# Patient Record
Sex: FEMALE | NOT HISPANIC OR LATINO | Employment: OTHER | URBAN - METROPOLITAN AREA
[De-identification: names, ages, dates, MRNs, and addresses within clinical notes are randomized per-mention and may not be internally consistent; named-entity substitution may affect disease eponyms.]

---

## 2021-03-09 ENCOUNTER — HOSPITAL ENCOUNTER (OUTPATIENT)
Dept: VACCINE CLINIC | Facility: HOSPITAL | Age: 71
Discharge: HOME | End: 2021-03-09
Attending: INTERNAL MEDICINE

## 2021-03-30 ENCOUNTER — HOSPITAL ENCOUNTER (OUTPATIENT)
Dept: VACCINE CLINIC | Facility: HOSPITAL | Age: 71
Discharge: HOME | End: 2021-03-30
Attending: INTERNAL MEDICINE

## 2023-06-14 PROBLEM — I48.91 A-FIB: Status: ACTIVE | Noted: 2023-06-14

## 2023-06-15 PROBLEM — I48.91 NEW ONSET A-FIB: Status: ACTIVE | Noted: 2023-06-15

## 2023-09-22 ENCOUNTER — TELEPHONE (OUTPATIENT)
Dept: CARDIOLOGY | Facility: CLINIC | Age: 73
End: 2023-09-22
Payer: MEDICARE

## 2023-09-22 ENCOUNTER — ANTICOAGULATION VISIT (OUTPATIENT)
Dept: CARDIOLOGY | Facility: CLINIC | Age: 73
End: 2023-09-22
Payer: MEDICARE

## 2023-09-22 LAB — INR PPP: 2.1

## 2023-09-22 NOTE — PROGRESS NOTES
PCP is wanting to transfer monitoring of INR to cardiology.       INR   1.54 (09/18/23)  2.10 (09/21/23)     Range 2.0-3.0  Dose: 3 mg Mon/ Wed/ Sat/ Sun    4 mg Tues/ Thurs/ Fri     Patient is currently trying to get [facility] out to draw inr, appointment set for next week. Patient said she is disabled and only able to get rides 2 times a month.     Please advise

## 2023-09-22 NOTE — TELEPHONE ENCOUNTER
Caller: TAYLOR TUCKER    Relationship: PCP    What was the call regarding: PATIENTS PCP CALLED IN ASKING IF DR. CRAIG CAN TAKE OVER THE PATIENT INR AND WARFARIN AS THEY CAN'T HANDLE THAT. THEY DID CHECK PATIENT'S INR YESTERDAY AND IT WAS 2.1.     Is it okay if the provider responds through MyChart: NO

## 2023-09-25 ENCOUNTER — TELEPHONE (OUTPATIENT)
Dept: CARDIOLOGY | Facility: CLINIC | Age: 73
End: 2023-09-25

## 2023-09-25 ENCOUNTER — ANTICOAGULATION VISIT (OUTPATIENT)
Dept: CARDIOLOGY | Facility: CLINIC | Age: 73
End: 2023-09-25

## 2023-09-25 DIAGNOSIS — I48.19 PERSISTENT ATRIAL FIBRILLATION: Primary | ICD-10-CM

## 2023-09-25 LAB — INR PPP: 2.3 (ref 2–3)

## 2023-09-25 RX ORDER — WARFARIN SODIUM 2 MG/1
4 TABLET ORAL DAILY
Qty: 90 TABLET | Refills: 1 | Status: SHIPPED | OUTPATIENT
Start: 2023-09-25

## 2023-09-25 RX ORDER — FUROSEMIDE 20 MG/1
40 TABLET ORAL 2 TIMES DAILY
Qty: 360 TABLET | Refills: 1 | Status: SHIPPED | OUTPATIENT
Start: 2023-09-25

## 2023-09-25 RX ORDER — WARFARIN SODIUM 2 MG/1
2 TABLET ORAL DAILY
Qty: 90 TABLET | Refills: 1 | Status: SHIPPED | OUTPATIENT
Start: 2023-09-25 | End: 2023-09-25 | Stop reason: SDUPTHER

## 2023-09-25 NOTE — TELEPHONE ENCOUNTER
Caller: Inez Doyle    Relationship: Self    Best call back number: [phone]     What medication are you requesting: WARFARIN 2 MG    What are your current symptoms: PATIENT DOES NOT KNOW WHY SHE IS TAKING THE MEDICATION OTHER THAN CONTROLLING HER INR    How long have you been experiencing symptoms: N/A    Have you had these symptoms before:    [] Yes  [] No    Have you been treated for these symptoms before:   [x] Yes  [] No    If a prescription is needed, what is your preferred pharmacy and phone number:  [facility] [address] [phone]    Additional notes: HER ORIGINAL PRESCRIPTION IS NO LONGER PRACTICING AND HER NEW PCP STATED THAT DR. CRAIG HAD TO TAKE OVER HER WARFARIN PRESCRIPTION. SHE IS COMPLETELY OUT

## 2023-09-25 NOTE — PROGRESS NOTES
Lab Results   Component Value Date    INR 2.30 09/25/2023    INR 2.10 09/21/2023    INR 1.20 (H) 06/14/2023    PROTIME 15.3 (H) 06/14/2023    Atrial fibrillation   Range 2.0-3.0  4 mg  [facility] nurse     Pt is aware of results and instructions.

## 2023-09-25 NOTE — TELEPHONE ENCOUNTER
Caller: Inez Doyle    Relationship: Self    Best call back number: [phone]     Requested Prescriptions:   Requested Prescriptions     Pending Prescriptions Disp Refills    furosemide (LASIX) 20 MG tablet 120 tablet 1     Sig: Take 2 tablets by mouth 2 (Two) Times a Day for 60 days.        [facility] [address] [phone] [phone] FX     Last office visit with prescribing clinician: 7/18/2023   Last telemedicine visit with prescribing clinician: Visit date not found   Next office visit with prescribing clinician: 2/13/2024     Additional details provided by patient: PATIENT ADDED THAT SHE IS NEEDING OTHER MEDICATION FILLED AS WELL BUT IS NOT SURE WHAT ALL OF IT IS AND THAT THE PHARMACY WOULD KNOW. SIMILAR TO PREVIOUS MESSAGE, PATIENT STATED THAT HER PCP WON'T FILL THEM AND IS WANTING DR. CRAIG TO TAKE OVER THE MEDICATION     Does the patient have less than a 3 day supply:  [x] Yes  [] No    Would you like a call back once the refill request has been completed: [x] Yes [] No    If the office needs to give you a call back, can they leave a voicemail: [] Yes [] No    Annabelle Toure Rep   09/25/23 10:23 EDT

## 2023-09-26 ENCOUNTER — TELEPHONE (OUTPATIENT)
Dept: CARDIOLOGY | Facility: CLINIC | Age: 73
End: 2023-09-26

## 2023-09-26 NOTE — TELEPHONE ENCOUNTER
The [facility] received a fax that requires your attention. The document has been indexed to the patient’s chart for your review.      Reason for sending: EXTERNAL MEDICAL RECORD NOTIFICATION     [facility]    Name of Sender: [facility] [place]     Date Indexed: 9.26.23

## 2023-10-19 ENCOUNTER — TELEPHONE (OUTPATIENT)
Dept: CARDIOLOGY | Facility: CLINIC | Age: 73
End: 2023-10-19
Payer: MEDICARE

## 2023-10-19 NOTE — TELEPHONE ENCOUNTER
Filled out forms for Remote INR and Mdinr for home monitoring. Forms given to Debra to be scanned in

## 2023-10-24 ENCOUNTER — TELEPHONE (OUTPATIENT)
Dept: CARDIOLOGY | Facility: CLINIC | Age: 73
End: 2023-10-24

## 2023-10-25 ENCOUNTER — TELEPHONE (OUTPATIENT)
Dept: CARDIOLOGY | Facility: CLINIC | Age: 73
End: 2023-10-25
Payer: MEDICARE

## 2023-10-25 NOTE — TELEPHONE ENCOUNTER
Pt called and was denied at home INR and Daisha has filled out and sent in an at home INR for the pt to do herself but has not been completed yet. She is do an INR and wants to talk to her provider to see what she can do because she is very concerned and scared. Please reach out.

## 2023-10-25 NOTE — TELEPHONE ENCOUNTER
Called pt and she said it cost her 70 dollars to go to the hospital and wants to wait until we hear from the at home INR machine that could take over a week or 2 to hear if she gets approved. I sent her to Valentina DO to advise.

## 2023-11-01 ENCOUNTER — TELEPHONE (OUTPATIENT)
Dept: CARDIOLOGY | Facility: CLINIC | Age: 73
End: 2023-11-01

## 2023-11-01 NOTE — TELEPHONE ENCOUNTER
The [facility] received a fax that requires your attention. The document has been indexed to the patient’s chart for your review.      Reason for sending: EXTERNAL MEDICAL RECORD NOTIFICATION     Documents Description: PHYS ORD-[facility] AT HOME SIGN/RETURN-11.1.23    Name of Sender: [facility] [place]     Date Indexed: 11.1.23

## 2023-11-07 ENCOUNTER — TELEPHONE (OUTPATIENT)
Dept: CARDIOLOGY | Facility: CLINIC | Age: 73
End: 2023-11-07
Payer: MEDICARE

## 2023-11-07 NOTE — TELEPHONE ENCOUNTER
The wrong form was sent to [employer] for remote inr. [employer] is the company for [employer]. Filled out [employer] form and faxed w/ins .

## 2023-11-17 ENCOUNTER — ANTICOAGULATION VISIT (OUTPATIENT)
Dept: CARDIOLOGY | Facility: CLINIC | Age: 73
End: 2023-11-17
Payer: MEDICARE

## 2023-11-17 DIAGNOSIS — I48.19 PERSISTENT ATRIAL FIBRILLATION: Primary | ICD-10-CM

## 2023-11-17 LAB — INR PPP: 1.8

## 2023-11-17 NOTE — PROGRESS NOTES
Lab Results   Component Value Date    INR 1.80 11/17/2023    INR 2.30 09/25/2023    INR 2.10 09/21/2023    PROTIME 15.3 (H) 06/14/2023     DX  Atrial fibrillation   Taking 4MG every day   Range 2.0-3.0  Home Remote

## 2023-11-21 ENCOUNTER — ANTICOAGULATION VISIT (OUTPATIENT)
Dept: CARDIOLOGY | Facility: CLINIC | Age: 73
End: 2023-11-21
Payer: MEDICARE

## 2023-11-21 DIAGNOSIS — I48.19 PERSISTENT ATRIAL FIBRILLATION: Primary | ICD-10-CM

## 2023-11-21 LAB — INR PPP: 2

## 2023-11-21 NOTE — PROGRESS NOTES
Lab Results   Component Value Date    INR 1.80 11/17/2023    INR 2.30 09/25/2023    INR 2.10 09/21/2023    PROTIME 15.3 (H) 06/14/2023     Dx:afib  Pt taking 6/4/4  Range: 2.0-3.0  remote

## 2023-11-21 NOTE — PROGRESS NOTES
Chart was incorrect it didn't save in the new one from the 20th please advise  Lab Results   Component Value Date    INR 2.00 11/20/2023    INR 1.80 11/17/2023    INR 2.30 09/25/2023    PROTIME 15.3 (H) 06/14/2023     Dx: afib  Pt taking 6/4/4  Range: 2.0-3.0  remote   81.6

## 2023-11-27 ENCOUNTER — ANTICOAGULATION VISIT (OUTPATIENT)
Dept: CARDIOLOGY | Facility: CLINIC | Age: 73
End: 2023-11-27
Payer: MEDICARE

## 2023-11-27 DIAGNOSIS — I48.19 PERSISTENT ATRIAL FIBRILLATION: ICD-10-CM

## 2023-11-27 DIAGNOSIS — I48.19 PERSISTENT ATRIAL FIBRILLATION: Primary | ICD-10-CM

## 2023-11-27 LAB — INR PPP: 2

## 2023-11-27 NOTE — PROGRESS NOTES
Lab Results   Component Value Date    INR 2.00 11/27/2023    INR 2.00 11/20/2023    INR 1.80 11/17/2023    PROTIME 15.3 (H) 06/14/2023     Dx: afib  Pt taking 6/4/4  Range: 2.0-3.0  remote

## 2023-11-28 DIAGNOSIS — I48.19 PERSISTENT ATRIAL FIBRILLATION: ICD-10-CM

## 2023-12-06 ENCOUNTER — ANTICOAGULATION VISIT (OUTPATIENT)
Dept: CARDIOLOGY | Facility: CLINIC | Age: 73
End: 2023-12-06
Payer: MEDICARE

## 2023-12-06 DIAGNOSIS — I48.19 PERSISTENT ATRIAL FIBRILLATION: Primary | ICD-10-CM

## 2023-12-06 DIAGNOSIS — I48.19 PERSISTENT ATRIAL FIBRILLATION: ICD-10-CM

## 2023-12-06 LAB — INR PPP: 3

## 2023-12-06 NOTE — PROGRESS NOTES
Lab Results   Component Value Date    INR 3.00 12/06/2023    INR 2.00 11/27/2023    INR 2.00 11/20/2023    PROTIME 15.3 (H) 06/14/2023     DX  Atrial fibrillation   Taking 6/4/4  Range 2.0-3.0  Remote

## 2023-12-06 NOTE — ADDENDUM NOTE
Addended by: RACHELE DAVIS on: 12/6/2023 01:03 PM     Modules accepted: Orders, Level of Service

## 2023-12-12 ENCOUNTER — ANTICOAGULATION VISIT (OUTPATIENT)
Dept: CARDIOLOGY | Facility: CLINIC | Age: 73
End: 2023-12-12
Payer: MEDICARE

## 2023-12-12 DIAGNOSIS — I48.19 PERSISTENT ATRIAL FIBRILLATION: Primary | ICD-10-CM

## 2023-12-12 LAB — INR PPP: 2.5

## 2023-12-12 NOTE — PROGRESS NOTES
Lab Results   Component Value Date    INR 2.50 12/12/2023    INR 3.00 12/06/2023    INR 2.00 11/27/2023    PROTIME 15.3 (H) 06/14/2023     Dx: afib  Pt taking 4/4/6  Range: 2.0-3.0  remote

## 2023-12-19 ENCOUNTER — ANTICOAGULATION VISIT (OUTPATIENT)
Dept: CARDIOLOGY | Facility: CLINIC | Age: 73
End: 2023-12-19
Payer: MEDICARE

## 2023-12-19 DIAGNOSIS — I48.19 PERSISTENT ATRIAL FIBRILLATION: Primary | ICD-10-CM

## 2023-12-19 DIAGNOSIS — I48.19 PERSISTENT ATRIAL FIBRILLATION: ICD-10-CM

## 2023-12-19 LAB — INR PPP: 1.8

## 2023-12-19 NOTE — PROGRESS NOTES
Patient states she only takes 6 mg on Fridays, 4 mg all other days. Directions still the same? Thank you.

## 2023-12-19 NOTE — PROGRESS NOTES
Lab Results   Component Value Date    INR 1.80 12/19/2023    INR 2.50 12/12/2023    INR 3.00 12/06/2023    PROTIME 15.3 (H) 06/14/2023     Dx: afib  Pt taking 4/4/6  Range: 2.0-3.0  remote

## 2023-12-21 ENCOUNTER — TELEPHONE (OUTPATIENT)
Dept: CARDIOLOGY | Facility: CLINIC | Age: 73
End: 2023-12-21

## 2023-12-21 ENCOUNTER — ANTICOAGULATION VISIT (OUTPATIENT)
Dept: CARDIOLOGY | Facility: CLINIC | Age: 73
End: 2023-12-21
Payer: MEDICARE

## 2023-12-21 DIAGNOSIS — I48.19 PERSISTENT ATRIAL FIBRILLATION: Primary | ICD-10-CM

## 2023-12-21 LAB — INR PPP: 1.8

## 2023-12-21 NOTE — TELEPHONE ENCOUNTER
Caller:LADI MCCRAY    Phone:[phone]    INR Number Being Reported:  1.8    Day of the Week  Monday Tuesday 12/19 Wednesday 12/20 Thursday Friday Saturday Sunday     Dose Taken  5 5

## 2023-12-21 NOTE — PROGRESS NOTES
INR - 1.8    Currently taking 5mg qd.  Last checked 12/19/2023 with a result of 1.8.  Range: 2.0-3.0  Testing done at home.

## 2023-12-26 ENCOUNTER — ANTICOAGULATION VISIT (OUTPATIENT)
Dept: CARDIOLOGY | Facility: CLINIC | Age: 73
End: 2023-12-26
Payer: MEDICARE

## 2023-12-26 DIAGNOSIS — I48.19 PERSISTENT ATRIAL FIBRILLATION: Primary | ICD-10-CM

## 2023-12-26 LAB — INR PPP: 1.7 (ref 2–3)

## 2023-12-26 NOTE — PROGRESS NOTES
Lab Results   Component Value Date    INR 1.70 (A) 12/26/2023    INR 1.80 12/21/2023    INR 1.80 12/19/2023    PROTIME 15.3 (H) 06/14/2023    Atrial fibrillation   Range 2.0-3.0  6/5/5  Self test    Attempted call, no answer , no vm x 2    Pt is aware of results and instructions.

## 2023-12-28 ENCOUNTER — ANTICOAGULATION VISIT (OUTPATIENT)
Dept: CARDIOLOGY | Facility: CLINIC | Age: 73
End: 2023-12-28
Payer: MEDICARE

## 2023-12-28 DIAGNOSIS — I48.19 PERSISTENT ATRIAL FIBRILLATION: ICD-10-CM

## 2023-12-28 DIAGNOSIS — I48.19 PERSISTENT ATRIAL FIBRILLATION: Primary | ICD-10-CM

## 2023-12-28 NOTE — PROGRESS NOTES
Lab Results   Component Value Date    INR 1.70 (A) 12/26/2023    INR 1.80 12/21/2023    INR 1.80 12/19/2023    PROTIME 15.3 (H) 06/14/2023     DX Atrial fibrillation   Taking 6mg daily  Range 2.0-3.0  [facility]

## 2024-01-03 ENCOUNTER — ANTICOAGULATION VISIT (OUTPATIENT)
Dept: CARDIOLOGY | Facility: CLINIC | Age: 74
End: 2024-01-03
Payer: MEDICARE

## 2024-01-03 ENCOUNTER — TELEPHONE (OUTPATIENT)
Dept: CARDIOLOGY | Facility: CLINIC | Age: 74
End: 2024-01-03
Payer: MEDICARE

## 2024-01-03 DIAGNOSIS — I48.19 PERSISTENT ATRIAL FIBRILLATION: Primary | ICD-10-CM

## 2024-01-03 LAB — INR PPP: 3

## 2024-01-03 NOTE — PROGRESS NOTES
Lab Results   Component Value Date    INR 3.00 01/03/2024    INR 1.70 (A) 12/26/2023    INR 1.80 12/21/2023    PROTIME 15.3 (H) 06/14/2023     Dx:afib  Pt taking 7/6/6  Range: 2.0-3.0  Remote    Call Justina WALKER back [phone]

## 2024-01-03 NOTE — TELEPHONE ENCOUNTER
"Alexa KING called and relayed to me that she spoke with MsYandy Yanira and \"is home bound and does not have any INR testing supplies until approximately the 8th.\" She wanted to know if it is ok to check her INR when her supplies arrive. (She was do to check yesterdat,Tuesday)  "

## 2024-01-09 ENCOUNTER — ANTICOAGULATION VISIT (OUTPATIENT)
Dept: CARDIOLOGY | Facility: CLINIC | Age: 74
End: 2024-01-09
Payer: MEDICARE

## 2024-01-09 DIAGNOSIS — I48.19 PERSISTENT ATRIAL FIBRILLATION: Primary | ICD-10-CM

## 2024-01-09 LAB — INR PPP: 3.5

## 2024-01-09 NOTE — PROGRESS NOTES
INR - 3.5    Currently taking 6/6/7mg rotation.  Last checked 1/3/2024 with a result of 3.0.  Range: 2.0-3.0  Testing done at home.

## 2024-01-12 ENCOUNTER — ANTICOAGULATION VISIT (OUTPATIENT)
Dept: CARDIOLOGY | Facility: CLINIC | Age: 74
End: 2024-01-12
Payer: MEDICARE

## 2024-01-12 DIAGNOSIS — I48.19 PERSISTENT ATRIAL FIBRILLATION: Primary | ICD-10-CM

## 2024-01-12 DIAGNOSIS — I48.19 PERSISTENT ATRIAL FIBRILLATION: ICD-10-CM

## 2024-01-12 LAB — INR PPP: 3.6

## 2024-01-12 NOTE — PROGRESS NOTES
INR - 3.6    Currently taking 6mg qd.  Last checked 1/9/2024 with a result of 3.5.  Range: 2.0-3.0  Testing done at home.    Per MIKE Mercedes take 3mg today then 5mg daily and recheck Monday.      Patient aware of instructions.

## 2024-01-16 ENCOUNTER — ANTICOAGULATION VISIT (OUTPATIENT)
Dept: CARDIOLOGY | Facility: CLINIC | Age: 74
End: 2024-01-16
Payer: MEDICARE

## 2024-01-16 DIAGNOSIS — I48.19 PERSISTENT ATRIAL FIBRILLATION: Primary | ICD-10-CM

## 2024-01-16 DIAGNOSIS — I48.19 PERSISTENT ATRIAL FIBRILLATION: ICD-10-CM

## 2024-01-16 LAB — INR PPP: 3.7

## 2024-01-16 NOTE — PROGRESS NOTES
Spoke to patient and patient acknowledge and aware of lab results  She states she is running out of supplies and can't recheck again until Tuesday 1/23/2024

## 2024-01-16 NOTE — PROGRESS NOTES
Lab Results   Component Value Date    INR 3.70 01/16/2024    INR 3.60 01/12/2024    INR 3.50 01/09/2024    PROTIME 15.3 (H) 06/14/2023     DXAtrial fibrillation   Taking 5mg daily  Range 2.0-3.0  Remote

## 2024-01-18 RX ORDER — WARFARIN SODIUM 2 MG/1
TABLET ORAL
Qty: 90 TABLET | Refills: 1 | Status: SHIPPED | OUTPATIENT
Start: 2024-01-18

## 2024-01-24 ENCOUNTER — ANTICOAGULATION VISIT (OUTPATIENT)
Dept: CARDIOLOGY | Facility: CLINIC | Age: 74
End: 2024-01-24
Payer: MEDICARE

## 2024-01-24 DIAGNOSIS — I48.19 PERSISTENT ATRIAL FIBRILLATION: Primary | ICD-10-CM

## 2024-01-24 LAB — INR PPP: 3.4

## 2024-01-24 NOTE — PROGRESS NOTES
Lab Results   Component Value Date    INR 3.40 01/24/2024    INR 3.70 01/16/2024    INR 3.60 01/12/2024    PROTIME 15.3 (H) 06/14/2023     Dx: afib  Pt taking 4  Range: 2.0-3.0  remote

## 2024-01-29 LAB — INR PPP: 2.3

## 2024-01-30 ENCOUNTER — ANTICOAGULATION VISIT (OUTPATIENT)
Dept: CARDIOLOGY | Facility: CLINIC | Age: 74
End: 2024-01-30
Payer: MEDICARE

## 2024-01-30 DIAGNOSIS — I48.19 PERSISTENT ATRIAL FIBRILLATION: Primary | ICD-10-CM

## 2024-01-30 DIAGNOSIS — I48.19 PERSISTENT ATRIAL FIBRILLATION: ICD-10-CM

## 2024-01-30 NOTE — PROGRESS NOTES
Lab Results   Component Value Date    INR 2.30 01/29/2024    INR 3.40 01/24/2024    INR 3.70 01/16/2024    PROTIME 15.3 (H) 06/14/2023     DX Atrial fibrillation   Taking 3/4/4mg every 3 days   Range 2.0-3.0  Remote

## 2024-02-05 ENCOUNTER — ANTICOAGULATION VISIT (OUTPATIENT)
Dept: CARDIOLOGY | Facility: CLINIC | Age: 74
End: 2024-02-05
Payer: MEDICARE

## 2024-02-05 DIAGNOSIS — I48.19 PERSISTENT ATRIAL FIBRILLATION: Primary | ICD-10-CM

## 2024-02-05 DIAGNOSIS — I48.19 PERSISTENT ATRIAL FIBRILLATION: ICD-10-CM

## 2024-02-05 LAB — INR PPP: 1.8

## 2024-02-05 NOTE — PROGRESS NOTES
Lab Results   Component Value Date    INR 1.80 02/05/2024    INR 2.30 01/29/2024    INR 3.40 01/24/2024    PROTIME 15.3 (H) 06/14/2023     DX Atrial fibrillation   Taking 3/4/4mg Every 3 days   Range 2.0-3.0  Remote

## 2024-02-12 ENCOUNTER — ANTICOAGULATION VISIT (OUTPATIENT)
Dept: CARDIOLOGY | Facility: CLINIC | Age: 74
End: 2024-02-12
Payer: MEDICARE

## 2024-02-12 DIAGNOSIS — I48.19 PERSISTENT ATRIAL FIBRILLATION: Primary | ICD-10-CM

## 2024-02-12 DIAGNOSIS — I48.19 PERSISTENT ATRIAL FIBRILLATION: ICD-10-CM

## 2024-02-12 LAB — INR PPP: 1.7 (ref 2–3)

## 2024-02-16 ENCOUNTER — ANTICOAGULATION VISIT (OUTPATIENT)
Dept: CARDIOLOGY | Facility: CLINIC | Age: 74
End: 2024-02-16
Payer: MEDICARE

## 2024-02-16 DIAGNOSIS — I48.19 PERSISTENT ATRIAL FIBRILLATION: Primary | ICD-10-CM

## 2024-02-16 LAB — INR PPP: 1.6

## 2024-02-22 ENCOUNTER — TELEPHONE (OUTPATIENT)
Dept: CARDIOLOGY | Facility: CLINIC | Age: 74
End: 2024-02-22
Payer: MEDICARE

## 2024-02-22 NOTE — TELEPHONE ENCOUNTER
Caller: Inez Doyle    Relationship to patient: Self    Best call back number:  [phone]    Patient is needing: PATIENT RAN OUT OF STRIPS TO TAKE INR. SHE CALLED THE COMPANY THEY TOLD HER THEY WOULD SEND MORE THIS WEEKEND AND NOT TO TAKE IT AGAIN UNTIL THEY WALK HER THROUGH IT. SHE IS CHECKING TO SEE IF SHE NEEDS TO STAY ON 5MG OF WARFRIN.

## 2024-02-26 ENCOUNTER — ANTICOAGULATION VISIT (OUTPATIENT)
Dept: CARDIOLOGY | Facility: CLINIC | Age: 74
End: 2024-02-26
Payer: MEDICARE

## 2024-02-26 DIAGNOSIS — I48.19 PERSISTENT ATRIAL FIBRILLATION: Primary | ICD-10-CM

## 2024-02-26 DIAGNOSIS — I48.19 PERSISTENT ATRIAL FIBRILLATION: ICD-10-CM

## 2024-02-26 LAB — INR PPP: 2.4

## 2024-02-26 NOTE — PROGRESS NOTES
Lab Results   Component Value Date    INR 2.40 02/26/2024    INR 1.60 02/16/2024    INR 1.70 (A) 02/12/2024    PROTIME 15.3 (H) 06/14/2023     DX Atrial fibrillation   Taking 5MG daily   Range 2.0-3.0  remote

## 2024-03-12 ENCOUNTER — ANTICOAGULATION VISIT (OUTPATIENT)
Dept: CARDIOLOGY | Facility: CLINIC | Age: 74
End: 2024-03-12
Payer: MEDICARE

## 2024-03-12 DIAGNOSIS — I48.19 PERSISTENT ATRIAL FIBRILLATION: Primary | ICD-10-CM

## 2024-03-12 LAB — INR PPP: 1.7 (ref 2–3)

## 2024-03-12 NOTE — PROGRESS NOTES
Lab Results   Component Value Date    INR 1.70 (A) 03/12/2024    INR 2.40 02/26/2024    INR 1.60 02/16/2024    PROTIME 15.3 (H) 06/14/2023    Atrial fibrillation   Range 2.0-3.0  5 mg  Self test    Pt is aware of results and instructions.

## 2024-03-15 ENCOUNTER — TELEPHONE (OUTPATIENT)
Dept: CARDIOLOGY | Facility: CLINIC | Age: 74
End: 2024-03-15
Payer: MEDICARE

## 2024-03-15 ENCOUNTER — ANTICOAGULATION VISIT (OUTPATIENT)
Dept: CARDIOLOGY | Facility: CLINIC | Age: 74
End: 2024-03-15
Payer: MEDICARE

## 2024-03-15 DIAGNOSIS — I48.19 PERSISTENT ATRIAL FIBRILLATION: Primary | ICD-10-CM

## 2024-03-15 DIAGNOSIS — I48.19 PERSISTENT ATRIAL FIBRILLATION: ICD-10-CM

## 2024-03-15 LAB — INR PPP: 1.9

## 2024-03-15 RX ORDER — WARFARIN SODIUM 2 MG/1
TABLET ORAL
Qty: 270 TABLET | Refills: 3 | Status: SHIPPED | OUTPATIENT
Start: 2024-03-15

## 2024-03-15 RX ORDER — WARFARIN SODIUM 2 MG/1
TABLET ORAL
Qty: 90 TABLET | Refills: 1 | Status: SHIPPED | OUTPATIENT
Start: 2024-03-15

## 2024-03-15 RX ORDER — WARFARIN SODIUM 5 MG/1
5 TABLET ORAL
Qty: 90 TABLET | Refills: 3 | Status: SHIPPED | OUTPATIENT
Start: 2024-03-15

## 2024-03-15 NOTE — TELEPHONE ENCOUNTER
Caller: Inez Doyle    Relationship: Self    Best call back number: [phone]    Requested Prescriptions:   Requested Prescriptions     Pending Prescriptions Disp Refills    warfarin (COUMADIN) 2 MG tablet 90 tablet 1        Pharmacy where request should be sent:   [facility]    Last office visit with prescribing clinician: Visit date not found   Last telemedicine visit with prescribing clinician: Visit date not found   Next office visit with prescribing clinician: Visit date not found     Additional details provided by patient: SHE NEEDS ANOTHER PRESCRIPTION SENT TO THE PHARMACY THE INSURANCE STATES THEY WON'T COVER IT UNTIL 3-23-24 SHE MAY BE ABLE TO STRETCH IT TILL MONDAY IF THEY DECREASE THE MG SHE TAKES.    Does the patient have less than a 3 day supply:  [x] Yes  [] No    Would you like a call back once the refill request has been completed: [x] Yes [] No    If the office needs to give you a call back, can they leave a voicemail: [x] Yes [] No    Annabelle Lopez Rep   03/15/24 12:53 EDT

## 2024-03-15 NOTE — PROGRESS NOTES
Lab Results   Component Value Date    INR 1.90 03/15/2024    INR 1.70 (A) 03/12/2024    INR 2.40 02/26/2024    PROTIME 15.3 (H) 06/14/2023     DX: AFIB   PT TAKING 5  RANGE: 2.0-3.0  REMOTE

## 2024-03-15 NOTE — PROGRESS NOTES
Pt has been taking a 6/5 rotation. Per MARINO Ortega, continue 6/5 recheck Tues. Pt is aware of results and instructions.

## 2024-03-18 DIAGNOSIS — I48.19 PERSISTENT ATRIAL FIBRILLATION: ICD-10-CM

## 2024-03-18 RX ORDER — FUROSEMIDE 20 MG/1
40 TABLET ORAL 2 TIMES DAILY
Qty: 360 TABLET | Refills: 1 | Status: SHIPPED | OUTPATIENT
Start: 2024-03-18

## 2024-03-18 RX ORDER — WARFARIN SODIUM 5 MG/1
5 TABLET ORAL
Qty: 90 TABLET | Refills: 3 | OUTPATIENT
Start: 2024-03-18

## 2024-03-18 RX ORDER — WARFARIN SODIUM 2 MG/1
TABLET ORAL
Qty: 90 TABLET | Refills: 1 | OUTPATIENT
Start: 2024-03-18

## 2024-03-18 NOTE — TELEPHONE ENCOUNTER
Caller: Inez Doyle    Relationship: Self    Best call back number: [phone]     Requested Prescriptions:   Requested Prescriptions     Pending Prescriptions Disp Refills    warfarin (COUMADIN) 5 MG tablet 90 tablet 3     Sig: Take 1 tablet by mouth Daily.    warfarin (COUMADIN) 2 MG tablet 90 tablet 1     Sig: Take 2 mg daily or as directed    furosemide (LASIX) 20 MG tablet 360 tablet 1     Sig: Take 2 tablets by mouth 2 (Two) Times a Day.        Pharmacy where request should be sent: [address] [facility] - [phone] [facility] [phone] FX     Last office visit with prescribing clinician: Visit date not found   Last telemedicine visit with prescribing clinician: Visit date not found   Next office visit with prescribing clinician: Visit date not found     Additional details provided by patient: PT HAS 2-3 OF ALL MEDICATIONS    Does the patient have less than a 3 day supply:  [x] Yes  [] No    Would you like a call back once the refill request has been completed: [] Yes [] No    If the office needs to give you a call back, can they leave a voicemail: [] Yes [] No    Annabelle Toure Rep   03/18/24 09:52 EDT

## 2024-03-19 ENCOUNTER — ANTICOAGULATION VISIT (OUTPATIENT)
Dept: CARDIOLOGY | Facility: CLINIC | Age: 74
End: 2024-03-19
Payer: MEDICARE

## 2024-03-19 DIAGNOSIS — I48.19 PERSISTENT ATRIAL FIBRILLATION: Primary | ICD-10-CM

## 2024-03-19 LAB — INR PPP: 2.3 (ref 2–3)

## 2024-03-26 ENCOUNTER — ANTICOAGULATION VISIT (OUTPATIENT)
Dept: CARDIOLOGY | Facility: CLINIC | Age: 74
End: 2024-03-26
Payer: MEDICARE

## 2024-03-26 DIAGNOSIS — I48.19 PERSISTENT ATRIAL FIBRILLATION: Primary | ICD-10-CM

## 2024-03-26 LAB — INR PPP: 3.5

## 2024-03-26 NOTE — PROGRESS NOTES
Lab Results   Component Value Date    INR 3.50 03/26/2024    INR 2.30 03/18/2024    INR 1.90 03/15/2024    PROTIME 15.3 (H) 06/14/2023     Dx: afib  Pt taking 6/5  Range: 2.0-3.0  Mdinr

## 2024-03-26 NOTE — PROGRESS NOTES
Spoke with pt. Gave results and plan.   She wants to know if she can do a 2mg today and then 5 mg till Friday.

## 2024-04-01 ENCOUNTER — ANTICOAGULATION VISIT (OUTPATIENT)
Dept: CARDIOLOGY | Facility: CLINIC | Age: 74
End: 2024-04-01
Payer: MEDICARE

## 2024-04-01 DIAGNOSIS — I48.19 PERSISTENT ATRIAL FIBRILLATION: ICD-10-CM

## 2024-04-01 DIAGNOSIS — I48.19 PERSISTENT ATRIAL FIBRILLATION: Primary | ICD-10-CM

## 2024-04-01 LAB — INR PPP: 2.7

## 2024-04-01 NOTE — PROGRESS NOTES
Lab Results   Component Value Date    INR 2.70 04/01/2024    INR 3.50 03/26/2024    INR 2.30 03/18/2024    PROTIME 15.3 (H) 06/14/2023     DX: afib  Pt taking 5  Range: 2.0-3.0  remote

## 2024-04-08 ENCOUNTER — ANTICOAGULATION VISIT (OUTPATIENT)
Dept: CARDIOLOGY | Facility: CLINIC | Age: 74
End: 2024-04-08
Payer: MEDICARE

## 2024-04-08 DIAGNOSIS — I48.19 PERSISTENT ATRIAL FIBRILLATION: ICD-10-CM

## 2024-04-08 DIAGNOSIS — I48.19 PERSISTENT ATRIAL FIBRILLATION: Primary | ICD-10-CM

## 2024-04-08 LAB — INR PPP: 3.1

## 2024-04-08 RX ORDER — WARFARIN SODIUM 2 MG/1
TABLET ORAL
Qty: 90 TABLET | Refills: 3 | Status: SHIPPED | OUTPATIENT
Start: 2024-04-08

## 2024-04-08 RX ORDER — WARFARIN SODIUM 5 MG/1
5 TABLET ORAL
Qty: 90 TABLET | Refills: 3 | Status: SHIPPED | OUTPATIENT
Start: 2024-04-08

## 2024-04-08 NOTE — PROGRESS NOTES
Patient isn't able to get coumadin RX today (has 5 mg). Will be able to  RX 4 mg tomorrow. Please advise.

## 2024-04-08 NOTE — PROGRESS NOTES
Lab Results   Component Value Date    INR 3.10 04/08/2024    INR 2.70 04/01/2024    INR 3.50 03/26/2024    PROTIME 15.3 (H) 06/14/2023     INR 3.1 on 5 mg  MdINR Home Remote  Range 2-3  Afib

## 2024-04-17 ENCOUNTER — ANTICOAGULATION VISIT (OUTPATIENT)
Dept: CARDIOLOGY | Facility: CLINIC | Age: 74
End: 2024-04-17
Payer: MEDICARE

## 2024-04-17 DIAGNOSIS — I48.19 PERSISTENT ATRIAL FIBRILLATION: Primary | ICD-10-CM

## 2024-04-17 DIAGNOSIS — I48.19 PERSISTENT ATRIAL FIBRILLATION: ICD-10-CM

## 2024-04-17 LAB — INR PPP: 2.4

## 2024-04-17 NOTE — PROGRESS NOTES
PER FLOR BREWER,  Do 4/5/5 recheck one week.    Spoke with pt. Gave results and plan. No concerns.

## 2024-04-17 NOTE — PROGRESS NOTES
Lab Results   Component Value Date    INR 2.40 04/17/2024    INR 3.10 04/08/2024    INR 2.70 04/01/2024    PROTIME 15.3 (H) 06/14/2023     DX:afib  Pt taking 2.5 then 4 daily  Range: 2.0-3.0  Remote mdinr

## 2024-04-17 NOTE — PROGRESS NOTES
Patient reported she is taking 4/5/5. INR is therapeutic so continue that dose and recheck in 1 week.

## 2024-04-22 ENCOUNTER — APPOINTMENT (OUTPATIENT)
Dept: GENERAL RADIOLOGY | Facility: HOSPITAL | Age: 74
DRG: 177 | End: 2024-04-22
Payer: MEDICARE

## 2024-04-22 ENCOUNTER — HOSPITAL ENCOUNTER (INPATIENT)
Facility: HOSPITAL | Age: 74
LOS: 5 days | Discharge: HOME HEALTH | DRG: 177 | End: 2024-04-27
Attending: EMERGENCY MEDICINE | Admitting: STUDENT IN AN ORGANIZED HEALTH CARE EDUCATION/TRAINING PROGRAM
Payer: MEDICARE

## 2024-04-22 DIAGNOSIS — Z78.9 DECREASED ACTIVITIES OF DAILY LIVING (ADL): ICD-10-CM

## 2024-04-22 DIAGNOSIS — J96.91 RESPIRATORY FAILURE WITH HYPOXIA, UNSPECIFIED CHRONICITY: ICD-10-CM

## 2024-04-22 DIAGNOSIS — U07.1 COVID-19: Primary | ICD-10-CM

## 2024-04-22 DIAGNOSIS — E66.01 MORBID OBESITY: ICD-10-CM

## 2024-04-22 DIAGNOSIS — R26.2 DIFFICULTY WALKING: ICD-10-CM

## 2024-04-22 DIAGNOSIS — I48.91 ATRIAL FIBRILLATION, UNSPECIFIED TYPE: ICD-10-CM

## 2024-04-22 DIAGNOSIS — J96.01 ACUTE RESPIRATORY FAILURE WITH HYPOXIA: ICD-10-CM

## 2024-04-22 PROBLEM — E11.9 DIABETES: Status: ACTIVE | Noted: 2024-04-22

## 2024-04-22 LAB
ALBUMIN SERPL-MCNC: 3.8 G/DL (ref 3.5–5.2)
ALBUMIN/GLOB SERPL: 1.3 G/DL
ALP SERPL-CCNC: 82 U/L (ref 39–117)
ALT SERPL W P-5'-P-CCNC: 7 U/L (ref 1–33)
ANION GAP SERPL CALCULATED.3IONS-SCNC: 13.5 MMOL/L (ref 5–15)
ARTERIAL PATENCY WRIST A: NORMAL
AST SERPL-CCNC: 10 U/L (ref 1–32)
BASE EXCESS BLDA CALC-SCNC: 1.4 MMOL/L (ref -2–2)
BASOPHILS # BLD AUTO: 0.02 10*3/MM3 (ref 0–0.2)
BASOPHILS NFR BLD AUTO: 0.4 % (ref 0–1.5)
BDY SITE: NORMAL
BILIRUB SERPL-MCNC: 0.6 MG/DL (ref 0–1.2)
BUN SERPL-MCNC: 13 MG/DL (ref 8–23)
BUN/CREAT SERPL: 13.3 (ref 7–25)
CALCIUM SPEC-SCNC: 8.7 MG/DL (ref 8.6–10.5)
CHLORIDE SERPL-SCNC: 99 MMOL/L (ref 98–107)
CO2 SERPL-SCNC: 27.5 MMOL/L (ref 22–29)
COHGB MFR BLD: 0.5 % (ref 0–1.5)
CREAT SERPL-MCNC: 0.98 MG/DL (ref 0.57–1)
D-LACTATE SERPL-SCNC: 1.5 MMOL/L (ref 0.5–2)
DEPRECATED RDW RBC AUTO: 47.5 FL (ref 37–54)
EGFRCR SERPLBLD CKD-EPI 2021: 60.7 ML/MIN/1.73
EOSINOPHIL # BLD AUTO: 0.1 10*3/MM3 (ref 0–0.4)
EOSINOPHIL NFR BLD AUTO: 2.2 % (ref 0.3–6.2)
ERYTHROCYTE [DISTWIDTH] IN BLOOD BY AUTOMATED COUNT: 15.3 % (ref 12.3–15.4)
FHHB: 3.7 % (ref 0–5)
FLUAV SUBTYP SPEC NAA+PROBE: NOT DETECTED
FLUBV RNA ISLT QL NAA+PROBE: NOT DETECTED
GAS FLOW AIRWAY: 2 LPM
GLOBULIN UR ELPH-MCNC: 3 GM/DL
GLUCOSE SERPL-MCNC: 136 MG/DL (ref 65–99)
HCO3 BLDA-SCNC: 25.9 MMOL/L (ref 22–26)
HCT VFR BLD AUTO: 39.6 % (ref 34–46.6)
HGB BLD-MCNC: 12 G/DL (ref 12–15.9)
HGB BLDA-MCNC: 12.1 G/DL (ref 11.7–14.6)
HOLD SPECIMEN: NORMAL
HOLD SPECIMEN: NORMAL
IMM GRANULOCYTES # BLD AUTO: 0.02 10*3/MM3 (ref 0–0.05)
IMM GRANULOCYTES NFR BLD AUTO: 0.4 % (ref 0–0.5)
INHALED O2 CONCENTRATION: 28 %
INR PPP: 2.21 (ref 0.86–1.15)
LACTATE BLDA-SCNC: NORMAL MMOL/L
LYMPHOCYTES # BLD AUTO: 0.85 10*3/MM3 (ref 0.7–3.1)
LYMPHOCYTES NFR BLD AUTO: 18.8 % (ref 19.6–45.3)
MCH RBC QN AUTO: 25.5 PG (ref 26.6–33)
MCHC RBC AUTO-ENTMCNC: 30.3 G/DL (ref 31.5–35.7)
MCV RBC AUTO: 84.3 FL (ref 79–97)
METHGB BLD QL: 0.1 % (ref 0–1.5)
MODALITY: NORMAL
MONOCYTES # BLD AUTO: 0.34 10*3/MM3 (ref 0.1–0.9)
MONOCYTES NFR BLD AUTO: 7.5 % (ref 5–12)
NEUTROPHILS NFR BLD AUTO: 3.19 10*3/MM3 (ref 1.7–7)
NEUTROPHILS NFR BLD AUTO: 70.7 % (ref 42.7–76)
NRBC BLD AUTO-RTO: 0 /100 WBC (ref 0–0.2)
NT-PROBNP SERPL-MCNC: 1538 PG/ML (ref 0–900)
OXYHGB MFR BLDV: 95.7 % (ref 94–99)
PCO2 BLDA: 40.5 MM HG (ref 35–45)
PH BLDA: 7.42 PH UNITS (ref 7.35–7.45)
PLATELET # BLD AUTO: 95 10*3/MM3 (ref 140–450)
PMV BLD AUTO: 11.1 FL (ref 6–12)
PO2 BLD: 341 MM[HG] (ref 0–500)
PO2 BLDA: 95.5 MM HG (ref 80–100)
POTASSIUM SERPL-SCNC: 3.5 MMOL/L (ref 3.5–5.2)
PROT SERPL-MCNC: 6.8 G/DL (ref 6–8.5)
PROTHROMBIN TIME: 24.9 SECONDS (ref 11.8–14.9)
QT INTERVAL: 433 MS
QTC INTERVAL: 480 MS
RBC # BLD AUTO: 4.7 10*6/MM3 (ref 3.77–5.28)
RSV RNA NPH QL NAA+NON-PROBE: NOT DETECTED
SAO2 % BLDCOA: 96.3 % (ref 95–99)
SARS-COV-2 RNA RESP QL NAA+PROBE: DETECTED
SODIUM SERPL-SCNC: 140 MMOL/L (ref 136–145)
TROPONIN T SERPL HS-MCNC: 22 NG/L
WBC NRBC COR # BLD AUTO: 4.52 10*3/MM3 (ref 3.4–10.8)
WHOLE BLOOD HOLD COAG: NORMAL
WHOLE BLOOD HOLD SPECIMEN: NORMAL

## 2024-04-22 PROCEDURE — 82375 ASSAY CARBOXYHB QUANT: CPT | Performed by: EMERGENCY MEDICINE

## 2024-04-22 PROCEDURE — 83050 HGB METHEMOGLOBIN QUAN: CPT | Performed by: EMERGENCY MEDICINE

## 2024-04-22 PROCEDURE — 25010000002 FUROSEMIDE PER 20 MG: Performed by: EMERGENCY MEDICINE

## 2024-04-22 PROCEDURE — 85025 COMPLETE CBC W/AUTO DIFF WBC: CPT | Performed by: EMERGENCY MEDICINE

## 2024-04-22 PROCEDURE — 99285 EMERGENCY DEPT VISIT HI MDM: CPT

## 2024-04-22 PROCEDURE — 71045 X-RAY EXAM CHEST 1 VIEW: CPT

## 2024-04-22 PROCEDURE — 83605 ASSAY OF LACTIC ACID: CPT | Performed by: EMERGENCY MEDICINE

## 2024-04-22 PROCEDURE — 94761 N-INVAS EAR/PLS OXIMETRY MLT: CPT

## 2024-04-22 PROCEDURE — 83880 ASSAY OF NATRIURETIC PEPTIDE: CPT | Performed by: EMERGENCY MEDICINE

## 2024-04-22 PROCEDURE — 93005 ELECTROCARDIOGRAM TRACING: CPT

## 2024-04-22 PROCEDURE — 82805 BLOOD GASES W/O2 SATURATION: CPT | Performed by: EMERGENCY MEDICINE

## 2024-04-22 PROCEDURE — 93005 ELECTROCARDIOGRAM TRACING: CPT | Performed by: EMERGENCY MEDICINE

## 2024-04-22 PROCEDURE — 87637 SARSCOV2&INF A&B&RSV AMP PRB: CPT | Performed by: EMERGENCY MEDICINE

## 2024-04-22 PROCEDURE — 85610 PROTHROMBIN TIME: CPT | Performed by: EMERGENCY MEDICINE

## 2024-04-22 PROCEDURE — 36415 COLL VENOUS BLD VENIPUNCTURE: CPT | Performed by: EMERGENCY MEDICINE

## 2024-04-22 PROCEDURE — 36600 WITHDRAWAL OF ARTERIAL BLOOD: CPT | Performed by: EMERGENCY MEDICINE

## 2024-04-22 PROCEDURE — 99221 1ST HOSP IP/OBS SF/LOW 40: CPT | Performed by: STUDENT IN AN ORGANIZED HEALTH CARE EDUCATION/TRAINING PROGRAM

## 2024-04-22 PROCEDURE — 80053 COMPREHEN METABOLIC PANEL: CPT | Performed by: EMERGENCY MEDICINE

## 2024-04-22 PROCEDURE — 84484 ASSAY OF TROPONIN QUANT: CPT | Performed by: EMERGENCY MEDICINE

## 2024-04-22 PROCEDURE — 25010000002 DEXAMETHASONE PER 1 MG: Performed by: STUDENT IN AN ORGANIZED HEALTH CARE EDUCATION/TRAINING PROGRAM

## 2024-04-22 PROCEDURE — 87040 BLOOD CULTURE FOR BACTERIA: CPT | Performed by: EMERGENCY MEDICINE

## 2024-04-22 PROCEDURE — 94799 UNLISTED PULMONARY SVC/PX: CPT

## 2024-04-22 PROCEDURE — 93010 ELECTROCARDIOGRAM REPORT: CPT | Performed by: INTERNAL MEDICINE

## 2024-04-22 RX ORDER — NITROGLYCERIN 0.4 MG/1
0.4 TABLET SUBLINGUAL
Status: DISCONTINUED | OUTPATIENT
Start: 2024-04-22 | End: 2024-04-27 | Stop reason: HOSPADM

## 2024-04-22 RX ORDER — CYANOCOBALAMIN 1000 UG/ML
1000 INJECTION, SOLUTION INTRAMUSCULAR; SUBCUTANEOUS WEEKLY
Status: DISCONTINUED | OUTPATIENT
Start: 2024-04-22 | End: 2024-04-27 | Stop reason: HOSPADM

## 2024-04-22 RX ORDER — ALBUTEROL SULFATE 90 UG/1
2 AEROSOL, METERED RESPIRATORY (INHALATION) EVERY 4 HOURS PRN
Status: DISCONTINUED | OUTPATIENT
Start: 2024-04-22 | End: 2024-04-27 | Stop reason: HOSPADM

## 2024-04-22 RX ORDER — ACETAMINOPHEN 325 MG/1
650 TABLET ORAL EVERY 4 HOURS PRN
Status: DISCONTINUED | OUTPATIENT
Start: 2024-04-22 | End: 2024-04-27 | Stop reason: HOSPADM

## 2024-04-22 RX ORDER — ACETAMINOPHEN 650 MG/1
650 SUPPOSITORY RECTAL EVERY 4 HOURS PRN
Status: DISCONTINUED | OUTPATIENT
Start: 2024-04-22 | End: 2024-04-27 | Stop reason: HOSPADM

## 2024-04-22 RX ORDER — ONDANSETRON 2 MG/ML
4 INJECTION INTRAMUSCULAR; INTRAVENOUS EVERY 6 HOURS PRN
Status: DISCONTINUED | OUTPATIENT
Start: 2024-04-22 | End: 2024-04-27 | Stop reason: HOSPADM

## 2024-04-22 RX ORDER — CYANOCOBALAMIN 1000 UG/ML
1000 INJECTION, SOLUTION INTRAMUSCULAR; SUBCUTANEOUS WEEKLY
COMMUNITY
Start: 2024-01-22

## 2024-04-22 RX ORDER — POLYETHYLENE GLYCOL 3350 17 G/17G
17 POWDER, FOR SOLUTION ORAL DAILY PRN
Status: DISCONTINUED | OUTPATIENT
Start: 2024-04-22 | End: 2024-04-27 | Stop reason: HOSPADM

## 2024-04-22 RX ORDER — FUROSEMIDE 10 MG/ML
40 INJECTION INTRAMUSCULAR; INTRAVENOUS ONCE
Status: COMPLETED | OUTPATIENT
Start: 2024-04-22 | End: 2024-04-22

## 2024-04-22 RX ORDER — SODIUM CHLORIDE 9 MG/ML
40 INJECTION, SOLUTION INTRAVENOUS AS NEEDED
Status: DISCONTINUED | OUTPATIENT
Start: 2024-04-22 | End: 2024-04-27 | Stop reason: HOSPADM

## 2024-04-22 RX ORDER — BISACODYL 10 MG
10 SUPPOSITORY, RECTAL RECTAL DAILY PRN
Status: DISCONTINUED | OUTPATIENT
Start: 2024-04-22 | End: 2024-04-27 | Stop reason: HOSPADM

## 2024-04-22 RX ORDER — ACETAMINOPHEN 160 MG/5ML
650 SOLUTION ORAL EVERY 4 HOURS PRN
Status: DISCONTINUED | OUTPATIENT
Start: 2024-04-22 | End: 2024-04-27 | Stop reason: HOSPADM

## 2024-04-22 RX ORDER — DEXAMETHASONE SODIUM PHOSPHATE 4 MG/ML
4 INJECTION, SOLUTION INTRA-ARTICULAR; INTRALESIONAL; INTRAMUSCULAR; INTRAVENOUS; SOFT TISSUE EVERY 6 HOURS
Status: DISCONTINUED | OUTPATIENT
Start: 2024-04-22 | End: 2024-04-22

## 2024-04-22 RX ORDER — SODIUM CHLORIDE 0.9 % (FLUSH) 0.9 %
10 SYRINGE (ML) INJECTION EVERY 12 HOURS SCHEDULED
Status: DISCONTINUED | OUTPATIENT
Start: 2024-04-22 | End: 2024-04-27 | Stop reason: HOSPADM

## 2024-04-22 RX ORDER — DEXAMETHASONE SODIUM PHOSPHATE 4 MG/ML
4 INJECTION, SOLUTION INTRA-ARTICULAR; INTRALESIONAL; INTRAMUSCULAR; INTRAVENOUS; SOFT TISSUE EVERY 6 HOURS
Status: DISCONTINUED | OUTPATIENT
Start: 2024-04-22 | End: 2024-04-23

## 2024-04-22 RX ORDER — GABAPENTIN 400 MG/1
800 CAPSULE ORAL NIGHTLY
Status: DISCONTINUED | OUTPATIENT
Start: 2024-04-22 | End: 2024-04-24

## 2024-04-22 RX ORDER — CLONIDINE HYDROCHLORIDE 0.1 MG/1
0.1 TABLET ORAL 2 TIMES DAILY
Status: DISCONTINUED | OUTPATIENT
Start: 2024-04-22 | End: 2024-04-27 | Stop reason: HOSPADM

## 2024-04-22 RX ORDER — LOSARTAN POTASSIUM 50 MG/1
100 TABLET ORAL DAILY
Status: DISCONTINUED | OUTPATIENT
Start: 2024-04-23 | End: 2024-04-27 | Stop reason: HOSPADM

## 2024-04-22 RX ORDER — AMOXICILLIN 250 MG
2 CAPSULE ORAL 2 TIMES DAILY PRN
Status: DISCONTINUED | OUTPATIENT
Start: 2024-04-22 | End: 2024-04-27 | Stop reason: HOSPADM

## 2024-04-22 RX ORDER — ONDANSETRON 4 MG/1
4 TABLET, ORALLY DISINTEGRATING ORAL EVERY 6 HOURS PRN
Status: DISCONTINUED | OUTPATIENT
Start: 2024-04-22 | End: 2024-04-27 | Stop reason: HOSPADM

## 2024-04-22 RX ORDER — SODIUM CHLORIDE 0.9 % (FLUSH) 0.9 %
10 SYRINGE (ML) INJECTION AS NEEDED
Status: DISCONTINUED | OUTPATIENT
Start: 2024-04-22 | End: 2024-04-27 | Stop reason: HOSPADM

## 2024-04-22 RX ORDER — BISACODYL 5 MG/1
5 TABLET, DELAYED RELEASE ORAL DAILY PRN
Status: DISCONTINUED | OUTPATIENT
Start: 2024-04-22 | End: 2024-04-27 | Stop reason: HOSPADM

## 2024-04-22 RX ORDER — POTASSIUM CHLORIDE 750 MG/1
10 TABLET, FILM COATED, EXTENDED RELEASE ORAL DAILY
COMMUNITY

## 2024-04-22 RX ORDER — FUROSEMIDE 40 MG/1
40 TABLET ORAL 2 TIMES DAILY
Status: DISCONTINUED | OUTPATIENT
Start: 2024-04-22 | End: 2024-04-23

## 2024-04-22 RX ADMIN — Medication 10 ML: at 22:37

## 2024-04-22 RX ADMIN — DEXAMETHASONE SODIUM PHOSPHATE 4 MG: 4 INJECTION, SOLUTION INTRA-ARTICULAR; INTRALESIONAL; INTRAMUSCULAR; INTRAVENOUS; SOFT TISSUE at 22:36

## 2024-04-22 RX ADMIN — FUROSEMIDE 40 MG: 40 TABLET ORAL at 22:36

## 2024-04-22 RX ADMIN — CLONIDINE HYDROCHLORIDE 0.1 MG: 0.1 TABLET ORAL at 22:37

## 2024-04-22 RX ADMIN — GABAPENTIN 800 MG: 400 CAPSULE ORAL at 22:36

## 2024-04-22 RX ADMIN — METOPROLOL TARTRATE 12.5 MG: 25 TABLET, FILM COATED ORAL at 22:36

## 2024-04-22 RX ADMIN — FUROSEMIDE 40 MG: 10 INJECTION, SOLUTION INTRAMUSCULAR; INTRAVENOUS at 16:21

## 2024-04-22 NOTE — ED PROVIDER NOTES
Time: 4:05 PM EDT  Date of encounter:  4/22/2024  Independent Historian/Clinical History and Information was obtained by:   Patient    History is limited by: N/A    Chief Complaint: Shortness of breath, hypoxia      History of Present Illness:  Patient is a 74 y.o. year old female who presents to the emergency department for evaluation of shortness of breath and hypoxia.  Patient states that she came from home after home health noted that her oxygen was in the 50s.  EMS reports that her oxygen was in the 80s and brought here for further eval.  Patient states that she has a history of chronic cellulitis, lower extremity swelling, diabetes, COPD.  States that she has had some weight gain recently but is down from her high weight of 600 down into the 350 range.  Today she weighed in at 390.  She denies fever.  States that she just feels more short of breath.  No other complaints this time.    HPI    Patient Care Team  Primary Care Provider: Christopher Hanson MD    Past Medical History:     Allergies   Allergen Reactions    Codeine     Dye Fdc Red [Red Dye] Other (See Comments)          Iodinated Contrast Media Nausea Only    Penicillins     Sulfa Antibiotics      Past Medical History:   Diagnosis Date    Anemia     Anxiety     Asthma     Depression     Osteoarthritis      Past Surgical History:   Procedure Laterality Date    BACK SURGERY      STOMACH SURGERY       No family history on file.    Home Medications:  Prior to Admission medications    Medication Sig Start Date End Date Taking? Authorizing Provider   albuterol sulfate  (90 Base) MCG/ACT inhaler Inhale 2 puffs Every 4 (Four) Hours As Needed for Wheezing or Shortness of Air.    Chayo Emmanuel MD   cholecalciferol (VITAMIN D3) 25 MCG (1000 UT) tablet Take 1 tablet by mouth Daily.    Chayo Emmanuel MD   cloNIDine (CATAPRES) 0.1 MG tablet Take 1 tablet by mouth 2 (Two) Times a Day.    Chayo Emmanuel MD   diphenhydrAMINE-acetaminophen  "(TYLENOL PM)  MG tablet per tablet Take 1 tablet by mouth Every Night.    Chayo Emmanuel MD   furosemide (LASIX) 20 MG tablet Take 2 tablets by mouth 2 (Two) Times a Day. 3/18/24   Tonya Ortega APRN   gabapentin (NEURONTIN) 800 MG tablet Take 1 tablet by mouth every night at bedtime. 6/5/23   Chayo Emmanuel MD   ibuprofen (ADVIL,MOTRIN) 800 MG tablet 1 tablet Every 4 (Four) Hours As Needed for Mild Pain, Moderate Pain, Fever or Headache. 4/13/16   Chayo Emmanuel MD   Liraglutide (VICTOZA) 18 MG/3ML solution pen-injector injection Inject 0.6 mg under the skin into the appropriate area as directed Daily. 6/2/23   Chayo Emmanuel MD   losartan (COZAAR) 100 MG tablet Take 1 tablet by mouth Daily. 6/5/23   Chayo Emmanuel MD   metoprolol tartrate (LOPRESSOR) 25 MG tablet Take 0.5 tablets by mouth 2 (Two) Times a Day for 60 days. 6/19/23 8/18/23  Thompson Pulido,    polyethylene glycol (MIRALAX) 17 g packet Take 17 g by mouth Daily As Needed. 3/20/23   Chayo Emmanuel MD   spironolactone (ALDACTONE) 50 MG tablet Take 1 tablet by mouth Daily. 3/28/23   Chayo Emmanuel MD   warfarin (COUMADIN) 2 MG tablet Take 2 mg daily or as directed 3/15/24   Tonya Ortega APRN   warfarin (COUMADIN) 2 MG tablet 1 tab qd or as directed 4/8/24   Tonya Ortega APRN   warfarin (COUMADIN) 5 MG tablet Take 1 tablet by mouth Daily. Or as directed 4/8/24   Tonya Ortega APRN        Social History:   Social History     Tobacco Use    Smoking status: Never   Vaping Use    Vaping status: Never Used   Substance Use Topics    Alcohol use: No    Drug use: Never         Review of Systems:  Review of Systems   Respiratory:  Positive for shortness of breath.         Physical Exam:  /81   Pulse 75   Temp 97.6 °F (36.4 °C) (Oral)   Resp 18   Ht 165.1 cm (65\")   Wt (!) 179 kg (394 lb 6.5 oz)   SpO2 94%   BMI 65.63 kg/m²     Physical Exam  Vitals and nursing note " reviewed.   Constitutional:       Appearance: Normal appearance. She is obese.   HENT:      Head: Normocephalic and atraumatic.   Eyes:      General: No scleral icterus.  Cardiovascular:      Rate and Rhythm: Normal rate and regular rhythm.      Heart sounds: Normal heart sounds.   Pulmonary:      Effort: Pulmonary effort is normal.      Breath sounds: Normal breath sounds.   Abdominal:      Palpations: Abdomen is soft.      Tenderness: There is no abdominal tenderness.   Musculoskeletal:         General: Normal range of motion.      Cervical back: Normal range of motion.      Right lower leg: Edema present.      Left lower leg: Edema present.      Comments: Bilateral lower extremity edema with redness noted.   Skin:     Findings: No rash.   Neurological:      General: No focal deficit present.      Mental Status: She is alert.                  Procedures:  Procedures      Medical Decision Making:      Comorbidities that affect care:    Congestive Heart Failure, COPD, Hypertension, Obesity    External Notes reviewed:    Reviewed office visit from 10/23/2023      The following orders were placed and all results were independently analyzed by me:  Orders Placed This Encounter   Procedures    Blood Culture - Blood,    Blood Culture - Blood,    COVID-19, FLU A/B, RSV PCR 1 HR TAT - Swab, Nasopharynx    XR Chest 1 View    [place] Draw    Comprehensive Metabolic Panel    BNP    Single High Sensitivity Troponin T    CBC Auto Differential    Blood Gas, Arterial -With Co-Ox Panel: Yes    Lactic Acid, Plasma    Protime-INR    NPO Diet NPO Type: Strict NPO    Undress & Gown    Continuous Pulse Oximetry    Vital Signs    Inpatient Hospitalist Consult    Oxygen Therapy- Nasal Cannula; Titrate 1-6 LPM Per SpO2; 90 - 95%    ECG 12 Lead ED Triage Standing Order; SOA    Insert Peripheral IV    Inpatient Admission    CBC & Differential    Green Top (Gel)    Lavender Top    Gold Top - SST    Light Blue Top       Medications Given in  the Emergency Department:  Medications   sodium chloride 0.9 % flush 10 mL (has no administration in time range)   furosemide (LASIX) injection 40 mg (40 mg Intravenous Given 4/22/24 1621)        ED Course:    ED Course as of 04/22/24 1853 Mon Apr 22, 2024   1543 ECG 12 Lead Bradycardia [SK]   1547 EKG interpreted by me  Time: 1539  Heart rate 74  A-fib, irregular, nonspecific ST changes, no acute ischemia [MA]   1843 Spoke with Dr. Meek who agrees to admit.  [MA]      ED Course User Index  [MA] Denny Bowser MD  [SK] Nathan Beckford PA-C       Labs:    Lab Results (last 24 hours)       Procedure Component Value Units Date/Time    CBC & Differential [[id number]]  (Abnormal) Collected: 04/22/24 1545    Specimen: Blood Updated: 04/22/24 1556    Narrative:      The following orders were created for panel order CBC & Differential.  Procedure                               Abnormality         Status                     ---------                               -----------         ------                     CBC Auto Differential[[id number]]        Abnormal            Final result               Scan Slide[[id number]]                                                                    Please view results for these tests on the individual orders.    Comprehensive Metabolic Panel [[id number]]  (Abnormal) Collected: 04/22/24 1545    Specimen: Blood Updated: 04/22/24 1611     Glucose 136 mg/dL      BUN 13 mg/dL      Creatinine 0.98 mg/dL      Sodium 140 mmol/L      Potassium 3.5 mmol/L      Chloride 99 mmol/L      CO2 27.5 mmol/L      Calcium 8.7 mg/dL      Total Protein 6.8 g/dL      Albumin 3.8 g/dL      ALT (SGPT) 7 U/L      AST (SGOT) 10 U/L      Alkaline Phosphatase 82 U/L      Total Bilirubin 0.6 mg/dL      Globulin 3.0 gm/dL      A/G Ratio 1.3 g/dL      BUN/Creatinine Ratio 13.3     Anion Gap 13.5 mmol/L      eGFR 60.7 mL/min/1.73     Narrative:      GFR Normal >60  Chronic Kidney Disease <60  Kidney Failure  <15    The GFR formula is only valid for adults with stable renal function between ages 18 and 70.    BNP [[id number]]  (Abnormal) Collected: 04/22/24 1545    Specimen: Blood Updated: 04/22/24 1609     proBNP 1,538.0 pg/mL     Narrative:      This assay is used as an aid in the diagnosis of individuals suspected of having heart failure. It can be used as an aid in the diagnosis of acute decompensated heart failure (ADHF) in patients presenting with signs and symptoms of ADHF to the emergency department (ED). In addition, NT-proBNP of <300 pg/mL indicates ADHF is not likely.    Age Range Result Interpretation  NT-proBNP Concentration (pg/mL:      <50             Positive            >450                   Gray                 300-450                    Negative             <300    50-75           Positive            >900                  Gray                300-900                  Negative            <300      >75             Positive            >1800                  Gray                300-1800                  Negative            <300    Single High Sensitivity Troponin T [[id number]]  (Abnormal) Collected: 04/22/24 1545    Specimen: Blood Updated: 04/22/24 1611     HS Troponin T 22 ng/L     Narrative:      High Sensitive Troponin T Reference Range:  <14.0 ng/L- Negative Female for AMI  <22.0 ng/L- Negative Male for AMI  >=14 - Abnormal Female indicating possible myocardial injury.  >=22 - Abnormal Male indicating possible myocardial injury.   Clinicians would have to utilize clinical acumen, EKG, Troponin, and serial changes to determine if it is an Acute Myocardial Infarction or myocardial injury due to an underlying chronic condition.         CBC Auto Differential [[id number]]  (Abnormal) Collected: 04/22/24 1545    Specimen: Blood Updated: 04/22/24 1556     WBC 4.52 10*3/mm3      RBC 4.70 10*6/mm3      Hemoglobin 12.0 g/dL      Hematocrit 39.6 %      MCV 84.3 fL      MCH 25.5 pg      MCHC 30.3 g/dL      RDW 15.3  %      RDW-SD 47.5 fl      MPV 11.1 fL      Platelets 95 10*3/mm3      Neutrophil % 70.7 %      Lymphocyte % 18.8 %      Monocyte % 7.5 %      Eosinophil % 2.2 %      Basophil % 0.4 %      Immature Grans % 0.4 %      Neutrophils, Absolute 3.19 10*3/mm3      Lymphocytes, Absolute 0.85 10*3/mm3      Monocytes, Absolute 0.34 10*3/mm3      Eosinophils, Absolute 0.10 10*3/mm3      Basophils, Absolute 0.02 10*3/mm3      Immature Grans, Absolute 0.02 10*3/mm3      nRBC 0.0 /100 WBC     Protime-INR [[id number]]  (Abnormal) Collected: 04/22/24 1545    Specimen: Blood Updated: 04/22/24 1607     Protime 24.9 Seconds      INR 2.21    Narrative:      Suggested Therapeutic Ranges For Oral Anticoagulant Therapy:  Level of Therapy                      INR Target Range  Standard Dose                            2.0-3.0  High Dose                                2.5-3.5  Patients not receiving anticoagulant  Therapy Normal Range                     0.86-1.15    Blood Gas, Arterial -With Co-Ox Panel: Yes [[id number]] Collected: 04/22/24 1611    Specimen: Arterial Blood from Arm, Right Updated: 04/22/24 1614     pH, Arterial 7.424 pH units      pCO2, Arterial 40.5 mm Hg      pO2, Arterial 95.5 mm Hg      HCO3, Arterial 25.9 mmol/L      Base Excess, Arterial 1.4 mmol/L      O2 Saturation, Arterial 96.3 %      Hemoglobin, Blood Gas 12.1 g/dL      Carboxyhemoglobin 0.5 %      Methemoglobin 0.10 %      Oxyhemoglobin 95.7 %      FHHB 3.7 %      Site Arterial: right brachial     Modality Cannula     FIO2 28 %      Flow Rate 2 lpm      PO2/FIO2 341     Zeke's Test N/A     Lactate, Arterial --    COVID-19, FLU A/B, RSV PCR 1 HR TAT - Swab, Nasopharynx [[id number]]  (Abnormal) Collected: 04/22/24 1612    Specimen: Swab from Nasopharynx Updated: 04/22/24 1705     COVID19 Detected     Influenza A PCR Not Detected     Influenza B PCR Not Detected     RSV, PCR Not Detected    Narrative:      Fact sheet for providers:  https://www.fda.gov/media/229411/download    Fact sheet for patients: https://www.fda.gov/media/880434/download    Test performed by PCR.    Blood Culture - Blood, Arm, Right [[id number]] Collected: 04/22/24 1617    Specimen: Blood from Arm, Right Updated: 04/22/24 1624    Lactic Acid, Plasma [[id number]]  (Normal) Collected: 04/22/24 1617    Specimen: Blood from Arm, Right Updated: 04/22/24 1643     Lactate 1.5 mmol/L     Blood Culture - Blood, Hand, Left [[id number]] Collected: 04/22/24 1620    Specimen: Blood from Hand, Left Updated: 04/22/24 1624             Imaging:    XR Chest 1 View    Result Date: 4/22/2024  XR CHEST 1 VW-  Date of Exam: 4/22/2024 3:55 PM  Indication: SOA Triage Protocol  Comparison: 6/14/2023  Findings: Unchanged enlarged cardiac silhouette.  No focal airspace consolidation, pleural effusion, or pneumothorax. No acute osseous abnormality. Partially visualized spinal stimulator lead      Impression: Unchanged enlarged cardiac silhouette. No focal airspace consolidation.   Electronically Signed By-Yoni Talavera MD On:4/22/2024 3:57 PM         Differential Diagnosis and Discussion:    Dyspnea: Differential diagnosis includes but is not limited to metabolic acidosis, neurological disorders, psychogenic, asthma, pneumothorax, upper airway obstruction, COPD, pneumonia, noncardiogenic pulmonary edema, interstitial lung disease, anemia, congestive heart failure, and pulmonary embolism    All labs were reviewed and interpreted by me.  All X-rays impressions were independently interpreted by me.  EKG was interpreted by me.    MDM     Patient is a 74-year-old female who presents with low oxygen.  Found to have oxygen that was in the 50s by home health and 80s by EMS.  Was hypoxic here in the high 80s.  Had to be placed on oxygen.  Positive for COVID.  Also has had a weight gain.  Will cover with Lasix.  Will need admission due to respiratory failure with hypoxia due to COVID.  Will admit for further  workup management.          Patient Care Considerations:          Consultants/Shared Management Plan:    Hospitalist: I have discussed the case with Dr. urrutia who agrees to accept the patient for admission.    Social Determinants of Health:          Disposition and Care Coordination:    Admit:   Through independent evaluation of the patient's history, physical, and imperical data, the patient meets criteria for inpatient admission to the hospital.        Final diagnoses:   COVID-19   Acute respiratory failure with hypoxia        ED Disposition       ED Disposition   Decision to Admit    Condition   --    Comment   Level of Care: Telemetry [5]   Diagnosis: COVID-19 with multiple comorbidities [[id number]]   Certification: I Certify That Inpatient Hospital Services Are Medically Necessary For Greater Than 2 Midnights                 This medical record created using voice recognition software.             Denny Bowser MD  04/22/24 2583

## 2024-04-22 NOTE — H&P
Patient Care Team:  Christopher Hanson MD as PCP - General (Internal Medicine)    Chief complaint Shortness of breath    Subjective     Patient is a 74 y.o. female presents with shortness of breath.  Patient was noted to have an oxygen saturation in the 50s with home health.  EMS reports he was in the 80s on room air and brought her to the emergency department for further evaluation.  Patient is morbidly obese and does have a history of chronic cellulitis, lower extremity swelling, diabetes and COPD.  She was found to be COVID-positive.    Review of Systems   Pertinent items are noted in HPI, all other systems reviewed and negative    History  Past Medical History:   Diagnosis Date    Anemia     Anxiety     Asthma     Depression     Osteoarthritis      Past Surgical History:   Procedure Laterality Date    BACK SURGERY      STOMACH SURGERY       No family history on file.  Social History     Tobacco Use    Smoking status: Never   Vaping Use    Vaping status: Never Used   Substance Use Topics    Alcohol use: No    Drug use: Never     (Not in a hospital admission)    Allergies:  Codeine, Dye fdc red [red dye], Iodinated contrast media, Penicillins, and Sulfa antibiotics    Objective     Vital Signs  Temp:  [97.6 °F (36.4 °C)] 97.6 °F (36.4 °C)  Heart Rate:  [66-79] 75  Resp:  [16-20] 18  BP: (128-149)/(47-81) 137/81    Physical Exam:      General Appearance:  Alert, cooperative, in no acute distress   Head:  Normocephalic, without obvious abnormality, atraumatic   Eyes:  Lids and lashes normal, conjunctivae and sclerae normal, no icterus, no pallor, corneas clear, PERRLA   Ears:  Ears appear intact with no abnormalities noted   Throat:  No oral lesions, no thrush, oral mucosa moist   Neck:  No adenopathy, supple, trachea midline, no thyromegaly, no carotid bruit, no JVD   Back:  No kyphosis present, no scoliosis present, no skin lesions, erythema or scars, no tenderness to percussion, or palpation, range of motion  normal   Lungs:  Clear to auscultation,respirations regular, even and unlabored    Heart:  Regular rhythm and normal rate, normal S1 and S2, no murmur, no gallop, no rub, no click   Breast Exam:  Deferred   Abdomen:  Normal bowel sounds, no masses, no organomegaly, soft non-tender, non-distended, no guarding, no rebound tenderness   Genitalia:  Deferred   Extremities:  Moves all extremities well, no edema, no cyanosis, no redness   Pulses:  Pulses palpable and equal bilaterally   Skin:  No bleeding, bruising or rash   Lymph nodes:  No palpable adenopathy   Neurologic:  Cranial nerves 2 - 12 grossly intact, sensation intact, DTR present and equal bilaterally       Results Review:    I reviewed the patient's new clinical results.  I reviewed the patient's new imaging results and agree with the interpretation.  I reviewed the patient's other test results and agree with the interpretation  I personally viewed and interpreted the patient's EKG/Telemetry data    Assessment & Plan       COVID-19 with multiple comorbidities    Atrial fibrillation    Hypoxic respiratory failure    Morbid obesity    Diabetes    Admit to tele  Decadron  Supplemental O2  Supportive care  ISS  Restart home medications    I discussed the patients findings and my recommendations with patient.     Jin Meek MD  04/22/24  18:53 EDT

## 2024-04-23 LAB
ANION GAP SERPL CALCULATED.3IONS-SCNC: 14 MMOL/L (ref 5–15)
BASOPHILS # BLD AUTO: 0.03 10*3/MM3 (ref 0–0.2)
BASOPHILS NFR BLD AUTO: 0.5 % (ref 0–1.5)
BUN SERPL-MCNC: 12 MG/DL (ref 8–23)
BUN/CREAT SERPL: 14 (ref 7–25)
CALCIUM SPEC-SCNC: 8.8 MG/DL (ref 8.6–10.5)
CHLORIDE SERPL-SCNC: 99 MMOL/L (ref 98–107)
CO2 SERPL-SCNC: 27 MMOL/L (ref 22–29)
CREAT SERPL-MCNC: 0.86 MG/DL (ref 0.57–1)
DEPRECATED RDW RBC AUTO: 46.1 FL (ref 37–54)
EGFRCR SERPLBLD CKD-EPI 2021: 71 ML/MIN/1.73
EOSINOPHIL # BLD AUTO: 0.02 10*3/MM3 (ref 0–0.4)
EOSINOPHIL NFR BLD AUTO: 0.4 % (ref 0.3–6.2)
ERYTHROCYTE [DISTWIDTH] IN BLOOD BY AUTOMATED COUNT: 15 % (ref 12.3–15.4)
GLUCOSE SERPL-MCNC: 193 MG/DL (ref 65–99)
HCT VFR BLD AUTO: 38.2 % (ref 34–46.6)
HGB BLD-MCNC: 11.5 G/DL (ref 12–15.9)
IMM GRANULOCYTES # BLD AUTO: 0.02 10*3/MM3 (ref 0–0.05)
IMM GRANULOCYTES NFR BLD AUTO: 0.4 % (ref 0–0.5)
LYMPHOCYTES # BLD AUTO: 0.54 10*3/MM3 (ref 0.7–3.1)
LYMPHOCYTES NFR BLD AUTO: 9.6 % (ref 19.6–45.3)
MCH RBC QN AUTO: 25.5 PG (ref 26.6–33)
MCHC RBC AUTO-ENTMCNC: 30.1 G/DL (ref 31.5–35.7)
MCV RBC AUTO: 84.7 FL (ref 79–97)
MONOCYTES # BLD AUTO: 0.09 10*3/MM3 (ref 0.1–0.9)
MONOCYTES NFR BLD AUTO: 1.6 % (ref 5–12)
NEUTROPHILS NFR BLD AUTO: 4.95 10*3/MM3 (ref 1.7–7)
NEUTROPHILS NFR BLD AUTO: 87.5 % (ref 42.7–76)
NRBC BLD AUTO-RTO: 0 /100 WBC (ref 0–0.2)
PLATELET # BLD AUTO: 100 10*3/MM3 (ref 140–450)
PMV BLD AUTO: 11.7 FL (ref 6–12)
POTASSIUM SERPL-SCNC: 3.9 MMOL/L (ref 3.5–5.2)
RBC # BLD AUTO: 4.51 10*6/MM3 (ref 3.77–5.28)
SODIUM SERPL-SCNC: 140 MMOL/L (ref 136–145)
WBC NRBC COR # BLD AUTO: 5.65 10*3/MM3 (ref 3.4–10.8)

## 2024-04-23 PROCEDURE — 94761 N-INVAS EAR/PLS OXIMETRY MLT: CPT

## 2024-04-23 PROCEDURE — 80048 BASIC METABOLIC PNL TOTAL CA: CPT | Performed by: STUDENT IN AN ORGANIZED HEALTH CARE EDUCATION/TRAINING PROGRAM

## 2024-04-23 PROCEDURE — 25010000002 FUROSEMIDE PER 20 MG: Performed by: INTERNAL MEDICINE

## 2024-04-23 PROCEDURE — 97161 PT EVAL LOW COMPLEX 20 MIN: CPT

## 2024-04-23 PROCEDURE — 25010000002 DEXAMETHASONE PER 1 MG: Performed by: STUDENT IN AN ORGANIZED HEALTH CARE EDUCATION/TRAINING PROGRAM

## 2024-04-23 PROCEDURE — 85025 COMPLETE CBC W/AUTO DIFF WBC: CPT | Performed by: STUDENT IN AN ORGANIZED HEALTH CARE EDUCATION/TRAINING PROGRAM

## 2024-04-23 PROCEDURE — 94799 UNLISTED PULMONARY SVC/PX: CPT

## 2024-04-23 PROCEDURE — 99233 SBSQ HOSP IP/OBS HIGH 50: CPT | Performed by: INTERNAL MEDICINE

## 2024-04-23 RX ORDER — DEXAMETHASONE SODIUM PHOSPHATE 10 MG/ML
6 INJECTION, SOLUTION INTRAMUSCULAR; INTRAVENOUS DAILY
Status: DISCONTINUED | OUTPATIENT
Start: 2024-04-24 | End: 2024-04-27 | Stop reason: HOSPADM

## 2024-04-23 RX ORDER — FUROSEMIDE 10 MG/ML
40 INJECTION INTRAMUSCULAR; INTRAVENOUS
Status: DISCONTINUED | OUTPATIENT
Start: 2024-04-23 | End: 2024-04-27 | Stop reason: HOSPADM

## 2024-04-23 RX ORDER — WARFARIN SODIUM 4 MG/1
4 TABLET ORAL
Status: DISCONTINUED | OUTPATIENT
Start: 2024-04-23 | End: 2024-04-24

## 2024-04-23 RX ADMIN — DEXAMETHASONE SODIUM PHOSPHATE 4 MG: 4 INJECTION, SOLUTION INTRA-ARTICULAR; INTRALESIONAL; INTRAMUSCULAR; INTRAVENOUS; SOFT TISSUE at 12:09

## 2024-04-23 RX ADMIN — FUROSEMIDE 40 MG: 10 INJECTION, SOLUTION INTRAMUSCULAR; INTRAVENOUS at 17:32

## 2024-04-23 RX ADMIN — GABAPENTIN 800 MG: 400 CAPSULE ORAL at 21:28

## 2024-04-23 RX ADMIN — LOSARTAN POTASSIUM 100 MG: 50 TABLET, FILM COATED ORAL at 09:12

## 2024-04-23 RX ADMIN — FUROSEMIDE 40 MG: 40 TABLET ORAL at 09:12

## 2024-04-23 RX ADMIN — METOPROLOL TARTRATE 12.5 MG: 25 TABLET, FILM COATED ORAL at 21:28

## 2024-04-23 RX ADMIN — WARFARIN SODIUM 4 MG: 4 TABLET ORAL at 17:33

## 2024-04-23 RX ADMIN — DEXAMETHASONE SODIUM PHOSPHATE 4 MG: 4 INJECTION, SOLUTION INTRA-ARTICULAR; INTRALESIONAL; INTRAMUSCULAR; INTRAVENOUS; SOFT TISSUE at 04:03

## 2024-04-23 RX ADMIN — Medication 10 ML: at 21:28

## 2024-04-23 RX ADMIN — Medication 10 ML: at 09:12

## 2024-04-23 RX ADMIN — CLONIDINE HYDROCHLORIDE 0.1 MG: 0.1 TABLET ORAL at 21:28

## 2024-04-23 RX ADMIN — METOPROLOL TARTRATE 12.5 MG: 25 TABLET, FILM COATED ORAL at 09:12

## 2024-04-23 RX ADMIN — CLONIDINE HYDROCHLORIDE 0.1 MG: 0.1 TABLET ORAL at 09:12

## 2024-04-23 NOTE — PROGRESS NOTES
[facility]   Hospitalist Progress Note  Date: 2024  Patient Name: Inez Doyle  : 1950  MRN: [id number]  Date of admission: 2024  Room/Bed: 256/1      Subjective   Subjective     Chief Complaint: SOA    Summary:Patient is a very pleasant 74 y.o. female presents with shortness of breath.  Patient was noted to have an oxygen saturation in the 50s with home health.  EMS reports he was in the 80s on room air and brought her to the emergency department for further evaluation.  Patient is morbidly obese and does have a history of chronic cellulitis, lower extremity swelling, diabetes and COPD.  She was found to be COVID-positive. She was admitted and started on IV dexamethasone.    Interval Followup: Feeling somewhat better; feels that the Lasix helped her breathing and she still is quite swollen. We discussed going back to IV dosing and she is in favor of that.    Review of Systems    All systems reviewed and negative except for what is outlined above.      Objective   Objective     Vitals:   Temp:  [97.7 °F (36.5 °C)-98.4 °F (36.9 °C)] 97.8 °F (36.6 °C)  Heart Rate:  [] 54  Resp:  [16-19] 18  BP: (121-155)/(56-94) 132/61  Flow (L/min):  [2] 2    Physical Exam   General: Awake, alert, NAD  HENT: NCAT, MMM  Eyes: pupils equal, no scleral icterus  Cardiovascular: RRR, no murmurs   Pulmonary: slightly diminished  Gastrointestinal: S/ND/NT, +BS  Musculoskeletal: chronic appearing eder ROJAS    Result Review    Result Review:  I have personally reviewed these results:  [x]  Laboratory      Lab 24  0349 24  1617 24  1545   WBC 5.65  --  4.52   HEMOGLOBIN 11.5*  --  12.0   HEMATOCRIT 38.2  --  39.6   PLATELETS 100*  --  95*   NEUTROS ABS 4.95  --  3.19   IMMATURE GRANS (ABS) 0.02  --  0.02   LYMPHS ABS 0.54*  --  0.85   MONOS ABS 0.09*  --  0.34   EOS ABS 0.02  --  0.10   MCV 84.7  --  84.3   LACTATE  --  1.5  --    PROTIME  --   --  24.9*         Lab 24  0349 24  1545    SODIUM 140 140   POTASSIUM 3.9 3.5   CHLORIDE 99 99   CO2 27.0 27.5   ANION GAP 14.0 13.5   BUN 12 13   CREATININE 0.86 0.98   EGFR 71.0 60.7   GLUCOSE 193* 136*   CALCIUM 8.8 8.7         Lab 04/22/24  1545   TOTAL PROTEIN 6.8   ALBUMIN 3.8   GLOBULIN 3.0   ALT (SGPT) 7   AST (SGOT) 10   BILIRUBIN 0.6   ALK PHOS 82         Lab 04/22/24  1545 04/17/24  0000   PROBNP 1,538.0*  --    HSTROP T 22*  --    PROTIME 24.9*  --    INR 2.21* 2.40                 Lab 04/22/24  1611   PH, ARTERIAL 7.424   PCO2, ARTERIAL 40.5   PO2 ART 95.5   O2 SATURATION ART 96.3   FIO2 28   HCO3 ART 25.9   BASE EXCESS ART 1.4   CARBOXYHEMOGLOBIN 0.5     Brief Urine Lab Results       None          [x]  Microbiology   Microbiology Results (last 10 days)       Procedure Component Value - Date/Time    COVID-19, FLU A/B, RSV PCR 1 HR TAT - Swab, Nasopharynx [[id number]]  (Abnormal) Collected: 04/22/24 1612    Lab Status: Final result Specimen: Swab from Nasopharynx Updated: 04/22/24 1705     COVID19 Detected     Influenza A PCR Not Detected     Influenza B PCR Not Detected     RSV, PCR Not Detected    Narrative:      Fact sheet for providers: https://www.fda.gov/media/334343/download    Fact sheet for patients: https://www.fda.gov/media/234810/download    Test performed by PCR.          [x]  Radiology  XR Chest 1 View    Result Date: 4/22/2024  Impression: Unchanged enlarged cardiac silhouette. No focal airspace consolidation.   Electronically Signed By-Yoni Talavera MD On:4/22/2024 3:57 PM     []  EKG/Telemetry   []  Cardiology/Vascular   []  Pathology  []  Old records  []  Other:    Assessment & Plan   Assessment / Plan     Assessment:   COVID-19 with multiple comorbidities    Atrial fibrillation    Hypoxic respiratory failure    Morbid obesity    Diabetes    Plan:  Admitted to Medicine  Continue dexamethasone given hypoxia, will scale back to 6mg IV daily.   Supplemental oxygen as needed  Warfarin ordered at 4mg daily; repeat INR in am and daily  for close therapeutic drug monitoring.  Will change Lasix to 40mg IV bid for now  We can discontinue the continuous pulse ox since her sats are doing well on minimal oxygen now.     DVT prophylaxis:  Medical DVT prophylaxis orders are present.        CODE STATUS:   Medical Intervention Limits: NO intubation (DNI)  Code Status (Patient has no pulse and is not breathing): CPR (Attempt to Resuscitate)  Medical Interventions (Patient has pulse or is breathing): Limited Support      Electronically signed by Jin Matos MD, 4/23/2024, 16:18 EDT.

## 2024-04-23 NOTE — CASE MANAGEMENT/SOCIAL WORK
Discharge Planning Assessment   Tammy     Patient Name: Inez Doyle  MRN: [id number]  Today's Date: 4/23/2024    Admit Date: 4/22/2024    Plan: Pt lives home alone in apartment. Pt has support from family and friends. PCP: MARINO Hanson, Pharm: [facility]. [facility] delivers medications to home for ($7-$10.00). Pt is current with [facility] and [facility] for cleaning of home and help with bathing. Pt states she does not usually use [facility] because it costs $70.00 a visit. Pt states that she is able to affording medications, groceries and utilities at this time. Pt does have DME at home. SW will continue to follow for needs.   Discharge Needs Assessment       Row Name 04/23/24 1528       Discharge Needs Assessment    Discharge Coordination/Progress Pt lives home alone in apartment. Pt has support from family and friends. PCP: MARINO Hanson,  Pharm: [facility]. [facility] delivers medications to home for ($7-$10.00). Pt is current with [facility] and [facility] for cleaning of home and help with bathing. Pt states she does not usually use T[facility] because it costs $70.00 a visit. Pt states that she is able to affording medications, groceries and utilities at this time. Pt does have DME at home. SW will continue to follow for needs.      Row Name 04/23/24 1524       Living Environment    People in Home alone    Current Living Arrangements apartment    Potentially Unsafe Housing Conditions none    In the past 12 months has the electric, gas, oil, or water company threatened to shut off services in your home? No    Primary Care Provided by self    Provides Primary Care For no one    Family Caregiver if Needed sibling(s);friend(s)    Quality of Family Relationships involved;helpful    Able to Return to Prior Arrangements yes       Resource/Environmental Concerns    Resource/Environmental Concerns none    Transportation Concerns none       Transportation Needs    In the past 12 months, has lack of transportation kept you from medical  appointments or from getting medications? no    In the past 12 months, has lack of transportation kept you from meetings, work, or from getting things needed for daily living? No       Food Insecurity    Within the past 12 months, you worried that your food would run out before you got the money to buy more. Never true    Within the past 12 months, the food you bought just didn't last and you didn't have money to get more. Never true       Transition Planning    Patient/Family Anticipates Transition to home    Patient/Family Anticipated Services at Transition none    Transportation Anticipated agency       Discharge Needs Assessment    Readmission Within the Last 30 Days no previous admission in last 30 days    Equipment Currently Used at Home wheelchair;walker, rolling;commode    Concerns to be Addressed discharge planning    Anticipated Changes Related to Illness none                   Discharge Plan       Row Name 04/23/24 1534       Plan    Plan Pt lives home alone in apartment. Pt has support from family and friends. PCP: MARINO Hanson, Pharm: [facility]. [facility] delivers medications to home for ($7-$10.00). Pt is current with [facility] and [facility] care for cleaning of home and help with bathing. Pt states she does not usually use [facility] because it costs $70.00 a visit. Pt states that she is able to affording medications, groceries and utilities at this time. Pt does have DME at home. SW will continue to follow for needs.                  Continued Care and Services - Admitted Since 4/22/2024    No active coordination exists for this encounter.          Demographic Summary       Row Name 04/23/24 1522       General Information    Admission Type inpatient    Arrived From emergency department    Referral Source admission list    Reason for Consult discharge planning    Preferred Language English       Contact Information    Permission Granted to Share Info With permission denied                   Functional Status        Row Name 04/23/24 1523       Functional Status    Usual Activity Tolerance moderate    Current Activity Tolerance moderate       Physical Activity    On average, how many days per week do you engage in moderate to strenuous exercise (like a brisk walk)? 0 days    On average, how many minutes do you engage in exercise at this level? 0 min    Number of minutes of exercise per week 0       Assessment of Health Literacy    How often do you have someone help you read hospital materials? Never    How often do you have problems learning about your medical condition because of difficulty understanding written information? Never    How often do you have a problem understanding what is told to you about your medical condition? Never    How confident are you filling out medical forms by yourself? Quite a bit    Health Literacy Good       Functional Status, IADL    Medications independent    Meal Preparation independent    Housekeeping independent    Laundry independent    Shopping independent       Mental Status    General Appearance WDL WDL       Mental Status Summary    Recent Changes in Mental Status/Cognitive Functioning no changes       Employment/    Employment Status disabled                   Psychosocial    No documentation.                  Abuse/Neglect    No documentation.                  Legal       Row Name 04/23/24 1524       Financial Resource Strain    How hard is it for you to pay for the very basics like food, housing, medical care, and heating? Not hard       Financial/Legal    Source of Income disability    Application for Public Assistance not applied       Legal    Criminal Activity/Legal Involvement none                   Substance Abuse    No documentation.                  Patient Forms    No documentation.                     Deann Mayo

## 2024-04-23 NOTE — THERAPY EVALUATION
Acute Care - Physical Therapy Initial Evaluation   Tammy     Patient Name: Inez Doyle  : 1950  MRN: [id number]  Today's Date: 2024      Visit Dx:     ICD-10-CM ICD-9-CM   1. COVID-19  U07.1 079.89   2. Acute respiratory failure with hypoxia  J96.01 518.81   3. Difficulty walking  R26.2 719.7     Patient Active Problem List   Diagnosis    Posterior tibial tendon dysfunction    Charcot's joint of foot    Arthritis, lumbar spine    Atrial fibrillation    COVID-19 with multiple comorbidities    Hypoxic respiratory failure    Morbid obesity    Diabetes     Past Medical History:   Diagnosis Date    Anemia     Anxiety     Asthma     Depression     Osteoarthritis      Past Surgical History:   Procedure Laterality Date    BACK SURGERY      STOMACH SURGERY       PT Assessment (Last 12 Hours)       PT Evaluation and Treatment       Row Name 24 1100          Physical Therapy Time and Intention    Subjective Information complains of;fatigue;weakness (P)   -     Document Type evaluation (P)   -     Mode of Treatment individual therapy;physical therapy (P)   -     Patient Effort adequate (P)   -     Symptoms Noted During/After Treatment none (P)   -       Row Name 24 1100          General Information    Patient Profile Reviewed yes (P)   -     Patient Observations alert;cooperative;agree to therapy (P)   -     Prior Level of Function independent:;all household mobility;community mobility (P)   medical apartment with HH, HH helps with any tasks unable to do  -     Equipment Currently Used at Home walker, rolling;cane, straight;wheelchair (P)   primary RW, [facility]  -     Barriers to Rehab none identified (P)   -       Row Name 24 1100          Living Environment    Current Living Arrangements apartment (P)     -     Home Accessibility wheelchair accessible (P)   -     People in Home alone (P)   -     Primary Care Provided by self;homecare agency (P)   -        Row Name 04/23/24 1100          Range of Motion (ROM)    Range of Motion bilateral lower extremities (P)   B hip limited by soft tissue, B knee flex limited by pain, 'bone on bone', B ankle WFL  -       Row Name 04/23/24 1100          Strength (Manual Muscle Testing)    Strength (Manual Muscle Testing) bilateral lower extremities (P)   B hip 3-/5 due to soft tissue, all other 4/5 B LE  -       Row Name 04/23/24 1100          Bed Mobility    Comment, (Bed Mobility) sitting in recliner at start and end of session (P)   -       Row Name 04/23/24 1100          Transfers    Transfers sit-stand transfer;stand-sit transfer (P)   -     Maintains Weight-bearing Status (Transfers) able to maintain (P)   -       Row Name 04/23/24 1100          Sit-Stand Transfer    Sit-Stand [place] (Transfers) contact guard (P)   -     Assistive Device (Sit-Stand Transfers) walker, front-wheeled (P)   -     Comment, (Sit-Stand Transfer) Requires multiple trials to stand. Pt doesn't come up to fully erect posture, maintains heavy flexion and WB through flexed arms. (P)   -[facility] Name 04/23/24 1100          Stand-Sit Transfer    Stand-Sit [place] (Transfers) contact guard (P)   -     Assistive Device (Stand-Sit Transfers) walker, front-wheeled (P)   -     Comment, (Stand-Sit Transfer) Pt refuses assist reporting she is unable to perform if she is 'pulled on' (P)   -[facility] Name 04/23/24 1100          Gait/Stairs (Locomotion)    Gait/Stairs Locomotion gait/ambulation assistive device (P)   -     [place] Level (Gait) contact guard (P)   -     Assistive Device (Gait) walker, front-wheeled (P)   -     Patient was able to Ambulate yes (P)   -     Distance in Feet (Gait) 10 (P)   -     Pattern (Gait) 3-point;step-to (P)   -     Deviations/Abnormal Patterns (Gait) base of support, wide;gait speed decreased;alcon decreased;stride length decreased;weight shifting decreased (P)   -      Bilateral Gait Deviations forward flexed posture (P)   severe  -     Comment, (Gait/Stairs) Pt able to ambulate short distances limited by fatigue and pain. (P)   -       Row Name 04/23/24 1100          Safety Issues, Functional Mobility    Safety Issues Affecting Function (Mobility) positioning of assistive device;safety precaution awareness;safety precautions follow-through/compliance;insight into deficits/self-awareness (P)   -     Impairments Affecting Function (Mobility) balance;endurance/activity tolerance;pain;postural/trunk control;strength (P)   -       Row Name 04/23/24 1100          Balance    Balance Assessment standing static balance;standing dynamic balance (P)   -     Static Standing Balance contact guard (P)   -     Dynamic Standing Balance contact guard (P)   -     Position/Device Used, Standing Balance supported;walker, front-wheeled (P)   -     Comment, Balance Pt doesn't require any assistance to maintain her chosen posture, Pt chosen posture very flexed and dependent on RW for stability. (P)   -       Row Name 04/23/24 1100          Plan of Care Review    Plan of Care Reviewed With patient (P)   -     Progress no change (P)   -     Outcome Evaluation Pt presents to PT with deficits associated with COVID 19. Pt demonstrates fair functional mobility limited by pain and comorbid factors. Skilled services needed at this time. Plan to DC to IRF. (P)   -       Row Name 04/23/24 1100          Positioning and Restraints    Pre-Treatment Position sitting in chair/recliner (P)   -     Post Treatment Position chair (P)   -     In Chair sitting;call light within reach;with nsg;waffle cushion;legs elevated (P)   -       Row Name 04/23/24 1100          Therapy Assessment/Plan (PT)    Patient/Family Therapy Goals Statement (PT) Walk better (P)   -     Rehab Potential (PT) good, to achieve stated therapy goals (P)   -     Criteria for Skilled Interventions Met (PT) meets  criteria (P)   -     Therapy Frequency (PT) daily (P)   -     Predicted Duration of Therapy Intervention (PT) 10 days (P)   -     Problem List (PT) problems related to;balance;mobility;range of motion (ROM);strength;pain;postural control (P)   -     Activity Limitations Related to Problem List (PT) unable to ambulate safely;unable to transfer safely (P)   -       Row Name 04/23/24 1100          PT Evaluation Complexity    History, PT Evaluation Complexity 3 or more personal factors and/or comorbidities (P)   -     Examination of Body Systems (PT Eval Complexity) total of 3 or more elements (P)   -     Clinical Presentation (PT Evaluation Complexity) stable (P)   -     Clinical Decision Making (PT Evaluation Complexity) low complexity (P)   -     Overall Complexity (PT Evaluation Complexity) low complexity (P)   -       Row Name 04/23/24 1100          Therapy Plan Review/Discharge Plan (PT)    Therapy Plan Review (PT) evaluation/treatment results reviewed (P)   -[facility] Name 04/23/24 1100          Physical Therapy Goals    Bed Mobility Goal Selection (PT) bed mobility, PT goal 1 (P)   -     Transfer Goal Selection (PT) transfer, PT goal 1 (P)   -     Gait Training Goal Selection (PT) gait training, PT goal 1 (P)   -       Row Name 04/23/24 1100          Bed Mobility Goal 1 (PT)    Activity/Assistive Device (Bed Mobility Goal 1, PT) bed mobility activities, all (P)   -     [place] Level/Cues Needed (Bed Mobility Goal 1, PT) standby assist (P)   -     Time Frame (Bed Mobility Goal 1, PT) 10 days (P)   -     Progress/Outcomes (Bed Mobility Goal 1, PT) new goal (P)   -       Row Name 04/23/24 1100          Transfer Goal 1 (PT)    Activity/Assistive Device (Transfer Goal 1, PT) sit-to-stand/stand-to-sit (P)   -     [place] Level/Cues Needed (Transfer Goal 1, PT) independent (P)   -     Time Frame (Transfer Goal 1, PT) 10 days (P)   -     Progress/Outcome (Transfer  Goal 1, PT) new goal (P)   -       Row Name 04/23/24 1100          Gait Training Goal 1 (PT)    Activity/Assistive Device (Gait Training Goal 1, PT) assistive device use;improve balance and speed;increase endurance/gait distance;increase energy conservation (P)   -     [place] Level (Gait Training Goal 1, PT) standby assist (P)   -     Distance (Gait Training Goal 1, PT) 50 (P)   -     Time Frame (Gait Training Goal 1, PT) 10 days (P)   -     Progress/Outcome (Gait Training Goal 1, PT) new goal (P)   -               User Key  (r) = Recorded By, (t) = Taken By, (c) = Cosigned By      Initials Name Provider Type     Denny Ascencio, PT Student PT Student                    Physical Therapy Education       Title: PT OT SLP Therapies (Done)       Topic: Physical Therapy (Done)       Point: Mobility training (Done)       Learning Progress Summary             Patient Acceptance, E,TB, VU by  at 4/23/2024 1129                                         User Key       Initials Effective Dates Name Provider Type Discipline     10/12/23 -  Denny Ascencio, PT Student PT Student PT                  PT Recommendation and Plan  Anticipated Discharge Disposition (PT): (P) inpatient rehabilitation facility  Planned Therapy Interventions (PT): (P) balance training, bed mobility training, gait training, patient/family education, stair training, strengthening, transfer training  Therapy Frequency (PT): (P) daily  Plan of Care Reviewed With: (P) patient  Progress: (P) no change  Outcome Evaluation: (P) Pt presents to PT with deficits associated with COVID 19. Pt demonstrates fair functional mobility limited by pain and comorbid factors. Skilled services needed at this time. Plan to DC to IRF.   Outcome Measures       Row Name 04/23/24 1100             How much help from another person do you currently need...    Turning from your back to your side while in flat bed without using bedrails? 3 (P)   -      Moving from  lying on back to sitting on the side of a flat bed without bedrails? 3 (P)   -MH      Moving to and from a bed to a chair (including a wheelchair)? 3 (P)   -MH      Standing up from a chair using your arms (e.g., wheelchair, bedside chair)? 3 (P)   -MH      Climbing 3-5 steps with a railing? 1 (P)   -MH      To walk in hospital room? 3 (P)   -      AM-PAC 6 Clicks Score (PT) 16 (P)   -      Highest Level of Mobility Goal 5 --> Static standing (P)   -MH                User Key  (r) = Recorded By, (t) = Taken By, (c) = Cosigned By      Initials Name Provider Type     Denny Ascencio, PT Student PT Student                     Time Calculation:    PT Charges       Row Name 04/23/24 1110             Time Calculation    PT Received On 04/23/24 (P)   -      PT Goal Re-Cert Due Date 05/02/24 (P)   -         Untimed Charges    PT Eval/Re-eval Minutes 33 (P)   -         Total Minutes    Untimed Charges Total Minutes 33 (P)   -MH       Total Minutes 33 (P)   -                User Key  (r) = Recorded By, (t) = Taken By, (c) = Cosigned By      Initials Name Provider Type     Denny Ascencio, PT Student PT Student                  Therapy Charges for Today       Code Description Service Date Service Provider Modifiers Qty    26126233887 HC PT EVAL LOW COMPLEXITY 3 4/23/2024 Denny Ascencio, PT Student GP 1            PT G-Codes  AM-PAC 6 Clicks Score (PT): (P) 16    Denny Ascencio PT Student  4/23/2024

## 2024-04-23 NOTE — PLAN OF CARE
Goal Outcome Evaluation:  Plan of Care Reviewed With: patient  VSS Afib on the cardiac monitor. 02 sats mid to upper 90's on 2 l n/c  No cough noted. Pt denies pain or discomfort.

## 2024-04-23 NOTE — PLAN OF CARE
Goal Outcome Evaluation:                   Patient resting in bedside chair with eyes open. No signs of acute distress present and patient has no concerns at this time. Call light within reach and VSS.

## 2024-04-23 NOTE — PLAN OF CARE
Goal Outcome Evaluation:  Plan of Care Reviewed With: (P) patient        Progress: (P) no change  Outcome Evaluation: (P) Pt presents to PT with deficits associated with COVID 19. Pt demonstrates fair functional mobility limited by pain and comorbid factors. Skilled services needed at this time. Plan to DC to IRF.      Anticipated Discharge Disposition (PT): (P) inpatient rehabilitation facility

## 2024-04-24 LAB
ALBUMIN SERPL-MCNC: 3.7 G/DL (ref 3.5–5.2)
ANION GAP SERPL CALCULATED.3IONS-SCNC: 11.8 MMOL/L (ref 5–15)
BASOPHILS # BLD AUTO: 0.03 10*3/MM3 (ref 0–0.2)
BASOPHILS NFR BLD AUTO: 0.4 % (ref 0–1.5)
BUN SERPL-MCNC: 15 MG/DL (ref 8–23)
BUN/CREAT SERPL: 19.5 (ref 7–25)
CALCIUM SPEC-SCNC: 9.2 MG/DL (ref 8.6–10.5)
CHLORIDE SERPL-SCNC: 97 MMOL/L (ref 98–107)
CO2 SERPL-SCNC: 27.2 MMOL/L (ref 22–29)
CREAT SERPL-MCNC: 0.77 MG/DL (ref 0.57–1)
DEPRECATED RDW RBC AUTO: 45.5 FL (ref 37–54)
EGFRCR SERPLBLD CKD-EPI 2021: 81.1 ML/MIN/1.73
EOSINOPHIL # BLD AUTO: 0.02 10*3/MM3 (ref 0–0.4)
EOSINOPHIL NFR BLD AUTO: 0.3 % (ref 0.3–6.2)
ERYTHROCYTE [DISTWIDTH] IN BLOOD BY AUTOMATED COUNT: 15 % (ref 12.3–15.4)
GEN 5 2HR TROPONIN T REFLEX: 18 NG/L
GLUCOSE BLDC GLUCOMTR-MCNC: 135 MG/DL (ref 70–99)
GLUCOSE SERPL-MCNC: 177 MG/DL (ref 65–99)
HCT VFR BLD AUTO: 35.4 % (ref 34–46.6)
HGB BLD-MCNC: 11 G/DL (ref 12–15.9)
IMM GRANULOCYTES # BLD AUTO: 0.04 10*3/MM3 (ref 0–0.05)
IMM GRANULOCYTES NFR BLD AUTO: 0.6 % (ref 0–0.5)
INR PPP: 1.7 (ref 0.86–1.15)
LYMPHOCYTES # BLD AUTO: 0.87 10*3/MM3 (ref 0.7–3.1)
LYMPHOCYTES NFR BLD AUTO: 12.8 % (ref 19.6–45.3)
MAGNESIUM SERPL-MCNC: 2 MG/DL (ref 1.6–2.4)
MCH RBC QN AUTO: 25.8 PG (ref 26.6–33)
MCHC RBC AUTO-ENTMCNC: 31.1 G/DL (ref 31.5–35.7)
MCV RBC AUTO: 83.1 FL (ref 79–97)
MONOCYTES # BLD AUTO: 0.49 10*3/MM3 (ref 0.1–0.9)
MONOCYTES NFR BLD AUTO: 7.2 % (ref 5–12)
NEUTROPHILS NFR BLD AUTO: 5.36 10*3/MM3 (ref 1.7–7)
NEUTROPHILS NFR BLD AUTO: 78.7 % (ref 42.7–76)
NRBC BLD AUTO-RTO: 0 /100 WBC (ref 0–0.2)
PHOSPHATE SERPL-MCNC: 3.4 MG/DL (ref 2.5–4.5)
PLATELET # BLD AUTO: 121 10*3/MM3 (ref 140–450)
PMV BLD AUTO: 11.6 FL (ref 6–12)
POTASSIUM SERPL-SCNC: 3.5 MMOL/L (ref 3.5–5.2)
POTASSIUM SERPL-SCNC: 3.6 MMOL/L (ref 3.5–5.2)
PROTHROMBIN TIME: 20.3 SECONDS (ref 11.8–14.9)
QT INTERVAL: 463 MS
QTC INTERVAL: 441 MS
RBC # BLD AUTO: 4.26 10*6/MM3 (ref 3.77–5.28)
SODIUM SERPL-SCNC: 136 MMOL/L (ref 136–145)
T4 FREE SERPL-MCNC: 1.2 NG/DL (ref 0.92–1.68)
TROPONIN T DELTA: -2 NG/L
TROPONIN T SERPL HS-MCNC: 20 NG/L
TSH SERPL DL<=0.05 MIU/L-ACNC: 2.1 UIU/ML (ref 0.27–4.2)
WBC NRBC COR # BLD AUTO: 6.81 10*3/MM3 (ref 3.4–10.8)

## 2024-04-24 PROCEDURE — 84484 ASSAY OF TROPONIN QUANT: CPT | Performed by: INTERNAL MEDICINE

## 2024-04-24 PROCEDURE — 82948 REAGENT STRIP/BLOOD GLUCOSE: CPT

## 2024-04-24 PROCEDURE — 84443 ASSAY THYROID STIM HORMONE: CPT | Performed by: PHYSICIAN ASSISTANT

## 2024-04-24 PROCEDURE — 93005 ELECTROCARDIOGRAM TRACING: CPT | Performed by: INTERNAL MEDICINE

## 2024-04-24 PROCEDURE — 99233 SBSQ HOSP IP/OBS HIGH 50: CPT | Performed by: INTERNAL MEDICINE

## 2024-04-24 PROCEDURE — 85025 COMPLETE CBC W/AUTO DIFF WBC: CPT | Performed by: STUDENT IN AN ORGANIZED HEALTH CARE EDUCATION/TRAINING PROGRAM

## 2024-04-24 PROCEDURE — 84132 ASSAY OF SERUM POTASSIUM: CPT | Performed by: INTERNAL MEDICINE

## 2024-04-24 PROCEDURE — 97165 OT EVAL LOW COMPLEX 30 MIN: CPT

## 2024-04-24 PROCEDURE — 25010000002 DEXAMETHASONE SODIUM PHOSPHATE 10 MG/ML SOLUTION: Performed by: INTERNAL MEDICINE

## 2024-04-24 PROCEDURE — 25010000002 FUROSEMIDE PER 20 MG: Performed by: INTERNAL MEDICINE

## 2024-04-24 PROCEDURE — 83735 ASSAY OF MAGNESIUM: CPT | Performed by: INTERNAL MEDICINE

## 2024-04-24 PROCEDURE — 84439 ASSAY OF FREE THYROXINE: CPT | Performed by: PHYSICIAN ASSISTANT

## 2024-04-24 PROCEDURE — 85610 PROTHROMBIN TIME: CPT | Performed by: INTERNAL MEDICINE

## 2024-04-24 PROCEDURE — 80069 RENAL FUNCTION PANEL: CPT | Performed by: INTERNAL MEDICINE

## 2024-04-24 PROCEDURE — 94799 UNLISTED PULMONARY SVC/PX: CPT

## 2024-04-24 RX ORDER — GABAPENTIN 300 MG/1
600 CAPSULE ORAL NIGHTLY
Status: DISCONTINUED | OUTPATIENT
Start: 2024-04-24 | End: 2024-04-27 | Stop reason: HOSPADM

## 2024-04-24 RX ORDER — WARFARIN SODIUM 6 MG/1
6 TABLET ORAL
Status: DISCONTINUED | OUTPATIENT
Start: 2024-04-24 | End: 2024-04-25

## 2024-04-24 RX ADMIN — GABAPENTIN 600 MG: 300 CAPSULE ORAL at 20:58

## 2024-04-24 RX ADMIN — FUROSEMIDE 40 MG: 10 INJECTION, SOLUTION INTRAMUSCULAR; INTRAVENOUS at 09:17

## 2024-04-24 RX ADMIN — METOPROLOL TARTRATE 12.5 MG: 25 TABLET, FILM COATED ORAL at 09:18

## 2024-04-24 RX ADMIN — Medication 10 ML: at 09:23

## 2024-04-24 RX ADMIN — DEXAMETHASONE SODIUM PHOSPHATE 6 MG: 10 INJECTION INTRAMUSCULAR; INTRAVENOUS at 09:17

## 2024-04-24 RX ADMIN — WARFARIN SODIUM 6 MG: 6 TABLET ORAL at 20:59

## 2024-04-24 RX ADMIN — Medication 10 ML: at 21:02

## 2024-04-24 RX ADMIN — LOSARTAN POTASSIUM 100 MG: 50 TABLET, FILM COATED ORAL at 09:18

## 2024-04-24 RX ADMIN — FUROSEMIDE 40 MG: 10 INJECTION, SOLUTION INTRAMUSCULAR; INTRAVENOUS at 21:01

## 2024-04-24 RX ADMIN — CLONIDINE HYDROCHLORIDE 0.1 MG: 0.1 TABLET ORAL at 09:18

## 2024-04-24 NOTE — SIGNIFICANT NOTE
Wound Eval / Progress Noted    SKY Munoz     Patient Name: Inez Doyle  : 1950  MRN: [id number]  Today's Date: 2024                 Admit Date: 2024    Visit Dx:    ICD-10-CM ICD-9-CM   1. COVID-19  U07.1 079.89   2. Acute respiratory failure with hypoxia  J96.01 518.81   3. Difficulty walking  R26.2 719.7   4. Decreased activities of daily living (ADL)  Z78.9 V49.89         COVID-19 with multiple comorbidities    Atrial fibrillation    Hypoxic respiratory failure    Morbid obesity    Diabetes        Past Medical History:   Diagnosis Date    Anemia     Anxiety     Asthma     Depression     Osteoarthritis         Past Surgical History:   Procedure Laterality Date    BACK SURGERY      STOMACH SURGERY       Physical Assessment:      24 0900   Skin   Skin WDL X;all   Skin Color/Characteristics redness blanchable;other (see comments)  (redness to BLE)   Skin Temperature warm   Skin Moisture dry   Skin Elasticity quick return to original state   Skin Integrity intact      Wound Check / Follow-up:  Patient seen today for wound consult. Patient is awake and alert at time of visit and agreeable to assessment.    Patient with redness and dry skin to BLE. No open wounds or drainage noted. Patient reports she previously had wounds to BLE but all are resolved at this time. She states she has recurrent cellulitis. Recommending skin care cleansing with CHG wash then applying moisturizer daily.    Patient denies any other skin issues and the need for further assessment at this time.    Impression: Redness and dry skin to BLE.      Short term goals: Maintain skin integrity, skin protection, skin care.    Anca Jones RN    2024    16:44 EDT

## 2024-04-24 NOTE — PLAN OF CARE
Goal Outcome Evaluation:  Plan of Care Reviewed With: patient        Progress: no change (First session for evaluation)  Outcome Evaluation: Patient presents with limitations of balance, strength and endurance/activity tolerance which impede her ability to perform ADL and transfers as prior.  The skills of a therapist will be required to safely and effectively implement treatment plan to restore maximal level of function.      Anticipated Discharge Disposition (OT): inpatient rehabilitation facility

## 2024-04-24 NOTE — SIGNIFICANT NOTE
"   04/24/24 1200   Physical Therapy Time and Intention   Session Not Performed unable to evaluate, medical status change;unable to treat, medical status change   Comment, Session Not Performed Nursing excused pt from PT treatment due to \"HR dropping into the 30's briefly.\"       "

## 2024-04-24 NOTE — PROGRESS NOTES
[facility]   Hospitalist Progress Note  Date: 2024  Patient Name: Inez Doyle  : 1950  MRN: [id number]  Date of admission: 2024  Room/Bed: 256/1      Subjective   Subjective     Chief Complaint: SOA    Summary:Patient is a very pleasant 74 y.o. female presents with shortness of breath.  Patient was noted to have an oxygen saturation in the 50s with home health.  EMS reports he was in the 80s on room air and brought her to the emergency department for further evaluation.  Patient is morbidly obese and does have a history of chronic cellulitis, lower extremity swelling, diabetes and COPD.  She was found to be COVID-positive. She was admitted and started on IV dexamethasone.    Interval Followup: Feels better, thinks the Lasix is helping.        Objective   Objective     Vitals:   Temp:  [96.8 °F (36 °C)-98.5 °F (36.9 °C)] 96.8 °F (36 °C)  Heart Rate:  [] 67  Resp:  [16-20] 20  BP: (102-137)/(51-70) 102/64  Flow (L/min):  [2] 2    Physical Exam   General: Awake, alert, NAD  HENT: NCAT, MMM  Eyes: pupils equal, no scleral icterus  Cardiovascular: RRR, no murmurs   Pulmonary: slightly diminished  Gastrointestinal: S/ND/NT, +BS  Musculoskeletal: chronic appearing eder ROJAS slightly better    Result Review    Result Review:  I have personally reviewed these results:  [x]  Laboratory      Lab 24  0336 24  0349 24  1617 24  1545   WBC 6.81 5.65  --  4.52   HEMOGLOBIN 11.0* 11.5*  --  12.0   HEMATOCRIT 35.4 38.2  --  39.6   PLATELETS 121* 100*  --  95*   NEUTROS ABS 5.36 4.95  --  3.19   IMMATURE GRANS (ABS) 0.04 0.02  --  0.02   LYMPHS ABS 0.87 0.54*  --  0.85   MONOS ABS 0.49 0.09*  --  0.34   EOS ABS 0.02 0.02  --  0.10   MCV 83.1 84.7  --  84.3   LACTATE  --   --  1.5  --    PROTIME 20.3*  --   --  24.9*         Lab 24  0336 24  0349 24  1545   SODIUM 136 140 140   POTASSIUM 3.5 3.9 3.5   CHLORIDE 97* 99 99   CO2 27.2 27.0 27.5   ANION GAP 11.8 14.0  13.5   BUN 15 12 13   CREATININE 0.77 0.86 0.98   EGFR 81.1 71.0 60.7   GLUCOSE 177* 193* 136*   CALCIUM 9.2 8.8 8.7   PHOSPHORUS 3.4  --   --          Lab 04/24/24  0336 04/22/24  1545   TOTAL PROTEIN  --  6.8   ALBUMIN 3.7 3.8   GLOBULIN  --  3.0   ALT (SGPT)  --  7   AST (SGOT)  --  10   BILIRUBIN  --  0.6   ALK PHOS  --  82         Lab 04/24/24  0336 04/22/24  1545   PROBNP  --  1,538.0*   HSTROP T  --  22*   PROTIME 20.3* 24.9*   INR 1.70* 2.21*                 Lab 04/22/24  1611   PH, ARTERIAL 7.424   PCO2, ARTERIAL 40.5   PO2 ART 95.5   O2 SATURATION ART 96.3   FIO2 28   HCO3 ART 25.9   BASE EXCESS ART 1.4   CARBOXYHEMOGLOBIN 0.5     Brief Urine Lab Results       None          [x]  Microbiology   Microbiology Results (last 10 days)       Procedure Component Value - Date/Time    Blood Culture - Blood, Hand, Left [[id number]]  (Normal) Collected: 04/22/24 1620    Lab Status: Preliminary result Specimen: Blood from Hand, Left Updated: 04/23/24 1631     Blood Culture No growth at 24 hours    Blood Culture - Blood, Arm, Right [[id number]]  (Normal) Collected: 04/22/24 1617    Lab Status: Preliminary result Specimen: Blood from Arm, Right Updated: 04/23/24 1631     Blood Culture No growth at 24 hours    COVID-19, FLU A/B, RSV PCR 1 HR TAT - Swab, Nasopharynx [[id number]]  (Abnormal) Collected: 04/22/24 1612    Lab Status: Final result Specimen: Swab from Nasopharynx Updated: 04/22/24 1705     COVID19 Detected     Influenza A PCR Not Detected     Influenza B PCR Not Detected     RSV, PCR Not Detected    Narrative:      Fact sheet for providers: https://www.fda.gov/media/826176/download    Fact sheet for patients: https://www.fda.gov/media/272253/download    Test performed by PCR.          [x]  Radiology  XR Chest 1 View    Result Date: 4/22/2024  Impression: Unchanged enlarged cardiac silhouette. No focal airspace consolidation.   Electronically Signed By-Yoni Talavera MD On:4/22/2024 3:57 PM     []  EKG/Telemetry   []   Cardiology/Vascular   []  Pathology  []  Old records  []  Other:    Assessment & Plan   Assessment / Plan     Assessment:   COVID-19 with multiple comorbidities    Atrial fibrillation    Hypoxic respiratory failure    Morbid obesity    Diabetes    Plan:  Admitted to Medicine  Continue dexamethasone 6mg IV daily  Supplemental oxygen as needed  Continue IV Lasix  Increase warfrain  Daily INR for therapeutic drug monitoring    DVT prophylaxis:  Medical DVT prophylaxis orders are present.        CODE STATUS:   Medical Intervention Limits: NO intubation (DNI)  Code Status (Patient has no pulse and is not breathing): CPR (Attempt to Resuscitate)  Medical Interventions (Patient has pulse or is breathing): Limited Support      Electronically signed by Jin Matos MD, 4/24/2024, 09:59 EDT.

## 2024-04-24 NOTE — THERAPY EVALUATION
Patient Name: Inez Doyle  : 1950    MRN: [id number]                              Today's Date: 2024       Admit Date: 2024    Visit Dx:     ICD-10-CM ICD-9-CM   1. COVID-19  U07.1 079.89   2. Acute respiratory failure with hypoxia  J96.01 518.81   3. Difficulty walking  R26.2 719.7   4. Decreased activities of daily living (ADL)  Z78.9 V49.89     Patient Active Problem List   Diagnosis    Posterior tibial tendon dysfunction    Charcot's joint of foot    Arthritis, lumbar spine    Atrial fibrillation    COVID-19 with multiple comorbidities    Hypoxic respiratory failure    Morbid obesity    Diabetes     Past Medical History:   Diagnosis Date    Anemia     Anxiety     Asthma     Depression     Osteoarthritis      Past Surgical History:   Procedure Laterality Date    BACK SURGERY      STOMACH SURGERY        General Information       Row Name 24 1053          OT Time and Intention    Document Type evaluation  -AV     Mode of Treatment individual therapy;occupational therapy  -AV       Row Name 24 1054          General Information    Patient Profile Reviewed yes  -AV     Prior Level of Function independent:;min assist:;ADL's;transfer  Primarily independent other than min assist for sponge baths. Sits to groom.  Ambulates short distances with RW.  Wheelchair used for community mobility/appointments.  No home oxygen.  [employer] assist twice a week with home management tasks.  -AV     Existing Precautions/Restrictions fall;oxygen therapy device and L/min  Enhanced airborne isolation: COVID+  -AV     Barriers to Rehab none identified  -AV       Row Name 24 1054          Occupational Profile    Reason for Services/Referral (Occupational Profile) Patient is a 74 year old female admitted to [facility] on 2024 with shortness of air.  She is currently on 2W/2L O2.   OT consulted due to recent decline in ADL/transfer independence.  No previous OT services for current  condition.  -AV       Row Name 04/24/24 1056          Living Environment    People in Home alone  -AV       Row Name 04/24/24 1056          Home Main Entrance    Number of Stairs, Main Entrance none  -AV       Row Name 04/24/24 1056          Stairs Within Home, Primary    Number of Stairs, Within Home, Primary none  -AV       [facility] Name 04/24/24 1056          Cognition    Orientation Status (Cognition) --  Patient is alert, pleasant and cooperative- able to retain information and follow commands.  -AV       [facility] Name 04/24/24 1056          Safety Issues, Functional Mobility    Impairments Affecting Function (Mobility) balance;endurance/activity tolerance;strength  -AV               User Key  (r) = Recorded By, (t) = Taken By, (c) = Cosigned By      Initials Name Provider Type    Dwain Dong OT Occupational Therapist                     Mobility/ADL's       [facility] 04/24/24 1059          Transfers    Comment, (Transfers) CGA/min assist RW  -AV       Row Name 04/24/24 1059          Activities of Daily Living    BADL Assessment/Intervention --  Independent feeding and grooming with set up while seated (does not wear her dentures).  Mod-max assist bathing/dressing.  Dependent toilet hygiene: Pure wick catheter, BSC.  -AV               User Key  (r) = Recorded By, (t) = Taken By, (c) = Cosigned By      Initials Name Provider Type    Dwain Dong OT Occupational Therapist                   Obj/Interventions       [facility] Name 04/24/24 1100          Sensory Assessment (Somatosensory)    Sensory Assessment (Somatosensory) UE sensation intact  -AV       Row Name 04/24/24 1100          Vision Assessment/Intervention    Visual Impairment/Limitations WFL;corrective lenses for reading  -AV       [facility] Name 04/24/24 1100          Range of Motion Comprehensive    General Range of Motion bilateral upper extremity ROM WFL  -AV     Comment, General Range of Motion AROM  -AV       [facility] Name 04/24/24 1100          Strength  Comprehensive (MMT)    Comment, General Manual Muscle Testing (MMT) Assessment 4(-)/5 bilateral biceps, triceps and   -AV       Row Name 04/24/24 1100          Motor Skills    Motor Skills coordination;functional endurance  -AV     Coordination WFL  Right dominant  -AV     Functional Endurance poor plus  -AV       Row Name 04/24/24 1100          Balance    Comment, Balance CGA/min assist/RW  -AV               User Key  (r) = Recorded By, (t) = Taken By, (c) = Cosigned By      Initials Name Provider Type    AV Dwain Mitchell OT Occupational Therapist                   Goals/Plan       Row Name 04/24/24 1103          Transfer Goal 1 (OT)    Activity/Assistive Device (Transfer Goal 1, OT) transfers, all;walker, rolling  -AV     [place] Level/Cues Needed (Transfer Goal 1, OT) modified independence  -AV     Time Frame (Transfer Goal 1, OT) long term goal (LTG);10 days  -AV       Row Name 04/24/24 1103          Bathing Goal 1 (OT)    Activity/Device (Bathing Goal 1, OT) bathing skills, all  -AV     [place] Level/Cues Needed (Bathing Goal 1, OT) minimum assist (75% or more patient effort)  -AV     Time Frame (Bathing Goal 1, OT) long term goal (LTG);10 days  -AV       Row Name 04/24/24 1103          Dressing Goal 1 (OT)    Activity/Device (Dressing Goal 1, OT) dressing skills, all  -AV     [place]/Cues Needed (Dressing Goal 1, OT) modified independence  -AV     Time Frame (Dressing Goal 1, OT) long term goal (LTG);10 days  -AV       Row Name 04/24/24 1103          Toileting Goal 1 (OT)    Activity/Device (Toileting Goal 1, OT) toileting skills, all  -AV     [place] Level/Cues Needed (Toileting Goal 1, OT) modified independence  -AV     Time Frame (Toileting Goal 1, OT) long term goal (LTG);10 days  -AV       Row Name 04/24/24 1103          Strength Goal 1 (OT)    Strength Goal 1 (OT) Patient will demonstrate 4/5 bilateral biceps, triceps and  to increase ADL and transfer independence.  -AV      Time Frame (Strength Goal 1, OT) long term goal (LTG);10 days  -AV       Row Name 04/24/24 1103          Problem Specific Goal 1 (OT)    Problem Specific Goal 1 (OT) Patient will demonstrate fair endurance/activity tolerance needed to support ADLs.  -AV     Time Frame (Problem Specific Goal 1, OT) long term goal (LTG);10 days  -AV       Row Name 04/24/24 1103          Therapy Assessment/Plan (OT)    Planned Therapy Interventions (OT) activity tolerance training;BADL retraining;functional balance retraining;IADL retraining;occupation/activity based interventions;strengthening exercise;transfer/mobility retraining  -AV               User Key  (r) = Recorded By, (t) = Taken By, (c) = Cosigned By      Initials Name Provider Type    AV Dwain Mitchell OT Occupational Therapist                   Clinical Impression       Row Name 04/24/24 1102          Pain Assessment    Additional Documentation Pain Scale: FACES Pre/Post-Treatment (Group)  -AV       [facility] Name 04/24/24 1102          Pain Scale: FACES Pre/Post-Treatment    Pain: FACES Scale, Pretreatment 0-->no hurt  -AV     Posttreatment Pain Rating 0-->no hurt  -AV       Row Name 04/24/24 1102          Plan of Care Review    Plan of Care Reviewed With patient  -AV     Progress no change  First session for evaluation  -AV     Outcome Evaluation Patient presents with limitations of balance, strength and endurance/activity tolerance which impede her ability to perform ADL and transfers as prior.  The skills of a therapist will be required to safely and effectively implement treatment plan to restore maximal level of function.  -AV       Row Name 04/24/24 1102          Therapy Assessment/Plan (OT)    Patient/Family Therapy Goal Statement (OT) Regain independence to return home  -AV     Rehab Potential (OT) good, to achieve stated therapy goals  -AV     Criteria for Skilled Therapeutic Interventions Met (OT) yes;meets criteria;skilled treatment is necessary  -AV     Therapy  Frequency (OT) 5 times/wk  -AV       Row Name 04/24/24 1102          Therapy Plan Review/Discharge Plan (OT)    Anticipated Discharge Disposition (OT) inpatient rehabilitation facility  -AV       Row Name 04/24/24 1102          Vital Signs    O2 Delivery Pre Treatment nasal cannula  -AV     O2 Delivery Intra Treatment nasal cannula  -AV     O2 Delivery Post Treatment nasal cannula  -AV       Row Name 04/24/24 1102          Positioning and Restraints    Pre-Treatment Position sitting in chair/recliner  -AV     Post Treatment Position chair  -AV     In Chair call light within reach;encouraged to call for assist  -AV               User Key  (r) = Recorded By, (t) = Taken By, (c) = Cosigned By      Initials Name Provider Type    Dwain Dong, OT Occupational Therapist                   Outcome Measures       Row Name 04/24/24 1104          How much help from another is currently needed...    Putting on and taking off regular lower body clothing? 2  -AV     Bathing (including washing, rinsing, and drying) 2  -AV     Toileting (which includes using toilet bed pan or urinal) 1  -AV     Putting on and taking off regular upper body clothing 3  -AV     Taking care of personal grooming (such as brushing teeth) 4  -AV     Eating meals 4  -AV     AM-PAC 6 Clicks Score (OT) 16  -AV       Row Name 04/24/24 1104          Functional Assessment    Outcome Measure Options AM-PAC 6 Clicks Daily Activity (OT);Optimal Instrument  -AV       Row Name 04/24/24 1104          Optimal Instrument    Optimal Instrument Optimal - 3  -AV     Bending/Stooping 3  -AV     Standing 2  -AV     Reaching 1  -AV     From the list, choose the 3 activities you would most like to be able to do without any difficulty Bending/stooping;Standing;Reaching  -AV     Total Score Optimal - 3 6  -AV               User Key  (r) = Recorded By, (t) = Taken By, (c) = Cosigned By      Initials Name Provider Type    Dwain Dong, OT Occupational Therapist                     Occupational Therapy Education       Title: PT OT SLP Therapies (Done)       Topic: Occupational Therapy (Done)       Point: ADL training (Done)       Description:   Instruct learner(s) on proper safety adaptation and remediation techniques during self care or transfers.   Instruct in proper use of assistive devices.                  Learning Progress Summary             Patient Acceptance, E, VU by AV at 4/24/2024 1107                         Point: Home exercise program (Done)       Description:   Instruct learner(s) on appropriate technique for monitoring, assisting and/or progressing therapeutic exercises/activities.                  Learning Progress Summary             Patient Acceptance, E, VU by AV at 4/24/2024 1107                         Point: Precautions (Done)       Description:   Instruct learner(s) on prescribed precautions during self-care and functional transfers.                  Learning Progress Summary             Patient Acceptance, E, VU by AV at 4/24/2024 1107                         Point: Body mechanics (Done)       Description:   Instruct learner(s) on proper positioning and spine alignment during self-care, functional mobility activities and/or exercises.                  Learning Progress Summary             Patient Acceptance, E, VU by AV at 4/24/2024 1107                                         User Key       Initials Effective Dates Name Provider Type Discipline     06/16/21 -  Dwain Mitchell OT Occupational Therapist OT                  OT Recommendation and Plan  Planned Therapy Interventions (OT): activity tolerance training, BADL retraining, functional balance retraining, IADL retraining, occupation/activity based interventions, strengthening exercise, transfer/mobility retraining  Therapy Frequency (OT): 5 times/wk  Plan of Care Review  Plan of Care Reviewed With: patient  Progress: no change (First session for evaluation)  Outcome Evaluation: Patient presents with  limitations of balance, strength and endurance/activity tolerance which impede her ability to perform ADL and transfers as prior.  The skills of a therapist will be required to safely and effectively implement treatment plan to restore maximal level of function.     Time Calculation:   Evaluation Complexity (OT)  Review Occupational Profile/Medical/Therapy History Complexity: expanded/moderate complexity  Assessment, Occupational Performance/Identification of Deficit Complexity: 1-3 performance deficits  Clinical Decision Making Complexity (OT): problem focused assessment/low complexity  Overall Complexity of Evaluation (OT): low complexity     Time Calculation- OT       Row Name 04/24/24 1108             Time Calculation- OT    OT Received On 04/24/24  -AV      OT Goal Re-Cert Due Date 05/03/24  -AV         Untimed Charges    OT Eval/Re-eval Minutes 35  -AV         Total Minutes    Untimed Charges Total Minutes 35  -AV       Total Minutes 35  -AV                User Key  (r) = Recorded By, (t) = Taken By, (c) = Cosigned By      Initials Name Provider Type    AV Dwain Mitchell OT Occupational Therapist                  Therapy Charges for Today       Code Description Service Date Service Provider Modifiers Qty    [id number]  OT EVAL LOW COMPLEXITY 3 4/24/2024 Dwain Mitchell OT GO 1                 Dwain Mitchell OT  4/24/2024

## 2024-04-24 NOTE — PLAN OF CARE
Goal Outcome Evaluation:                 Shift has been uneventful. Resting in bedside chair. Chair alarm on and call light within reach.

## 2024-04-25 LAB
ALBUMIN SERPL-MCNC: 3.9 G/DL (ref 3.5–5.2)
ANION GAP SERPL CALCULATED.3IONS-SCNC: 12.9 MMOL/L (ref 5–15)
BASOPHILS # BLD AUTO: 0.03 10*3/MM3 (ref 0–0.2)
BASOPHILS NFR BLD AUTO: 0.5 % (ref 0–1.5)
BUN SERPL-MCNC: 19 MG/DL (ref 8–23)
BUN/CREAT SERPL: 21.8 (ref 7–25)
CALCIUM SPEC-SCNC: 9.3 MG/DL (ref 8.6–10.5)
CHLORIDE SERPL-SCNC: 96 MMOL/L (ref 98–107)
CO2 SERPL-SCNC: 29.1 MMOL/L (ref 22–29)
CREAT SERPL-MCNC: 0.87 MG/DL (ref 0.57–1)
DEPRECATED RDW RBC AUTO: 46.1 FL (ref 37–54)
EGFRCR SERPLBLD CKD-EPI 2021: 70 ML/MIN/1.73
EOSINOPHIL # BLD AUTO: 0.03 10*3/MM3 (ref 0–0.4)
EOSINOPHIL NFR BLD AUTO: 0.5 % (ref 0.3–6.2)
ERYTHROCYTE [DISTWIDTH] IN BLOOD BY AUTOMATED COUNT: 14.9 % (ref 12.3–15.4)
GLUCOSE SERPL-MCNC: 165 MG/DL (ref 65–99)
HCT VFR BLD AUTO: 39.6 % (ref 34–46.6)
HGB BLD-MCNC: 11.9 G/DL (ref 12–15.9)
IMM GRANULOCYTES # BLD AUTO: 0.02 10*3/MM3 (ref 0–0.05)
IMM GRANULOCYTES NFR BLD AUTO: 0.3 % (ref 0–0.5)
INR PPP: 1.49 (ref 0.86–1.15)
LYMPHOCYTES # BLD AUTO: 1.04 10*3/MM3 (ref 0.7–3.1)
LYMPHOCYTES NFR BLD AUTO: 16.9 % (ref 19.6–45.3)
MAGNESIUM SERPL-MCNC: 2 MG/DL (ref 1.6–2.4)
MCH RBC QN AUTO: 25.6 PG (ref 26.6–33)
MCHC RBC AUTO-ENTMCNC: 30.1 G/DL (ref 31.5–35.7)
MCV RBC AUTO: 85.2 FL (ref 79–97)
MONOCYTES # BLD AUTO: 0.47 10*3/MM3 (ref 0.1–0.9)
MONOCYTES NFR BLD AUTO: 7.6 % (ref 5–12)
NEUTROPHILS NFR BLD AUTO: 4.57 10*3/MM3 (ref 1.7–7)
NEUTROPHILS NFR BLD AUTO: 74.2 % (ref 42.7–76)
NRBC BLD AUTO-RTO: 0 /100 WBC (ref 0–0.2)
PHOSPHATE SERPL-MCNC: 3.8 MG/DL (ref 2.5–4.5)
PLATELET # BLD AUTO: 123 10*3/MM3 (ref 140–450)
PMV BLD AUTO: 11.7 FL (ref 6–12)
POTASSIUM SERPL-SCNC: 3.4 MMOL/L (ref 3.5–5.2)
PROTHROMBIN TIME: 18.3 SECONDS (ref 11.8–14.9)
RBC # BLD AUTO: 4.65 10*6/MM3 (ref 3.77–5.28)
SODIUM SERPL-SCNC: 138 MMOL/L (ref 136–145)
WBC NRBC COR # BLD AUTO: 6.16 10*3/MM3 (ref 3.4–10.8)

## 2024-04-25 PROCEDURE — 85025 COMPLETE CBC W/AUTO DIFF WBC: CPT | Performed by: STUDENT IN AN ORGANIZED HEALTH CARE EDUCATION/TRAINING PROGRAM

## 2024-04-25 PROCEDURE — 25010000002 ONDANSETRON PER 1 MG: Performed by: STUDENT IN AN ORGANIZED HEALTH CARE EDUCATION/TRAINING PROGRAM

## 2024-04-25 PROCEDURE — 83735 ASSAY OF MAGNESIUM: CPT | Performed by: PHYSICIAN ASSISTANT

## 2024-04-25 PROCEDURE — 97116 GAIT TRAINING THERAPY: CPT

## 2024-04-25 PROCEDURE — 25010000002 FUROSEMIDE PER 20 MG: Performed by: INTERNAL MEDICINE

## 2024-04-25 PROCEDURE — 99233 SBSQ HOSP IP/OBS HIGH 50: CPT | Performed by: INTERNAL MEDICINE

## 2024-04-25 PROCEDURE — 85610 PROTHROMBIN TIME: CPT | Performed by: INTERNAL MEDICINE

## 2024-04-25 PROCEDURE — 25010000002 DEXAMETHASONE SODIUM PHOSPHATE 10 MG/ML SOLUTION: Performed by: INTERNAL MEDICINE

## 2024-04-25 PROCEDURE — 80069 RENAL FUNCTION PANEL: CPT | Performed by: PHYSICIAN ASSISTANT

## 2024-04-25 RX ORDER — WARFARIN SODIUM 7.5 MG/1
7.5 TABLET ORAL
Status: DISCONTINUED | OUTPATIENT
Start: 2024-04-25 | End: 2024-04-27 | Stop reason: HOSPADM

## 2024-04-25 RX ORDER — POTASSIUM CHLORIDE 750 MG/1
40 CAPSULE, EXTENDED RELEASE ORAL ONCE
Status: COMPLETED | OUTPATIENT
Start: 2024-04-25 | End: 2024-04-25

## 2024-04-25 RX ADMIN — POTASSIUM CHLORIDE 40 MEQ: 750 CAPSULE, EXTENDED RELEASE ORAL at 09:25

## 2024-04-25 RX ADMIN — Medication 10 ML: at 09:26

## 2024-04-25 RX ADMIN — FUROSEMIDE 40 MG: 10 INJECTION, SOLUTION INTRAMUSCULAR; INTRAVENOUS at 18:50

## 2024-04-25 RX ADMIN — Medication 10 ML: at 21:40

## 2024-04-25 RX ADMIN — FUROSEMIDE 40 MG: 10 INJECTION, SOLUTION INTRAMUSCULAR; INTRAVENOUS at 09:25

## 2024-04-25 RX ADMIN — WARFARIN SODIUM 7.5 MG: 7.5 TABLET ORAL at 18:50

## 2024-04-25 RX ADMIN — ONDANSETRON 4 MG: 2 INJECTION INTRAMUSCULAR; INTRAVENOUS at 05:55

## 2024-04-25 RX ADMIN — GABAPENTIN 600 MG: 300 CAPSULE ORAL at 21:39

## 2024-04-25 RX ADMIN — DEXAMETHASONE SODIUM PHOSPHATE 6 MG: 10 INJECTION INTRAMUSCULAR; INTRAVENOUS at 09:25

## 2024-04-25 NOTE — PLAN OF CARE
Goal Outcome Evaluation:  Plan of Care Reviewed With: patient        Progress: improving  Outcome Evaluation: patietn remains alert and oriented x4, denies discomfort at this time. hr 40-50 afib most of shift did drop to 31-35hr for a short time while sleeping but remained asymptomatic .

## 2024-04-25 NOTE — PLAN OF CARE
Problem: Adult Inpatient Plan of Care  Goal: Plan of Care Review  Outcome: Ongoing, Progressing  Goal: Patient-Specific Goal (Individualized)  Outcome: Ongoing, Progressing  Goal: Absence of Hospital-Acquired Illness or Injury  Outcome: Ongoing, Progressing  Intervention: Identify and Manage Fall Risk  Intervention: Prevent Skin Injury  Intervention: Prevent and Manage VTE (Venous Thromboembolism) Risk  Intervention: Prevent Infection  Goal: Optimal Comfort and Wellbeing  Outcome: Ongoing, Progressing  Intervention: Provide Person-Centered Care  Goal: Readiness for Transition of Care  Outcome: Ongoing, Progressing   Goal Outcome Evaluation:  Plan of Care Reviewed With: patient        Progress: no change

## 2024-04-25 NOTE — THERAPY TREATMENT NOTE
Acute Care - Physical Therapy Treatment Note   Tammy     Patient Name: Inez Doyle  : 1950  MRN: [id number]  Today's Date: 2024      Visit Dx:     ICD-10-CM ICD-9-CM   1. COVID-19  U07.1 079.89   2. Acute respiratory failure with hypoxia  J96.01 518.81   3. Difficulty walking  R26.2 719.7   4. Decreased activities of daily living (ADL)  Z78.9 V49.89     Patient Active Problem List   Diagnosis    Posterior tibial tendon dysfunction    Charcot's joint of foot    Arthritis, lumbar spine    Atrial fibrillation    COVID-19 with multiple comorbidities    Hypoxic respiratory failure    Morbid obesity    Diabetes     Past Medical History:   Diagnosis Date    Anemia     Anxiety     Asthma     Depression     Osteoarthritis      Past Surgical History:   Procedure Laterality Date    BACK SURGERY      STOMACH SURGERY       PT Assessment (Last 12 Hours)       PT Evaluation and Treatment       Row Name 24 1152          Physical Therapy Time and Intention    Subjective Information complains of;fatigue;pain (P)   -SM     Document Type therapy note (daily note) (P)   -SM     Mode of Treatment individual therapy;physical therapy (P)   -SM     Patient Effort adequate (P)   -     Symptoms Noted During/After Treatment fatigue (P)   -       Row Name 24 1152          Pain Scale: FACES Pre/Post-Treatment    Pain: FACES Scale, Pretreatment 2-->hurts little bit (P)   -SM     Posttreatment Pain Rating 2-->hurts little bit (P)   -[facility] Name 24 1152          Transfers    Transfers sit-stand transfer;stand-sit transfer (P)   -       Row Name 24 1152          Sit-Stand Transfer    Sit-Stand [place] (Transfers) contact guard (P)   -     Assistive Device (Sit-Stand Transfers) walker, front-wheeled (P)   -       Row Name 24 1152          Stand-Sit Transfer    Stand-Sit [place] (Transfers) contact guard (P)   -     Assistive Device (Stand-Sit Transfers) walker,  front-wheeled (P)   -       Row Name 04/25/24 1152          Gait/Stairs (Locomotion)    Gait/Stairs Locomotion gait/ambulation assistive device (P)   -     [place] Level (Gait) contact guard (P)   -     Assistive Device (Gait) walker, front-wheeled (P)   -SM     Patient was able to Ambulate yes (P)   -SM     Distance in Feet (Gait) 10 (P)   -SM     Pattern (Gait) 3-point;step-to (P)   -SM     Deviations/Abnormal Patterns (Gait) base of support, wide;gait speed decreased;alcon decreased;stride length decreased;weight shifting decreased (P)   -SM     Bilateral Gait Deviations forward flexed posture (P)   -SM     Left Sided Gait Deviations heel strike decreased (P)   -SM     Right Sided Gait Deviations heel strike decreased (P)   -       Row Name 04/25/24 1152          Safety Issues, Functional Mobility    Impairments Affecting Function (Mobility) balance;endurance/activity tolerance;strength (P)   -       Row Name 04/25/24 1152          Balance    Dynamic Standing Balance contact guard (P)   -     Position/Device Used, Standing Balance walker, front-wheeled (P)   -       Row Name 04/25/24 1152          Vital Signs    O2 Delivery Intra Treatment nasal cannula (P)   2L  -       Row Name 04/25/24 1152          Positioning and Restraints    Post Treatment Position chair (P)   -     In Chair sitting;encouraged to call for assist;call light within reach;with nsg (P)   -       Row Name 04/25/24 1152          Progress Summary (PT)    Progress Toward Functional Goals (PT) progress toward functional goals is fair (P)   -               User Key  (r) = Recorded By, (t) = Taken By, (c) = Cosigned By      Initials Name Provider Type    Mirta Humpherys PTA Student PTA Student                    Physical Therapy Education       Title: PT OT SLP Therapies (Done)       Topic: Physical Therapy (Done)       Point: Mobility training (Done)       Learning Progress Summary             Patient Acceptance, E, VU  by NHAN at 4/24/2024 1107    Acceptance, E,TB, VU by  at 4/23/2024 1129                                         User Key       Initials Effective Dates Name Provider Type Discipline    AV 06/16/21 -  Dwain Mitchell, OT Occupational Therapist OT     10/12/23 -  Denny Ascencio, PT Student PT Student PT                  PT Recommendation and Plan     Progress Summary (PT)  Progress Toward Functional Goals (PT): (P) progress toward functional goals is fair   Outcome Measures       Row Name 04/25/24 1156 04/23/24 1100          How much help from another person do you currently need...    Turning from your back to your side while in flat bed without using bedrails? 3 (P)   -SM 3  -GIULIA (r) MH (t) GIULIA (c)     Moving from lying on back to sitting on the side of a flat bed without bedrails? 3 (P)   -SM 3  -GIULIA (r) MH (t) GIULIA (c)     Moving to and from a bed to a chair (including a wheelchair)? 2 (P)   -SM 3  -GIULIA (r) MH (t) GIULIA (c)     Standing up from a chair using your arms (e.g., wheelchair, bedside chair)? 3 (P)   -SM 3  -GIULIA (r) MH (t) GIULIA (c)     Climbing 3-5 steps with a railing? 2 (P)   -SM 1  -GIULIA (r) MH (t) GIULIA (c)     To walk in hospital room? 3 (P)   -SM 3  -GIULIA (r) MH (t) GIULIA (c)     AM-PAC 6 Clicks Score (PT) 16 (P)   -SM 16  -GIULIA (r) MH (t)     Highest Level of Mobility Goal 5 --> Static standing (P)   -SM 5 --> Static standing  -GIULIA (r) MH (t)               User Key  (r) = Recorded By, (t) = Taken By, (c) = Cosigned By      Initials Name Provider Type    GIULIA David Miller, PT Physical Therapist     Denny Ascencio, PT Student PT Student    SM Mirta Vilchis, PTA Student PTA Student                     Time Calculation:    PT Charges       Row Name 04/25/24 1151             Timed Charges    10465 - Gait Training Minutes  8 (P)   -SM      90244 - PT Therapeutic Activity Minutes 3 (P)   -         Total Minutes    Timed Charges Total Minutes 11 (P)   -       Total Minutes 11 (P)   -                User Key  (r) =  Recorded By, (t) = Taken By, (c) = Cosigned By      Initials Name Provider Type    Mirta Humphreys PTA Student PTA Student                  Therapy Charges for Today       Code Description Service Date Service Provider Modifiers Qty    [id number] HC GAIT TRAINING EA 15 MIN 4/25/2024 Mirta Vilchis PTA Student GP 1            PT G-Codes  Outcome Measure Options: AM-PAC 6 Clicks Daily Activity (OT), Optimal Instrument  AM-PAC 6 Clicks Score (PT): (P) 16  AM-PAC 6 Clicks Score (OT): 16    Mirta Vilchis PTA Student  4/25/2024

## 2024-04-25 NOTE — PROGRESS NOTES
[facility]   Hospitalist Progress Note  Date: 2024  Patient Name: nIez Doyle  : 1950  MRN: [id number]  Date of admission: 2024  Room/Bed: 256/1      Subjective   Subjective     Chief Complaint: SOA    Summary:Patient is a very pleasant 74 y.o. female presents with shortness of breath.  Patient was noted to have an oxygen saturation in the 50s with home health.  EMS reports he was in the 80s on room air and brought her to the emergency department for further evaluation.  Patient is morbidly obese and does have a history of chronic cellulitis, lower extremity swelling, diabetes and COPD.  She was found to be COVID-positive. She was admitted and started on IV dexamethasone.    Interval Followup: Doing well no acute events through the night small run of nonsustained V. tach episodes of bradycardia in the setting of A-fib Lopressor on hold may need to resume at lower dose continues to diurese well blood pressure and creatinine remained stable      Review of systems: All systems reviewed and negative except as noted above  Objective   Objective     Vitals:   Temp:  [97.3 °F (36.3 °C)-98.7 °F (37.1 °C)] 97.7 °F (36.5 °C)  Heart Rate:  [31-79] 62  Resp:  [16-34] 20  BP: (101-149)/(50-73) 101/52  Flow (L/min):  [1.5-2] 1.5    Physical Exam   General: Awake, alert, NAD  HENT: NCAT, MMM  Eyes: pupils equal, no scleral icterus  Cardiovascular: RRR, no murmurs   Pulmonary: slightly diminished  Gastrointestinal: S/ND/NT, +BS  Musculoskeletal: chronic appearing eder ROJAS slightly better    Result Review    Result Review:  I have personally reviewed these results:  [x]  Laboratory      Lab 24  0344 24  0336 24  0349 24  1617 24  1545   WBC 6.16 6.81 5.65  --  4.52   HEMOGLOBIN 11.9* 11.0* 11.5*  --  12.0   HEMATOCRIT 39.6 35.4 38.2  --  39.6   PLATELETS 123* 121* 100*  --  95*   NEUTROS ABS 4.57 5.36 4.95  --  3.19   IMMATURE GRANS (ABS) 0.02 0.04 0.02  --  0.02   LYMPHS ABS  1.04 0.87 0.54*  --  0.85   MONOS ABS 0.47 0.49 0.09*  --  0.34   EOS ABS 0.03 0.02 0.02  --  0.10   MCV 85.2 83.1 84.7  --  84.3   LACTATE  --   --   --  1.5  --    PROTIME 18.3* 20.3*  --   --  24.9*         Lab 04/25/24  0344 04/24/24  1356 04/24/24  1134 04/24/24 0336 04/23/24  0349   SODIUM 138  --   --  136 140   POTASSIUM 3.4* 3.6  --  3.5 3.9   CHLORIDE 96*  --   --  97* 99   CO2 29.1*  --   --  27.2 27.0   ANION GAP 12.9  --   --  11.8 14.0   BUN 19  --   --  15 12   CREATININE 0.87  --   --  0.77 0.86   EGFR 70.0  --   --  81.1 71.0   GLUCOSE 165*  --   --  177* 193*   CALCIUM 9.3  --   --  9.2 8.8   MAGNESIUM 2.0 2.0  --   --   --    PHOSPHORUS 3.8  --   --  3.4  --    TSH  --   --  2.100  --   --          Lab 04/25/24  0344 04/24/24 0336 04/22/24  1545   TOTAL PROTEIN  --   --  6.8   ALBUMIN 3.9 3.7 3.8   GLOBULIN  --   --  3.0   ALT (SGPT)  --   --  7   AST (SGOT)  --   --  10   BILIRUBIN  --   --  0.6   ALK PHOS  --   --  82         Lab 04/25/24  0344 04/24/24  1356 04/24/24  1134 04/24/24  0336 04/22/24  1545   PROBNP  --   --   --   --  1,538.0*   HSTROP T  --  18* 20*  --  22*   PROTIME 18.3*  --   --  20.3* 24.9*   INR 1.49*  --   --  1.70* 2.21*                 Lab 04/22/24  1611   PH, ARTERIAL 7.424   PCO2, ARTERIAL 40.5   PO2 ART 95.5   O2 SATURATION ART 96.3   FIO2 28   HCO3 ART 25.9   BASE EXCESS ART 1.4   CARBOXYHEMOGLOBIN 0.5     Brief Urine Lab Results       None          [x]  Microbiology   Microbiology Results (last 10 days)       Procedure Component Value - Date/Time    Blood Culture - Blood, Hand, Left [[id number]]  (Normal) Collected: 04/22/24 1620    Lab Status: Preliminary result Specimen: Blood from Hand, Left Updated: 04/24/24 1631     Blood Culture No growth at 2 days    Blood Culture - Blood, Arm, Right [[id number]]  (Normal) Collected: 04/22/24 1617    Lab Status: Preliminary result Specimen: Blood from Arm, Right Updated: 04/24/24 1631     Blood Culture No growth at 2 days     COVID-19, FLU A/B, RSV PCR 1 HR TAT - Swab, Nasopharynx [[id number]]  (Abnormal) Collected: 04/22/24 1612    Lab Status: Final result Specimen: Swab from Nasopharynx Updated: 04/22/24 1705     COVID19 Detected     Influenza A PCR Not Detected     Influenza B PCR Not Detected     RSV, PCR Not Detected    Narrative:      Fact sheet for providers: https://www.fda.gov/media/781737/download    Fact sheet for patients: https://www.fda.gov/media/267299/download    Test performed by PCR.          [x]  Radiology  XR Chest 1 View    Result Date: 4/22/2024  Impression: Unchanged enlarged cardiac silhouette. No focal airspace consolidation.   Electronically Signed By-Yoni Talavera MD On:4/22/2024 3:57 PM     []  EKG/Telemetry   []  Cardiology/Vascular   []  Pathology  []  Old records  []  Other:    Assessment & Plan   Assessment / Plan     Assessment:   COVID-19 with multiple comorbidities    Atrial fibrillation    Hypoxic respiratory failure    Morbid obesity    Diabetes    Plan:  Admitted to Medicine  Continue dexamethasone 6mg IV daily  Supplemental oxygen as needed  Continue IV Lasix  Increase warfrain again today monitor INR  Daily INR for therapeutic drug monitoring  Lopressor on hold may need to resume at a lower dose  PT OT  Needs rehab currently refusing  It does not want to go to rehab can discharge in next 24 to 48 hours  DVT prophylaxis:  Medical DVT prophylaxis orders are present.        CODE STATUS:   Medical Intervention Limits: NO intubation (DNI)  Code Status (Patient has no pulse and is not breathing): CPR (Attempt to Resuscitate)  Medical Interventions (Patient has pulse or is breathing): Limited Support      Electronically signed by EMELY Yin, 4/25/2024, 12:27 EDT.        Attending Documentation:  Patient independently seen and evaluated, above documentation reflects plan put forth during bedside rounds.  More than 51% of the time of this patient encounter was performed by me. I discussed the care  plan with ARTURO Ambrocio PA-C, I agree with his findings and plan as documented, what I have added to the care plan and modified is as follows in my documentation and my medical decision making; 74-year-old female presented with shortness of breath, found to be COVID-positive.  Clinically improving.  She is diuresing well with IV Lasix.  Increased warfarin dose due to subtherapeutic INR.  Recommend consideration of rehab.  Electronically signed by Jin Matos MD, 04/25/24, 3:32 PM EDT.

## 2024-04-26 LAB
ALBUMIN SERPL-MCNC: 3.8 G/DL (ref 3.5–5.2)
ANION GAP SERPL CALCULATED.3IONS-SCNC: 12.8 MMOL/L (ref 5–15)
BUN SERPL-MCNC: 21 MG/DL (ref 8–23)
BUN/CREAT SERPL: 20.8 (ref 7–25)
CALCIUM SPEC-SCNC: 9.3 MG/DL (ref 8.6–10.5)
CHLORIDE SERPL-SCNC: 95 MMOL/L (ref 98–107)
CO2 SERPL-SCNC: 30.2 MMOL/L (ref 22–29)
CREAT SERPL-MCNC: 1.01 MG/DL (ref 0.57–1)
DEPRECATED RDW RBC AUTO: 44.6 FL (ref 37–54)
EGFRCR SERPLBLD CKD-EPI 2021: 58.5 ML/MIN/1.73
ERYTHROCYTE [DISTWIDTH] IN BLOOD BY AUTOMATED COUNT: 14.6 % (ref 12.3–15.4)
GLUCOSE SERPL-MCNC: 159 MG/DL (ref 65–99)
HCT VFR BLD AUTO: 37.6 % (ref 34–46.6)
HGB BLD-MCNC: 11.6 G/DL (ref 12–15.9)
INR PPP: 1.57 (ref 0.86–1.15)
MAGNESIUM SERPL-MCNC: 2.1 MG/DL (ref 1.6–2.4)
MCH RBC QN AUTO: 25.9 PG (ref 26.6–33)
MCHC RBC AUTO-ENTMCNC: 30.9 G/DL (ref 31.5–35.7)
MCV RBC AUTO: 83.9 FL (ref 79–97)
NT-PROBNP SERPL-MCNC: 1802 PG/ML (ref 0–900)
PHOSPHATE SERPL-MCNC: 3.8 MG/DL (ref 2.5–4.5)
PLATELET # BLD AUTO: 114 10*3/MM3 (ref 140–450)
PMV BLD AUTO: 11.3 FL (ref 6–12)
POTASSIUM SERPL-SCNC: 3.7 MMOL/L (ref 3.5–5.2)
PROTHROMBIN TIME: 19.1 SECONDS (ref 11.8–14.9)
RBC # BLD AUTO: 4.48 10*6/MM3 (ref 3.77–5.28)
SODIUM SERPL-SCNC: 138 MMOL/L (ref 136–145)
WBC NRBC COR # BLD AUTO: 6.79 10*3/MM3 (ref 3.4–10.8)

## 2024-04-26 PROCEDURE — 97116 GAIT TRAINING THERAPY: CPT

## 2024-04-26 PROCEDURE — 80069 RENAL FUNCTION PANEL: CPT | Performed by: PHYSICIAN ASSISTANT

## 2024-04-26 PROCEDURE — 85027 COMPLETE CBC AUTOMATED: CPT | Performed by: PHYSICIAN ASSISTANT

## 2024-04-26 PROCEDURE — 25010000002 DEXAMETHASONE SODIUM PHOSPHATE 10 MG/ML SOLUTION: Performed by: INTERNAL MEDICINE

## 2024-04-26 PROCEDURE — 63710000001 ONDANSETRON ODT 4 MG TABLET DISPERSIBLE: Performed by: STUDENT IN AN ORGANIZED HEALTH CARE EDUCATION/TRAINING PROGRAM

## 2024-04-26 PROCEDURE — 85610 PROTHROMBIN TIME: CPT | Performed by: INTERNAL MEDICINE

## 2024-04-26 PROCEDURE — 99233 SBSQ HOSP IP/OBS HIGH 50: CPT | Performed by: INTERNAL MEDICINE

## 2024-04-26 PROCEDURE — 25010000002 ONDANSETRON PER 1 MG: Performed by: STUDENT IN AN ORGANIZED HEALTH CARE EDUCATION/TRAINING PROGRAM

## 2024-04-26 PROCEDURE — 83735 ASSAY OF MAGNESIUM: CPT | Performed by: PHYSICIAN ASSISTANT

## 2024-04-26 PROCEDURE — 83880 ASSAY OF NATRIURETIC PEPTIDE: CPT | Performed by: PHYSICIAN ASSISTANT

## 2024-04-26 RX ADMIN — Medication 10 ML: at 09:57

## 2024-04-26 RX ADMIN — ACETAMINOPHEN 650 MG: 325 TABLET ORAL at 19:17

## 2024-04-26 RX ADMIN — ONDANSETRON 4 MG: 4 TABLET, ORALLY DISINTEGRATING ORAL at 12:39

## 2024-04-26 RX ADMIN — WARFARIN SODIUM 7.5 MG: 7.5 TABLET ORAL at 18:31

## 2024-04-26 RX ADMIN — Medication 10 ML: at 21:42

## 2024-04-26 RX ADMIN — GABAPENTIN 600 MG: 300 CAPSULE ORAL at 21:42

## 2024-04-26 RX ADMIN — ONDANSETRON 4 MG: 2 INJECTION INTRAMUSCULAR; INTRAVENOUS at 01:06

## 2024-04-26 RX ADMIN — DEXAMETHASONE SODIUM PHOSPHATE 6 MG: 10 INJECTION INTRAMUSCULAR; INTRAVENOUS at 09:57

## 2024-04-26 NOTE — PLAN OF CARE
Problem: Adult Inpatient Plan of Care  Goal: Plan of Care Review  Outcome: Ongoing, Progressing  Goal: Patient-Specific Goal (Individualized)  Outcome: Ongoing, Progressing  Goal: Absence of Hospital-Acquired Illness or Injury  Outcome: Ongoing, Progressing  Intervention: Identify and Manage Fall Risk  Intervention: Prevent Skin Injury  Intervention: Prevent and Manage VTE (Venous Thromboembolism) Risk  Intervention: Prevent Infection  Goal: Optimal Comfort and Wellbeing  Outcome: Ongoing, Progressing  Intervention: Provide Person-Centered Care  Goal: Readiness for Transition of Care  Outcome: Ongoing, Progressing   Goal Outcome Evaluation:  Plan of Care Reviewed With: patient        Progress: no change  Outcome Evaluation: VVS . patient sitting in recliner no visible signs of distress call light in reach.

## 2024-04-26 NOTE — PROGRESS NOTES
[facility]   Hospitalist Progress Note  Date: 2024  Patient Name: Inez Doyle  : 1950  MRN: [id number]  Date of admission: 2024  Room/Bed: 256/1      Subjective   Subjective     Chief Complaint: SOA    Summary:Patient is a very pleasant 74 y.o. female presents with shortness of breath.  Patient was noted to have an oxygen saturation in the 50s with home health.  EMS reports he was in the 80s on room air and brought her to the emergency department for further evaluation.  Patient is morbidly obese and does have a history of chronic cellulitis, lower extremity swelling, diabetes and COPD.  She was found to be COVID-positive. She was admitted and started on IV dexamethasone.    Interval Followup: \Patient seen and examined resting comfortably serum creatinine has trended up we will hold diuretic today patient is adamant that she will not go to rehab if not going to rehab can likely discharge home tomorrow if renal function and volume status stable    Review of systems: All systems reviewed and negative except as noted above  Objective   Objective     Vitals:   Temp:  [98.1 °F (36.7 °C)-98.5 °F (36.9 °C)] 98.5 °F (36.9 °C)  Heart Rate:  [] 69  Resp:  [16-24] 24  BP: (111-156)/(56-94) 147/57  Flow (L/min):  [1.5-2] 2    Physical Exam   General: Awake, alert, NAD  HENT: NCAT, MMM  Eyes: pupils equal, no scleral icterus  Cardiovascular: RRR, no murmurs   Pulmonary: slightly diminished  Gastrointestinal: S/ND/NT, +BS  Musculoskeletal: chronic appearing eder ROJAS slightly better    Result Review    Result Review:  I have personally reviewed these results:  [x]  Laboratory      Lab 24  0407 24  0344 24  0336 24  0349 24  1617   WBC 6.79 6.16 6.81 5.65  --    HEMOGLOBIN 11.6* 11.9* 11.0* 11.5*  --    HEMATOCRIT 37.6 39.6 35.4 38.2  --    PLATELETS 114* 123* 121* 100*  --    NEUTROS ABS  --  4.57 5.36 4.95  --    IMMATURE GRANS (ABS)  --  0.02 0.04 0.02  --    LYMPHS  ABS  --  1.04 0.87 0.54*  --    MONOS ABS  --  0.47 0.49 0.09*  --    EOS ABS  --  0.03 0.02 0.02  --    MCV 83.9 85.2 83.1 84.7  --    LACTATE  --   --   --   --  1.5   PROTIME 19.1* 18.3* 20.3*  --   --          Lab 04/26/24 0407 04/25/24 0344 04/24/24  1356 04/24/24  1134 04/24/24  0336   SODIUM 138 138  --   --  136   POTASSIUM 3.7 3.4* 3.6  --  3.5   CHLORIDE 95* 96*  --   --  97*   CO2 30.2* 29.1*  --   --  27.2   ANION GAP 12.8 12.9  --   --  11.8   BUN 21 19  --   --  15   CREATININE 1.01* 0.87  --   --  0.77   EGFR 58.5* 70.0  --   --  81.1   GLUCOSE 159* 165*  --   --  177*   CALCIUM 9.3 9.3  --   --  9.2   MAGNESIUM 2.1 2.0 2.0  --   --    PHOSPHORUS 3.8 3.8  --   --  3.4   TSH  --   --   --  2.100  --          Lab 04/26/24 0407 04/25/24 0344 04/24/24 0336 04/22/24  1545   TOTAL PROTEIN  --   --   --  6.8   ALBUMIN 3.8 3.9 3.7 3.8   GLOBULIN  --   --   --  3.0   ALT (SGPT)  --   --   --  7   AST (SGOT)  --   --   --  10   BILIRUBIN  --   --   --  0.6   ALK PHOS  --   --   --  82         Lab 04/26/24 0407 04/25/24 0344 04/24/24  1356 04/24/24  1134 04/24/24  0336 04/22/24  1545   PROBNP  --   --   --   --   --  1,538.0*   HSTROP T  --   --  18* 20*  --  22*   PROTIME 19.1* 18.3*  --   --  20.3* 24.9*   INR 1.57* 1.49*  --   --  1.70* 2.21*                 Lab 04/22/24  1611   PH, ARTERIAL 7.424   PCO2, ARTERIAL 40.5   PO2 ART 95.5   O2 SATURATION ART 96.3   FIO2 28   HCO3 ART 25.9   BASE EXCESS ART 1.4   CARBOXYHEMOGLOBIN 0.5     Brief Urine Lab Results       None          [x]  Microbiology   Microbiology Results (last 10 days)       Procedure Component Value - Date/Time    Blood Culture - Blood, Hand, Left [[id number]]  (Normal) Collected: 04/22/24 1620    Lab Status: Preliminary result Specimen: Blood from Hand, Left Updated: 04/25/24 1631     Blood Culture No growth at 3 days    Blood Culture - Blood, Arm, Right [[id number]]  (Normal) Collected: 04/22/24 1617    Lab Status: Preliminary result  Specimen: Blood from Arm, Right Updated: 04/25/24 1631     Blood Culture No growth at 3 days    COVID-19, FLU A/B, RSV PCR 1 HR TAT - Swab, Nasopharynx [[id number]]  (Abnormal) Collected: 04/22/24 1612    Lab Status: Final result Specimen: Swab from Nasopharynx Updated: 04/22/24 1705     COVID19 Detected     Influenza A PCR Not Detected     Influenza B PCR Not Detected     RSV, PCR Not Detected    Narrative:      Fact sheet for providers: https://www.fda.gov/media/284976/download    Fact sheet for patients: https://www.fda.gov/media/104057/download    Test performed by PCR.          [x]  Radiology  XR Chest 1 View    Result Date: 4/22/2024  Impression: Unchanged enlarged cardiac silhouette. No focal airspace consolidation.   Electronically Signed By-Yoni Talavera MD On:4/22/2024 3:57 PM     []  EKG/Telemetry   []  Cardiology/Vascular   []  Pathology  []  Old records  []  Other:    Assessment & Plan   Assessment / Plan     Assessment:   COVID-19 with multiple comorbidities    Atrial fibrillation    Hypoxic respiratory failure    Morbid obesity    Diabetes    Plan:  Admitted to Medicine  Continue dexamethasone 6mg IV daily  Supplemental oxygen as needed  Given increasing serum creatinine we will hold Lasix today  Recheck chemistry to follow renal function tomorrow  Continue with Coumadin 7.5 mg daily recheck INR in a.m. remains subtherapeutic  Daily INR for therapeutic drug monitoring  Lopressor on hold may need to resume at a lower dose  PT OT  Needs rehab currently refusing  It does not want to go to rehab can discharge in next 24 to 48 hours  DVT prophylaxis:  Medical DVT prophylaxis orders are present.        CODE STATUS:   Medical Intervention Limits: NO intubation (DNI)  Code Status (Patient has no pulse and is not breathing): CPR (Attempt to Resuscitate)  Medical Interventions (Patient has pulse or is breathing): Limited Support      Electronically signed by EMELY Yin, 4/26/2024, 13:24  EDT.        Attending Documentation:  Patient independently seen and evaluated, above documentation reflects plan put forth during bedside rounds.  More than 51% of the time of this patient encounter was performed by me. I discussed the care plan with ARTURO Ambrocio PA-C, I agree with his findings and plan as documented, what I have added to the care plan and modified is as follows in my documentation and my medical decision making; 74-year-old female presented with shortness of breath, found to be COVID-positive.  Clinically improving.  She has diuresed well with IV Lasix.  Hold that today given rising creatinine.  Recheck BMP in the morning.  Continue warfarin today, will repeat INR tomorrow.  I do think she would benefit from rehab but she declines and prefers to go home.  Potential discharge home tomorrow.  Electronically signed by Jin Matos MD, 04/26/24, 5:16 PM EDT.

## 2024-04-26 NOTE — PLAN OF CARE
Goal Outcome Evaluation:  Plan of Care Reviewed With: patient        Progress: improving  Outcome Evaluation: clondine hels this shift because bp meet parameters. hr 40-70 this shift bp stable. no c/o voiced, slept well, great urineoutput

## 2024-04-26 NOTE — SIGNIFICANT NOTE
04/26/24 1345   OTHER   Discipline occupational therapist   Rehab Time/Intention   Session Not Performed patient unavailable for treatment

## 2024-04-26 NOTE — THERAPY TREATMENT NOTE
Acute Care - Physical Therapy Treatment Note   Tammy     Patient Name: Inez Doyle  : 1950  MRN: [id number]  Today's Date: 2024    Admit date: 2024     Referring Physician: Jin Matos MD     Surgery Date:* No surgery found *            Visit Dx:     ICD-10-CM ICD-9-CM   1. COVID-19  U07.1 079.89   2. Acute respiratory failure with hypoxia  J96.01 518.81   3. Difficulty walking  R26.2 719.7   4. Decreased activities of daily living (ADL)  Z78.9 V49.89   5. Morbid obesity  E66.01 278.01   6. Respiratory failure with hypoxia, unspecified chronicity  J96.91 518.81     Patient Active Problem List   Diagnosis    Posterior tibial tendon dysfunction    Charcot's joint of foot    Arthritis, lumbar spine    Atrial fibrillation    COVID-19 with multiple comorbidities    Hypoxic respiratory failure    Morbid obesity    Diabetes     Past Medical History:   Diagnosis Date    Anemia     Anxiety     Asthma     Depression     Osteoarthritis      Past Surgical History:   Procedure Laterality Date    BACK SURGERY      STOMACH SURGERY       PT Assessment (Last 12 Hours)       PT Evaluation and Treatment       Row Name 24 [facility]          Physical Therapy Time and Intention    Subjective Information no complaints (P)   -     Document Type therapy note (daily note) (P)   -     Mode of Treatment individual therapy;physical therapy (P)   -     Patient Effort good (P)   -     Symptoms Noted During/After Treatment none (P)   -       Row Name 24 [facility]          General Information    Patient Profile Reviewed yes (P)   -     Patient Observations alert;cooperative;agree to therapy (P)   -     Barriers to Rehab none identified (P)   -       Row Name 24          Pain    Pretreatment Pain Rating 0/10 - no pain (P)   -     Posttreatment Pain Rating 0/10 - no pain (P)   -       Row Name 24          Cognition    Orientation Status (Cognition) oriented x 3 (P)   -        Row Name 04/26/24 1434          Bed Mobility    Comment, (Bed Mobility) No bed mobility. Patient seated in chair upon arrival. (P)   -       Row Name 04/26/24 1434          Transfers    Transfers sit-stand transfer;stand-sit transfer (P)   -       Row Name 04/26/24 1434          Sit-Stand Transfer    Sit-Stand Larimer (Transfers) contact guard (P)   Very slow progression of STS but can complete.  -     Assistive Device (Sit-Stand Transfers) walker, front-wheeled (P)   -       Row Name 04/26/24 1434          Stand-Sit Transfer    Stand-Sit Larimer (Transfers) contact guard (P)   -     Assistive Device (Stand-Sit Transfers) walker, front-wheeled (P)   -       Row Name 04/26/24 1434          Gait/Stairs (Locomotion)    Gait/Stairs Locomotion gait/ambulation assistive device (P)   -     Larimer Level (Gait) contact guard (P)   -     Assistive Device (Gait) walker, front-wheeled (P)   -     Patient was able to Ambulate yes (P)   -MF     Distance in Feet (Gait) 20 (P)   -MF     Pattern (Gait) 3-point;step-to (P)   -     Deviations/Abnormal Patterns (Gait) base of support, wide;gait speed decreased;alcon decreased;stride length decreased;weight shifting decreased (P)   -MF     Bilateral Gait Deviations forward flexed posture (P)   -       Row Name 04/26/24 1434          Safety Issues, Functional Mobility    Impairments Affecting Function (Mobility) endurance/activity tolerance;balance;strength;shortness of breath (P)   -       Row Name 04/26/24 1434          Balance    Balance Assessment standing dynamic balance (P)   -     Dynamic Standing Balance contact guard (P)   -     Position/Device Used, Standing Balance supported;walker, front-wheeled (P)   -MF       Row Name 04/26/24 1434          Positioning and Restraints    Pre-Treatment Position sitting in chair/recliner (P)   -MF     Post Treatment Position chair (P)   -MF     In Chair reclined;call light within reach;encouraged  to call for assist;exit alarm on (P)   -MF       Row Name 04/26/24 1434          Progress Summary (PT)    Progress Toward Functional Goals (PT) progress toward functional goals is fair (P)   -     Daily Progress Summary (PT) Patient tolerated treatment well today and was able to ambulate further. Patient is slow to complete transfers and ambulate. Skilled PT is still needed at this time to improve patient's endurance as well as their independence with transfers and ambulation. (P)   -     Barriers to Overall Progress (PT) No barriers identified. (P)   -               User Key  (r) = Recorded By, (t) = Taken By, (c) = Cosigned By      Initials Name Provider Type    MF Andrew Garduno, PT Student PT Student                    Physical Therapy Education       Title: PT OT SLP Therapies (Done)       Topic: Physical Therapy (Done)       Point: Mobility training (Done)       Learning Progress Summary             Patient Acceptance, E, VU by  at 4/24/2024 1107    Acceptance, E,TB, VU by  at 4/23/2024 1129                                         User Key       Initials Effective Dates Name Provider Type Discipline    NHAN 06/16/21 -  Dwain Mitchell, OT Occupational Therapist OT     10/12/23 -  Denny Ascencio, PT Student PT Student PT                  PT Recommendation and Plan     Progress Summary (PT)  Progress Toward Functional Goals (PT): (P) progress toward functional goals is fair  Daily Progress Summary (PT): (P) Patient tolerated treatment well today and was able to ambulate further. Patient is slow to complete transfers and ambulate. Skilled PT is still needed at this time to improve patient's endurance as well as their independence with transfers and ambulation.  Barriers to Overall Progress (PT): (P) No barriers identified.   Outcome Measures       Row Name 04/26/24 1440 04/25/24 1156          How much help from another person do you currently need...    Turning from your back to your side while in flat  bed without using bedrails? 3 (P)   -MF 3  -GIULIA (r) SM (t) GIULIA (c)     Moving from lying on back to sitting on the side of a flat bed without bedrails? 3 (P)   -MF 3  -GIULIA (r) SM (t) GIULIA (c)     Moving to and from a bed to a chair (including a wheelchair)? 3 (P)   -MF 2  -GIULIA (r) SM (t) GIULIA (c)     Standing up from a chair using your arms (e.g., wheelchair, bedside chair)? 3 (P)   -MF 3  -GIULIA (r) SM (t) GIULIA (c)     Climbing 3-5 steps with a railing? 2 (P)   -MF 2  -GIULIA (r) SM (t) GIULIA (c)     To walk in hospital room? 3 (P)   -MF 3  -GIULIA (r) SM (t) GIULIA (c)     AM-PAC 6 Clicks Score (PT) 17 (P)   -MF 16  -GIULIA (r) SM (t)     Highest Level of Mobility Goal 5 --> Static standing (P)   -MF 5 --> Static standing  -GIULIA (r) SM (t)        Functional Assessment    Outcome Measure Options AM-PAC 6 Clicks Basic Mobility (PT) (P)   -MF --               User Key  (r) = Recorded By, (t) = Taken By, (c) = Cosigned By      Initials Name Provider Type    David Lane, PT Physical Therapist    Mirta Humphreys, PTA Student PTA Student    Andrew Umana, PT Student PT Student                     Time Calculation:    PT Charges       Row Name 04/26/24 1434             Time Calculation    PT Received On 04/26/24 (P)   -MF         Timed Charges    19507 - Gait Training Minutes  12 (P)   -MF         Total Minutes    Timed Charges Total Minutes 12 (P)   -MF       Total Minutes 12 (P)   -MF                User Key  (r) = Recorded By, (t) = Taken By, (c) = Cosigned By      Initials Name Provider Type    Andrew Umana, PT Student PT Student                  Therapy Charges for Today       Code Description Service Date Service Provider Modifiers Qty    [id number] HC GAIT TRAINING EA 15 MIN 4/26/2024 Andrew Garduno, PT Student GP 1            PT G-Codes  Outcome Measure Options: (P) AM-PAC 6 Clicks Basic Mobility (PT)  AM-PAC 6 Clicks Score (PT): (P) 17  AM-PAC 6 Clicks Score (OT): 16    Andrew Garduno PT Student  4/26/2024

## 2024-04-27 ENCOUNTER — READMISSION MANAGEMENT (OUTPATIENT)
Dept: CALL CENTER | Facility: HOSPITAL | Age: 74
End: 2024-04-27
Payer: MEDICARE

## 2024-04-27 VITALS
OXYGEN SATURATION: 95 % | SYSTOLIC BLOOD PRESSURE: 120 MMHG | TEMPERATURE: 97.6 F | HEIGHT: 65 IN | BODY MASS INDEX: 48.82 KG/M2 | HEART RATE: 85 BPM | WEIGHT: 293 LBS | DIASTOLIC BLOOD PRESSURE: 78 MMHG | RESPIRATION RATE: 18 BRPM

## 2024-04-27 LAB
ALBUMIN SERPL-MCNC: 4 G/DL (ref 3.5–5.2)
ANION GAP SERPL CALCULATED.3IONS-SCNC: 12.5 MMOL/L (ref 5–15)
BACTERIA SPEC AEROBE CULT: NORMAL
BACTERIA SPEC AEROBE CULT: NORMAL
BUN SERPL-MCNC: 21 MG/DL (ref 8–23)
BUN/CREAT SERPL: 23.1 (ref 7–25)
CALCIUM SPEC-SCNC: 9.5 MG/DL (ref 8.6–10.5)
CHLORIDE SERPL-SCNC: 97 MMOL/L (ref 98–107)
CO2 SERPL-SCNC: 27.5 MMOL/L (ref 22–29)
CREAT SERPL-MCNC: 0.91 MG/DL (ref 0.57–1)
DEPRECATED RDW RBC AUTO: 44.3 FL (ref 37–54)
EGFRCR SERPLBLD CKD-EPI 2021: 66.3 ML/MIN/1.73
ERYTHROCYTE [DISTWIDTH] IN BLOOD BY AUTOMATED COUNT: 14.7 % (ref 12.3–15.4)
GLUCOSE SERPL-MCNC: 155 MG/DL (ref 65–99)
HCT VFR BLD AUTO: 39.2 % (ref 34–46.6)
HGB BLD-MCNC: 12.3 G/DL (ref 12–15.9)
INR PPP: 1.66 (ref 0.86–1.15)
MAGNESIUM SERPL-MCNC: 2.2 MG/DL (ref 1.6–2.4)
MCH RBC QN AUTO: 25.8 PG (ref 26.6–33)
MCHC RBC AUTO-ENTMCNC: 31.4 G/DL (ref 31.5–35.7)
MCV RBC AUTO: 82.2 FL (ref 79–97)
PHOSPHATE SERPL-MCNC: 3.6 MG/DL (ref 2.5–4.5)
PLATELET # BLD AUTO: 122 10*3/MM3 (ref 140–450)
PMV BLD AUTO: 11.5 FL (ref 6–12)
POTASSIUM SERPL-SCNC: 4 MMOL/L (ref 3.5–5.2)
PROTHROMBIN TIME: 19.9 SECONDS (ref 11.8–14.9)
RBC # BLD AUTO: 4.77 10*6/MM3 (ref 3.77–5.28)
SODIUM SERPL-SCNC: 137 MMOL/L (ref 136–145)
WBC NRBC COR # BLD AUTO: 6.43 10*3/MM3 (ref 3.4–10.8)

## 2024-04-27 PROCEDURE — 80069 RENAL FUNCTION PANEL: CPT | Performed by: PHYSICIAN ASSISTANT

## 2024-04-27 PROCEDURE — 25010000002 FUROSEMIDE PER 20 MG: Performed by: PHYSICIAN ASSISTANT

## 2024-04-27 PROCEDURE — 83735 ASSAY OF MAGNESIUM: CPT | Performed by: PHYSICIAN ASSISTANT

## 2024-04-27 PROCEDURE — 85610 PROTHROMBIN TIME: CPT | Performed by: INTERNAL MEDICINE

## 2024-04-27 PROCEDURE — 25010000002 DEXAMETHASONE SODIUM PHOSPHATE 10 MG/ML SOLUTION: Performed by: INTERNAL MEDICINE

## 2024-04-27 PROCEDURE — 99239 HOSP IP/OBS DSCHRG MGMT >30: CPT | Performed by: INTERNAL MEDICINE

## 2024-04-27 PROCEDURE — 85027 COMPLETE CBC AUTOMATED: CPT | Performed by: PHYSICIAN ASSISTANT

## 2024-04-27 PROCEDURE — 94618 PULMONARY STRESS TESTING: CPT

## 2024-04-27 RX ORDER — DEXAMETHASONE 6 MG/1
6 TABLET ORAL
Qty: 6 TABLET | Refills: 0 | Status: SHIPPED | OUTPATIENT
Start: 2024-04-27 | End: 2024-05-03

## 2024-04-27 RX ORDER — DEXAMETHASONE 6 MG/1
6 TABLET ORAL
Qty: 6 TABLET | Refills: 0 | Status: SHIPPED | OUTPATIENT
Start: 2024-04-27 | End: 2024-04-27

## 2024-04-27 RX ADMIN — FUROSEMIDE 40 MG: 10 INJECTION, SOLUTION INTRAMUSCULAR; INTRAVENOUS at 09:10

## 2024-04-27 RX ADMIN — Medication 10 ML: at 09:11

## 2024-04-27 RX ADMIN — LOSARTAN POTASSIUM 100 MG: 50 TABLET, FILM COATED ORAL at 09:10

## 2024-04-27 RX ADMIN — DEXAMETHASONE SODIUM PHOSPHATE 6 MG: 10 INJECTION INTRAMUSCULAR; INTRAVENOUS at 09:10

## 2024-04-27 RX ADMIN — CLONIDINE HYDROCHLORIDE 0.1 MG: 0.1 TABLET ORAL at 09:11

## 2024-04-27 NOTE — DISCHARGE SUMMARY
[facility]         HOSPITALIST  DISCHARGE SUMMARY    Patient Name: Inez Doyle  : 1950  MRN: [id number]    Date of Admission: 2024  Date of Discharge: 2024  Primary Care Physician: Christopher Hanson MD  Reason for admission:  Shortness of breath    Final diagnosis:  COVID-19 with multiple comorbidities    Atrial fibrillation with slow ventricular response holding beta-blocker at discharge until patient is seen by PCP and/or cardiologist    Hypoxic respiratory failure    Morbid obesity    Diabetes  Consults       Date and Time Order Name Status Description    2024  6:27 PM Inpatient Hospitalist Consult              Active and Resolved Hospital Problems:  Active Hospital Problems    Diagnosis POA   • **COVID-19 with multiple comorbidities [U07.1] Yes   • Hypoxic respiratory failure [J96.91] Yes   • Morbid obesity [E66.01] Yes   • Diabetes [E11.9] Yes   • Atrial fibrillation [I48.91] Yes      Resolved Hospital Problems   No resolved problems to display.       Hospital Course     Hospital Course:  Inez Doyle is a 74 y.o. female presents with shortness of breath. Patient was noted to have an oxygen saturation in the 50s with home health. EMS reports he was in the 80s on room air and brought her to the emergency department for further evaluation. Patient is morbidly obese and does have a history of chronic cellulitis, lower extremity swelling, diabetes and COPD. She was found to be COVID-positive. She was admitted and started on IV dexamethasone.  Additionally aggressively diuresed given supplemental oxygen and started on breathing treatments patient's clinical status improved diuresed well hemodynamics are now stable walking oximetry pursued does not qualify for home oxygen as oxygen saturations remained above 90%.  Patient with atrial fibrillation slow ventricular response beta-blocker on hold until she follows up with her cardiologist.  Seen and examined 2024  hemodynamically stable and clinically stable for discharge will be asked to follow-up with her PCP in cardiologist within next 5 to 7 days.  Please note we felt the patient would benefit from inpatient rehab she is adamant that she does not want to pursue inpatient rehab and would prefer home health we feel the patient is at high risk for readmission.        DISCHARGE Follow Up Recommendations for labs and diagnostics: As above      Day of Discharge     Vital Signs:  Temp:  [97.3 °F (36.3 °C)-98.4 °F (36.9 °C)] 97.9 °F (36.6 °C)  Heart Rate:  [56-87] 75  Resp:  [18-22] 18  BP: (150-174)/(54-81) 162/54  Flow (L/min):  [2] 2  Physical Exam:   Constitutional: Awake alert oriented no acute distress  Respiratory: Clear  Cardiovascular: Irregular    Discharge Details        Discharge Medications        New Medications        Instructions Start Date   dexAMETHasone 6 MG tablet  Commonly known as: DECADRON   6 mg, Oral, Daily With Breakfast             Changes to Medications        Instructions Start Date   warfarin 2 MG tablet  Commonly known as: COUMADIN  What changed:   how much to take  how to take this  when to take this  additional instructions   Take 2 mg daily or as directed      warfarin 5 MG tablet  Commonly known as: COUMADIN  What changed: Another medication with the same name was changed. Make sure you understand how and when to take each.   5 mg, Oral, Daily Warfarin, Or as directed             Continue These Medications        Instructions Start Date   albuterol sulfate  (90 Base) MCG/ACT inhaler  Commonly known as: PROVENTIL HFA;VENTOLIN HFA;PROAIR HFA   2 puffs, Inhalation, Every 4 Hours PRN      cloNIDine 0.1 MG tablet  Commonly known as: CATAPRES   0.1 mg, Oral, 2 Times Daily      cyanocobalamin 1000 MCG/ML injection   1,000 mcg, Intramuscular, Weekly      furosemide 20 MG tablet  Commonly known as: LASIX   40 mg, Oral, 2 Times Daily      gabapentin 800 MG tablet  Commonly known as: NEURONTIN   1  tablet, Oral, Every Night at Bedtime      Liraglutide 18 MG/3ML solution pen-injector injection  Commonly known as: VICTOZA   0.6 mg, Subcutaneous, Daily      losartan 100 MG tablet  Commonly known as: COZAAR   1 tablet, Oral, Daily      potassium chloride 10 MEQ CR tablet   10 mEq, Oral, Daily      spironolactone 50 MG tablet  Commonly known as: ALDACTONE   50 mg, Oral, Daily             Stop These Medications      diphenhydrAMINE-acetaminophen  MG tablet per tablet  Commonly known as: TYLENOL PM     ibuprofen 800 MG tablet  Commonly known as: ADVIL,MOTRIN     metoprolol tartrate 25 MG tablet  Commonly known as: LOPRESSOR              Allergies   Allergen Reactions   • Codeine    • Dye Fdc Red [Red Dye] Other (See Comments)         • Iodinated Contrast Media Nausea Only   • Penicillins    • Sulfa Antibiotics        Discharge Disposition:  [place]-Health Care [facility] [place]    Diet:  Hospital:  Diet Order   Procedures   • Diet: Diabetic; Consistent Carbohydrate; Fluid Consistency: Thin (IDDSI 0)       Discharge Activity:       CODE STATUS:  Code Status and Medical Interventions:   Ordered at: 04/22/24 2331     Medical Intervention Limits:    NO intubation (DNI)     Code Status (Patient has no pulse and is not breathing):    CPR (Attempt to Resuscitate)     Medical Interventions (Patient has pulse or is breathing):    Limited Support         Future Appointments   Date Time Provider Department Center   5/20/2024  1:45 PM Stew Riojas MD [facility] [place] [facility]       Additional Instructions for the Follow-ups that You Need to Schedule       Discharge Follow-up with PCP   As directed       Currently Documented PCP:    Christopher Hanson MD    PCP Phone Number:    [phone]     Follow Up Details: one week                Pertinent  and/or Most Recent Results     PROCEDURES:   None    LAB RESULTS:      Lab 04/27/24  0335 04/26/24  0407 04/25/24  0344 04/24/24  0336 04/23/24  0349 04/22/24  1617 04/22/24  1545   WBC 6.43 6.79 6.16  6.81 5.65  --  4.52   HEMOGLOBIN 12.3 11.6* 11.9* 11.0* 11.5*  --  12.0   HEMATOCRIT 39.2 37.6 39.6 35.4 38.2  --  39.6   PLATELETS 122* 114* 123* 121* 100*  --  95*   NEUTROS ABS  --   --  4.57 5.36 4.95  --  3.19   IMMATURE GRANS (ABS)  --   --  0.02 0.04 0.02  --  0.02   LYMPHS ABS  --   --  1.04 0.87 0.54*  --  0.85   MONOS ABS  --   --  0.47 0.49 0.09*  --  0.34   EOS ABS  --   --  0.03 0.02 0.02  --  0.10   MCV 82.2 83.9 85.2 83.1 84.7  --  84.3   LACTATE  --   --   --   --   --  1.5  --    PROTIME 19.9* 19.1* 18.3* 20.3*  --   --  24.9*         Lab 04/27/24  0335 04/26/24  0407 04/25/24  0344 04/24/24  1356 04/24/24  1134 04/24/24  0336 04/23/24  0349   SODIUM 137 138 138  --   --  136 140   POTASSIUM 4.0 3.7 3.4* 3.6  --  3.5 3.9   CHLORIDE 97* 95* 96*  --   --  97* 99   CO2 27.5 30.2* 29.1*  --   --  27.2 27.0   ANION GAP 12.5 12.8 12.9  --   --  11.8 14.0   BUN 21 21 19  --   --  15 12   CREATININE 0.91 1.01* 0.87  --   --  0.77 0.86   EGFR 66.3 58.5* 70.0  --   --  81.1 71.0   GLUCOSE 155* 159* 165*  --   --  177* 193*   CALCIUM 9.5 9.3 9.3  --   --  9.2 8.8   MAGNESIUM 2.2 2.1 2.0 2.0  --   --   --    PHOSPHORUS 3.6 3.8 3.8  --   --  3.4  --    TSH  --   --   --   --  2.100  --   --          Lab 04/27/24  0335 04/26/24  0407 04/25/24  0344 04/24/24  0336 04/22/24  1545   TOTAL PROTEIN  --   --   --   --  6.8   ALBUMIN 4.0 3.8 3.9 3.7 3.8   GLOBULIN  --   --   --   --  3.0   ALT (SGPT)  --   --   --   --  7   AST (SGOT)  --   --   --   --  10   BILIRUBIN  --   --   --   --  0.6   ALK PHOS  --   --   --   --  82         Lab 04/27/24  0335 04/26/24  0407 04/25/24  0344 04/24/24  1356 04/24/24  1134 04/24/24  0336 04/22/24  1545   PROBNP  --  1,802.0*  --   --   --   --  1,538.0*   HSTROP T  --   --   --  18* 20*  --  22*   PROTIME 19.9* 19.1* 18.3*  --   --  20.3* 24.9*   INR 1.66* 1.57* 1.49*  --   --  1.70* 2.21*                 Lab 04/22/24  1611   PH, ARTERIAL 7.424   PCO2, ARTERIAL 40.5   PO2 ART 95.5    O2 SATURATION ART 96.3   FIO2 28   HCO3 ART 25.9   BASE EXCESS ART 1.4   CARBOXYHEMOGLOBIN 0.5     Brief Urine Lab Results       None          Microbiology Results (last 10 days)       Procedure Component Value - Date/Time    Blood Culture - Blood, Hand, Left [[id number]]  (Normal) Collected: 04/22/24 1620    Lab Status: Preliminary result Specimen: Blood from Hand, Left Updated: 04/26/24 1631     Blood Culture No growth at 4 days    Blood Culture - Blood, Arm, Right [[id number]]  (Normal) Collected: 04/22/24 1617    Lab Status: Preliminary result Specimen: Blood from Arm, Right Updated: 04/26/24 1631     Blood Culture No growth at 4 days    COVID-19, FLU A/B, RSV PCR 1 HR TAT - Swab, Nasopharynx [[id number]]  (Abnormal) Collected: 04/22/24 1612    Lab Status: Final result Specimen: Swab from Nasopharynx Updated: 04/22/24 1705     COVID19 Detected     Influenza A PCR Not Detected     Influenza B PCR Not Detected     RSV, PCR Not Detected    Narrative:      Fact sheet for providers: https://www.fda.gov/media/524853/download    Fact sheet for patients: https://www.fda.gov/media/209541/download    Test performed by PCR.            XR Chest 1 View    Result Date: 4/22/2024  Impression: Impression: Unchanged enlarged cardiac silhouette. No focal airspace consolidation.   Electronically Signed By-Yoni Talavera MD On:4/22/2024 3:57 PM               Results for orders placed during the hospital encounter of 06/14/23    Adult Transthoracic Echo Complete W/ Cont if Necessary Per Protocol    Interpretation Summary  Technically limited study.  Normal left ventricular systolic function.  No significant valve abnormalities noted.      Labs Pending at Discharge:  Pending Labs       Order Current Status    Blood Culture - Blood, Arm, Right Preliminary result    Blood Culture - Blood, Hand, Left Preliminary result              Time spent on Discharge including face to face service: 35 minutes    Electronically signed by Win  EMELY Ambrocio, 04/27/24, 1:55 PM EDT.    Attending Documentation:  Patient independently seen and evaluated, above documentation reflects plan put forth during bedside rounds.  More than 51% of the time of this patient encounter was performed by me. I discussed the care plan with ARTURO Ambrocio PA-C, I agree with his findings and plan as documented, what I have added to the care plan and modified is as follows in my documentation and my medical decision making; 74-year-old female presented with shortness of breath, diagnosed with COVID.  Had atrial fibrillation with slow ventricular response, improved with holding beta-blocker.  Stable for discharge today.  Will hold her beta-blocker and have her follow-up with her primary care and cardiologist.  She did pass a walk test and does not require home oxygen upon discharge.  We did advise rehab be considered but patient did not want to do that and preferred to go home instead.  Electronically signed by Jin Matos MD, 04/27/24, 2:19 PM EDT.

## 2024-04-27 NOTE — PROCEDURES
Walking Oximetry Progress Note      Patient Name:  Inez Doyle  YOB: 1950  Date of Procedure: 04/27/24              ROOM AIR BASELINE   SpO2% 93   Heart Rate 75     EXERCISE ON ROOM AIR SpO2% EXERCISE ON O2 LPM SpO2%   1 MINUTE 94 1 MINUTE     2 MINUTES 93 2 MINUTES     3 MINUTES 92 3 MINUTES     4 MINUTES 94 4 MINUTES     5 MINUTES 93 5 MINUTES     6 MINUTES 92 6 MINUTES                Time to Recovery  1 min   SpO2% Post Exercise  93 on room air.    HR Post Exercise  85     Comments: Patient walked in the room due to isolation. Spo2 remained above 90% on rrom air.          Electronically signed by Lucero Tse, RRT, 04/27/24, 1:32 PM EDT.

## 2024-04-27 NOTE — OUTREACH NOTE
Prep Survey      Flowsheet Row Responses   [facility] facility patient discharged from? [facility]   Is LACE score < 7 ? No   Eligibility Readm Mgmt   Discharge diagnosis *COVID-19 with multiple comorbidities (U0   Does the patient have one of the following disease processes/diagnoses(primary or secondary)? Other   Does the patient have Home health ordered? Yes   What is the Home health agency?  [facility]   Is there a DME ordered? Yes   What DME was ordered? bariactric walker   Prep survey completed? Yes            HOOD CHAPA - Registered Nurse

## 2024-04-29 ENCOUNTER — ANTICOAGULATION VISIT (OUTPATIENT)
Dept: CARDIOLOGY | Facility: CLINIC | Age: 74
End: 2024-04-29
Payer: MEDICARE

## 2024-04-29 DIAGNOSIS — I48.19 PERSISTENT ATRIAL FIBRILLATION: ICD-10-CM

## 2024-04-29 DIAGNOSIS — I48.19 PERSISTENT ATRIAL FIBRILLATION: Primary | ICD-10-CM

## 2024-04-29 LAB — INR PPP: 2

## 2024-04-29 NOTE — PROGRESS NOTES
Lab Results   Component Value Date    INR 2.00 04/29/2024    INR 1.66 (H) 04/27/2024    INR 1.57 (H) 04/26/2024    PROTIME 19.9 (H) 04/27/2024    PROTIME 19.1 (H) 04/26/2024    PROTIME 18.3 (H) 04/25/2024     DX: afib  Pt taking 4/5/5  Range: 2.0-3.0  remote

## 2024-05-01 ENCOUNTER — READMISSION MANAGEMENT (OUTPATIENT)
Dept: CALL CENTER | Facility: HOSPITAL | Age: 74
End: 2024-05-01
Payer: MEDICARE

## 2024-05-01 NOTE — OUTREACH NOTE
Medical Week 1 Survey      Flowsheet Row Responses   [facility] patient discharged from? [facility]   Does the patient have one of the following disease processes/diagnoses(primary or secondary)? Other   Week 1 attempt successful? Yes   Call start time 1414   Call end time 1421   Discharge diagnosis *COVID-19 with multiple comorbidities (U0   Does the patient have a primary care provider?  Yes   Does the patient have an appointment with their PCP within 7 days of discharge? Yes   Has the patient kept scheduled appointments due by today? N/A   What is the Home health agency?  [facility]   Has home health visited the patient within 72 hours of discharge? Yes   What DME was ordered? bariactric walker   Has all DME been delivered? Yes   Comments Pt reports that she is not having SOA or other issues at this time, she feels much improved. Pt is monitoring her glucose which is currently higher than normal r/t steroids, the pt reports that when the steroids are complete she hope the glucose will trend back down to normal. Pt reports that she is adhereing to the ADA diet she was on at the hosp.   Did the patient receive a copy of their discharge instructions? Yes   Nursing interventions Reviewed instructions with patient   What is the patient's perception of their health status since discharge? Improving   Is the patient/caregiver able to teach back signs and symptoms related to disease process for when to call PCP? Yes   Is the patient/caregiver able to teach back signs and symptoms related to disease process for when to call 911? Yes   Is the patient/caregiver able to teach back the hierarchy of who to call/visit for symptoms/problems? PCP, Specialist, Home health nurse, Urgent Care, ED, 911 Yes   Week 1 call completed? Yes   Revoked No further contact(revokes)-requires comment   Call end time 1421            Jacquelyn VALDERRAMA - Registered Nurse

## 2024-05-03 ENCOUNTER — TELEPHONE (OUTPATIENT)
Dept: CARDIOLOGY | Facility: CLINIC | Age: 74
End: 2024-05-03

## 2024-05-03 NOTE — TELEPHONE ENCOUNTER
Caller: Inez Doyle    Relationship to patient: Self    Best call back number:[phone]     Chief complaint: HOSPITAL FOLLOW UP     Type of visit: HOSPITAL FOLLOW UP     Requested date: ASAP     If rescheduling, when is the original appointment:  5.3.24     Additional notes: PATIENT CAN'T MAKE HER APPOINTMENT TODAY. NEXT AVAILABLE IS 5.22.24.

## 2024-05-07 ENCOUNTER — ANTICOAGULATION VISIT (OUTPATIENT)
Dept: CARDIOLOGY | Facility: CLINIC | Age: 74
End: 2024-05-07
Payer: MEDICARE

## 2024-05-07 DIAGNOSIS — I48.19 PERSISTENT ATRIAL FIBRILLATION: Primary | ICD-10-CM

## 2024-05-07 LAB — INR PPP: 4

## 2024-05-09 LAB
QT INTERVAL: 463 MS
QTC INTERVAL: 441 MS

## 2024-05-14 ENCOUNTER — ANTICOAGULATION VISIT (OUTPATIENT)
Dept: CARDIOLOGY | Facility: CLINIC | Age: 74
End: 2024-05-14
Payer: MEDICARE

## 2024-05-14 DIAGNOSIS — I48.19 PERSISTENT ATRIAL FIBRILLATION: Primary | ICD-10-CM

## 2024-05-14 LAB — INR PPP: 2.9

## 2024-05-14 NOTE — PROGRESS NOTES
INR - 2.9    Currently taking 4mg qd.  Last checked 5/7/2024 with a result of 4.00  Range: 2.0-3.0  Testing done at home.

## 2024-05-20 ENCOUNTER — LAB REQUISITION (OUTPATIENT)
Dept: LAB | Facility: HOSPITAL | Age: 74
End: 2024-05-20
Payer: MEDICARE

## 2024-05-20 ENCOUNTER — ANTICOAGULATION VISIT (OUTPATIENT)
Dept: CARDIOLOGY | Facility: CLINIC | Age: 74
End: 2024-05-20
Payer: MEDICARE

## 2024-05-20 DIAGNOSIS — I48.19 PERSISTENT ATRIAL FIBRILLATION: Primary | ICD-10-CM

## 2024-05-20 DIAGNOSIS — N39.0 URINARY TRACT INFECTION, SITE NOT SPECIFIED: ICD-10-CM

## 2024-05-20 LAB
BACTERIA UR QL AUTO: ABNORMAL /HPF
BILIRUB UR QL STRIP: NEGATIVE
CLARITY UR: CLEAR
COLOR UR: YELLOW
GLUCOSE UR STRIP-MCNC: NEGATIVE MG/DL
HGB UR QL STRIP.AUTO: NEGATIVE
HYALINE CASTS UR QL AUTO: ABNORMAL /LPF
INR PPP: 4.1
KETONES UR QL STRIP: NEGATIVE
LEUKOCYTE ESTERASE UR QL STRIP.AUTO: ABNORMAL
NITRITE UR QL STRIP: NEGATIVE
PH UR STRIP.AUTO: 6 [PH] (ref 5–8)
PROT UR QL STRIP: NEGATIVE
RBC # UR STRIP: ABNORMAL /HPF
REF LAB TEST METHOD: ABNORMAL
SP GR UR STRIP: 1.01 (ref 1–1.03)
SQUAMOUS #/AREA URNS HPF: ABNORMAL /HPF
UROBILINOGEN UR QL STRIP: ABNORMAL
WBC # UR STRIP: ABNORMAL /HPF

## 2024-05-20 PROCEDURE — 81001 URINALYSIS AUTO W/SCOPE: CPT | Performed by: NURSE PRACTITIONER

## 2024-05-20 PROCEDURE — 87186 SC STD MICRODIL/AGAR DIL: CPT | Performed by: NURSE PRACTITIONER

## 2024-05-20 PROCEDURE — 87086 URINE CULTURE/COLONY COUNT: CPT | Performed by: NURSE PRACTITIONER

## 2024-05-20 PROCEDURE — 87077 CULTURE AEROBIC IDENTIFY: CPT | Performed by: NURSE PRACTITIONER

## 2024-05-20 NOTE — PROGRESS NOTES
Lab Results   Component Value Date    INR 4.10 05/20/2024    INR 2.90 05/13/2024    INR 4.00 05/07/2024    PROTIME 19.9 (H) 04/27/2024    PROTIME 19.1 (H) 04/26/2024    PROTIME 18.3 (H) 04/25/2024     Dx: afib  Pt taking 4  Range: 2.0-3.0  remote

## 2024-05-20 NOTE — PROGRESS NOTES
PER MIKE VELIZ- VERBAL ORDER-  Hold today alternate 2/4 after today and recheck Thursday      Spoke with pt. Gave results and plan. No concerns.

## 2024-05-23 LAB — BACTERIA SPEC AEROBE CULT: ABNORMAL

## 2024-05-24 ENCOUNTER — ANTICOAGULATION VISIT (OUTPATIENT)
Dept: CARDIOLOGY | Facility: CLINIC | Age: 74
End: 2024-05-24
Payer: MEDICARE

## 2024-05-24 DIAGNOSIS — I48.19 PERSISTENT ATRIAL FIBRILLATION: Primary | ICD-10-CM

## 2024-05-24 LAB — INR PPP: 1.9

## 2024-05-24 NOTE — PROGRESS NOTES
Lab Results   Component Value Date    INR 1.90 05/24/2024    INR 4.10 05/20/2024    INR 2.90 05/13/2024    PROTIME 19.9 (H) 04/27/2024    PROTIME 19.1 (H) 04/26/2024    PROTIME 18.3 (H) 04/25/2024     Dx: afib  Pt held alternated 2/4   Range: 2.0-3.0  MDINR

## 2024-05-28 ENCOUNTER — ANTICOAGULATION VISIT (OUTPATIENT)
Dept: CARDIOLOGY | Facility: CLINIC | Age: 74
End: 2024-05-28
Payer: MEDICARE

## 2024-05-28 DIAGNOSIS — I48.19 PERSISTENT ATRIAL FIBRILLATION: ICD-10-CM

## 2024-05-28 DIAGNOSIS — I48.19 PERSISTENT ATRIAL FIBRILLATION: Primary | ICD-10-CM

## 2024-05-28 LAB — INR PPP: 1.6

## 2024-05-28 NOTE — PROGRESS NOTES
Lab Results   Component Value Date    INR 1.60 05/28/2024    INR 1.90 05/24/2024    INR 4.10 05/20/2024    PROTIME 19.9 (H) 04/27/2024    PROTIME 19.1 (H) 04/26/2024    PROTIME 18.3 (H) 04/25/2024     Dx: afib  Pt taking 3/3/4  Range: 2.0-3.0  remote

## 2024-06-03 ENCOUNTER — ANTICOAGULATION VISIT (OUTPATIENT)
Dept: CARDIOLOGY | Facility: CLINIC | Age: 74
End: 2024-06-03
Payer: MEDICARE

## 2024-06-03 DIAGNOSIS — I48.19 PERSISTENT ATRIAL FIBRILLATION: Primary | ICD-10-CM

## 2024-06-03 LAB — INR PPP: 1.3 (ref 2–3)

## 2024-06-03 NOTE — PROGRESS NOTES
Lab Results   Component Value Date    INR 1.30 (A) 06/03/2024    INR 1.60 05/28/2024    INR 1.90 05/24/2024    PROTIME 19.9 (H) 04/27/2024    PROTIME 19.1 (H) 04/26/2024    PROTIME 18.3 (H) 04/25/2024    Atrial fibrillation   Range 2.0-3.0  3/2  Self test    Pt is aware of results and instructions.

## 2024-06-06 ENCOUNTER — ANTICOAGULATION VISIT (OUTPATIENT)
Dept: CARDIOLOGY | Facility: CLINIC | Age: 74
End: 2024-06-06
Payer: MEDICARE

## 2024-06-06 DIAGNOSIS — I48.19 PERSISTENT ATRIAL FIBRILLATION: Primary | ICD-10-CM

## 2024-06-06 DIAGNOSIS — I48.19 PERSISTENT ATRIAL FIBRILLATION: ICD-10-CM

## 2024-06-06 LAB — INR PPP: 1.4

## 2024-06-06 NOTE — PROGRESS NOTES
[facility]  Cardiology progress Note    Patient Name: Inez Doyle  : 1950    CHIEF COMPLAINT  Atrial fibrillation        Subjective   Subjective     HISTORY OF PRESENT ILLNESS    Inez Doyle is a 74 y.o. female with history of atrial fibrillation and CHF.  No chest pain or shortness of breath.    REVIEW OF SYSTEMS    Constitutional:    No fever, no weight loss  Skin:     No rash  Otolaryngeal:    No difficulty swallowing  Cardiovascular: See HPI.  Pulmonary:    No cough, no sputum production    Personal History     Social History:    reports that she has never smoked. She does not have any smokeless tobacco history on file. She reports that she does not drink alcohol and does not use drugs.    Home Medications:  Current Outpatient Medications on File Prior to Visit   Medication Sig    albuterol sulfate  (90 Base) MCG/ACT inhaler Inhale 2 puffs Every 4 (Four) Hours As Needed for Wheezing or Shortness of Air.    cloNIDine (CATAPRES) 0.1 MG tablet Take 1 tablet by mouth 2 (Two) Times a Day.    cyanocobalamin 1000 MCG/ML injection Inject 1 mL into the appropriate muscle as directed by prescriber 1 (One) Time Per Week.    furosemide (LASIX) 20 MG tablet Take 2 tablets by mouth 2 (Two) Times a Day.    gabapentin (NEURONTIN) 800 MG tablet Take 1 tablet by mouth every night at bedtime.    Liraglutide (VICTOZA) 18 MG/3ML solution pen-injector injection Inject 0.6 mg under the skin into the appropriate area as directed Daily.    losartan (COZAAR) 100 MG tablet Take 1 tablet by mouth Daily.    potassium chloride 10 MEQ CR tablet Take 1 tablet by mouth Daily.    spironolactone (ALDACTONE) 50 MG tablet Take 1 tablet by mouth Daily.    warfarin (COUMADIN) 2 MG tablet Take 2 mg daily or as directed (Patient taking differently: Take 2 tablets by mouth Every Night. Home Warfarin Notes    Home warfarin dose: Since last 2024 4 mg- 5 mg- 5 mg then 4 mg- 5 mg- 5 mg  Who monitors the  patients INR? Checks weekly @ home and reports to Tonya King MIKE  Has the patient been consistent on their current dosing regimen? No)    warfarin (COUMADIN) 5 MG tablet Take 1 tablet by mouth Daily. Or as directed (Patient taking differently: Take 1 tablet by mouth Daily. Home warfarin dose: Since last Tuesday 04/16/2024 4 mg- 5 mg- 5 mg then 4 mg- 5 mg- 5 mg  Who monitors the patients INR? Checks weekly @ home and reports to Tonya King MIKE  Has the patient been consistent on their current dosing regimen? No)     No current facility-administered medications on file prior to visit.       Past Medical History:   Diagnosis Date    Anemia     Anxiety     Asthma     Depression     Osteoarthritis        Allergies:  Allergies   Allergen Reactions    Codeine     Dye Fdc Red [Red Dye] Other (See Comments)          Iodinated Contrast Media Nausea Only    Penicillins     Sulfa Antibiotics        Objective    Objective       Vitals:   Heart Rate:  [74] 74  BP: (156)/(74) 156/74  Body mass index is 65.63 kg/m².     PHYSICAL EXAM:    General Appearance:   well developed  well nourished  HENT:   oropharynx moist  lips not cyanotic  Neck:  thyroid not enlarged  supple  Respiratory:  no respiratory distress  normal breath sounds  no rales  Cardiovascular:  no jugular venous distention  regular rhythm  apical impulse normal  S1 normal, S2 normal  no S3, no S4   no murmur  no rub, no thrill  carotid pulses normal; no bruit  pedal pulses normal  lower extremity edema: none    Skin:   warm, dry  Psychiatric:  judgement and insight appropriate  normal mood and affect        Result Review:  I have personally reviewed the available results from  [x]  Laboratory  [x]  EKG  [x]  Cardiology  [x]  Medications  [x]  Old records  []  Other:     Procedures  Lab Results   Component Value Date    CHOL 122 06/15/2023     Lab Results   Component Value Date    TRIG 112 06/15/2023     Lab Results   Component Value Date    HDL 50 06/15/2023      Lab Results   Component Value Date    LDL 52 06/15/2023     Lab Results   Component Value Date    VLDL 20 06/15/2023     Results for orders placed during the hospital encounter of 06/14/23    Adult Transthoracic Echo Complete W/ Cont if Necessary Per Protocol    Interpretation Summary  Technically limited study.  Normal left ventricular systolic function.  No significant valve abnormalities noted.     Impression/Plan:  1. Persistent atrial fibrillation controlled:  Continue Coumadin for stroke prevention.  2.  Essential hypertension controlled: Continue losartan 100 mg once a day.  Continue clonidine .1 mg twice a day.  Monitor blood pressure regularly.  3.  Chronic diastolic heart failure stable: Continue Lasix 40 mg twice a day.  Add metolazone 2.5 mg every other day.  Monitor BMP.           Stew Riojas MD   06/11/24   13:34 EDT

## 2024-06-06 NOTE — PROGRESS NOTES
Lab Results   Component Value Date    INR 1.40 06/06/2024    INR 1.30 (A) 06/03/2024    INR 1.60 05/28/2024    PROTIME 19.9 (H) 04/27/2024    PROTIME 19.1 (H) 04/26/2024    PROTIME 18.3 (H) 04/25/2024     Dx: afib  Pt taking 3  Range: 2.0-3.0  remote

## 2024-06-10 ENCOUNTER — ANTICOAGULATION VISIT (OUTPATIENT)
Dept: CARDIOLOGY | Facility: CLINIC | Age: 74
End: 2024-06-10
Payer: MEDICARE

## 2024-06-10 DIAGNOSIS — I48.19 PERSISTENT ATRIAL FIBRILLATION: Primary | ICD-10-CM

## 2024-06-10 LAB — INR PPP: 1.6

## 2024-06-10 NOTE — PROGRESS NOTES
INR - 1.6    Currently taking 4mg qd.  Last checked 6/6/2024 with a result of 1.4.  Range: 2.0-3.0  Testing done at home.

## 2024-06-11 ENCOUNTER — OFFICE VISIT (OUTPATIENT)
Dept: CARDIOLOGY | Facility: CLINIC | Age: 74
End: 2024-06-11
Payer: MEDICARE

## 2024-06-11 VITALS
DIASTOLIC BLOOD PRESSURE: 74 MMHG | SYSTOLIC BLOOD PRESSURE: 156 MMHG | HEIGHT: 65 IN | HEART RATE: 74 BPM | BODY MASS INDEX: 65.63 KG/M2

## 2024-06-11 DIAGNOSIS — I48.19 PERSISTENT ATRIAL FIBRILLATION: ICD-10-CM

## 2024-06-11 DIAGNOSIS — I10 HYPERTENSION, ESSENTIAL: Primary | ICD-10-CM

## 2024-06-11 DIAGNOSIS — I50.32 DIASTOLIC CHF, CHRONIC: ICD-10-CM

## 2024-06-11 PROCEDURE — 99214 OFFICE O/P EST MOD 30 MIN: CPT | Performed by: SPECIALIST

## 2024-06-11 PROCEDURE — 1160F RVW MEDS BY RX/DR IN RCRD: CPT | Performed by: SPECIALIST

## 2024-06-11 PROCEDURE — 1159F MED LIST DOCD IN RCRD: CPT | Performed by: SPECIALIST

## 2024-06-11 RX ORDER — METOLAZONE 2.5 MG/1
2.5 TABLET ORAL EVERY OTHER DAY
Qty: 30 TABLET | Refills: 11 | Status: SHIPPED | OUTPATIENT
Start: 2024-06-11

## 2024-06-12 ENCOUNTER — LAB REQUISITION (OUTPATIENT)
Dept: LAB | Facility: HOSPITAL | Age: 74
End: 2024-06-12
Payer: MEDICARE

## 2024-06-12 DIAGNOSIS — N39.0 URINARY TRACT INFECTION, SITE NOT SPECIFIED: ICD-10-CM

## 2024-06-12 LAB
BILIRUB UR QL STRIP: NEGATIVE
CLARITY UR: CLEAR
COLOR UR: YELLOW
GLUCOSE UR STRIP-MCNC: NEGATIVE MG/DL
HGB UR QL STRIP.AUTO: NEGATIVE
KETONES UR QL STRIP: NEGATIVE
LEUKOCYTE ESTERASE UR QL STRIP.AUTO: NEGATIVE
NITRITE UR QL STRIP: NEGATIVE
PH UR STRIP.AUTO: 6.5 [PH] (ref 5–8)
PROT UR QL STRIP: NEGATIVE
SP GR UR STRIP: 1.02 (ref 1–1.03)
UROBILINOGEN UR QL STRIP: NORMAL

## 2024-06-12 PROCEDURE — 81003 URINALYSIS AUTO W/O SCOPE: CPT | Performed by: NURSE PRACTITIONER

## 2024-06-13 ENCOUNTER — TELEPHONE (OUTPATIENT)
Dept: CARDIOLOGY | Facility: CLINIC | Age: 74
End: 2024-06-13
Payer: MEDICARE

## 2024-06-13 NOTE — TELEPHONE ENCOUNTER
The [facility] received a fax that requires your attention. The document has been indexed to the patient’s chart for your review.      Reason for sending: EXTERNAL MEDICAL RECORD NOTIFICATION     Documents Description: PHYS ORD; SIGN/RETURN-A [facility]-6.13.24    Name of Sender: [facility]     Date Indexed: 6.13.24

## 2024-06-14 ENCOUNTER — ANTICOAGULATION VISIT (OUTPATIENT)
Dept: CARDIOLOGY | Facility: CLINIC | Age: 74
End: 2024-06-14
Payer: MEDICARE

## 2024-06-14 DIAGNOSIS — I48.19 PERSISTENT ATRIAL FIBRILLATION: Primary | ICD-10-CM

## 2024-06-14 LAB — INR PPP: 2

## 2024-06-14 NOTE — PROGRESS NOTES
Lab Results   Component Value Date    INR 2.00 06/14/2024    INR 1.60 06/10/2024    INR 1.40 06/06/2024    PROTIME 19.9 (H) 04/27/2024    PROTIME 19.1 (H) 04/26/2024    PROTIME 18.3 (H) 04/25/2024     Dx: afib  Pt taking 5/5/4  Range: 2.0-3.0  remote

## 2024-06-24 ENCOUNTER — LAB REQUISITION (OUTPATIENT)
Dept: LAB | Facility: HOSPITAL | Age: 74
End: 2024-06-24
Payer: MEDICARE

## 2024-06-24 ENCOUNTER — ANTICOAGULATION VISIT (OUTPATIENT)
Dept: CARDIOLOGY | Facility: CLINIC | Age: 74
End: 2024-06-24
Payer: MEDICARE

## 2024-06-24 DIAGNOSIS — I50.9 HEART FAILURE, UNSPECIFIED: ICD-10-CM

## 2024-06-24 DIAGNOSIS — I48.19 PERSISTENT ATRIAL FIBRILLATION: ICD-10-CM

## 2024-06-24 DIAGNOSIS — I48.91 UNSPECIFIED ATRIAL FIBRILLATION: ICD-10-CM

## 2024-06-24 DIAGNOSIS — I48.19 PERSISTENT ATRIAL FIBRILLATION: Primary | ICD-10-CM

## 2024-06-24 LAB
ALBUMIN SERPL-MCNC: 4.1 G/DL (ref 3.5–5.2)
ALBUMIN/GLOB SERPL: 1.5 G/DL
ALP SERPL-CCNC: 92 U/L (ref 39–117)
ALT SERPL W P-5'-P-CCNC: 8 U/L (ref 1–33)
ANION GAP SERPL CALCULATED.3IONS-SCNC: 13 MMOL/L (ref 5–15)
AST SERPL-CCNC: 10 U/L (ref 1–32)
BASOPHILS # BLD AUTO: 0.02 10*3/MM3 (ref 0–0.2)
BASOPHILS NFR BLD AUTO: 0.3 % (ref 0–1.5)
BILIRUB SERPL-MCNC: 0.5 MG/DL (ref 0–1.2)
BUN SERPL-MCNC: 39 MG/DL (ref 8–23)
BUN/CREAT SERPL: 34.2 (ref 7–25)
CALCIUM SPEC-SCNC: 9.9 MG/DL (ref 8.6–10.5)
CHLORIDE SERPL-SCNC: 94 MMOL/L (ref 98–107)
CO2 SERPL-SCNC: 30 MMOL/L (ref 22–29)
CREAT SERPL-MCNC: 1.14 MG/DL (ref 0.57–1)
DEPRECATED RDW RBC AUTO: 52.4 FL (ref 37–54)
EGFRCR SERPLBLD CKD-EPI 2021: 50.6 ML/MIN/1.73
EOSINOPHIL # BLD AUTO: 0.06 10*3/MM3 (ref 0–0.4)
EOSINOPHIL NFR BLD AUTO: 1 % (ref 0.3–6.2)
ERYTHROCYTE [DISTWIDTH] IN BLOOD BY AUTOMATED COUNT: 17.9 % (ref 12.3–15.4)
GLOBULIN UR ELPH-MCNC: 2.7 GM/DL
GLUCOSE SERPL-MCNC: 152 MG/DL (ref 65–99)
HCT VFR BLD AUTO: 42.1 % (ref 34–46.6)
HGB BLD-MCNC: 13.1 G/DL (ref 12–15.9)
IMM GRANULOCYTES # BLD AUTO: 0.01 10*3/MM3 (ref 0–0.05)
IMM GRANULOCYTES NFR BLD AUTO: 0.2 % (ref 0–0.5)
INR PPP: 4
LYMPHOCYTES # BLD AUTO: 1.11 10*3/MM3 (ref 0.7–3.1)
LYMPHOCYTES NFR BLD AUTO: 17.8 % (ref 19.6–45.3)
MCH RBC QN AUTO: 25.2 PG (ref 26.6–33)
MCHC RBC AUTO-ENTMCNC: 31.1 G/DL (ref 31.5–35.7)
MCV RBC AUTO: 81 FL (ref 79–97)
MONOCYTES # BLD AUTO: 0.46 10*3/MM3 (ref 0.1–0.9)
MONOCYTES NFR BLD AUTO: 7.4 % (ref 5–12)
NEUTROPHILS NFR BLD AUTO: 4.57 10*3/MM3 (ref 1.7–7)
NEUTROPHILS NFR BLD AUTO: 73.3 % (ref 42.7–76)
PLATELET # BLD AUTO: 120 10*3/MM3 (ref 140–450)
PMV BLD AUTO: 10.9 FL (ref 6–12)
POTASSIUM SERPL-SCNC: 4.1 MMOL/L (ref 3.5–5.2)
PROT SERPL-MCNC: 6.8 G/DL (ref 6–8.5)
RBC # BLD AUTO: 5.2 10*6/MM3 (ref 3.77–5.28)
SODIUM SERPL-SCNC: 137 MMOL/L (ref 136–145)
WBC NRBC COR # BLD AUTO: 6.23 10*3/MM3 (ref 3.4–10.8)

## 2024-06-24 PROCEDURE — 85025 COMPLETE CBC W/AUTO DIFF WBC: CPT | Performed by: NURSE PRACTITIONER

## 2024-06-24 PROCEDURE — 80053 COMPREHEN METABOLIC PANEL: CPT | Performed by: NURSE PRACTITIONER

## 2024-06-24 NOTE — PROGRESS NOTES
Level is too high.  Please verify there is no bleeding issues.  Hold dose today and tomorrow, take 4 mg daily, and recheck INR on Monday.

## 2024-06-24 NOTE — PROGRESS NOTES
Lab Results   Component Value Date    INR 4.00 06/24/2024    INR 2.00 06/14/2024    INR 1.60 06/10/2024    PROTIME 19.9 (H) 04/27/2024    PROTIME 19.1 (H) 04/26/2024    PROTIME 18.3 (H) 04/25/2024     DX: afib  Pt taking 5/5/4  Range: 2.0-3.0  remote

## 2024-06-27 ENCOUNTER — ANTICOAGULATION VISIT (OUTPATIENT)
Dept: CARDIOLOGY | Facility: CLINIC | Age: 74
End: 2024-06-27
Payer: MEDICARE

## 2024-06-27 DIAGNOSIS — I48.11 LONGSTANDING PERSISTENT ATRIAL FIBRILLATION: Primary | ICD-10-CM

## 2024-06-27 DIAGNOSIS — I48.19 PERSISTENT ATRIAL FIBRILLATION: ICD-10-CM

## 2024-06-27 LAB — INR PPP: 2.3 (ref 2–3)

## 2024-06-27 NOTE — PROGRESS NOTES
Lab Results   Component Value Date    INR 2.30 06/27/2024    INR 4.00 06/24/2024    INR 2.00 06/14/2024    PROTIME 19.9 (H) 04/27/2024    PROTIME 19.1 (H) 04/26/2024    PROTIME 18.3 (H) 04/25/2024    Atrial fibrillation   Range 2.0-3.0  4 mg  Self test    Pt has been taking 5 mg. Advised her to continue that dose , recheck in 1 week.

## 2024-07-01 ENCOUNTER — ANTICOAGULATION VISIT (OUTPATIENT)
Dept: CARDIOLOGY | Facility: CLINIC | Age: 74
End: 2024-07-01
Payer: MEDICARE

## 2024-07-01 DIAGNOSIS — I48.19 PERSISTENT ATRIAL FIBRILLATION: ICD-10-CM

## 2024-07-01 DIAGNOSIS — I48.11 LONGSTANDING PERSISTENT ATRIAL FIBRILLATION: Primary | ICD-10-CM

## 2024-07-01 LAB — INR PPP: 3.4

## 2024-07-01 NOTE — PROGRESS NOTES
Lab Results   Component Value Date    INR 3.40 07/01/2024    INR 2.30 06/27/2024    INR 4.00 06/24/2024    PROTIME 19.9 (H) 04/27/2024    PROTIME 19.1 (H) 04/26/2024    PROTIME 18.3 (H) 04/25/2024     DX: AFIB  PT TAKING 5  RANGE 2.0-3.0  REMOTE

## 2024-07-09 ENCOUNTER — ANTICOAGULATION VISIT (OUTPATIENT)
Dept: CARDIOLOGY | Facility: CLINIC | Age: 74
End: 2024-07-09
Payer: MEDICARE

## 2024-07-09 DIAGNOSIS — I48.19 PERSISTENT ATRIAL FIBRILLATION: ICD-10-CM

## 2024-07-09 DIAGNOSIS — I48.11 LONGSTANDING PERSISTENT ATRIAL FIBRILLATION: Primary | ICD-10-CM

## 2024-07-09 LAB — INR PPP: 4 (ref 2–3)

## 2024-07-09 NOTE — PROGRESS NOTES
Lab Results   Component Value Date    INR 4.00 (A) 07/09/2024    INR 3.40 07/01/2024    INR 2.30 06/27/2024    PROTIME 19.9 (H) 04/27/2024    PROTIME 19.1 (H) 04/26/2024    PROTIME 18.3 (H) 04/25/2024    Atrial fibrillation   Range 2.0-3.0  5 mg  [facility]    Pt is out of strips. She was told they will be there at the beginning of next week. I advised she start 4 mg daily tomorrow and recheck asap. Is that ok?

## 2024-07-10 ENCOUNTER — HOSPITAL ENCOUNTER (EMERGENCY)
Facility: HOSPITAL | Age: 74
Discharge: HOME | End: 2024-07-11
Attending: EMERGENCY MEDICINE
Payer: MEDICARE

## 2024-07-10 DIAGNOSIS — R19.7 DIARRHEA, UNSPECIFIED TYPE: ICD-10-CM

## 2024-07-10 DIAGNOSIS — R11.2 NAUSEA AND VOMITING, UNSPECIFIED VOMITING TYPE: Primary | ICD-10-CM

## 2024-07-10 DIAGNOSIS — I87.2 VENOUS STASIS DERMATITIS: ICD-10-CM

## 2024-07-10 DIAGNOSIS — B37.2 CUTANEOUS CANDIDIASIS: ICD-10-CM

## 2024-07-10 LAB
ALBUMIN SERPL-MCNC: 3.5 G/DL (ref 3.5–5.2)
ALBUMIN/GLOB SERPL: 1 G/DL
ALP SERPL-CCNC: 88 U/L (ref 39–117)
ALT SERPL W P-5'-P-CCNC: 5 U/L (ref 1–33)
ANION GAP SERPL CALCULATED.3IONS-SCNC: 12.8 MMOL/L (ref 5–15)
AST SERPL-CCNC: 7 U/L (ref 1–32)
BASOPHILS # BLD AUTO: 0.04 10*3/MM3 (ref 0–0.2)
BASOPHILS NFR BLD AUTO: 0.5 % (ref 0–1.5)
BILIRUB SERPL-MCNC: 0.4 MG/DL (ref 0–1.2)
BILIRUB UR QL STRIP: NEGATIVE
BUN SERPL-MCNC: 53 MG/DL (ref 8–23)
BUN/CREAT SERPL: 40.5 (ref 7–25)
CALCIUM SPEC-SCNC: 9.2 MG/DL (ref 8.6–10.5)
CHLORIDE SERPL-SCNC: 105 MMOL/L (ref 98–107)
CLARITY UR: CLEAR
CO2 SERPL-SCNC: 22.2 MMOL/L (ref 22–29)
COLOR UR: YELLOW
CREAT SERPL-MCNC: 1.31 MG/DL (ref 0.57–1)
DEPRECATED RDW RBC AUTO: 52 FL (ref 37–54)
EGFRCR SERPLBLD CKD-EPI 2021: 42.8 ML/MIN/1.73
EOSINOPHIL # BLD AUTO: 0.04 10*3/MM3 (ref 0–0.4)
EOSINOPHIL NFR BLD AUTO: 0.5 % (ref 0.3–6.2)
ERYTHROCYTE [DISTWIDTH] IN BLOOD BY AUTOMATED COUNT: 17.8 % (ref 12.3–15.4)
GLOBULIN UR ELPH-MCNC: 3.4 GM/DL
GLUCOSE SERPL-MCNC: 177 MG/DL (ref 65–99)
GLUCOSE UR STRIP-MCNC: NEGATIVE MG/DL
HCT VFR BLD AUTO: 35.5 % (ref 34–46.6)
HGB BLD-MCNC: 11.1 G/DL (ref 12–15.9)
HGB UR QL STRIP.AUTO: NEGATIVE
HOLD SPECIMEN: NORMAL
HOLD SPECIMEN: NORMAL
IMM GRANULOCYTES # BLD AUTO: 0.04 10*3/MM3 (ref 0–0.05)
IMM GRANULOCYTES NFR BLD AUTO: 0.5 % (ref 0–0.5)
INR PPP: 2.26 (ref 0.86–1.15)
KETONES UR QL STRIP: NEGATIVE
LEUKOCYTE ESTERASE UR QL STRIP.AUTO: NEGATIVE
LIPASE SERPL-CCNC: 32 U/L (ref 13–60)
LYMPHOCYTES # BLD AUTO: 0.64 10*3/MM3 (ref 0.7–3.1)
LYMPHOCYTES NFR BLD AUTO: 8.2 % (ref 19.6–45.3)
MCH RBC QN AUTO: 25 PG (ref 26.6–33)
MCHC RBC AUTO-ENTMCNC: 31.3 G/DL (ref 31.5–35.7)
MCV RBC AUTO: 80 FL (ref 79–97)
MONOCYTES # BLD AUTO: 0.54 10*3/MM3 (ref 0.1–0.9)
MONOCYTES NFR BLD AUTO: 6.9 % (ref 5–12)
NEUTROPHILS NFR BLD AUTO: 6.48 10*3/MM3 (ref 1.7–7)
NEUTROPHILS NFR BLD AUTO: 83.4 % (ref 42.7–76)
NITRITE UR QL STRIP: NEGATIVE
NRBC BLD AUTO-RTO: 0 /100 WBC (ref 0–0.2)
PH UR STRIP.AUTO: 5.5 [PH] (ref 5–8)
PLATELET # BLD AUTO: 153 10*3/MM3 (ref 140–450)
PMV BLD AUTO: 10.3 FL (ref 6–12)
POTASSIUM SERPL-SCNC: 4.6 MMOL/L (ref 3.5–5.2)
PROT SERPL-MCNC: 6.9 G/DL (ref 6–8.5)
PROT UR QL STRIP: NEGATIVE
PROTHROMBIN TIME: 25.4 SECONDS (ref 11.8–14.9)
RBC # BLD AUTO: 4.44 10*6/MM3 (ref 3.77–5.28)
SODIUM SERPL-SCNC: 140 MMOL/L (ref 136–145)
SP GR UR STRIP: 1.01 (ref 1–1.03)
UROBILINOGEN UR QL STRIP: NORMAL
WBC NRBC COR # BLD AUTO: 7.78 10*3/MM3 (ref 3.4–10.8)
WHOLE BLOOD HOLD COAG: NORMAL
WHOLE BLOOD HOLD SPECIMEN: NORMAL

## 2024-07-10 PROCEDURE — 80053 COMPREHEN METABOLIC PANEL: CPT

## 2024-07-10 PROCEDURE — 99283 EMERGENCY DEPT VISIT LOW MDM: CPT

## 2024-07-10 PROCEDURE — 85025 COMPLETE CBC W/AUTO DIFF WBC: CPT

## 2024-07-10 PROCEDURE — 36415 COLL VENOUS BLD VENIPUNCTURE: CPT

## 2024-07-10 PROCEDURE — 85610 PROTHROMBIN TIME: CPT | Performed by: EMERGENCY MEDICINE

## 2024-07-10 PROCEDURE — 81003 URINALYSIS AUTO W/O SCOPE: CPT

## 2024-07-10 PROCEDURE — 83690 ASSAY OF LIPASE: CPT

## 2024-07-10 RX ORDER — SODIUM CHLORIDE 0.9 % (FLUSH) 0.9 %
10 SYRINGE (ML) INJECTION AS NEEDED
Status: DISCONTINUED | OUTPATIENT
Start: 2024-07-10 | End: 2024-07-11 | Stop reason: HOSPADM

## 2024-07-11 VITALS
OXYGEN SATURATION: 98 % | HEIGHT: 65 IN | RESPIRATION RATE: 22 BRPM | BODY MASS INDEX: 65.63 KG/M2 | DIASTOLIC BLOOD PRESSURE: 63 MMHG | HEART RATE: 104 BPM | TEMPERATURE: 98.4 F | SYSTOLIC BLOOD PRESSURE: 119 MMHG

## 2024-07-11 LAB
D-LACTATE SERPL-SCNC: 1.3 MMOL/L (ref 0.5–2)
FLUAV SUBTYP SPEC NAA+PROBE: NOT DETECTED
FLUBV RNA ISLT QL NAA+PROBE: NOT DETECTED
RSV RNA NPH QL NAA+NON-PROBE: NOT DETECTED
SARS-COV-2 RNA RESP QL NAA+PROBE: NOT DETECTED
WHOLE BLOOD HOLD COAG: NORMAL

## 2024-07-11 PROCEDURE — 25010000002 ONDANSETRON PER 1 MG: Performed by: EMERGENCY MEDICINE

## 2024-07-11 PROCEDURE — 87637 SARSCOV2&INF A&B&RSV AMP PRB: CPT | Performed by: EMERGENCY MEDICINE

## 2024-07-11 PROCEDURE — 25810000003 LACTATED RINGERS SOLUTION: Performed by: EMERGENCY MEDICINE

## 2024-07-11 PROCEDURE — 96375 TX/PRO/DX INJ NEW DRUG ADDON: CPT

## 2024-07-11 PROCEDURE — 83605 ASSAY OF LACTIC ACID: CPT

## 2024-07-11 PROCEDURE — 96374 THER/PROPH/DIAG INJ IV PUSH: CPT

## 2024-07-11 RX ORDER — ONDANSETRON 2 MG/ML
4 INJECTION INTRAMUSCULAR; INTRAVENOUS ONCE
Status: COMPLETED | OUTPATIENT
Start: 2024-07-11 | End: 2024-07-11

## 2024-07-11 RX ORDER — FAMOTIDINE 20 MG/1
20 TABLET, FILM COATED ORAL 2 TIMES DAILY
Qty: 28 TABLET | Refills: 0 | Status: SHIPPED | OUTPATIENT
Start: 2024-07-11 | End: 2024-07-25

## 2024-07-11 RX ORDER — FAMOTIDINE 10 MG/ML
20 INJECTION, SOLUTION INTRAVENOUS ONCE
Status: COMPLETED | OUTPATIENT
Start: 2024-07-11 | End: 2024-07-11

## 2024-07-11 RX ORDER — NYSTATIN 100000 [USP'U]/G
POWDER TOPICAL 3 TIMES DAILY
Qty: 60 G | Refills: 0 | Status: SHIPPED | OUTPATIENT
Start: 2024-07-11 | End: 2024-07-21

## 2024-07-11 RX ADMIN — ONDANSETRON 4 MG: 2 INJECTION INTRAMUSCULAR; INTRAVENOUS at 00:35

## 2024-07-11 RX ADMIN — FAMOTIDINE 20 MG: 10 INJECTION INTRAVENOUS at 00:35

## 2024-07-11 RX ADMIN — SODIUM CHLORIDE, POTASSIUM CHLORIDE, SODIUM LACTATE AND CALCIUM CHLORIDE 1000 ML: 600; 310; 30; 20 INJECTION, SOLUTION INTRAVENOUS at 00:35

## 2024-07-11 NOTE — DISCHARGE INSTRUCTIONS
Liquid diet only for the next 24 hours and then advance your diet very slowly as tolerated    Please push oral fluids    Please follow-up with your doctor tomorrow for serial reexamination the abdomen.      Return to the emergency room immediately for intractable pain, intractable vomiting, fever, shaking chills, muscle aches, near passing out, passing out or any new symptoms you are concerned with    If you continue diarrhea, please discuss the need to check your stool for C. difficile toxin and enteric pathogens with your primary care physician

## 2024-07-11 NOTE — ED PROVIDER NOTES
Time: 8:31 PM EDT  Date of encounter:  7/10/2024  Independent Historian/Clinical History and Information was obtained by:   Patient    History is limited by: N/A    Chief Complaint   Patient presents with    Vomiting    Nausea    Diarrhea         History of Present Illness:  Patient is a 74 y.o. year old female who presents to the emergency department for evaluation of nausea, vomiting, diarrhea.  This been going on for 2 days now.  Patient states that she recently started Keflex 2 days ago to treat her bilateral lower extremity cellulitis.  She has taken Zofran as well for nausea.  Denies any fever.  The patient denies any abdominal pain.  The patient's had no fever, rigors or myalgias.  The patient denies any cough or shortness of breath.  Patient notes that she has chronic rash on her legs and groin.  She is currently  Nystatin powder to her abdomen.  The patient denies any urinary dysuria, frequency or urgency.  The patient denies any bad food exposure.  Patient states that she is having about 5 watery stools a day.  She denies any hematochezia or melena.  Patient denies any chest pain or new back pain    Patient Care Team  Primary Care Provider: Christopher Hanson MD    Past Medical History:     Allergies   Allergen Reactions    Codeine     Dye Fdc Red [Red Dye] Other (See Comments)          Iodinated Contrast Media Nausea Only    Penicillins     Sulfa Antibiotics      Past Medical History:   Diagnosis Date    Anemia     Anxiety     Asthma     Depression     Obesity     Osteoarthritis      Past Surgical History:   Procedure Laterality Date    BACK SURGERY      STOMACH SURGERY       History reviewed. No pertinent family history.    Home Medications:  Prior to Admission medications    Medication Sig Start Date End Date Taking? Authorizing Provider   albuterol sulfate  (90 Base) MCG/ACT inhaler Inhale 2 puffs Every 4 (Four) Hours As Needed for Wheezing or Shortness of Air.    Provider, MD Chayo   cloNIDine  (CATAPRES) 0.1 MG tablet Take 1 tablet by mouth 2 (Two) Times a Day.    Chayo Emmanuel MD   cyanocobalamin 1000 MCG/ML injection Inject 1 mL into the appropriate muscle as directed by prescriber 1 (One) Time Per Week. 1/22/24   Chayo Emmanuel MD   furosemide (LASIX) 20 MG tablet Take 2 tablets by mouth 2 (Two) Times a Day. 3/18/24   Tonya Ortega APRN   gabapentin (NEURONTIN) 800 MG tablet Take 1 tablet by mouth every night at bedtime. 6/5/23   Chayo Emmanuel MD   Liraglutide (VICTOZA) 18 MG/3ML solution pen-injector injection Inject 0.6 mg under the skin into the appropriate area as directed Daily. 6/2/23   Chayo Emmanuel MD   losartan (COZAAR) 100 MG tablet Take 1 tablet by mouth Daily. 6/5/23   Chayo Emmanuel MD   metOLazone (ZAROXOLYN) 2.5 MG tablet Take 1 tablet by mouth Every Other Day. 6/11/24   Stew Riojas MD   potassium chloride 10 MEQ CR tablet Take 1 tablet by mouth Daily.    Chayo Emmanuel MD   spironolactone (ALDACTONE) 50 MG tablet Take 1 tablet by mouth Daily. 3/28/23   Chayo Emmanuel MD   warfarin (COUMADIN) 2 MG tablet Take 2 mg daily or as directed  Patient taking differently: Take 2 tablets by mouth Every Night. Home Warfarin Notes    Home warfarin dose: Since last Tuesday 04/16/2024 4 mg- 5 mg- 5 mg then 4 mg- 5 mg- 5 mg  Who monitors the patients INR? Checks weekly @ home and reports to Tonya MCKEON  Has the patient been consistent on their current dosing regimen? No 3/15/24   Tonya Ortega APRN   warfarin (COUMADIN) 5 MG tablet Take 1 tablet by mouth Daily. Or as directed  Patient taking differently: Take 1 tablet by mouth Daily. Home warfarin dose: Since last Tuesday 04/16/2024 4 mg- 5 mg- 5 mg then 4 mg- 5 mg- 5 mg  Who monitors the patients INR? Checks weekly @ home and reports to Tonya MCKEON  Has the patient been consistent on their current dosing regimen? No 4/8/24   Tonya Ortega APRN     "    Social History:   Social History     Tobacco Use    Smoking status: Never   Vaping Use    Vaping status: Never Used   Substance Use Topics    Alcohol use: No    Drug use: Never         Review of Systems:  Review of Systems   Constitutional:  Negative for chills, diaphoresis and fever.   HENT:  Negative for congestion, postnasal drip, rhinorrhea and sore throat.    Eyes:  Negative for photophobia.   Respiratory:  Negative for cough, chest tightness and shortness of breath.    Cardiovascular:  Positive for leg swelling. Negative for chest pain and palpitations.   Gastrointestinal:  Positive for diarrhea, nausea and vomiting. Negative for abdominal pain.   Genitourinary:  Negative for difficulty urinating, dysuria, flank pain, frequency, hematuria and urgency.   Musculoskeletal:  Negative for neck pain and neck stiffness.   Skin:  Positive for rash and wound. Negative for pallor.   Neurological:  Negative for dizziness, syncope, weakness, numbness and headaches.   Hematological:  Negative for adenopathy. Does not bruise/bleed easily.   Psychiatric/Behavioral: Negative.          Physical Exam:  /63   Pulse 94   Temp 98.4 °F (36.9 °C)   Resp 22   Ht 165.1 cm (65\")   SpO2 98%   BMI 65.63 kg/m²         Physical Exam  Vitals and nursing note reviewed.   Constitutional:       General: She is not in acute distress.     Appearance: Normal appearance. She is obese. She is not ill-appearing, toxic-appearing or diaphoretic.   HENT:      Head: Normocephalic and atraumatic.      Mouth/Throat:      Mouth: Mucous membranes are moist.   Eyes:      Pupils: Pupils are equal, round, and reactive to light.   Cardiovascular:      Rate and Rhythm: Normal rate and regular rhythm.      Pulses: Normal pulses.           Carotid pulses are 2+ on the right side and 2+ on the left side.       Radial pulses are 2+ on the right side and 2+ on the left side.        Femoral pulses are 2+ on the right side and 2+ on the left side.       " Popliteal pulses are 2+ on the right side and 2+ on the left side.        Dorsalis pedis pulses are 2+ on the right side and 2+ on the left side.        Posterior tibial pulses are 2+ on the right side and 2+ on the left side.      Heart sounds: Normal heart sounds. No murmur heard.  Pulmonary:      Effort: Pulmonary effort is normal. No accessory muscle usage, respiratory distress or retractions.      Breath sounds: Normal breath sounds. No wheezing, rhonchi or rales.   Abdominal:      General: Abdomen is flat. There is no distension.      Palpations: Abdomen is soft. There is no mass or pulsatile mass.      Tenderness: There is no abdominal tenderness. There is no right CVA tenderness, left CVA tenderness, guarding or rebound.      Comments: No rigidity   Musculoskeletal:         General: Swelling present. No tenderness or deformity.      Cervical back: Neck supple. No tenderness.      Right lower leg: No edema.      Left lower leg: No edema.   Skin:     General: Skin is warm and dry.      Capillary Refill: Capillary refill takes less than 2 seconds.      Coloration: Skin is not jaundiced or pale.      Findings: Rash present. No erythema.      Comments: The patient has moderate to severe lymphedema.    Patient has changes on the legs consistent with chronic venous stasis dermatitis.  The patient has some weeping of the left anterior middle one third of the leg with some mild erythema.  There is no obvious abscess.  There is no crepitance.    In addition, the patient has what appears to be to severe Candida infection of the pannus.  There is erythema.  There is thick white discharge.  There is superficial erosions   Neurological:      General: No focal deficit present.      Mental Status: She is alert and oriented to person, place, and time. Mental status is at baseline.      Cranial Nerves: Cranial nerves 2-12 are intact. No cranial nerve deficit.      Sensory: Sensation is intact. No sensory deficit.      Motor:  Motor function is intact. No weakness or pronator drift.      Coordination: Coordination is intact. Coordination normal.   Psychiatric:         Mood and Affect: Mood normal.         Behavior: Behavior normal.                      Procedures:  Procedures      Medical Decision Making:      Comorbidities that affect care:    Arthritis, anemia, asthma, depression, morbid obesity, chronic peripheral edema, venous stasis dermatitis    External Notes reviewed:    None      The following orders were placed and all results were independently analyzed by me:  Orders Placed This Encounter   Procedures    COVID-19, FLU A/B, RSV PCR 1 HR TAT - Swab, Nasopharynx    Clostridioides difficile Toxin - Stool, Per Rectum    Enteric Bacterial Panel - Stool, Per Rectum    Clostridioides difficile Toxin, PCR - Stool, Per Rectum    [place] Draw    Comprehensive Metabolic Panel    Lipase    Urinalysis With Microscopic If Indicated (No Culture) - Urine, Clean Catch    Lactic Acid, Plasma    CBC Auto Differential    Protime-INR    Insert Peripheral IV    CBC & Differential    Green Top (Gel)    Lavender Top    Gold Top - SST    Light Blue Top    Extra Tubes    Light Blue Top       Medications Given in the Emergency Department:  Medications   sodium chloride 0.9 % flush 10 mL (has no administration in time range)   lactated ringers bolus 1,000 mL (1,000 mL Intravenous New Bag 7/11/24 0035)   ondansetron (ZOFRAN) injection 4 mg (4 mg Intravenous Given 7/11/24 0035)   famotidine (PEPCID) injection 20 mg (20 mg Intravenous Given 7/11/24 0035)        ED Course:    The patient was initially evaluated in the triage area where orders were placed. The patient was later dispositioned by Nathan Miguel DO.      The patient was advised to stay for completion of workup which includes but is not limited to communication of labs and radiological results, reassessment and plan. The patient was advised that leaving prior to disposition by a provider could  result in critical findings that are not communicated to the patient.     ED Course as of 07/11/24 0403   Wed Jul 10, 2024   2032 PROVIDER IN TRIAGE  Patient was evaluated by Kolby hyde PA-C. Orders were placed and awaiting final results and disposition.   [MV]      ED Course User Index  [MV] Kolby Akers PA       Labs:    Lab Results (last 24 hours)       Procedure Component Value Units Date/Time    CBC & Differential [[id number]]  (Abnormal) Collected: 07/10/24 2045    Specimen: Blood from Arm, Right Updated: 07/10/24 2117    Narrative:      The following orders were created for panel order CBC & Differential.  Procedure                               Abnormality         Status                     ---------                               -----------         ------                     CBC Auto Differential[[id number]]        Abnormal            Final result                 Please view results for these tests on the individual orders.    Comprehensive Metabolic Panel [[id number]]  (Abnormal) Collected: 07/10/24 2045    Specimen: Blood from Arm, Right Updated: 07/10/24 2153     Glucose 177 mg/dL      BUN 53 mg/dL      Creatinine 1.31 mg/dL      Sodium 140 mmol/L      Potassium 4.6 mmol/L      Chloride 105 mmol/L      CO2 22.2 mmol/L      Calcium 9.2 mg/dL      Total Protein 6.9 g/dL      Albumin 3.5 g/dL      ALT (SGPT) 5 U/L      AST (SGOT) 7 U/L      Alkaline Phosphatase 88 U/L      Total Bilirubin 0.4 mg/dL      Globulin 3.4 gm/dL      A/G Ratio 1.0 g/dL      BUN/Creatinine Ratio 40.5     Anion Gap 12.8 mmol/L      eGFR 42.8 mL/min/1.73     Narrative:      GFR Normal >60  Chronic Kidney Disease <60  Kidney Failure <15    The GFR formula is only valid for adults with stable renal function between ages 18 and 70.    Lipase [[id number]]  (Normal) Collected: 07/10/24 2045    Specimen: Blood from Arm, Right Updated: 07/10/24 2139     Lipase 32 U/L     CBC Auto Differential [[id number]]  (Abnormal) Collected: 07/10/24  2045    Specimen: Blood from Arm, Right Updated: 07/10/24 2117     WBC 7.78 10*3/mm3      RBC 4.44 10*6/mm3      Hemoglobin 11.1 g/dL      Hematocrit 35.5 %      MCV 80.0 fL      MCH 25.0 pg      MCHC 31.3 g/dL      RDW 17.8 %      RDW-SD 52.0 fl      MPV 10.3 fL      Platelets 153 10*3/mm3      Neutrophil % 83.4 %      Lymphocyte % 8.2 %      Monocyte % 6.9 %      Eosinophil % 0.5 %      Basophil % 0.5 %      Immature Grans % 0.5 %      Neutrophils, Absolute 6.48 10*3/mm3      Lymphocytes, Absolute 0.64 10*3/mm3      Monocytes, Absolute 0.54 10*3/mm3      Eosinophils, Absolute 0.04 10*3/mm3      Basophils, Absolute 0.04 10*3/mm3      Immature Grans, Absolute 0.04 10*3/mm3      nRBC 0.0 /100 WBC     Protime-INR [[id number]]  (Abnormal) Collected: 07/10/24 2045    Specimen: Blood from Arm, Right Updated: 07/10/24 2348     Protime 25.4 Seconds      INR 2.26    Narrative:      Suggested Therapeutic Ranges For Oral Anticoagulant Therapy:  Level of Therapy                      INR Target Range  Standard Dose                            2.0-3.0  High Dose                                2.5-3.5  Patients not receiving anticoagulant  Therapy Normal Range                     0.86-1.15    Urinalysis With Microscopic If Indicated (No Culture) - Urine, Clean Catch [[id number]]  (Normal) Collected: 07/10/24 2231    Specimen: Urine, Clean Catch Updated: 07/10/24 2309     Color, UA Yellow     Appearance, UA Clear     pH, UA 5.5     Specific Gravity, UA 1.015     Glucose, UA Negative     Ketones, UA Negative     Bilirubin, UA Negative     Blood, UA Negative     Protein, UA Negative     Leuk Esterase, UA Negative     Nitrite, UA Negative     Urobilinogen, UA 0.2 E.U./dL    Narrative:      Urine microscopic not indicated.    Lactic Acid, Plasma [[id number]]  (Normal) Collected: 07/11/24 0004    Specimen: Blood Updated: 07/11/24 0030     Lactate 1.3 mmol/L     COVID-19, FLU A/B, RSV PCR 1 HR TAT - Swab, Nasopharynx [[id number]]  (Normal)  Collected: 07/11/24 0040    Specimen: Swab from Nasopharynx Updated: 07/11/24 0122     COVID19 Not Detected     Influenza A PCR Not Detected     Influenza B PCR Not Detected     RSV, PCR Not Detected    Narrative:      Fact sheet for providers: https://www.fda.gov/media/613755/download    Fact sheet for patients: https://www.fda.gov/media/804760/download    Test performed by PCR.             Imaging:    No Radiology Exams Resulted Within Past 24 Hours      Differential Diagnosis and Discussion:      Vomiting: Differential diagnosis includes but is not limited to migraine, labyrinthine disorders, psychogenic, metabolic and endocrine causes, peptic ulcer, gastric outlet obstruction, gastritis, gastroenteritis, appendicitis, intestinal obstruction, paralytic ileus, food poisoning, cholecystitis, acute hepatitis, acute pancreatitis, acute febrile illness, and myocardial infarction.    All labs were reviewed and interpreted by me.    MDM  Number of Diagnoses or Management Options  Cutaneous candidiasis  Diarrhea, unspecified type  Nausea and vomiting, unspecified vomiting type  Venous stasis dermatitis  Diagnosis management comments: The patient's Covid swab was negative   The patient's Influenza swab was negative     The patient's pro time was 25.4 and the patient's INR was 2.26 which is consistent with the patient's warfarin    The patient's urinalysis was negative for obvious infection or blood    The patient's CMP was reviewed and shows no abnormalities of critical concern.  Of note, the patient's sodium and potassium are acceptable.  The patient's liver enzymes are unremarkable.  The patient's renal function including creatinine is preserved.  The patient has some very mild renal insufficienc.  y the patient has a normal anion gap.    The patient's CBC was reviewed and shows no abnormalities of critical concern.  Of note, there is no anemia requiring a blood transfusion and the platelet count is acceptable    The  patient states her diarrhea has improved.  The patient was unable to give a stool sample while in the emergency room.    The patient was given a liter of lactated Ringer's.  The patient was given Pepcid and Zofran.    The patient was reassessed.  Again, the patient states she has no abdominal pain.  The patient's vomiting is improved.    The patient comes to the ED for evaluation of vomiting.  Emesis is much improved in the ED.  The patient was given antiemetics in the ED.  The patient is resting comfortably and feels better, is alert and in no distress.  Repeat examination is unremarkable and benign.  In particular, [facility]  The history, exam, and diagnostic testing and current condition does not suggest acute appendicitis, bowel obstruction, acute cholecystitis, bowel perforation, major gastrointestinal bleeding, severe diverticulitis, abdominal aortic aneurysm, mesenteric ischemia, volvulus, sepsis or other significant pathology that warrants further testing, continued ED treatment, admission for surgical evaluation at this point.  The vital signs have been stable.  Blood work performed shows no signs of acute renal failure.  The patient does not have uncontrollable pain, intractable vomiting or other significant symptoms.  The patient is now able to tolerate p.o. intake in the ED and has passed a p.o. challenge.  The patient's condition is stable and appropriate for discharge from the emergency department.    I will place the patient on Pepcid.  The patient will continue her Zofran.  The patient will follow-up with her doctor on Friday.  The patient will discuss possible need to check her stool for C. difficile for enteric pathogen should she have any diarrhea.    I will refill the patient's nystatin powder for her candidal skin infection.           Amount and/or Complexity of Data Reviewed  Clinical lab tests: reviewed  Decide to obtain previous medical records or to obtain  history from someone other than the patient: yes         Social Determinants of Health:    Patient is independent, reliable, and has access to care.       Disposition and Care Coordination:    Discharged: The patient is suitable and stable for discharge with no need for consideration of admission.    I have explained discharge medications and the need for follow up with the patient/caretakers. This was also printed in the discharge instructions. Patient was discharged with the following medications and follow up:      Medication List        New Prescriptions      famotidine 20 MG tablet  Commonly known as: PEPCID  Take 1 tablet by mouth 2 (Two) Times a Day for 14 days.     nystatin 825373 UNIT/GM powder  Commonly known as: MYCOSTATIN  Apply  topically to the appropriate area as directed 3 (Three) Times a Day for 10 days.            Changed      * warfarin 2 MG tablet  Commonly known as: COUMADIN  Take 2 mg daily or as directed  What changed:   how much to take  how to take this  when to take this  additional instructions     * warfarin 5 MG tablet  Commonly known as: COUMADIN  Take 1 tablet by mouth Daily. Or as directed  What changed: additional instructions           * This list has 2 medication(s) that are the same as other medications prescribed for you. Read the directions carefully, and ask your doctor or other care provider to review them with you.                   Where to Get Your Medications        These medications were sent to [facility] - [place], [place] - [address] - [phone] [facility] [phone] FX  [address] , [facility] [address]      Phone: [phone]   famotidine 20 MG tablet  nystatin [id number] UNIT/GM powder      Christopher Hanson MD  [address] [id number] [phone]    Call on 7/12/2024         Final diagnoses:   Nausea and vomiting, unspecified vomiting type   Diarrhea, unspecified type   Venous stasis dermatitis   Cutaneous  candidiasis        ED Disposition       ED Disposition   Discharge    Condition   Stable    Comment   --               This medical record created using voice recognition software.             Nathan Miguel DO  07/11/24 3839

## 2024-07-12 ENCOUNTER — TELEPHONE (OUTPATIENT)
Dept: CARDIOLOGY | Facility: CLINIC | Age: 74
End: 2024-07-12
Payer: MEDICARE

## 2024-07-12 ENCOUNTER — ANTICOAGULATION VISIT (OUTPATIENT)
Dept: CARDIOLOGY | Facility: CLINIC | Age: 74
End: 2024-07-12
Payer: MEDICARE

## 2024-07-12 DIAGNOSIS — I48.11 LONGSTANDING PERSISTENT ATRIAL FIBRILLATION: Primary | ICD-10-CM

## 2024-07-12 LAB — INR PPP: 2.4 (ref 2–3)

## 2024-07-12 NOTE — TELEPHONE ENCOUNTER
Caller:LADI MCCRAY     Phone:[phone]    INR Number Being Reported: 2.4     PATIENTS THINKS SHE TOOK A DOSE OF 4 THIS WEEK BUT SHE IS UNSURE

## 2024-07-12 NOTE — PROGRESS NOTES
Lab Results   Component Value Date    INR 2.40 07/12/2024    INR 2.26 (H) 07/10/2024    INR 4.00 (A) 07/09/2024    PROTIME 25.4 (H) 07/10/2024    PROTIME 19.9 (H) 04/27/2024    PROTIME 19.1 (H) 04/26/2024    Atrial fibrillation   Range 2.0-3.0  4 mg  Self test    Pt is aware of results and instructions.

## 2024-07-17 ENCOUNTER — TELEPHONE (OUTPATIENT)
Dept: CARDIOLOGY | Facility: CLINIC | Age: 74
End: 2024-07-17
Payer: MEDICARE

## 2024-07-17 NOTE — TELEPHONE ENCOUNTER
Caller: BLANCA MAHENDRA     Phone: [phone]    INR Number Being Reported:2.4  SHE SAID SHE IS CURRENTLY IN HOSPITAL AND THEY ARE DOING IT THERE SO SHE IS NOT GOING TO NEED TO DO THIS WEEK       Day of the Week  Monday Tuesday Wednesday Thursday Friday Saturday Sunday     Dose Taken

## 2024-08-09 ENCOUNTER — ANTICOAGULATION VISIT (OUTPATIENT)
Dept: CARDIOLOGY | Facility: CLINIC | Age: 74
End: 2024-08-09
Payer: MEDICARE

## 2024-08-09 DIAGNOSIS — I48.19 PERSISTENT ATRIAL FIBRILLATION: ICD-10-CM

## 2024-08-09 DIAGNOSIS — I48.11 LONGSTANDING PERSISTENT ATRIAL FIBRILLATION: Primary | ICD-10-CM

## 2024-08-09 LAB — INR PPP: 2.4 (ref 2–3)

## 2024-08-09 NOTE — PROGRESS NOTES
Lab Results   Component Value Date    INR 2.40 08/09/2024    INR 2.40 07/12/2024    INR 2.26 (H) 07/10/2024    PROTIME 25.4 (H) 07/10/2024    PROTIME 19.9 (H) 04/27/2024    PROTIME 19.1 (H) 04/26/2024    Atrial fibrillation   Range 2.0-3.0  4 mg  Self test    Pt is aware of results and instructions.

## 2024-08-12 ENCOUNTER — ANTICOAGULATION VISIT (OUTPATIENT)
Dept: CARDIOLOGY | Facility: CLINIC | Age: 74
End: 2024-08-12
Payer: MEDICARE

## 2024-08-12 DIAGNOSIS — I48.19 PERSISTENT ATRIAL FIBRILLATION: ICD-10-CM

## 2024-08-12 DIAGNOSIS — I48.11 LONGSTANDING PERSISTENT ATRIAL FIBRILLATION: Primary | ICD-10-CM

## 2024-08-12 LAB — INR PPP: 2.9

## 2024-08-12 NOTE — PROGRESS NOTES
INR - 2.9    Currently taking 4mg qd.  Last checked 8/9/2024 with a result of 2.4  Range: 2.0-3.0  Testing done at home.

## 2024-08-19 ENCOUNTER — ANTICOAGULATION VISIT (OUTPATIENT)
Dept: CARDIOLOGY | Facility: CLINIC | Age: 74
End: 2024-08-19
Payer: MEDICARE

## 2024-08-19 DIAGNOSIS — I48.19 PERSISTENT ATRIAL FIBRILLATION: ICD-10-CM

## 2024-08-19 DIAGNOSIS — I48.11 LONGSTANDING PERSISTENT ATRIAL FIBRILLATION: Primary | ICD-10-CM

## 2024-08-19 LAB — INR PPP: 1.3

## 2024-08-19 NOTE — PROGRESS NOTES
INR - 1.3    Currently taking 4mg qd.  Last checked 8/12/2024 with a result of 2.9.  Range: 2.0-3.0  Testing done at home.

## 2024-08-21 NOTE — PROGRESS NOTES
Called patient and she said she was in the hospital last week with a yeast infection and she was told to not take her warfarin for a couple days while on the steroid for the yeast infection. She started taking the warfarin last Thursday and was taking 2mg daily.

## 2024-10-17 ENCOUNTER — ANTICOAGULATION VISIT (OUTPATIENT)
Dept: CARDIOLOGY | Facility: CLINIC | Age: 74
End: 2024-10-17
Payer: MEDICARE

## 2024-10-17 DIAGNOSIS — I48.11 LONGSTANDING PERSISTENT ATRIAL FIBRILLATION: Primary | ICD-10-CM

## 2024-10-17 NOTE — PROGRESS NOTES
Spoke with patient, patient states she is in a nursing home and they are monitoring INR.    PAST SURGICAL HISTORY:  Avulsion of patellar tendon S/P surgical repair

## 2024-10-21 ENCOUNTER — TRANSCRIBE ORDERS (OUTPATIENT)
Dept: ADMINISTRATIVE | Facility: HOSPITAL | Age: 74
End: 2024-10-21
Payer: MEDICARE

## 2024-10-21 ENCOUNTER — HOSPITAL ENCOUNTER (EMERGENCY)
Facility: HOSPITAL | Age: 74
Discharge: NURSING, REHAB OR LONG-TERM CARE | End: 2024-10-21
Attending: EMERGENCY MEDICINE | Admitting: EMERGENCY MEDICINE
Payer: MEDICARE

## 2024-10-21 ENCOUNTER — APPOINTMENT (OUTPATIENT)
Dept: CT IMAGING | Facility: HOSPITAL | Age: 74
End: 2024-10-21
Payer: MEDICARE

## 2024-10-21 ENCOUNTER — APPOINTMENT (OUTPATIENT)
Dept: GENERAL RADIOLOGY | Facility: HOSPITAL | Age: 74
End: 2024-10-21
Payer: MEDICARE

## 2024-10-21 VITALS
SYSTOLIC BLOOD PRESSURE: 127 MMHG | RESPIRATION RATE: 18 BRPM | DIASTOLIC BLOOD PRESSURE: 82 MMHG | WEIGHT: 289.68 LBS | OXYGEN SATURATION: 94 % | TEMPERATURE: 97.6 F | HEIGHT: 65 IN | HEART RATE: 112 BPM | BODY MASS INDEX: 48.26 KG/M2

## 2024-10-21 DIAGNOSIS — E66.01 MORBID OBESITY DUE TO EXCESS CALORIES: Primary | ICD-10-CM

## 2024-10-21 DIAGNOSIS — N39.0 URINARY TRACT INFECTION IN FEMALE: ICD-10-CM

## 2024-10-21 DIAGNOSIS — E87.6 HYPOKALEMIA: ICD-10-CM

## 2024-10-21 DIAGNOSIS — R10.9 ABDOMINAL PAIN, UNSPECIFIED ABDOMINAL LOCATION: Primary | ICD-10-CM

## 2024-10-21 LAB
ALBUMIN SERPL-MCNC: 3.2 G/DL (ref 3.5–5.2)
ALBUMIN/GLOB SERPL: 0.8 G/DL
ALP SERPL-CCNC: 147 U/L (ref 39–117)
ALT SERPL W P-5'-P-CCNC: <5 U/L (ref 1–33)
ANION GAP SERPL CALCULATED.3IONS-SCNC: 19.3 MMOL/L (ref 5–15)
AST SERPL-CCNC: 6 U/L (ref 1–32)
BACTERIA UR QL AUTO: ABNORMAL /HPF
BASOPHILS # BLD AUTO: 0.04 10*3/MM3 (ref 0–0.2)
BASOPHILS NFR BLD AUTO: 0.5 % (ref 0–1.5)
BILIRUB SERPL-MCNC: 0.6 MG/DL (ref 0–1.2)
BILIRUB UR QL STRIP: ABNORMAL
BUN SERPL-MCNC: 14 MG/DL (ref 8–23)
BUN/CREAT SERPL: 23 (ref 7–25)
CALCIUM SPEC-SCNC: 9.7 MG/DL (ref 8.6–10.5)
CHLORIDE SERPL-SCNC: 95 MMOL/L (ref 98–107)
CLARITY UR: ABNORMAL
CO2 SERPL-SCNC: 25.7 MMOL/L (ref 22–29)
COLOR UR: ABNORMAL
CREAT SERPL-MCNC: 0.61 MG/DL (ref 0.57–1)
D-LACTATE SERPL-SCNC: 1.4 MMOL/L (ref 0.5–2)
DEPRECATED RDW RBC AUTO: 48.5 FL (ref 37–54)
EGFRCR SERPLBLD CKD-EPI 2021: 93.9 ML/MIN/1.73
EOSINOPHIL # BLD AUTO: 0.09 10*3/MM3 (ref 0–0.4)
EOSINOPHIL NFR BLD AUTO: 1.1 % (ref 0.3–6.2)
ERYTHROCYTE [DISTWIDTH] IN BLOOD BY AUTOMATED COUNT: 17.1 % (ref 12.3–15.4)
GLOBULIN UR ELPH-MCNC: 4.1 GM/DL
GLUCOSE SERPL-MCNC: 149 MG/DL (ref 65–99)
GLUCOSE UR STRIP-MCNC: NEGATIVE MG/DL
HCT VFR BLD AUTO: 38.1 % (ref 34–46.6)
HGB BLD-MCNC: 11.3 G/DL (ref 12–15.9)
HGB UR QL STRIP.AUTO: ABNORMAL
HOLD SPECIMEN: NORMAL
HOLD SPECIMEN: NORMAL
HYALINE CASTS UR QL AUTO: ABNORMAL /LPF
IMM GRANULOCYTES # BLD AUTO: 0.12 10*3/MM3 (ref 0–0.05)
IMM GRANULOCYTES NFR BLD AUTO: 1.4 % (ref 0–0.5)
KETONES UR QL STRIP: ABNORMAL
LEUKOCYTE ESTERASE UR QL STRIP.AUTO: ABNORMAL
LIPASE SERPL-CCNC: 12 U/L (ref 13–60)
LYMPHOCYTES # BLD AUTO: 0.8 10*3/MM3 (ref 0.7–3.1)
LYMPHOCYTES NFR BLD AUTO: 9.4 % (ref 19.6–45.3)
MCH RBC QN AUTO: 23 PG (ref 26.6–33)
MCHC RBC AUTO-ENTMCNC: 29.7 G/DL (ref 31.5–35.7)
MCV RBC AUTO: 77.6 FL (ref 79–97)
MONOCYTES # BLD AUTO: 0.6 10*3/MM3 (ref 0.1–0.9)
MONOCYTES NFR BLD AUTO: 7 % (ref 5–12)
NEUTROPHILS NFR BLD AUTO: 6.9 10*3/MM3 (ref 1.7–7)
NEUTROPHILS NFR BLD AUTO: 80.6 % (ref 42.7–76)
NITRITE UR QL STRIP: NEGATIVE
NRBC BLD AUTO-RTO: 0 /100 WBC (ref 0–0.2)
PH UR STRIP.AUTO: 6.5 [PH] (ref 5–8)
PLATELET # BLD AUTO: 180 10*3/MM3 (ref 140–450)
PMV BLD AUTO: 10.5 FL (ref 6–12)
POTASSIUM SERPL-SCNC: 3.3 MMOL/L (ref 3.5–5.2)
PROT SERPL-MCNC: 7.3 G/DL (ref 6–8.5)
PROT UR QL STRIP: ABNORMAL
QT INTERVAL: 334 MS
QTC INTERVAL: 456 MS
RBC # BLD AUTO: 4.91 10*6/MM3 (ref 3.77–5.28)
RBC # UR STRIP: ABNORMAL /HPF
REF LAB TEST METHOD: ABNORMAL
SODIUM SERPL-SCNC: 140 MMOL/L (ref 136–145)
SP GR UR STRIP: 1.02 (ref 1–1.03)
SQUAMOUS #/AREA URNS HPF: ABNORMAL /HPF
UROBILINOGEN UR QL STRIP: ABNORMAL
WBC # UR STRIP: ABNORMAL /HPF
WBC NRBC COR # BLD AUTO: 8.55 10*3/MM3 (ref 3.4–10.8)
WHOLE BLOOD HOLD COAG: NORMAL
WHOLE BLOOD HOLD SPECIMEN: NORMAL

## 2024-10-21 PROCEDURE — 87086 URINE CULTURE/COLONY COUNT: CPT

## 2024-10-21 PROCEDURE — 81001 URINALYSIS AUTO W/SCOPE: CPT

## 2024-10-21 PROCEDURE — 80053 COMPREHEN METABOLIC PANEL: CPT

## 2024-10-21 PROCEDURE — 93005 ELECTROCARDIOGRAM TRACING: CPT

## 2024-10-21 PROCEDURE — 83690 ASSAY OF LIPASE: CPT

## 2024-10-21 PROCEDURE — 83605 ASSAY OF LACTIC ACID: CPT

## 2024-10-21 PROCEDURE — 71045 X-RAY EXAM CHEST 1 VIEW: CPT

## 2024-10-21 PROCEDURE — 96375 TX/PRO/DX INJ NEW DRUG ADDON: CPT

## 2024-10-21 PROCEDURE — 96374 THER/PROPH/DIAG INJ IV PUSH: CPT

## 2024-10-21 PROCEDURE — 25010000002 DIPHENHYDRAMINE PER 50 MG

## 2024-10-21 PROCEDURE — 99284 EMERGENCY DEPT VISIT MOD MDM: CPT

## 2024-10-21 PROCEDURE — 25810000003 SODIUM CHLORIDE 0.9 % SOLUTION

## 2024-10-21 PROCEDURE — 25010000002 PROCHLORPERAZINE 10 MG/2ML SOLUTION

## 2024-10-21 PROCEDURE — 85025 COMPLETE CBC W/AUTO DIFF WBC: CPT

## 2024-10-21 RX ORDER — ONDANSETRON 4 MG/1
4 TABLET, ORALLY DISINTEGRATING ORAL EVERY 4 HOURS PRN
COMMUNITY

## 2024-10-21 RX ORDER — SODIUM CHLORIDE 0.9 % (FLUSH) 0.9 %
10 SYRINGE (ML) INJECTION AS NEEDED
Status: DISCONTINUED | OUTPATIENT
Start: 2024-10-21 | End: 2024-10-21 | Stop reason: HOSPADM

## 2024-10-21 RX ORDER — GINSENG 100 MG
1 CAPSULE ORAL DAILY
COMMUNITY

## 2024-10-21 RX ORDER — MEROPENEM 1 G/1
1 INJECTION, POWDER, FOR SOLUTION INTRAVENOUS EVERY 8 HOURS SCHEDULED
COMMUNITY

## 2024-10-21 RX ORDER — PROCHLORPERAZINE EDISYLATE 5 MG/ML
10 INJECTION INTRAMUSCULAR; INTRAVENOUS ONCE
Status: COMPLETED | OUTPATIENT
Start: 2024-10-21 | End: 2024-10-21

## 2024-10-21 RX ORDER — NUTRITIONAL SUPPLEMENT
1 POWDER (GRAM) ORAL 2 TIMES DAILY
COMMUNITY

## 2024-10-21 RX ORDER — TRAMADOL HYDROCHLORIDE 50 MG/1
50 TABLET ORAL EVERY 6 HOURS PRN
COMMUNITY

## 2024-10-21 RX ORDER — LORAZEPAM 0.5 MG/1
0.5 TABLET ORAL EVERY 12 HOURS PRN
COMMUNITY

## 2024-10-21 RX ORDER — POTASSIUM CHLORIDE 750 MG/1
40 CAPSULE, EXTENDED RELEASE ORAL ONCE
Status: DISCONTINUED | OUTPATIENT
Start: 2024-10-21 | End: 2024-10-21 | Stop reason: HOSPADM

## 2024-10-21 RX ORDER — DIPHENHYDRAMINE HYDROCHLORIDE 50 MG/ML
12.5 INJECTION INTRAMUSCULAR; INTRAVENOUS ONCE
Status: COMPLETED | OUTPATIENT
Start: 2024-10-21 | End: 2024-10-21

## 2024-10-21 RX ORDER — SACCHAROMYCES BOULARDII 250 MG
250 CAPSULE ORAL 2 TIMES DAILY
COMMUNITY

## 2024-10-21 RX ORDER — IPRATROPIUM BROMIDE AND ALBUTEROL SULFATE 2.5; .5 MG/3ML; MG/3ML
3 SOLUTION RESPIRATORY (INHALATION) EVERY 6 HOURS
COMMUNITY

## 2024-10-21 RX ORDER — SERTRALINE HYDROCHLORIDE 25 MG/1
25 TABLET, FILM COATED ORAL DAILY
COMMUNITY

## 2024-10-21 RX ORDER — FERROUS SULFATE 325(65) MG
325 TABLET ORAL 2 TIMES DAILY
COMMUNITY

## 2024-10-21 RX ADMIN — DIPHENHYDRAMINE HYDROCHLORIDE 12.5 MG: 50 INJECTION, SOLUTION INTRAMUSCULAR; INTRAVENOUS at 19:29

## 2024-10-21 RX ADMIN — SODIUM CHLORIDE 1000 ML: 0.9 INJECTION, SOLUTION INTRAVENOUS at 20:21

## 2024-10-21 RX ADMIN — PROCHLORPERAZINE EDISYLATE 10 MG: 5 INJECTION INTRAMUSCULAR; INTRAVENOUS at 19:29

## 2024-10-21 NOTE — ED PROVIDER NOTES
Time: 7:09 PM EDT  Date of encounter:  10/21/2024  Independent Historian/Clinical History and Information was obtained by:   Patient and Chart    History is limited by: N/A    Chief Complaint: Abdominal pain      History of Present Illness:  Patient is a 74 y.o. year old female who presents to the emergency department for evaluation of abdominal pain.  Patient states that she has had abdominal pain for the past couple of weeks now and has been diagnosed with multiple UTIs and is taking Cipro, clinda, meropenem for this with most recently starting the meropenem.  Patient complains of vomiting as well.      Patient Care Team  Primary Care Provider: Christopher Hanson MD    Past Medical History:     Allergies   Allergen Reactions    Codeine     Dye Fdc Red [Red Dye #40 (Allura Red)] Other (See Comments)          Iodinated Contrast Media Nausea Only    Penicillins     Sulfa Antibiotics      Past Medical History:   Diagnosis Date    A-fib     Anemia     Anxiety     Asthma     Candidiasis of skin and nail     CHF (congestive heart failure)     COPD (chronic obstructive pulmonary disease)     Depression     Diabetes mellitus     GERD (gastroesophageal reflux disease)     Hyperlipidemia     Hypertension     Hypokalemia     Hypomagnesemia     Intervertebral disc disease     Obesity     Osteoarthritis     Panniculitis     Sleep apnea     Vitamin D deficiency      Past Surgical History:   Procedure Laterality Date    BACK SURGERY      STOMACH SURGERY       History reviewed. No pertinent family history.    Home Medications:  Prior to Admission medications    Medication Sig Start Date End Date Taking? Authorizing Provider   bacitracin 500 UNIT/GM ointment Apply 1 Application topically to the appropriate area as directed Daily. Apply liberal amount to wound in abd fold   Yes Provider, MD Chayo   cyanocobalamin 1000 MCG/ML injection Inject 1 mL into the appropriate muscle as directed by prescriber 1 (One) Time Per Week. 1/22/24   Yes Chayo Emmanuel MD   ferrous sulfate 325 (65 FE) MG tablet Take 1 tablet by mouth 2 (Two) Times a Day.   Yes Provider, Historical, MD   furosemide (LASIX) 20 MG tablet Take 2 tablets by mouth 2 (Two) Times a Day.  Patient taking differently: Take 2 tablets by mouth Daily. 3/18/24  Yes Tonya Ortega APRN   gabapentin (NEURONTIN) 800 MG tablet Take 1 tablet by mouth every night at bedtime. 6/5/23  Yes Provider, Historical, MD   ipratropium-albuterol (DUO-NEB) 0.5-2.5 mg/3 ml nebulizer Take 3 mL by nebulization Every 6 (Six) Hours.   Yes Chayo Emmanuel MD   Liraglutide (VICTOZA) 18 MG/3ML solution pen-injector injection Inject 0.6 mg under the skin into the appropriate area as directed Daily. 6/2/23  Yes Provider, Historical, MD   LORazepam (ATIVAN) 0.5 MG tablet Take 1 tablet by mouth Every 12 (Twelve) Hours As Needed for Anxiety.   Yes Chayo Emmanuel MD   losartan (COZAAR) 100 MG tablet Take 12.5 mg by mouth Daily. 6/5/23  Yes Provider, Historical, MD   Melatonin 3 MG capsule Take 1 capsule by mouth Every Night.   Yes Chayo Emmanuel MD   meropenem (MERREM) 1 g injection Infuse 1,000 mg into a venous catheter Every 8 (Eight) Hours. For 7 days - started 10/19/24 and end 10/27/24   Yes Chayo Emmanuel MD   metFORMIN (GLUCOPHAGE) 500 MG tablet Take 1 tablet by mouth 2 (Two) Times a Day With Meals.   Yes Chayo Emmanuel MD   Nutritional Supplements (William) powder Take 1 package by mouth 2 (Two) Times a Day.   Yes Provider, Historical, MD   ondansetron ODT (ZOFRAN-ODT) 4 MG disintegrating tablet Place 1 tablet on the tongue Every 4 (Four) Hours As Needed for Nausea or Vomiting.   Yes Provider, Historical, MD   potassium chloride 10 MEQ CR tablet Take 1 tablet by mouth Daily.   Yes Provider, Historical, MD   saccharomyces boulardii (FLORASTOR) 250 MG capsule Take 1 capsule by mouth 2 (Two) Times a Day.   Yes Provider, Historical, MD   sertraline (ZOLOFT) 25 MG tablet Take 1  tablet by mouth Daily.   Yes Chayo Emmanuel MD   traMADol (ULTRAM) 50 MG tablet Take 1 tablet by mouth Every 6 (Six) Hours As Needed for Moderate Pain.   Yes Chayo Emmanuel MD   warfarin (COUMADIN) 2 MG tablet Take 2 mg daily or as directed  Patient taking differently: Take 2 tablets by mouth Every Night. Home Warfarin Notes    Home warfarin dose: Since last Tuesday 04/16/2024 4 mg- 5 mg- 5 mg then 4 mg- 5 mg- 5 mg  Who monitors the patients INR? Checks weekly @ home and reports to Tonya MCKEON  Has the patient been consistent on their current dosing regimen? No 3/15/24  Yes Tonya Ortega APRN   warfarin (COUMADIN) 5 MG tablet Take 1 tablet by mouth Daily. Or as directed  Patient taking differently: Take 1 tablet by mouth Daily. Home warfarin dose: Since last Tuesday 04/16/2024 4 mg- 5 mg- 5 mg then 4 mg- 5 mg- 5 mg  Who monitors the patients INR? Checks weekly @ home and reports to Tonya MCKEON  Has the patient been consistent on their current dosing regimen? No 4/8/24  Yes Tonya Ortega APRN   albuterol sulfate  (90 Base) MCG/ACT inhaler Inhale 2 puffs Every 4 (Four) Hours As Needed for Wheezing or Shortness of Air.    Chayo Emmanuel MD   cloNIDine (CATAPRES) 0.1 MG tablet Take 1 tablet by mouth 2 (Two) Times a Day.    Chayo Emmanuel MD   metOLazone (ZAROXOLYN) 2.5 MG tablet Take 1 tablet by mouth Every Other Day. 6/11/24   Stew Riojas MD   spironolactone (ALDACTONE) 50 MG tablet Take 1 tablet by mouth Daily. 3/28/23   Chayo Emmanuel MD        Social History:   Social History     Tobacco Use    Smoking status: Never   Vaping Use    Vaping status: Never Used   Substance Use Topics    Alcohol use: No    Drug use: Never         Review of Systems:  Review of Systems   Constitutional:  Positive for appetite change. Negative for fever.   Gastrointestinal:  Positive for abdominal pain, nausea and vomiting.   Genitourinary:  Positive for  "urgency.        Physical Exam:  /82   Pulse 112   Temp 97.6 °F (36.4 °C) (Oral)   Resp 18   Ht 165.1 cm (65\")   Wt 131 kg (289 lb 11 oz)   SpO2 94%   BMI 48.21 kg/m²     Physical Exam  Vitals reviewed.   Constitutional:       Appearance: She is morbidly obese.   Cardiovascular:      Rate and Rhythm: Normal rate and regular rhythm.      Heart sounds: Normal heart sounds.   Pulmonary:      Breath sounds: Decreased breath sounds present.   Abdominal:      General: Abdomen is flat.      Palpations: Abdomen is soft.      Tenderness: There is generalized abdominal tenderness.   Neurological:      Mental Status: She is alert.   Psychiatric:         Behavior: Behavior is cooperative.                Procedures:  Procedures      Medical Decision Making:      Comorbidities that affect care:    Asthma, Atrial Fibrillation, Congestive Heart Failure, COPD, Diabetes, Hypertension    External Notes reviewed:    Previous Clinic Note: Facility notes and medications      The following orders were placed and all results were independently analyzed by me:  Orders Placed This Encounter   Procedures    Urine Culture - Urine,    XR Chest 1 View    [place] Draw    Comprehensive Metabolic Panel    Lipase    Urinalysis With Microscopic If Indicated (No Culture) - Urine, Clean Catch    Lactic Acid, Plasma    CBC Auto Differential    Urinalysis, Microscopic Only - Urine, Clean Catch    Undress & Gown    ECG 12 Lead Tachycardia    CBC & Differential    Green Top (Gel)    Lavender Top    Gold Top - SST    Light Blue Top       Medications Given in the Emergency Department:  Medications   prochlorperazine (COMPAZINE) injection 10 mg (10 mg Intravenous Given 10/21/24 1929)   diphenhydrAMINE (BENADRYL) injection 12.5 mg (12.5 mg Intravenous Given 10/21/24 1929)   sodium chloride 0.9 % bolus 1,000 mL (0 mL Intravenous Stopped 10/21/24 2053)        ED Course:    ED Course as of 10/22/24 0143   Tue Oct 22, 2024   0141 Patient refused CT " scan.  Encourage patient due to possible underlying abscess as well as abdominal pain.  Risks and benefits discussed with patient. [AS]   0143 Comprehensive Metabolic Panel(!)  Patient started on potassium replacement [AS]      ED Course User Index  [AS] Seaver, Alyce B, APRN       Labs:    Lab Results (last 24 hours)       Procedure Component Value Units Date/Time    CBC & Differential [[id number]]  (Abnormal) Collected: 10/21/24 1612    Specimen: Blood from Arm, Right Updated: 10/21/24 1626    Narrative:      The following orders were created for panel order CBC & Differential.  Procedure                               Abnormality         Status                     ---------                               -----------         ------                     CBC Auto Differential[[id number]]        Abnormal            Final result                 Please view results for these tests on the individual orders.    Comprehensive Metabolic Panel [[id number]]  (Abnormal) Collected: 10/21/24 1612    Specimen: Blood from Arm, Right Updated: 10/21/24 1657     Glucose 149 mg/dL      BUN 14 mg/dL      Creatinine 0.61 mg/dL      Sodium 140 mmol/L      Potassium 3.3 mmol/L      Chloride 95 mmol/L      CO2 25.7 mmol/L      Calcium 9.7 mg/dL      Total Protein 7.3 g/dL      Albumin 3.2 g/dL      ALT (SGPT) <5 U/L      AST (SGOT) 6 U/L      Alkaline Phosphatase 147 U/L      Total Bilirubin 0.6 mg/dL      Globulin 4.1 gm/dL      A/G Ratio 0.8 g/dL      BUN/Creatinine Ratio 23.0     Anion Gap 19.3 mmol/L      eGFR 93.9 mL/min/1.73     Narrative:      GFR Normal >60  Chronic Kidney Disease <60  Kidney Failure <15    The GFR formula is only valid for adults with stable renal function between ages 18 and 70.    Lipase [[id number]]  (Abnormal) Collected: 10/21/24 1612    Specimen: Blood from Arm, Right Updated: 10/21/24 1645     Lipase 12 U/L     Lactic Acid, Plasma [[id number]]  (Normal) Collected: 10/21/24 1612    Specimen: Blood from Arm, Right  Updated: 10/21/24 1640     Lactate 1.4 mmol/L     CBC Auto Differential [[id number]]  (Abnormal) Collected: 10/21/24 1612    Specimen: Blood from Arm, Right Updated: 10/21/24 1626     WBC 8.55 10*3/mm3      RBC 4.91 10*6/mm3      Hemoglobin 11.3 g/dL      Hematocrit 38.1 %      MCV 77.6 fL      MCH 23.0 pg      MCHC 29.7 g/dL      RDW 17.1 %      RDW-SD 48.5 fl      MPV 10.5 fL      Platelets 180 10*3/mm3      Neutrophil % 80.6 %      Lymphocyte % 9.4 %      Monocyte % 7.0 %      Eosinophil % 1.1 %      Basophil % 0.5 %      Immature Grans % 1.4 %      Neutrophils, Absolute 6.90 10*3/mm3      Lymphocytes, Absolute 0.80 10*3/mm3      Monocytes, Absolute 0.60 10*3/mm3      Eosinophils, Absolute 0.09 10*3/mm3      Basophils, Absolute 0.04 10*3/mm3      Immature Grans, Absolute 0.12 10*3/mm3      nRBC 0.0 /100 WBC     Urinalysis With Microscopic If Indicated (No Culture) - Indwelling Urethral Catheter [[id number]]  (Abnormal) Collected: 10/21/24 1613    Specimen: Urine from Indwelling Urethral Catheter Updated: 10/21/24 1643     Color, UA Dark Yellow     Appearance, UA Turbid     pH, UA 6.5     Specific Gravity, UA 1.021     Glucose, UA Negative     Ketones, UA 80 mg/dL (3+)     Bilirubin, UA Moderate (2+)     Blood, UA Moderate (2+)     Protein, UA 30 mg/dL (1+)     Leuk Esterase, UA Large (3+)     Nitrite, UA Negative     Urobilinogen, UA 1.0 E.U./dL    Urinalysis, Microscopic Only - Indwelling Urethral Catheter [[id number]]  (Abnormal) Collected: 10/21/24 1613    Specimen: Urine from Indwelling Urethral Catheter Updated: 10/21/24 1643     RBC, UA 6-10 /HPF      WBC, UA Too Numerous to Count /HPF      Bacteria, UA 2+ /HPF      Squamous Epithelial Cells, UA None Seen /HPF      Hyaline Casts, UA 0-2 /LPF      Methodology Manual Light Microscopy    Urine Culture - Urine, Indwelling Urethral Catheter [[id number]] Collected: 10/21/24 1613    Specimen: Urine from Indwelling Urethral Catheter Updated: 10/21/24 1935              Imaging:    XR Chest 1 View    Result Date: 10/21/2024  XR CHEST 1 VW Date of Exam: 10/21/2024 7:13 PM EDT Indication: SOB Comparison: 4/22/2024, 6/14/2023 Findings: The lungs are clear. Cardiac, hilar, and mediastinal silhouettes are stable. There are senescent changes in the thoracic aorta. No pneumothorax or pleural effusions. The trachea is midline. Pulmonary vascularity is normal. Visualized bony structures are intact.     Impression: No active cardiopulmonary disease Electronically Signed: Dwain Martin DO  10/21/2024 8:09 PM EDT  Workstation ID: [id number]       Differential Diagnosis and Discussion:    Abdominal Pain: Based on the patient's signs and symptoms, I considered abdominal aortic aneurysm, small bowel obstruction, pancreatitis, acute cholecystitis, acute appendecitis, peptic ulcer disease, gastritis, colitis, endocrine disorders, irritable bowel syndrome and other differential diagnosis an etiology of the patient's abdominal pain.    All labs were reviewed and interpreted by me.    MDM  Number of Diagnoses or Management Options  Abdominal pain, unspecified abdominal location  Hypokalemia  Urinary tract infection in female  Diagnosis management comments: The patient is resting comfortably and feels better, is alert and in no distress. Repeat examination is unremarkable and benign; in particular, there's no discomfort at McBurney's point and there is no pulsatile mass. The history, exam, diagnostic testing, and current condition does not suggest acute appendicitis, bowel obstruction, acute cholecystitis, bowel perforation, major gastrointestinal bleeding, severe diverticulitis, abdominal aortic aneurysm, mesenteric ischemia, volvulus, sepsis, or other significant pathology that warrants further testing, continued ED treatment, admission, for surgical evaluation at this point. The vital signs have been stable. The patient does not have uncontrollable pain, intractable vomiting, or other significant  symptoms. The patient's condition is stable and appropriate for discharge from the emergency department.       Amount and/or Complexity of Data Reviewed  Clinical lab tests: reviewed  Tests in the radiology section of CPT®: reviewed  Tests in the medicine section of CPT®: reviewed           Patient Care Considerations:    SEPSIS was considered but is NOT present in the emergency department as SIRS criteria is not present.      Consultants/Shared Management Plan:    SHARED VISIT: I have discussed the case with my supervising physician, Dr. Valderrama who states see note. The substantive portion of the medical decision was made by the attesting physician who made or approve the management plan and will take responsibility for the patient.  Clinical findings were discussed and ultimate disposition was made in consult with supervising physician.    Social Determinants of Health:    Patient is a nursing home/assisted living resident and has reliable access to care.      Disposition and Care Coordination:    Discharged: I considered escalation of care by admitting this patient to the hospital, however patient stable at this time and on adequate IV antibiotics    I have explained the patient´s condition, diagnoses and treatment plan based on the information available to me at this time. I have answered questions and addressed any concerns. The patient has a good  understanding of the patient´s diagnosis, condition, and treatment plan as can be expected at this point. The vital signs have been stable. The patient´s condition is stable and appropriate for discharge from the emergency department.      The patient will pursue further outpatient evaluation with the primary care physician or other designated or consulting physician as outlined in the discharge instructions. They are agreeable to this plan of care and follow-up instructions have been explained in detail. The patient has received these instructions in written format and  has expressed an understanding of the discharge instructions. The patient is aware that any significant change in condition or worsening of symptoms should prompt an immediate return to this or the closest emergency department or call to 911.    Final diagnoses:   Abdominal pain, unspecified abdominal location   Urinary tract infection in female   Hypokalemia        ED Disposition       ED Disposition   Discharge    Condition   Stable    Comment   --               This medical record created using voice recognition software.             Seaver, Alyce B, APRN  10/22/24 0144

## 2024-10-22 NOTE — ED PROVIDER NOTES
"SHARED VISIT NOTE:    Patient is 74 y.o. year old female that presents to the ED for evaluation of abdominal pain.  Patient reports having pain for the last couple weeks.  Has been diagnosed with a UTI and is currently taking meropenem.  Patient reports being on multiple antibiotics.  Patient also admits to having some nausea and vomiting.    Physical Exam    ED Course:    /82   Pulse 112   Temp 97.6 °F (36.4 °C) (Oral)   Resp 18   Ht 165.1 cm (65\")   Wt 131 kg (289 lb 11 oz)   SpO2 94%   BMI 48.21 kg/m²   Results for orders placed or performed during the hospital encounter of 10/21/24   Comprehensive Metabolic Panel    Collection Time: 10/21/24  4:12 PM    Specimen: Arm, Right; Blood   Result Value Ref Range    Glucose 149 (H) 65 - 99 mg/dL    BUN 14 8 - 23 mg/dL    Creatinine 0.61 0.57 - 1.00 mg/dL    Sodium 140 136 - 145 mmol/L    Potassium 3.3 (L) 3.5 - 5.2 mmol/L    Chloride 95 (L) 98 - 107 mmol/L    CO2 25.7 22.0 - 29.0 mmol/L    Calcium 9.7 8.6 - 10.5 mg/dL    Total Protein 7.3 6.0 - 8.5 g/dL    Albumin 3.2 (L) 3.5 - 5.2 g/dL    ALT (SGPT) <5 1 - 33 U/L    AST (SGOT) 6 1 - 32 U/L    Alkaline Phosphatase 147 (H) 39 - 117 U/L    Total Bilirubin 0.6 0.0 - 1.2 mg/dL    Globulin 4.1 gm/dL    A/G Ratio 0.8 g/dL    BUN/Creatinine Ratio 23.0 7.0 - 25.0    Anion Gap 19.3 (H) 5.0 - 15.0 mmol/L    eGFR 93.9 >60.0 mL/min/1.73   Lipase    Collection Time: 10/21/24  4:12 PM    Specimen: Arm, Right; Blood   Result Value Ref Range    Lipase 12 (L) 13 - 60 U/L   Lactic Acid, Plasma    Collection Time: 10/21/24  4:12 PM    Specimen: Arm, Right; Blood   Result Value Ref Range    Lactate 1.4 0.5 - 2.0 mmol/L   CBC Auto Differential    Collection Time: 10/21/24  4:12 PM    Specimen: Arm, Right; Blood   Result Value Ref Range    WBC 8.55 3.40 - 10.80 10*3/mm3    RBC 4.91 3.77 - 5.28 10*6/mm3    Hemoglobin 11.3 (L) 12.0 - 15.9 g/dL    Hematocrit 38.1 34.0 - 46.6 %    MCV 77.6 (L) 79.0 - 97.0 fL    MCH 23.0 (L) 26.6 - " 33.0 pg    MCHC 29.7 (L) 31.5 - 35.7 g/dL    RDW 17.1 (H) 12.3 - 15.4 %    RDW-SD 48.5 37.0 - 54.0 fl    MPV 10.5 6.0 - 12.0 fL    Platelets 180 140 - 450 10*3/mm3    Neutrophil % 80.6 (H) 42.7 - 76.0 %    Lymphocyte % 9.4 (L) 19.6 - 45.3 %    Monocyte % 7.0 5.0 - 12.0 %    Eosinophil % 1.1 0.3 - 6.2 %    Basophil % 0.5 0.0 - 1.5 %    Immature Grans % 1.4 (H) 0.0 - 0.5 %    Neutrophils, Absolute 6.90 1.70 - 7.00 10*3/mm3    Lymphocytes, Absolute 0.80 0.70 - 3.10 10*3/mm3    Monocytes, Absolute 0.60 0.10 - 0.90 10*3/mm3    Eosinophils, Absolute 0.09 0.00 - 0.40 10*3/mm3    Basophils, Absolute 0.04 0.00 - 0.20 10*3/mm3    Immature Grans, Absolute 0.12 (H) 0.00 - 0.05 10*3/mm3    nRBC 0.0 0.0 - 0.2 /100 WBC   Green Top (Gel)    Collection Time: 10/21/24  4:12 PM   Result Value Ref Range    Extra Tube Hold for add-ons.    Lavender Top    Collection Time: 10/21/24  4:12 PM   Result Value Ref Range    Extra Tube hold for add-on    Gold Top - SST    Collection Time: 10/21/24  4:12 PM   Result Value Ref Range    Extra Tube Hold for add-ons.    Light Blue Top    Collection Time: 10/21/24  4:12 PM   Result Value Ref Range    Extra Tube Hold for add-ons.    Urinalysis With Microscopic If Indicated (No Culture) - Indwelling Urethral Catheter    Collection Time: 10/21/24  4:13 PM    Specimen: Indwelling Urethral Catheter; Urine   Result Value Ref Range    Color, UA Dark Yellow (A) Yellow, Straw    Appearance, UA Turbid (A) Clear    pH, UA 6.5 5.0 - 8.0    Specific Gravity, UA 1.021 1.005 - 1.030    Glucose, UA Negative Negative    Ketones, UA 80 mg/dL (3+) (A) Negative    Bilirubin, UA Moderate (2+) (A) Negative    Blood, UA Moderate (2+) (A) Negative    Protein, UA 30 mg/dL (1+) (A) Negative    Leuk Esterase, UA Large (3+) (A) Negative    Nitrite, UA Negative Negative    Urobilinogen, UA 1.0 E.U./dL 0.2 - 1.0 E.U./dL   Urinalysis, Microscopic Only - Indwelling Urethral Catheter    Collection Time: 10/21/24  4:13 PM    Specimen:  Indwelling Urethral Catheter; Urine   Result Value Ref Range    RBC, UA 6-10 (A) None Seen, 0-2 /HPF    WBC, UA Too Numerous to Count (A) None Seen, 0-2 /HPF    Bacteria, UA 2+ (A) None Seen /HPF    Squamous Epithelial Cells, UA None Seen None Seen, 0-2 /HPF    Hyaline Casts, UA 0-2 None Seen /LPF    Methodology Manual Light Microscopy    ECG 12 Lead Tachycardia    Collection Time: 10/21/24  7:28 PM   Result Value Ref Range    QT Interval 334 ms    QTC Interval 456 ms     Medications   sodium chloride 0.9 % flush 10 mL (has no administration in time range)   sodium chloride 0.9 % bolus 1,000 mL (1,000 mL Intravenous New Bag 10/21/24 2021)   potassium chloride (MICRO-K/KLOR-CON) CR capsule (has no administration in time range)   prochlorperazine (COMPAZINE) injection 10 mg (10 mg Intravenous Given 10/21/24 1929)   diphenhydrAMINE (BENADRYL) injection 12.5 mg (12.5 mg Intravenous Given 10/21/24 1929)     XR Chest 1 View    Result Date: 10/21/2024  Narrative: XR CHEST 1 VW Date of Exam: 10/21/2024 7:13 PM EDT Indication: SOB Comparison: 4/22/2024, 6/14/2023 Findings: The lungs are clear. Cardiac, hilar, and mediastinal silhouettes are stable. There are senescent changes in the thoracic aorta. No pneumothorax or pleural effusions. The trachea is midline. Pulmonary vascularity is normal. Visualized bony structures are intact.     Impression: Impression: No active cardiopulmonary disease Electronically Signed: Dwain Martin DO  10/21/2024 8:09 PM EDT  Workstation ID: [id number]     MDM:    Procedures    All labs were reviewed and interpreted by me.  All X-rays impressions were independently interpreted by me.  EKG was interpreted by me.          SHARED VISIT ATTESTATION:    This visit was performed by both myself and an APC.  I performed the substantive portion of the medical decision making. The management plan was made or approved by me, and I take responsibility for patient management.           Blu Valderrama DO  20:27  EDT  10/21/24         Blu Valderrama DO  10/24/24 1200

## 2024-10-22 NOTE — DISCHARGE INSTRUCTIONS
Follow-up with your primary care provider within 72 hours.  Return to ER if symptoms worsen or fail to improve.  Continue taking previously scribed meropenem.

## 2024-10-23 LAB — BACTERIA SPEC AEROBE CULT: NO GROWTH

## 2024-11-03 LAB
QT INTERVAL: 334 MS
QTC INTERVAL: 456 MS

## 2024-11-04 ENCOUNTER — HOSPITAL ENCOUNTER (OUTPATIENT)
Dept: CT IMAGING | Facility: HOSPITAL | Age: 74
Discharge: HOME | End: 2024-11-04
Payer: MEDICARE

## 2024-11-05 ENCOUNTER — HOSPITAL ENCOUNTER (OUTPATIENT)
Dept: CT IMAGING | Facility: HOSPITAL | Age: 74
Discharge: HOME | End: 2024-11-05
Admitting: INTERNAL MEDICINE
Payer: MEDICARE

## 2024-11-05 DIAGNOSIS — E66.01 MORBID OBESITY DUE TO EXCESS CALORIES: ICD-10-CM

## 2024-11-05 PROCEDURE — 74150 CT ABDOMEN W/O CONTRAST: CPT

## 2024-11-11 ENCOUNTER — APPOINTMENT (OUTPATIENT)
Dept: GENERAL RADIOLOGY | Facility: HOSPITAL | Age: 74
End: 2024-11-11
Payer: MEDICARE

## 2024-11-11 ENCOUNTER — HOSPITAL ENCOUNTER (INPATIENT)
Facility: HOSPITAL | Age: 74
LOS: 27 days | Discharge: NURSING, REHAB OR LONG-TERM CARE | End: 2024-12-10
Attending: EMERGENCY MEDICINE | Admitting: INTERNAL MEDICINE
Payer: MEDICARE

## 2024-11-11 ENCOUNTER — APPOINTMENT (OUTPATIENT)
Dept: CT IMAGING | Facility: HOSPITAL | Age: 74
End: 2024-11-11
Payer: MEDICARE

## 2024-11-11 DIAGNOSIS — L08.9 CHRONIC WOUND INFECTION OF ABDOMEN, SEQUELA: ICD-10-CM

## 2024-11-11 DIAGNOSIS — R26.2 DIFFICULTY WALKING: Primary | ICD-10-CM

## 2024-11-11 DIAGNOSIS — E87.6 HYPOKALEMIA: ICD-10-CM

## 2024-11-11 DIAGNOSIS — S31.109S CHRONIC WOUND INFECTION OF ABDOMEN, SEQUELA: ICD-10-CM

## 2024-11-11 DIAGNOSIS — R13.12 OROPHARYNGEAL DYSPHAGIA: ICD-10-CM

## 2024-11-11 DIAGNOSIS — R63.0 ANOREXIA: ICD-10-CM

## 2024-11-11 DIAGNOSIS — S31.109A CHRONIC WOUND INFECTION OF ABDOMEN, INITIAL ENCOUNTER: ICD-10-CM

## 2024-11-11 DIAGNOSIS — L08.9 CHRONIC WOUND INFECTION OF ABDOMEN, INITIAL ENCOUNTER: ICD-10-CM

## 2024-11-11 PROBLEM — R41.82 AMS (ALTERED MENTAL STATUS): Status: ACTIVE | Noted: 2024-11-11

## 2024-11-11 LAB
ALBUMIN SERPL-MCNC: 2.8 G/DL (ref 3.5–5.2)
ALBUMIN/GLOB SERPL: 0.9 G/DL
ALP SERPL-CCNC: 107 U/L (ref 39–117)
ALT SERPL W P-5'-P-CCNC: 8 U/L (ref 1–33)
AMMONIA BLD-SCNC: 20 UMOL/L (ref 11–51)
ANION GAP SERPL CALCULATED.3IONS-SCNC: 16.4 MMOL/L (ref 5–15)
ARTERIAL PATENCY WRIST A: POSITIVE
AST SERPL-CCNC: 7 U/L (ref 1–32)
ATMOSPHERIC PRESS: 743.6 MMHG
BACTERIA UR QL AUTO: ABNORMAL /HPF
BASE EXCESS BLDA CALC-SCNC: 2.4 MMOL/L (ref -2–2)
BASOPHILS # BLD AUTO: 0.04 10*3/MM3 (ref 0–0.2)
BASOPHILS NFR BLD AUTO: 0.4 % (ref 0–1.5)
BDY SITE: ABNORMAL
BILIRUB SERPL-MCNC: 0.6 MG/DL (ref 0–1.2)
BILIRUB UR QL STRIP: ABNORMAL
BUN SERPL-MCNC: 23 MG/DL (ref 8–23)
BUN/CREAT SERPL: 31.5 (ref 7–25)
CALCIUM SPEC-SCNC: 9 MG/DL (ref 8.6–10.5)
CHLORIDE SERPL-SCNC: 93 MMOL/L (ref 98–107)
CLARITY UR: ABNORMAL
CO2 SERPL-SCNC: 23.6 MMOL/L (ref 22–29)
COLOR UR: ABNORMAL
CREAT SERPL-MCNC: 0.73 MG/DL (ref 0.57–1)
D-LACTATE SERPL-SCNC: 1.9 MMOL/L (ref 0.5–2)
DEPRECATED RDW RBC AUTO: 49.1 FL (ref 37–54)
EGFRCR SERPLBLD CKD-EPI 2021: 86.4 ML/MIN/1.73
EOSINOPHIL # BLD AUTO: 0.1 10*3/MM3 (ref 0–0.4)
EOSINOPHIL NFR BLD AUTO: 1 % (ref 0.3–6.2)
ERYTHROCYTE [DISTWIDTH] IN BLOOD BY AUTOMATED COUNT: 18.9 % (ref 12.3–15.4)
FOLATE SERPL-MCNC: 2.07 NG/ML (ref 4.78–24.2)
GAS FLOW AIRWAY: 2 LPM
GEN 5 1HR TROPONIN T REFLEX: 51 NG/L
GLOBULIN UR ELPH-MCNC: 3.2 GM/DL
GLUCOSE BLDC GLUCOMTR-MCNC: 114 MG/DL (ref 70–99)
GLUCOSE BLDC GLUCOMTR-MCNC: 136 MG/DL (ref 70–99)
GLUCOSE SERPL-MCNC: 140 MG/DL (ref 65–99)
GLUCOSE UR STRIP-MCNC: NEGATIVE MG/DL
GRAN CASTS URNS QL MICRO: ABNORMAL /LPF
HCO3 BLDA-SCNC: 26.2 MMOL/L (ref 22–26)
HCT VFR BLD AUTO: 38.1 % (ref 34–46.6)
HCT VFR BLD CALC: 36 % (ref 38–51)
HEMODILUTION: NO
HGB BLD-MCNC: 12 G/DL (ref 12–15.9)
HGB BLDA-MCNC: 12.2 G/DL (ref 12–18)
HGB UR QL STRIP.AUTO: NEGATIVE
HOLD SPECIMEN: NORMAL
HOLD SPECIMEN: NORMAL
HYALINE CASTS UR QL AUTO: ABNORMAL /LPF
IMM GRANULOCYTES # BLD AUTO: 0.1 10*3/MM3 (ref 0–0.05)
IMM GRANULOCYTES NFR BLD AUTO: 1 % (ref 0–0.5)
INHALED O2 CONCENTRATION: 28 %
INR PPP: 1.2 (ref 0.86–1.15)
KETONES UR QL STRIP: ABNORMAL
LEUKOCYTE ESTERASE UR QL STRIP.AUTO: ABNORMAL
LYMPHOCYTES # BLD AUTO: 1.04 10*3/MM3 (ref 0.7–3.1)
LYMPHOCYTES NFR BLD AUTO: 10.2 % (ref 19.6–45.3)
MAGNESIUM SERPL-MCNC: 1.2 MG/DL (ref 1.6–2.4)
MCH RBC QN AUTO: 23.6 PG (ref 26.6–33)
MCHC RBC AUTO-ENTMCNC: 31.5 G/DL (ref 31.5–35.7)
MCV RBC AUTO: 75 FL (ref 79–97)
MODALITY: ABNORMAL
MONOCYTES # BLD AUTO: 0.94 10*3/MM3 (ref 0.1–0.9)
MONOCYTES NFR BLD AUTO: 9.2 % (ref 5–12)
NEUTROPHILS NFR BLD AUTO: 78.2 % (ref 42.7–76)
NEUTROPHILS NFR BLD AUTO: 8.01 10*3/MM3 (ref 1.7–7)
NITRITE UR QL STRIP: NEGATIVE
NRBC BLD AUTO-RTO: 0.2 /100 WBC (ref 0–0.2)
NT-PROBNP SERPL-MCNC: 1560 PG/ML (ref 0–900)
PCO2 BLDA: 36.8 MM HG (ref 35–45)
PH BLDA: 7.46 PH UNITS (ref 7.35–7.45)
PH UR STRIP.AUTO: 6 [PH] (ref 5–8)
PLATELET # BLD AUTO: 124 10*3/MM3 (ref 140–450)
PMV BLD AUTO: 11 FL (ref 6–12)
PO2 BLD: 505 MM[HG] (ref 0–500)
PO2 BLDA: 141.3 MM HG (ref 80–100)
POTASSIUM SERPL-SCNC: 2.6 MMOL/L (ref 3.5–5.2)
PROT SERPL-MCNC: 6 G/DL (ref 6–8.5)
PROT UR QL STRIP: ABNORMAL
PROTHROMBIN TIME: 15.4 SECONDS (ref 11.8–14.9)
RBC # BLD AUTO: 5.08 10*6/MM3 (ref 3.77–5.28)
RBC # UR STRIP: ABNORMAL /HPF
REF LAB TEST METHOD: ABNORMAL
SAO2 % BLDCOA: 99.3 % (ref 95–99)
SODIUM SERPL-SCNC: 133 MMOL/L (ref 136–145)
SP GR UR STRIP: 1.02 (ref 1–1.03)
SQUAMOUS #/AREA URNS HPF: ABNORMAL /HPF
TROPONIN T NUMERIC DELTA: -6 NG/L
TROPONIN T SERPL HS-MCNC: 57 NG/L
TSH SERPL DL<=0.05 MIU/L-ACNC: 1.63 UIU/ML (ref 0.27–4.2)
UROBILINOGEN UR QL STRIP: ABNORMAL
WBC # UR STRIP: ABNORMAL /HPF
WBC NRBC COR # BLD AUTO: 10.23 10*3/MM3 (ref 3.4–10.8)
WHOLE BLOOD HOLD COAG: NORMAL
WHOLE BLOOD HOLD SPECIMEN: NORMAL
WHOLE BLOOD HOLD SPECIMEN: NORMAL
YEAST URNS QL MICRO: ABNORMAL /HPF

## 2024-11-11 PROCEDURE — 82948 REAGENT STRIP/BLOOD GLUCOSE: CPT

## 2024-11-11 PROCEDURE — 84443 ASSAY THYROID STIM HORMONE: CPT | Performed by: INTERNAL MEDICINE

## 2024-11-11 PROCEDURE — 93005 ELECTROCARDIOGRAM TRACING: CPT | Performed by: EMERGENCY MEDICINE

## 2024-11-11 PROCEDURE — 25010000002 CEFTRIAXONE PER 250 MG: Performed by: INTERNAL MEDICINE

## 2024-11-11 PROCEDURE — 25810000003 SODIUM CHLORIDE 0.9 % SOLUTION: Performed by: EMERGENCY MEDICINE

## 2024-11-11 PROCEDURE — G0378 HOSPITAL OBSERVATION PER HR: HCPCS

## 2024-11-11 PROCEDURE — 82803 BLOOD GASES ANY COMBINATION: CPT | Performed by: EMERGENCY MEDICINE

## 2024-11-11 PROCEDURE — 83880 ASSAY OF NATRIURETIC PEPTIDE: CPT | Performed by: EMERGENCY MEDICINE

## 2024-11-11 PROCEDURE — 83735 ASSAY OF MAGNESIUM: CPT | Performed by: EMERGENCY MEDICINE

## 2024-11-11 PROCEDURE — 87077 CULTURE AEROBIC IDENTIFY: CPT | Performed by: EMERGENCY MEDICINE

## 2024-11-11 PROCEDURE — 70450 CT HEAD/BRAIN W/O DYE: CPT

## 2024-11-11 PROCEDURE — 93005 ELECTROCARDIOGRAM TRACING: CPT

## 2024-11-11 PROCEDURE — 25010000002 POTASSIUM CHLORIDE 10 MEQ/100ML SOLUTION: Performed by: EMERGENCY MEDICINE

## 2024-11-11 PROCEDURE — 99223 1ST HOSP IP/OBS HIGH 75: CPT | Performed by: INTERNAL MEDICINE

## 2024-11-11 PROCEDURE — 36415 COLL VENOUS BLD VENIPUNCTURE: CPT | Performed by: EMERGENCY MEDICINE

## 2024-11-11 PROCEDURE — 80053 COMPREHEN METABOLIC PANEL: CPT | Performed by: EMERGENCY MEDICINE

## 2024-11-11 PROCEDURE — 83605 ASSAY OF LACTIC ACID: CPT

## 2024-11-11 PROCEDURE — 87186 SC STD MICRODIL/AGAR DIL: CPT | Performed by: EMERGENCY MEDICINE

## 2024-11-11 PROCEDURE — 81001 URINALYSIS AUTO W/SCOPE: CPT | Performed by: EMERGENCY MEDICINE

## 2024-11-11 PROCEDURE — P9612 CATHETERIZE FOR URINE SPEC: HCPCS

## 2024-11-11 PROCEDURE — 84484 ASSAY OF TROPONIN QUANT: CPT | Performed by: EMERGENCY MEDICINE

## 2024-11-11 PROCEDURE — 25010000002 HEPARIN (PORCINE) PER 1000 UNITS: Performed by: INTERNAL MEDICINE

## 2024-11-11 PROCEDURE — 36600 WITHDRAWAL OF ARTERIAL BLOOD: CPT | Performed by: EMERGENCY MEDICINE

## 2024-11-11 PROCEDURE — 71045 X-RAY EXAM CHEST 1 VIEW: CPT

## 2024-11-11 PROCEDURE — 85025 COMPLETE CBC W/AUTO DIFF WBC: CPT

## 2024-11-11 PROCEDURE — 25010000002 ONDANSETRON PER 1 MG: Performed by: EMERGENCY MEDICINE

## 2024-11-11 PROCEDURE — 87040 BLOOD CULTURE FOR BACTERIA: CPT

## 2024-11-11 PROCEDURE — 85610 PROTHROMBIN TIME: CPT

## 2024-11-11 PROCEDURE — 82140 ASSAY OF AMMONIA: CPT | Performed by: INTERNAL MEDICINE

## 2024-11-11 PROCEDURE — 99285 EMERGENCY DEPT VISIT HI MDM: CPT

## 2024-11-11 PROCEDURE — 87154 CUL TYP ID BLD PTHGN 6+ TRGT: CPT | Performed by: EMERGENCY MEDICINE

## 2024-11-11 PROCEDURE — 87086 URINE CULTURE/COLONY COUNT: CPT | Performed by: EMERGENCY MEDICINE

## 2024-11-11 PROCEDURE — 82746 ASSAY OF FOLIC ACID SERUM: CPT | Performed by: INTERNAL MEDICINE

## 2024-11-11 PROCEDURE — 93010 ELECTROCARDIOGRAM REPORT: CPT | Performed by: STUDENT IN AN ORGANIZED HEALTH CARE EDUCATION/TRAINING PROGRAM

## 2024-11-11 PROCEDURE — 25010000002 MAGNESIUM SULFATE 2 GM/50ML SOLUTION: Performed by: EMERGENCY MEDICINE

## 2024-11-11 RX ORDER — POTASSIUM CHLORIDE 1.5 G/1.58G
40 POWDER, FOR SOLUTION ORAL ONCE
Status: COMPLETED | OUTPATIENT
Start: 2024-11-11 | End: 2024-11-11

## 2024-11-11 RX ORDER — SODIUM CHLORIDE 0.9 % (FLUSH) 0.9 %
10 SYRINGE (ML) INJECTION EVERY 12 HOURS SCHEDULED
Status: DISCONTINUED | OUTPATIENT
Start: 2024-11-11 | End: 2024-12-10 | Stop reason: HOSPADM

## 2024-11-11 RX ORDER — ACETAMINOPHEN 650 MG/1
650 SUPPOSITORY RECTAL EVERY 4 HOURS PRN
Status: DISCONTINUED | OUTPATIENT
Start: 2024-11-11 | End: 2024-12-10 | Stop reason: HOSPADM

## 2024-11-11 RX ORDER — POTASSIUM CHLORIDE 7.45 MG/ML
10 INJECTION INTRAVENOUS ONCE
Status: COMPLETED | OUTPATIENT
Start: 2024-11-11 | End: 2024-11-11

## 2024-11-11 RX ORDER — MAGNESIUM SULFATE HEPTAHYDRATE 40 MG/ML
2 INJECTION, SOLUTION INTRAVENOUS ONCE
Status: COMPLETED | OUTPATIENT
Start: 2024-11-11 | End: 2024-11-11

## 2024-11-11 RX ORDER — DEXTROSE MONOHYDRATE, SODIUM CHLORIDE, AND POTASSIUM CHLORIDE 50; 1.49; 4.5 G/1000ML; G/1000ML; G/1000ML
75 INJECTION, SOLUTION INTRAVENOUS CONTINUOUS
Status: ACTIVE | OUTPATIENT
Start: 2024-11-11 | End: 2024-11-12

## 2024-11-11 RX ORDER — SODIUM CHLORIDE 0.9 % (FLUSH) 0.9 %
10 SYRINGE (ML) INJECTION AS NEEDED
Status: DISCONTINUED | OUTPATIENT
Start: 2024-11-11 | End: 2024-12-10 | Stop reason: HOSPADM

## 2024-11-11 RX ORDER — LEVOFLOXACIN 750 MG/1
750 TABLET, FILM COATED ORAL DAILY
COMMUNITY
Start: 2024-11-08 | End: 2024-12-10 | Stop reason: HOSPADM

## 2024-11-11 RX ORDER — LANOLIN ALCOHOL/MO/W.PET/CERES
1000 CREAM (GRAM) TOPICAL DAILY
COMMUNITY

## 2024-11-11 RX ORDER — IPRATROPIUM BROMIDE AND ALBUTEROL SULFATE 2.5; .5 MG/3ML; MG/3ML
3 SOLUTION RESPIRATORY (INHALATION) EVERY 6 HOURS
Status: DISCONTINUED | OUTPATIENT
Start: 2024-11-12 | End: 2024-11-25

## 2024-11-11 RX ORDER — METOCLOPRAMIDE 5 MG/1
5 TABLET ORAL
COMMUNITY

## 2024-11-11 RX ORDER — HEPARIN SODIUM 5000 [USP'U]/ML
5000 INJECTION, SOLUTION INTRAVENOUS; SUBCUTANEOUS EVERY 8 HOURS SCHEDULED
Status: DISCONTINUED | OUTPATIENT
Start: 2024-11-11 | End: 2024-11-17

## 2024-11-11 RX ORDER — LOPERAMIDE HYDROCHLORIDE 2 MG/1
2 CAPSULE ORAL AS NEEDED
COMMUNITY

## 2024-11-11 RX ORDER — SODIUM CHLORIDE 9 MG/ML
250 INJECTION, SOLUTION INTRAVENOUS CONTINUOUS
Status: ACTIVE | OUTPATIENT
Start: 2024-11-11 | End: 2024-11-11

## 2024-11-11 RX ORDER — COLLAGENASE SANTYL 250 [ARB'U]/G
1 OINTMENT TOPICAL DAILY
COMMUNITY
Start: 2024-10-31

## 2024-11-11 RX ORDER — WARFARIN SODIUM 2.5 MG/1
5 TABLET ORAL
Status: DISCONTINUED | OUTPATIENT
Start: 2024-11-11 | End: 2024-11-11

## 2024-11-11 RX ORDER — SODIUM CHLORIDE 0.9 % (FLUSH) 0.9 %
10 SYRINGE (ML) INJECTION AS NEEDED
Status: DISCONTINUED | OUTPATIENT
Start: 2024-11-11 | End: 2024-11-23

## 2024-11-11 RX ORDER — ACETAMINOPHEN 325 MG/1
650 TABLET ORAL EVERY 4 HOURS PRN
COMMUNITY
Start: 2024-10-07

## 2024-11-11 RX ORDER — NITROGLYCERIN 0.4 MG/1
0.4 TABLET SUBLINGUAL
Status: DISCONTINUED | OUTPATIENT
Start: 2024-11-11 | End: 2024-12-10 | Stop reason: HOSPADM

## 2024-11-11 RX ORDER — ONDANSETRON 2 MG/ML
4 INJECTION INTRAMUSCULAR; INTRAVENOUS ONCE
Status: COMPLETED | OUTPATIENT
Start: 2024-11-11 | End: 2024-11-11

## 2024-11-11 RX ORDER — SODIUM CHLORIDE 9 MG/ML
40 INJECTION, SOLUTION INTRAVENOUS AS NEEDED
Status: DISCONTINUED | OUTPATIENT
Start: 2024-11-11 | End: 2024-12-10 | Stop reason: HOSPADM

## 2024-11-11 RX ORDER — DEXTROSE MONOHYDRATE 25 G/50ML
25 INJECTION, SOLUTION INTRAVENOUS
Status: DISCONTINUED | OUTPATIENT
Start: 2024-11-11 | End: 2024-11-30

## 2024-11-11 RX ORDER — ACETAMINOPHEN 325 MG/1
650 TABLET ORAL EVERY 4 HOURS PRN
Status: DISCONTINUED | OUTPATIENT
Start: 2024-11-11 | End: 2024-12-10 | Stop reason: HOSPADM

## 2024-11-11 RX ORDER — POTASSIUM CHLORIDE 7.45 MG/ML
10 INJECTION INTRAVENOUS
Status: DISPENSED | OUTPATIENT
Start: 2024-11-11 | End: 2024-11-11

## 2024-11-11 RX ORDER — LOSARTAN POTASSIUM 25 MG/1
12.5 TABLET ORAL DAILY
Status: DISCONTINUED | OUTPATIENT
Start: 2024-11-12 | End: 2024-12-10 | Stop reason: HOSPADM

## 2024-11-11 RX ORDER — ONDANSETRON 2 MG/ML
4 INJECTION INTRAMUSCULAR; INTRAVENOUS EVERY 6 HOURS PRN
Status: DISCONTINUED | OUTPATIENT
Start: 2024-11-11 | End: 2024-12-10 | Stop reason: HOSPADM

## 2024-11-11 RX ORDER — NICOTINE POLACRILEX 4 MG
15 LOZENGE BUCCAL
Status: DISCONTINUED | OUTPATIENT
Start: 2024-11-11 | End: 2024-11-30

## 2024-11-11 RX ORDER — ACETAMINOPHEN 160 MG/5ML
650 SOLUTION ORAL EVERY 4 HOURS PRN
Status: DISCONTINUED | OUTPATIENT
Start: 2024-11-11 | End: 2024-12-10 | Stop reason: HOSPADM

## 2024-11-11 RX ORDER — IBUPROFEN 600 MG/1
1 TABLET ORAL
Status: DISCONTINUED | OUTPATIENT
Start: 2024-11-11 | End: 2024-11-30

## 2024-11-11 RX ORDER — FUROSEMIDE 40 MG/1
40 TABLET ORAL DAILY
COMMUNITY

## 2024-11-11 RX ORDER — MEGESTROL ACETATE 40 MG/ML
400 SUSPENSION ORAL DAILY
Status: DISCONTINUED | OUTPATIENT
Start: 2024-11-12 | End: 2024-11-21

## 2024-11-11 RX ORDER — SERTRALINE HYDROCHLORIDE 25 MG/1
25 TABLET, FILM COATED ORAL DAILY
Status: DISCONTINUED | OUTPATIENT
Start: 2024-11-12 | End: 2024-12-10 | Stop reason: HOSPADM

## 2024-11-11 RX ORDER — MEGESTROL ACETATE 40 MG/ML
400 SUSPENSION ORAL DAILY
COMMUNITY

## 2024-11-11 RX ADMIN — HEPARIN SODIUM 5000 UNITS: 5000 INJECTION INTRAVENOUS; SUBCUTANEOUS at 22:12

## 2024-11-11 RX ADMIN — POTASSIUM CHLORIDE 10 MEQ: 7.45 INJECTION INTRAVENOUS at 15:41

## 2024-11-11 RX ADMIN — SODIUM CHLORIDE 250 ML/HR: 9 INJECTION, SOLUTION INTRAVENOUS at 14:27

## 2024-11-11 RX ADMIN — POTASSIUM CHLORIDE 40 MEQ: 1.5 POWDER, FOR SOLUTION ORAL at 15:12

## 2024-11-11 RX ADMIN — ONDANSETRON 4 MG: 2 INJECTION INTRAMUSCULAR; INTRAVENOUS at 15:11

## 2024-11-11 RX ADMIN — Medication 10 ML: at 20:06

## 2024-11-11 RX ADMIN — POTASSIUM CHLORIDE 10 MEQ: 7.45 INJECTION INTRAVENOUS at 14:27

## 2024-11-11 RX ADMIN — POTASSIUM CHLORIDE, DEXTROSE MONOHYDRATE AND SODIUM CHLORIDE 75 ML/HR: 150; 5; 450 INJECTION, SOLUTION INTRAVENOUS at 18:20

## 2024-11-11 RX ADMIN — CEFTRIAXONE SODIUM 2000 MG: 2 INJECTION, POWDER, FOR SOLUTION INTRAMUSCULAR; INTRAVENOUS at 20:04

## 2024-11-11 RX ADMIN — MAGNESIUM SULFATE HEPTAHYDRATE 2 G: 40 INJECTION, SOLUTION INTRAVENOUS at 14:28

## 2024-11-11 RX ADMIN — POTASSIUM CHLORIDE 10 MEQ: 7.45 INJECTION INTRAVENOUS at 18:20

## 2024-11-11 NOTE — H&P
[facility] HISTORY AND PHYSICAL  Date: 2024   Patient Name: Inez Doyle  : 1950  MRN: [id number]  Primary Care Physician:  Christopher Hanson MD  Date of admission: 2024    Subjective   Subjective     Chief Complaint: Altered mental status    HPI:    Inez Doyle is a 74 y.o. female past medical history of atrial fibrillation on A-fib, CHF, COPD, hypertension presented to the emergency department for evaluation of altered mental status from nursing facility.  Patient reportedly has had decreased activity level and decreased p.o. intake.  Per patient she just feels tired and she is not hungry.  She is oriented x 2 did not know location.  However she is oriented to situation.  Able to answer questions appropriately.  She denies any other fevers, chills, sweats, nausea, vomiting, chest pain shortness of breath palpitations, abdominal pain diarrhea constipation, dysuria, rash.  In the emergency department noted to have hypokalemia with potassium 2.6 and is receiving repletion.  She will be admitted for ongoing monitoring management.      Personal History     Past Medical History:  Past Medical History:   Diagnosis Date    A-fib     Anemia     Anxiety     Asthma     Candidiasis of skin and nail     CHF (congestive heart failure)     COPD (chronic obstructive pulmonary disease)     Depression     Diabetes mellitus     GERD (gastroesophageal reflux disease)     Hyperlipidemia     Hypertension     Hypokalemia     Hypomagnesemia     Intervertebral disc disease     Obesity     Osteoarthritis     Panniculitis     Sleep apnea     Vitamin D deficiency          Past Surgical History:  Past Surgical History:   Procedure Laterality Date    BACK SURGERY      STOMACH SURGERY           Family History:   History reviewed. No pertinent family history.      Social History:   Social History     Tobacco Use    Smoking status: Never   Vaping Use    Vaping status: Never Used   Substance Use Topics     Alcohol use: No    Drug use: Never         Home Medications:  William, LORazepam, Liraglutide, Melatonin, albuterol sulfate HFA, bacitracin, cloNIDine, cyanocobalamin, ferrous sulfate, furosemide, gabapentin, ipratropium-albuterol, losartan, meropenem, metFORMIN, metOLazone, ondansetron ODT, potassium chloride, saccharomyces boulardii, sertraline, spironolactone, traMADol, and warfarin    Allergies:  Allergies   Allergen Reactions    Codeine     Dye Fdc Red [Red Dye #40 (Allura Red)] Other (See Comments)          Iodinated Contrast Media Nausea Only    Penicillins     Sulfa Antibiotics        Review of Systems   All systems were reviewed and negative except for: Decreased p.o. intake, lethargy    Objective   Objective     Vitals:   Temp:  [97.7 °F (36.5 °C)-98.2 °F (36.8 °C)] 97.7 °F (36.5 °C)  Heart Rate:  [] 97  Resp:  [17-21] 17  BP: ()/(51-72) 108/72  Flow (L/min) (Oxygen Therapy):  [2] 2    Physical Exam    Constitutional: Awake, alert, no acute distress   Eyes: Pupils equal, sclerae anicteric, no conjunctival injection   HENT: NCAT, mucous membranes moist   Neck: Supple, no thyromegaly, no lymphadenopathy, trachea midline   Respiratory: Clear to auscultation bilaterally, nonlabored respirations    Cardiovascular: RRR, no murmurs, rubs, or gallops, palpable pedal pulses bilaterally   Gastrointestinal: Positive bowel sounds, soft, nontender, nondistended   Musculoskeletal: No bilateral ankle edema, no clubbing or cyanosis to extremities   Psychiatric: Appropriate affect, cooperative   Neurologic: Oriented x 2, strength symmetric in all extremities, Cranial Nerves grossly intact to confrontation, speech clear   Skin: No rashes     Result Review    Result Review:  I have personally reviewed the results from the time of this admission to 11/11/2024 15:52 EST and agree with these findings:  [x]  Laboratory  [x]  Microbiology  []  Radiology  []  EKG/Telemetry   []  Cardiology/Vascular   []  Pathology  []   Old records  []  Other:      Assessment & Plan   Assessment / Plan     Assessment/Plan:   Acute metabolic encephalopathy: Likely UTI versus polypharmacy.  Will give Rocephin and follow urine culture.  Check ammonia, TSH, B12, folate.  Hold sedating medications for now.  Neurochecks.  Will monitor on telemetry overnight.  Severe life-threatening hypokalemia: Started on repletion in the ER.  Follow serial labs and replete as needed.  Monitor on telemetry until normalized.  Paroxysmal atrial fibrillation: On Coumadin.  Pharmacy to dose.  Daily INR.  Continue home regimen.  Telemetry.  History of COPD without exacerbation: Continue home regimen  History of congestive heart failure preserved ejection fraction: No exacerbation.  Monitor volume status.  Resume home medications as appropriate  Hypertension: Add back home medications when taking p.o.    Addendum: Patient with warfarin on previous med rec and does have a previous discharge summary that also suggested Eliquis.  However, pharmacy has confirmed with nursing facility and she is not currently on any anticoagulants.  Will start heparin subcu for VTE prophylaxis.    VTE Prophylaxis:  Mechanical & pharmacologic VTE prophylaxis orders are signed & held.         CODE STATUS:    Medical Intervention Limits: No intubation (DNI)  Code Status (Patient has no pulse and is not breathing): No CPR (Do Not Attempt to Resuscitate)  Medical Interventions (Patient has pulse or is breathing): Limited Support      Admission Status:  I believe this patient meets observation status.    Electronically signed by Power Reyes Jr, MD, 11/11/24, 3:52 PM EST.

## 2024-11-11 NOTE — PLAN OF CARE
Goal Outcome Evaluation:  Plan of Care Reviewed With: patient        Progress: no change  Outcome Evaluation: Patient new admit from ED, upon arrival to floor patient had 2 lower abdominal wounds open to air, tunneling, draind, wet to dry dressing in place per protocol, WC consult placed. Patient on 2L per NC. IV potassium maintained. No new issues at this time

## 2024-11-11 NOTE — ED PROVIDER NOTES
Time: 1:46 PM EST  Date of encounter:  11/11/2024  Independent Historian/Clinical History and Information was obtained by:   Nursing Staff    History is limited by: Altered Mental Status    Chief Complaint: Altered mental status, decreased p.o. intake      History of Present Illness:  Patient is a 74 y.o. year old female who presents to the emergency department for evaluation of altered mental status and decreased p.o. intake from nursing home.  Patient reportedly not eating or drinking.  This started yesterday.  No further history available as the patient can only mumble responses that I cannot understand.      Patient Care Team  Primary Care Provider: Christopher Hanson MD    Past Medical History:     Allergies   Allergen Reactions    Codeine     Dye Fdc Red [Red Dye #40 (Allura Red)] Other (See Comments)          Iodinated Contrast Media Nausea Only    Penicillins     Sulfa Antibiotics      Past Medical History:   Diagnosis Date    A-fib     Anemia     Anxiety     Asthma     Candidiasis of skin and nail     CHF (congestive heart failure)     COPD (chronic obstructive pulmonary disease)     Depression     Diabetes mellitus     GERD (gastroesophageal reflux disease)     Hyperlipidemia     Hypertension     Hypokalemia     Hypomagnesemia     Intervertebral disc disease     Obesity     Osteoarthritis     Panniculitis     Sleep apnea     Vitamin D deficiency      Past Surgical History:   Procedure Laterality Date    BACK SURGERY      STOMACH SURGERY       History reviewed. No pertinent family history.    Home Medications:  Prior to Admission medications    Medication Sig Start Date End Date Taking? Authorizing Provider   albuterol sulfate  (90 Base) MCG/ACT inhaler Inhale 2 puffs Every 4 (Four) Hours As Needed for Wheezing or Shortness of Air.    Provider, MD Chayo   bacitracin 500 UNIT/GM ointment Apply 1 Application topically to the appropriate area as directed Daily. Apply liberal amount to wound in abd fold     Chayo Emmanuel MD   cloNIDine (CATAPRES) 0.1 MG tablet Take 1 tablet by mouth 2 (Two) Times a Day.    Chayo Emmanuel MD   cyanocobalamin 1000 MCG/ML injection Inject 1 mL into the appropriate muscle as directed by prescriber 1 (One) Time Per Week. 1/22/24   Chayo Emmanuel MD   ferrous sulfate 325 (65 FE) MG tablet Take 1 tablet by mouth 2 (Two) Times a Day.    Chayo Emmanuel MD   furosemide (LASIX) 20 MG tablet Take 2 tablets by mouth 2 (Two) Times a Day.  Patient taking differently: Take 2 tablets by mouth Daily. 3/18/24   Tonya Ortega APRN   gabapentin (NEURONTIN) 800 MG tablet Take 1 tablet by mouth every night at bedtime. 6/5/23   Chayo Emmanuel MD   ipratropium-albuterol (DUO-NEB) 0.5-2.5 mg/3 ml nebulizer Take 3 mL by nebulization Every 6 (Six) Hours.    Chayo Emmanuel MD   Liraglutide (VICTOZA) 18 MG/3ML solution pen-injector injection Inject 0.6 mg under the skin into the appropriate area as directed Daily. 6/2/23   Chayo Emmanuel MD   LORazepam (ATIVAN) 0.5 MG tablet Take 1 tablet by mouth Every 12 (Twelve) Hours As Needed for Anxiety.    Chayo Emmanuel MD   losartan (COZAAR) 100 MG tablet Take 12.5 mg by mouth Daily. 6/5/23   Chayo Emmanuel MD   Melatonin 3 MG capsule Take 1 capsule by mouth Every Night.    Chayo Emmanuel MD   meropenem (MERREM) 1 g injection Infuse 1,000 mg into a venous catheter Every 8 (Eight) Hours. For 7 days - started 10/19/24 and end 10/27/24    Chayo Emmanuel MD   metFORMIN (GLUCOPHAGE) 500 MG tablet Take 1 tablet by mouth 2 (Two) Times a Day With Meals.    Chayo Emmanuel MD   metOLazone (ZAROXOLYN) 2.5 MG tablet Take 1 tablet by mouth Every Other Day. 6/11/24   Stew Riojas MD   Nutritional Supplements (William) powder Take 1 package by mouth 2 (Two) Times a Day.    Chayo Emmanuel MD   ondansetron ODT (ZOFRAN-ODT) 4 MG disintegrating tablet Place 1 tablet on the tongue  "Every 4 (Four) Hours As Needed for Nausea or Vomiting.    Chayo Emmanuel MD   potassium chloride 10 MEQ CR tablet Take 1 tablet by mouth Daily.    Provider, Historical, MD   saccharomyces boulardii (FLORASTOR) 250 MG capsule Take 1 capsule by mouth 2 (Two) Times a Day.    Chayo Emmanuel MD   sertraline (ZOLOFT) 25 MG tablet Take 1 tablet by mouth Daily.    Chayo Emmanuel MD   spironolactone (ALDACTONE) 50 MG tablet Take 1 tablet by mouth Daily. 3/28/23   Chayo Emmanuel MD   traMADol (ULTRAM) 50 MG tablet Take 1 tablet by mouth Every 6 (Six) Hours As Needed for Moderate Pain.    Chayo Emmanuel MD   warfarin (COUMADIN) 2 MG tablet Take 2 mg daily or as directed  Patient taking differently: Take 2 tablets by mouth Every Night. Home Warfarin Notes    Home warfarin dose: Since last Tuesday 04/16/2024 4 mg- 5 mg- 5 mg then 4 mg- 5 mg- 5 mg  Who monitors the patients INR? Checks weekly @ home and reports to Tonya MCKEON  Has the patient been consistent on their current dosing regimen? No 3/15/24   Tonya Ortega APRN   warfarin (COUMADIN) 5 MG tablet Take 1 tablet by mouth Daily. Or as directed  Patient taking differently: Take 1 tablet by mouth Daily. Home warfarin dose: Since last Tuesday 04/16/2024 4 mg- 5 mg- 5 mg then 4 mg- 5 mg- 5 mg  Who monitors the patients INR? Checks weekly @ home and reports to Tonya MCKEON  Has the patient been consistent on their current dosing regimen? No 4/8/24   Tonya Ortega APRN        Social History:   Social History     Tobacco Use    Smoking status: Never   Vaping Use    Vaping status: Never Used   Substance Use Topics    Alcohol use: No    Drug use: Never         Review of Systems:  Review of Systems   Unable to perform ROS: Mental status change        Physical Exam:  /72 (BP Location: Left arm, Patient Position: Lying)   Pulse 97   Temp 97.7 °F (36.5 °C) (Oral)   Resp 17   Ht 167.6 cm (66\")   Wt (!) 137 kg (301 " lb 2.4 oz)   SpO2 95%   BMI 48.61 kg/m²     Physical Exam  Constitutional:       General: She is not in acute distress.     Appearance: She is obese. She is not toxic-appearing.      Comments: Drowsy   HENT:      Head: Normocephalic and atraumatic.      Mouth/Throat:      Comments: Dry mucous membranes  Eyes:      Comments: Anisocoria with left pupil being approximately 5 mm and right pupil 3 mm.  Both round and reactive to light.   Cardiovascular:      Rate and Rhythm: Tachycardia present. Rhythm irregular.   Pulmonary:      Effort: Pulmonary effort is normal. No respiratory distress.      Breath sounds: No wheezing.   Neurological:      Mental Status: She is confused.      GCS: GCS eye subscore is 4. GCS verbal subscore is 2. GCS motor subscore is 6.      Cranial Nerves: Dysarthria present. No facial asymmetry.      Motor: Weakness (Generalized, legs greater then arms.  Patient is able to give equal resistance to movement of extremities.  She does follow simple motor commands.) present.                  Procedures:  Procedures      Medical Decision Making:      Comorbidities that affect care:    COPD, CHF, A-fib    External Notes reviewed:    Previous Clinic Note: Cardiology office visit 10/17/2024.  Description: Longstanding persistent atrial fibrillation.      The following orders were placed and all results were independently analyzed by me:  Orders Placed This Encounter   Procedures    Blood Culture - Blood,    Blood Culture - Blood,    XR Chest 1 View    CT Head Without Contrast    [place] Draw    Comprehensive Metabolic Panel    Single High Sensitivity Troponin T    Magnesium    Protime-INR    CBC Auto Differential    Lactic Acid, Plasma    High Sensitivity Troponin T 2Hr    Blood Gas, Arterial -    BNP    Urinalysis With Culture If Indicated - Urine, Catheter    NPO Diet NPO Type: Strict NPO    Undress & Gown    Continuous Pulse Oximetry    Vital Signs    Hospitalist (on-call MD unless specified)     Oxygen Therapy- Nasal Cannula; Titrate 1-6 LPM Per SpO2; 90 - 95%    POC Glucose Once    ECG 12 Lead ED Triage Standing Order; Weak / Dizzy / AMS    Insert Peripheral IV    Fall Precautions    CBC & Differential    Green Top (Gel)    Lavender Top    Gold Top - SST    Light Blue Top       Medications Given in the Emergency Department:  Medications   sodium chloride 0.9 % flush 10 mL (has no administration in time range)   potassium chloride 10 mEq in 100 mL IVPB (10 mEq Intravenous New Bag 11/11/24 1427)   sodium chloride 0.9 % infusion (250 mL/hr Intravenous New Bag 11/11/24 1427)   magnesium sulfate 2g/50 mL (PREMIX) infusion (2 g Intravenous New Bag 11/11/24 1428)   potassium chloride (KLOR-CON) packet 40 mEq (40 mEq Oral Given 11/11/24 1512)   ondansetron (ZOFRAN) injection 4 mg (4 mg Intravenous Given 11/11/24 1511)        ED Course:    ED Course as of 11/11/24 1532   Mon Nov 11, 2024   1323 EKG interpretation: Atrial fibrillation with controlled rate.  No ischemic changes. [RP]      ED Course User Index  [RP] Josue Jesus MD       Labs:    Lab Results (last 24 hours)       Procedure Component Value Units Date/Time    CBC & Differential [[id number]]  (Abnormal) Collected: 11/11/24 1125    Specimen: Blood Updated: 11/11/24 1148    Narrative:      The following orders were created for panel order CBC & Differential.  Procedure                               Abnormality         Status                     ---------                               -----------         ------                     CBC Auto Differential[[id number]]        Abnormal            Final result               Scan Slide[[id number]]                                                                    Please view results for these tests on the individual orders.    Protime-INR [[id number]]  (Abnormal) Collected: 11/11/24 1125    Specimen: Blood Updated: 11/11/24 1150     Protime 15.4 Seconds      INR 1.20    Narrative:      Suggested Therapeutic  Ranges For Oral Anticoagulant Therapy:  Level of Therapy                      INR Target Range  Standard Dose                            2.0-3.0  High Dose                                2.5-3.5  Patients not receiving anticoagulant  Therapy Normal Range                     0.86-1.15    CBC Auto Differential [[id number]]  (Abnormal) Collected: 11/11/24 1125    Specimen: Blood Updated: 11/11/24 1148     WBC 10.23 10*3/mm3      RBC 5.08 10*6/mm3      Hemoglobin 12.0 g/dL      Hematocrit 38.1 %      MCV 75.0 fL      MCH 23.6 pg      MCHC 31.5 g/dL      RDW 18.9 %      RDW-SD 49.1 fl      MPV 11.0 fL      Platelets 124 10*3/mm3      Neutrophil % 78.2 %      Lymphocyte % 10.2 %      Monocyte % 9.2 %      Eosinophil % 1.0 %      Basophil % 0.4 %      Immature Grans % 1.0 %      Neutrophils, Absolute 8.01 10*3/mm3      Lymphocytes, Absolute 1.04 10*3/mm3      Monocytes, Absolute 0.94 10*3/mm3      Eosinophils, Absolute 0.10 10*3/mm3      Basophils, Absolute 0.04 10*3/mm3      Immature Grans, Absolute 0.10 10*3/mm3      nRBC 0.2 /100 WBC     Blood Culture - Blood, Arm, Left [[id number]] Collected: 11/11/24 1125    Specimen: Blood from Arm, Left Updated: 11/11/24 1134    Blood Culture - Blood, Arm, Left [[id number]] Collected: 11/11/24 1125    Specimen: Blood from Arm, Left Updated: 11/11/24 1134    Lactic Acid, Plasma [[id number]]  (Normal) Collected: 11/11/24 1125    Specimen: Blood Updated: 11/11/24 1158     Lactate 1.9 mmol/L     Comprehensive Metabolic Panel [[id number]]  (Abnormal) Collected: 11/11/24 1233    Specimen: Blood Updated: 11/11/24 1316     Glucose 140 mg/dL      BUN 23 mg/dL      Creatinine 0.73 mg/dL      Sodium 133 mmol/L      Potassium 2.6 mmol/L      Chloride 93 mmol/L      CO2 23.6 mmol/L      Calcium 9.0 mg/dL      Total Protein 6.0 g/dL      Albumin 2.8 g/dL      ALT (SGPT) 8 U/L      AST (SGOT) 7 U/L      Alkaline Phosphatase 107 U/L      Total Bilirubin 0.6 mg/dL      Globulin 3.2 gm/dL      A/G Ratio  0.9 g/dL      BUN/Creatinine Ratio 31.5     Anion Gap 16.4 mmol/L      eGFR 86.4 mL/min/1.73     Narrative:      GFR Normal >60  Chronic Kidney Disease <60  Kidney Failure <15    The GFR formula is only valid for adults with stable renal function between ages 18 and 70.    Single High Sensitivity Troponin T [[id number]]  (Abnormal) Collected: 11/11/24 1233    Specimen: Blood Updated: 11/11/24 1316     HS Troponin T 57 ng/L     Narrative:      High Sensitive Troponin T Reference Range:  <14.0 ng/L- Negative Female for AMI  <22.0 ng/L- Negative Male for AMI  >=14 - Abnormal Female indicating possible myocardial injury.  >=22 - Abnormal Male indicating possible myocardial injury.   Clinicians would have to utilize clinical acumen, EKG, Troponin, and serial changes to determine if it is an Acute Myocardial Infarction or myocardial injury due to an underlying chronic condition.         Magnesium [[id number]]  (Abnormal) Collected: 11/11/24 1233    Specimen: Blood Updated: 11/11/24 1316     Magnesium 1.2 mg/dL     BNP [[id number]]  (Abnormal) Collected: 11/11/24 1233    Specimen: Blood Updated: 11/11/24 1409     proBNP 1,560.0 pg/mL     Narrative:      This assay is used as an aid in the diagnosis of individuals suspected of having heart failure. It can be used as an aid in the diagnosis of acute decompensated heart failure (ADHF) in patients presenting with signs and symptoms of ADHF to the emergency department (ED). In addition, NT-proBNP of <300 pg/mL indicates ADHF is not likely.    Age Range Result Interpretation  NT-proBNP Concentration (pg/mL:      <50             Positive            >450                   Gray                 300-450                    Negative             <300    50-75           Positive            >900                  Gray                300-900                  Negative            <300      >75             Positive            >1800                  Gray                300-1800                   Negative            <300    Blood Gas, Arterial - [[id number]]  (Abnormal) Collected: 11/11/24 1400    Specimen: Arterial Blood Updated: 11/11/24 1403     Site Left Radial     Zeke's Test Positive     pH, Arterial 7.460 pH units      pCO2, Arterial 36.8 mm Hg      pO2, Arterial 141.3 mm Hg      HCO3, Arterial 26.2 mmol/L      Base Excess, Arterial 2.4 mmol/L      Comment: Serial Number: [id number]:  [id number]        O2 Saturation, Arterial 99.3 %      Hemoglobin, Blood Gas 12.2 g/dL      Hematocrit, Blood Gas 36.0 %      Barometric Pressure for Blood Gas 743.6000 mmHg      Modality Cannula     FIO2 28 %      Flow Rate 2.0000 lpm      Hemodilution No     PO2/FIO2 505    Urinalysis With Culture If Indicated - Urine, Catheter [[id number]] Collected: 11/11/24 1528    Specimen: Urine, Catheter Updated: 11/11/24 1530             Imaging:    CT Head Without Contrast    Result Date: 11/11/2024  CT HEAD WO CONTRAST Date of Exam: 11/11/2024 2:41 PM EST Indication: AMS headache. Comparison: None available. Technique: Axial CT images were obtained of the head without contrast administration.  Reconstructed coronal and sagittal images were also obtained. Automated exposure control and iterative construction methods were used. Findings: There is no evidence of hemorrhage. There is no mass effect or midline shift. Diffuse brain atrophy. Periventricular hypodensities compatible with chronic vessel ischemia There is no extracerebral collection. Ventricles are normal in size and configuration for patient's stated age. Posterior fossa is within normal limits. Calvarium and skull base appear intact.  Visualized sinuses show no air fluid levels. Visualized orbits are unremarkable.     Impression: No acute intracranial abnormality Electronically Signed: Eliezer Stahl MD  11/11/2024 2:57 PM EST  Workstation ID: [id number]    XR Chest 1 View    Result Date: 11/11/2024  XR CHEST 1 VW Date of Exam: 11/11/2024 11:02 AM EST Indication:  Weak/Dizzy/AMS triage protocol Comparison: Chest AP dated 10/21/2024 Findings: The lungs are clear bilaterally. Cardiac and mediastinal silhouettes appear normal. No effusion is seen. A spinal stimulator projects over the lower thoracic spine.     Impression: No acute cardiopulmonary disease Electronically Signed: Alirio Mcfarlane MD  11/11/2024 11:34 AM EST  Workstation ID: [id number]       Differential Diagnosis and Discussion:    Altered Mental Status: Based on the patient's signs and symptoms, differential diagnosis includes but is not limited to meningitis, stroke, sepsis, subarachnoid hemorrhage, intracranial bleeding, encephalitis, and metabolic encephalopathy.    All labs were reviewed and interpreted by me.  All X-rays impressions were independently interpreted by me.  EKG was interpreted by me.  CT scan radiology impression was interpreted by me.    [facility]                     Patient Care Considerations:    MRI: I considered ordering an MRI however patient has no focal neurologic deficits by history or physical exam      Consultants/Shared Management Plan:    Hospitalist: I have discussed the case with Dr. Reyes who agrees to accept the patient for admission.    Social Determinants of Health:    Patient is a nursing home/assisted living resident and has reliable access to care.      Disposition and Care Coordination:    Admit:   Through independent evaluation of the patient's history, physical, and imperical data, the patient meets criteria for inpatient admission to the hospital.        Final diagnoses:   Hypokalemia   Anorexia        ED Disposition       ED Disposition   Decision to Admit    Condition   --    Comment   --               This medical record created using voice recognition software.             Josue Jesus MD  11/11/24 8816

## 2024-11-12 LAB
ALBUMIN SERPL-MCNC: 2.9 G/DL (ref 3.5–5.2)
ALBUMIN/GLOB SERPL: 0.9 G/DL
ALP SERPL-CCNC: 111 U/L (ref 39–117)
ALT SERPL W P-5'-P-CCNC: 8 U/L (ref 1–33)
ANION GAP SERPL CALCULATED.3IONS-SCNC: 14.3 MMOL/L (ref 5–15)
ANISOCYTOSIS BLD QL: NORMAL
AST SERPL-CCNC: 7 U/L (ref 1–32)
BACTERIA BLD CULT: ABNORMAL
BASOPHILS # BLD AUTO: 0.04 10*3/MM3 (ref 0–0.2)
BASOPHILS NFR BLD AUTO: 0.4 % (ref 0–1.5)
BILIRUB SERPL-MCNC: 0.5 MG/DL (ref 0–1.2)
BOTTLE TYPE: ABNORMAL
BUN SERPL-MCNC: 19 MG/DL (ref 8–23)
BUN/CREAT SERPL: 29.7 (ref 7–25)
CALCIUM SPEC-SCNC: 9 MG/DL (ref 8.6–10.5)
CHLORIDE SERPL-SCNC: 94 MMOL/L (ref 98–107)
CO2 SERPL-SCNC: 22.7 MMOL/L (ref 22–29)
CREAT SERPL-MCNC: 0.64 MG/DL (ref 0.57–1)
DEPRECATED RDW RBC AUTO: 48.3 FL (ref 37–54)
EGFRCR SERPLBLD CKD-EPI 2021: 92.9 ML/MIN/1.73
EOSINOPHIL # BLD AUTO: 0.08 10*3/MM3 (ref 0–0.4)
EOSINOPHIL NFR BLD AUTO: 0.8 % (ref 0.3–6.2)
ERYTHROCYTE [DISTWIDTH] IN BLOOD BY AUTOMATED COUNT: 18.6 % (ref 12.3–15.4)
GLOBULIN UR ELPH-MCNC: 3.3 GM/DL
GLUCOSE BLDC GLUCOMTR-MCNC: 157 MG/DL (ref 70–99)
GLUCOSE BLDC GLUCOMTR-MCNC: 159 MG/DL (ref 70–99)
GLUCOSE BLDC GLUCOMTR-MCNC: 176 MG/DL (ref 70–99)
GLUCOSE BLDC GLUCOMTR-MCNC: 179 MG/DL (ref 70–99)
GLUCOSE BLDC GLUCOMTR-MCNC: 184 MG/DL (ref 70–99)
GLUCOSE BLDC GLUCOMTR-MCNC: 232 MG/DL (ref 70–99)
GLUCOSE SERPL-MCNC: 186 MG/DL (ref 65–99)
HCT VFR BLD AUTO: 35.4 % (ref 34–46.6)
HGB BLD-MCNC: 11 G/DL (ref 12–15.9)
IMM GRANULOCYTES # BLD AUTO: 0.08 10*3/MM3 (ref 0–0.05)
IMM GRANULOCYTES NFR BLD AUTO: 0.8 % (ref 0–0.5)
INR PPP: 1.27 (ref 0.86–1.15)
LYMPHOCYTES # BLD AUTO: 0.87 10*3/MM3 (ref 0.7–3.1)
LYMPHOCYTES NFR BLD AUTO: 9.1 % (ref 19.6–45.3)
MCH RBC QN AUTO: 23.2 PG (ref 26.6–33)
MCHC RBC AUTO-ENTMCNC: 31.1 G/DL (ref 31.5–35.7)
MCV RBC AUTO: 74.5 FL (ref 79–97)
MICROCYTES BLD QL: NORMAL
MONOCYTES # BLD AUTO: 0.82 10*3/MM3 (ref 0.1–0.9)
MONOCYTES NFR BLD AUTO: 8.6 % (ref 5–12)
NEUTROPHILS NFR BLD AUTO: 7.64 10*3/MM3 (ref 1.7–7)
NEUTROPHILS NFR BLD AUTO: 80.3 % (ref 42.7–76)
NRBC BLD AUTO-RTO: 0.2 /100 WBC (ref 0–0.2)
PLATELET # BLD AUTO: 137 10*3/MM3 (ref 140–450)
PMV BLD AUTO: 11.3 FL (ref 6–12)
POTASSIUM SERPL-SCNC: 3 MMOL/L (ref 3.5–5.2)
PROT SERPL-MCNC: 6.2 G/DL (ref 6–8.5)
PROTHROMBIN TIME: 16.2 SECONDS (ref 11.8–14.9)
RBC # BLD AUTO: 4.75 10*6/MM3 (ref 3.77–5.28)
SMALL PLATELETS BLD QL SMEAR: NORMAL
SODIUM SERPL-SCNC: 131 MMOL/L (ref 136–145)
WBC MORPH BLD: NORMAL
WBC NRBC COR # BLD AUTO: 9.53 10*3/MM3 (ref 3.4–10.8)

## 2024-11-12 PROCEDURE — 82948 REAGENT STRIP/BLOOD GLUCOSE: CPT | Performed by: INTERNAL MEDICINE

## 2024-11-12 PROCEDURE — G0378 HOSPITAL OBSERVATION PER HR: HCPCS

## 2024-11-12 PROCEDURE — 85025 COMPLETE CBC W/AUTO DIFF WBC: CPT | Performed by: INTERNAL MEDICINE

## 2024-11-12 PROCEDURE — 25010000002 HEPARIN (PORCINE) PER 1000 UNITS: Performed by: INTERNAL MEDICINE

## 2024-11-12 PROCEDURE — 94664 DEMO&/EVAL PT USE INHALER: CPT

## 2024-11-12 PROCEDURE — 97165 OT EVAL LOW COMPLEX 30 MIN: CPT

## 2024-11-12 PROCEDURE — 84252 ASSAY OF VITAMIN B-2: CPT | Performed by: INTERNAL MEDICINE

## 2024-11-12 PROCEDURE — 94799 UNLISTED PULMONARY SVC/PX: CPT

## 2024-11-12 PROCEDURE — 99232 SBSQ HOSP IP/OBS MODERATE 35: CPT | Performed by: INTERNAL MEDICINE

## 2024-11-12 PROCEDURE — 63710000001 INSULIN REGULAR HUMAN PER 5 UNITS: Performed by: INTERNAL MEDICINE

## 2024-11-12 PROCEDURE — 25010000002 ONDANSETRON PER 1 MG: Performed by: INTERNAL MEDICINE

## 2024-11-12 PROCEDURE — 92610 EVALUATE SWALLOWING FUNCTION: CPT

## 2024-11-12 PROCEDURE — 85610 PROTHROMBIN TIME: CPT | Performed by: INTERNAL MEDICINE

## 2024-11-12 PROCEDURE — 94761 N-INVAS EAR/PLS OXIMETRY MLT: CPT

## 2024-11-12 PROCEDURE — 97161 PT EVAL LOW COMPLEX 20 MIN: CPT

## 2024-11-12 PROCEDURE — 82948 REAGENT STRIP/BLOOD GLUCOSE: CPT

## 2024-11-12 PROCEDURE — 80053 COMPREHEN METABOLIC PANEL: CPT | Performed by: INTERNAL MEDICINE

## 2024-11-12 PROCEDURE — 85007 BL SMEAR W/DIFF WBC COUNT: CPT | Performed by: INTERNAL MEDICINE

## 2024-11-12 PROCEDURE — 94640 AIRWAY INHALATION TREATMENT: CPT

## 2024-11-12 RX ORDER — HYDROCODONE BITARTRATE AND ACETAMINOPHEN 5; 325 MG/1; MG/1
1 TABLET ORAL EVERY 6 HOURS PRN
Status: DISCONTINUED | OUTPATIENT
Start: 2024-11-12 | End: 2024-11-14 | Stop reason: SDUPTHER

## 2024-11-12 RX ORDER — MINERAL OIL/HYDROPHIL PETROLAT
1 OINTMENT (GRAM) TOPICAL
Status: DISCONTINUED | OUTPATIENT
Start: 2024-11-12 | End: 2024-11-21

## 2024-11-12 RX ADMIN — HEPARIN SODIUM 5000 UNITS: 5000 INJECTION INTRAVENOUS; SUBCUTANEOUS at 21:14

## 2024-11-12 RX ADMIN — Medication 10 ML: at 21:12

## 2024-11-12 RX ADMIN — Medication 473 ML: at 21:11

## 2024-11-12 RX ADMIN — INSULIN HUMAN 2 UNITS: 100 INJECTION, SOLUTION PARENTERAL at 00:05

## 2024-11-12 RX ADMIN — WHITE PETROLATUM 1 APPLICATION: 1.75 OINTMENT TOPICAL at 21:11

## 2024-11-12 RX ADMIN — COLLAGENASE SANTYL 1 APPLICATION: 250 OINTMENT TOPICAL at 21:12

## 2024-11-12 RX ADMIN — HEPARIN SODIUM 5000 UNITS: 5000 INJECTION INTRAVENOUS; SUBCUTANEOUS at 15:19

## 2024-11-12 RX ADMIN — INSULIN HUMAN 2 UNITS: 100 INJECTION, SOLUTION PARENTERAL at 23:14

## 2024-11-12 RX ADMIN — IPRATROPIUM BROMIDE AND ALBUTEROL SULFATE 3 ML: .5; 3 SOLUTION RESPIRATORY (INHALATION) at 12:04

## 2024-11-12 RX ADMIN — HYDROCODONE BITARTRATE AND ACETAMINOPHEN 1 TABLET: 5; 325 TABLET ORAL at 15:57

## 2024-11-12 RX ADMIN — IPRATROPIUM BROMIDE AND ALBUTEROL SULFATE 3 ML: .5; 3 SOLUTION RESPIRATORY (INHALATION) at 01:48

## 2024-11-12 RX ADMIN — INSULIN HUMAN 2 UNITS: 100 INJECTION, SOLUTION PARENTERAL at 08:24

## 2024-11-12 RX ADMIN — ONDANSETRON 4 MG: 2 INJECTION INTRAMUSCULAR; INTRAVENOUS at 16:00

## 2024-11-12 RX ADMIN — INSULIN HUMAN 3 UNITS: 100 INJECTION, SOLUTION PARENTERAL at 12:35

## 2024-11-12 RX ADMIN — INSULIN HUMAN 2 UNITS: 100 INJECTION, SOLUTION PARENTERAL at 17:46

## 2024-11-12 RX ADMIN — IPRATROPIUM BROMIDE AND ALBUTEROL SULFATE 3 ML: .5; 3 SOLUTION RESPIRATORY (INHALATION) at 06:47

## 2024-11-12 RX ADMIN — IPRATROPIUM BROMIDE AND ALBUTEROL SULFATE 3 ML: .5; 3 SOLUTION RESPIRATORY (INHALATION) at 18:27

## 2024-11-12 RX ADMIN — MICONAZOLE NITRATE 1 APPLICATION: 2 POWDER TOPICAL at 21:12

## 2024-11-12 NOTE — THERAPY EVALUATION
Patient Name: Inez Doyle  : 1950    MRN: [id number]                              Today's Date: 2024       Admit Date: 2024    Visit Dx:     ICD-10-CM ICD-9-CM   1. Hypokalemia  E87.6 276.8   2. Anorexia  R63.0 783.0     Patient Active Problem List   Diagnosis    Posterior tibial tendon dysfunction    Charcot's joint of foot    Arthritis, lumbar spine    Atrial fibrillation    COVID-19 with multiple comorbidities    Hypoxic respiratory failure    Morbid obesity    Diabetes    AMS (altered mental status)     Past Medical History:   Diagnosis Date    A-fib     Anemia     Anxiety     Asthma     Candidiasis of skin and nail     CHF (congestive heart failure)     COPD (chronic obstructive pulmonary disease)     Depression     Diabetes mellitus     GERD (gastroesophageal reflux disease)     Hyperlipidemia     Hypertension     Hypokalemia     Hypomagnesemia     Intervertebral disc disease     Obesity     Osteoarthritis     Panniculitis     Sleep apnea     Vitamin D deficiency      Past Surgical History:   Procedure Laterality Date    BACK SURGERY      STOMACH SURGERY        General Information       Row Name 24 0834          OT Time and Intention    Document Type evaluation  -PG     Mode of Treatment individual therapy;occupational therapy  -PG       Row Name 24 0834          General Information    Patient Profile Reviewed yes  Patient reportedly from [facility].  Patient is a poor historian.  Prior functional history unknown  -PG     Prior Level of Function max assist:;ADL's;dependent:  -PG     Existing Precautions/Restrictions fall  -PG     Barriers to Rehab none identified  -PG       Row Name 24 0834          Occupational Profile    Reason for Services/Referral (Occupational Profile) Patient is a 74-year-old female admitted for hypokalemia, altered mental status and UTI.  Patient is being evaluated by Occupational Therapy due to recent decline in ADL function.  No  previous OT services identified  -PG       Row Name 11/12/24 0834          Living Environment    People in Home alone  -PG       Row Name 11/12/24 0834          Cognition    Orientation Status (Cognition) oriented to;person;verbal cues/prompts needed for orientation  -PG       Row Name 11/12/24 0834          Safety Issues/Impairments Affecting Functional Mobility    Impairments Affecting Function (Mobility) balance;cognition;endurance/activity tolerance;strength  -PG     Cognitive Impairments, Mobility Safety/Performance awareness, need for assistance;judgment;sequencing abilities;problem-solving/reasoning  -PG               User Key  (r) = Recorded By, (t) = Taken By, (c) = Cosigned By      Initials Name Provider Type    PG Guanaco Downs, OT Occupational Therapist                     Mobility/ADL's       Row Name 11/12/24 0835          Transfers    Comment, (Transfers) deferred  -       Row Name 11/12/24 0835          Activities of Daily Living    BADL Assessment/Intervention bathing;upper body dressing;lower body dressing;grooming;toileting  -PG       Row Name 11/12/24 0835          Bathing Assessment/Intervention    [place] Level (Bathing) bathing skills;maximum assist (25% patient effort)  -PG       Row Name 11/12/24 0835          Upper Body Dressing Assessment/Training    [place] Level (Upper Body Dressing) upper body dressing skills;maximum assist (25% patient effort)  -PG       Row Name 11/12/24 0835          Lower Body Dressing Assessment/Training    [place] Level (Lower Body Dressing) lower body dressing skills;dependent (less than 25% patient effort)  -PG       Row Name 11/12/24 0835          Grooming Assessment/Training    [place] Level (Grooming) grooming skills;minimum assist (75% patient effort)  -PG       Row Name 11/12/24 0835          Toileting Assessment/Training    [place] Level (Toileting) toileting skills;dependent (less than 25% patient effort)  -PG               User  Key  (r) = Recorded By, (t) = Taken By, (c) = Cosigned By      Initials Name Provider Type    PG Guanaco Downs OT Occupational Therapist                   Obj/Interventions       [facility] Name 11/12/24 [facility]          Sensory Assessment (Somatosensory)    Sensory Assessment (Somatosensory) unable/difficult to assess  -PG       [facility] Name 11/12/24 [facility]          Vision Assessment/Intervention    Visual Impairment/Limitations unable/difficult to assess  -PG       [facility] Name 11/12/24 [facility]          Range of Motion Comprehensive    General Range of Motion no range of motion deficits identified  -PG       Row Name 11/12/24 [facility]          Strength Comprehensive (MMT)    Comment, General Manual Muscle Testing (MMT) Assessment 4-/5 BUE  -PG       [facility] Name 11/12/24 [facility]          Motor Skills    Motor Skills coordination;functional endurance  -PG     Coordination WFL  -PG     Functional Endurance fair minus  -PG               User Key  (r) = Recorded By, (t) = Taken By, (c) = Cosigned By      Initials Name Provider Type    PG Guanaco Downs OT Occupational Therapist                   Goals/Plan       Row Name 11/12/24 08          Transfer Goal 1 (OT)    Activity/Assistive Device (Transfer Goal 1, OT) transfers, all  -PG     [place] Level/Cues Needed (Transfer Goal 1, OT) minimum assist (75% or more patient effort)  -PG     Time Frame (Transfer Goal 1, OT) long term goal (LTG);10 days  -PG       Row Name 11/12/24 08          Bathing Goal 1 (OT)    Activity/Device (Bathing Goal 1, OT) bathing skills, all  -PG     [place] Level/Cues Needed (Bathing Goal 1, OT) minimum assist (75% or more patient effort)  -PG     Time Frame (Bathing Goal 1, OT) long term goal (LTG);10 days  -PG       [facility] Name 11/12/24 08          Dressing Goal 1 (OT)    Activity/Device (Dressing Goal 1, OT) dressing skills, all  -PG     [place]/Cues Needed (Dressing Goal 1, OT) minimum assist (75% or more patient effort)  -PG     Time Frame (Dressing Goal  1, OT) long term goal (LTG);10 days  -PG       Row Name 11/12/24 0837          Toileting Goal 1 (OT)    Activity/Device (Toileting Goal 1, OT) toileting skills, all  -PG     [place] Level/Cues Needed (Toileting Goal 1, OT) minimum assist (75% or more patient effort)  -PG     Time Frame (Toileting Goal 1, OT) long term goal (LTG);10 days  -PG       Row Name 11/12/24 08          Grooming Goal 1 (OT)    Activity/Device (Grooming Goal 1, OT) grooming skills, all  -PG     [place] (Grooming Goal 1, OT) minimum assist (75% or more patient effort)  -PG     Time Frame (Grooming Goal 1, OT) long term goal (LTG);10 days  -PG       Row Name 11/12/24 0837          Strength Goal 1 (OT)    Strength Goal 1 (OT) Patient will improve bilateral upper extremity strength to 4+/5 to support independence with self-care tasks  -PG     Time Frame (Strength Goal 1, OT) long term goal (LTG);10 days  -PG       Row Name 11/12/24 08          Problem Specific Goal 1 (OT)    Problem Specific Goal 1 (OT) Patient will improve activity tolerance to fair plus to support independence with self-care activity and functional transfers  -PG     Time Frame (Problem Specific Goal 1, OT) long term goal (LTG);10 days  -PG       Row Name 11/12/24 0837          Therapy Assessment/Plan (OT)    Planned Therapy Interventions (OT) activity tolerance training;BADL retraining;strengthening exercise;transfer/mobility retraining;patient/caregiver education/training;occupation/activity based interventions  -PG               User Key  (r) = Recorded By, (t) = Taken By, (c) = Cosigned By      Initials Name Provider Type    PG Guanaco Downs, OT Occupational Therapist                   Clinical Impression       Row Name 11/12/24 0836          Pain Assessment    Pretreatment Pain Rating 0/10 - no pain  -PG     Posttreatment Pain Rating 0/10 - no pain  -PG       Row Name 11/12/24 0836          Plan of Care Review    Plan of Care Reviewed With patient  -PG      Progress no change  -PG     Outcome Evaluation Patient presents with limitations affecting strength, activity tolerance, and balance impacting patient's ability to return home safely and independently.  The skills of a therapist will be required to safely and effectively implement the following treatment plan to restore maximal level of function  -PG       Row Name 11/12/24 0836          Therapy Assessment/Plan (OT)    Patient/Family Therapy Goal Statement (OT) Patient reports she would like to walk again  -PG     Rehab Potential (OT) good  -PG     Criteria for Skilled Therapeutic Interventions Met (OT) yes;meets criteria;skilled treatment is necessary  -PG     Therapy Frequency (OT) 5 times/wk  -PG       Row Name 11/12/24 0836          Therapy Plan Review/Discharge Plan (OT)    Anticipated Discharge Disposition (OT) sub acute care setting  -PG               User Key  (r) = Recorded By, (t) = Taken By, (c) = Cosigned By      Initials Name Provider Type    PG Guanaco Downs, OT Occupational Therapist                   Outcome Measures       Row Name 11/12/24 0838          How much help from another is currently needed...    Putting on and taking off regular lower body clothing? 1  -PG     Bathing (including washing, rinsing, and drying) 1  -PG     Toileting (which includes using toilet bed pan or urinal) 1  -PG     Putting on and taking off regular upper body clothing 2  -PG     Taking care of personal grooming (such as brushing teeth) 2  -PG     Eating meals 3  -PG     AM-PAC 6 Clicks Score (OT) 10  -PG       Row Name 11/12/24 0838          Functional Assessment    Outcome Measure Options AM-PAC 6 Clicks Daily Activity (OT);Optimal Instrument  -PG       Row Name 11/12/24 0838          Optimal Instrument    Optimal Instrument Optimal - 3  -PG     Bending/Stooping 5  -PG     Standing 5  -PG     Reaching 2  -PG     From the list, choose the 3 activities you would most like to be able to do without any difficulty  Bending/stooping;Standing;Reaching  -PG     Total Score Optimal - 3 12  -PG               User Key  (r) = Recorded By, (t) = Taken By, (c) = Cosigned By      Initials Name Provider Type    PG Guanaco Downs OT Occupational Therapist                    Occupational Therapy Education       Title: PT OT SLP Therapies (In Progress)       Topic: Occupational Therapy (In Progress)       Point: ADL training (In Progress)       Description:   Instruct learner(s) on proper safety adaptation and remediation techniques during self care or transfers.   Instruct in proper use of assistive devices.                  Learning Progress Summary            Patient Acceptance, E,D, NR by PG at 11/12/2024 0839                      Point: Home exercise program (In Progress)       Description:   Instruct learner(s) on appropriate technique for monitoring, assisting and/or progressing therapeutic exercises/activities.                  Learning Progress Summary            Patient Acceptance, E,D, NR by PG at 11/12/2024 0839                      Point: Precautions (In Progress)       Description:   Instruct learner(s) on prescribed precautions during self-care and functional transfers.                  Learning Progress Summary            Patient Acceptance, E,D, NR by PG at 11/12/2024 0839                      Point: Body mechanics (In Progress)       Description:   Instruct learner(s) on proper positioning and spine alignment during self-care, functional mobility activities and/or exercises.                  Learning Progress Summary            Patient Acceptance, E,D, NR by PG at 11/12/2024 0839                                      User Key       Initials Effective Dates Name Provider Type [facility] 06/16/21 -  Guanaco Downs OT Occupational Therapist OT                  OT Recommendation and Plan  Planned Therapy Interventions (OT): activity tolerance training, BADL retraining, strengthening exercise, transfer/mobility retraining,  patient/caregiver education/training, occupation/activity based interventions  Therapy Frequency (OT): 5 times/wk  Plan of Care Review  Plan of Care Reviewed With: patient  Progress: no change  Outcome Evaluation: Patient presents with limitations affecting strength, activity tolerance, and balance impacting patient's ability to return home safely and independently.  The skills of a therapist will be required to safely and effectively implement the following treatment plan to restore maximal level of function     Time Calculation:   Evaluation Complexity (OT)  Review Occupational Profile/Medical/Therapy History Complexity: brief/low complexity  Assessment, Occupational Performance/Identification of Deficit Complexity: 3-5 performance deficits  Clinical Decision Making Complexity (OT): problem focused assessment/low complexity  Overall Complexity of Evaluation (OT): low complexity     Time Calculation- OT       Row Name 11/12/24 0840             Time Calculation- OT    OT Received On 11/12/24  -PG      OT Goal Re-Cert Due Date 11/21/24  -PG         Untimed Charges    OT Eval/Re-eval Minutes 35  -PG         Total Minutes    Untimed Charges Total Minutes 35  -PG       Total Minutes 35  -PG                User Key  (r) = Recorded By, (t) = Taken By, (c) = Cosigned By      Initials Name Provider Type    PG Guanaco Downs OT Occupational Therapist                  Therapy Charges for Today       Code Description Service Date Service Provider Modifiers Qty    21052494528 HC OT EVAL LOW COMPLEXITY 3 11/12/2024 Guanaco Downs OT GO 1                 Guanaco Downs OT  11/12/2024

## 2024-11-12 NOTE — THERAPY EVALUATION
Acute Care - Speech Language Pathology   Swallow Initial Evaluation SKY Munoz     Patient Name: Inez Doyle  : 1950  MRN: [id number]  Today's Date: 2024               Admit Date: 2024    Visit Dx:     ICD-10-CM ICD-9-CM   1. Hypokalemia  E87.6 276.8   2. Anorexia  R63.0 783.0   3. Oropharyngeal dysphagia  R13.12 787.22     Patient Active Problem List   Diagnosis    Posterior tibial tendon dysfunction    Charcot's joint of foot    Arthritis, lumbar spine    Atrial fibrillation    COVID-19 with multiple comorbidities    Hypoxic respiratory failure    Morbid obesity    Diabetes    AMS (altered mental status)     Past Medical History:   Diagnosis Date    A-fib     Anemia     Anxiety     Asthma     Candidiasis of skin and nail     CHF (congestive heart failure)     COPD (chronic obstructive pulmonary disease)     Depression     Diabetes mellitus     GERD (gastroesophageal reflux disease)     Hyperlipidemia     Hypertension     Hypokalemia     Hypomagnesemia     Intervertebral disc disease     Obesity     Osteoarthritis     Panniculitis     Sleep apnea     Vitamin D deficiency      Past Surgical History:   Procedure Laterality Date    BACK SURGERY      STOMACH SURGERY             Inpatient Speech Pathology Dysphagia Evaluation        PAIN SCALE: None indicated.    PRECAUTIONS/CONTRAINDICATIONS: Standard    SUSPECTED ABUSE/NEGLECT/EXPLOITATION: None indicated.    SOCIAL/PSYCHOLOGICAL NEEDS/BARRIERS: None indicated.    PAST SOCIAL HISTORY: 74-year-old female from assisted living    PRIOR FUNCTION: Not fully determined    PATIENT GOALS/EXPECTATIONS: Continue eating orally, wanting regular diet    HISTORY: 74-year-old female the above diagnosis referred for speech therapy evaluation to assess for swallowing.  No previous speech pathology services are reported this incident.  Patient denies difficulty.  Patient was initially with altered mental status.    CURRENT DIET LEVEL: N.p.o.    OBJECTIVE:    TEST  ADMINISTERED: Clinical dysphagia evaluation    COGNITION/SAFETY AWARENESS: Patient followed basic directions and responded to questions    BEHAVIORAL OBSERVATIONS: Alert and cooperative    ORAL MOTOR EXAM: Patient is edentulous    VOICE QUALITY: Adequate    REFLEX EXAM: Deferred    POSTURE: Assisted sitting upright in bed    FEEDING/SWALLOWING FUNCTION: Assessed with nectar liquid, thin liquids, puréed solids, crunchy solid.    CLINICAL OBSERVATIONS:   Thin liquid by cup and by straw with delay, vocal quality remaining clear to cervical auscultation.  Throat clear noted x 1.  Purée solid with swallow completed with laryngeal elevation noted to palpation.  Crunchy solid with extended chewing followed by swallow completed clearing the oral cavity.    DYSPHAGIA CRITERIA: Swallow delay, risk of aspiration    FUNCTIONAL ASSESSMENT INSTRUMENT: Patient currently scored a level 6 of 7 on Functional Communication Measures for swallowing indicating a 1-19% limitation in function.    ASSESSMENT/ PLAN OF CARE:  Pt presents with limitations, noted below, that impede her ability to swallow safely and maintain nutrition. The skills of a therapist will be required to safely and effectively implement the following treatment plan to restore maximal level of function.    PROBLEMS:  1.  Swallow delay, risk of aspiration                       LTG 1: 30 days: Patient will increase score for swallowing on the Functional Communication Measures to level 7 of 7 indicating a 0% limitation in function.                       STG 1a: 14 days: Patient will tolerate diet of regular soft solids and thin liquids with minimal assistance for strategies.                       STG 1b: 14 days: Patient will tolerate 8 of 10 trials of thin liquids with min to no signs or symptoms of aspiration.                       STG 1c: 14 days:  Patient/family education.                       TREATMENT: Dysphagia therapy to address swallow function through exercises  and education of strategies.     FREQUENCY/DURATION: Once daily 5 times per week    REHAB POTENTIAL:  Pt has fair to good rehab potential.  The following limitations may influence improvement/ length of tx: Medical status.    RECOMMENDATIONS:   1.   DIET: Soft to chew solids with whole meats, thin liquid.    2.  POSITION: Positioning fully upright for all p.o. intake and 30 minutes following.    3.  COMPENSATORY STRATEGIES: Patient may need assistance with set up, cut food items small.  Alternate small bites and small sips of solids and liquids at a slow rate.    Pt/responsible party agrees with plan of care and has been informed of all alternatives, risks and benefits.                            Anticipated Discharge Disposition (SLP): [facility] nursing facility (11/12/24 0942)                                                               EDUCATION  The patient has been educated in the following areas:   Modified Diet Instruction.                Time Calculation:    Time Calculation- SLP       Row Name 11/12/24 0942             Time Calculation- SLP    SLP Start Time 0645  -TB      SLP Stop Time 0745  -TB      SLP Time Calculation (min) 60 min  -TB      SLP Received On 11/12/24  -TB         Untimed Charges    SLP Eval/Re-eval  ST Eval Oral Pharyng Swallow - 27575  -TB      14995-VS Eval Oral Pharyng Swallow Minutes 60  -TB         Total Minutes    Untimed Charges Total Minutes 60  -TB       Total Minutes 60  -TB                User Key  (r) = Recorded By, (t) = Taken By, (c) = Cosigned By      Initials Name Provider Type    TB Jacque Reddy SLP Speech and Language Pathologist                    Therapy Charges for Today       Code Description Service Date Service Provider Modifiers Qty    79741463818 HC ST EVAL ORAL PHARYNG SWALLOW 4 11/12/2024 Jacque Reddy SLP GN 1                 RICHARDSON Mcnamara  11/12/2024

## 2024-11-12 NOTE — PLAN OF CARE
Goal Outcome Evaluation:  Plan of Care Reviewed With: patient      ASSESSMENT/ PLAN OF CARE:  Pt presents with limitations, noted below, that impede her ability to swallow safely and maintain nutrition. The skills of a therapist will be required to safely and effectively implement the following treatment plan to restore maximal level of function.    PROBLEMS:  1.  Swallow delay, risk of aspiration                       LTG 1: 30 days: Patient will increase score for swallowing on the Functional Communication Measures to level 7 of 7 indicating a 0% limitation in function.                       STG 1a: 14 days: Patient will tolerate diet of regular soft solids and thin liquids with minimal assistance for strategies.                       STG 1b: 14 days: Patient will tolerate 8 of 10 trials of thin liquids with min to no signs or symptoms of aspiration.                       STG 1c: 14 days:  Patient/family education.                       TREATMENT: Dysphagia therapy to address swallow function through exercises and education of strategies.     FREQUENCY/DURATION: Once daily 5 times per week    REHAB POTENTIAL:  Pt has fair to good rehab potential.  The following limitations may influence improvement/ length of tx: Medical status.    RECOMMENDATIONS:   1.   DIET: Soft to chew solids with whole meats, thin liquid.    2.  POSITION: Positioning fully upright for all p.o. intake and 30 minutes following.    3.  COMPENSATORY STRATEGIES: Patient may need assistance with set up, cut food items small.  Alternate small bites and small sips of solids and liquids at a slow rate.          Anticipated Discharge Disposition (SLP): skilled nursing facility

## 2024-11-12 NOTE — THERAPY EVALUATION
Acute Care - Physical Therapy Initial Evaluation   Tammy     Patient Name: Inez Doyle  : 1950  MRN: [id number]  Today's Date: 2024      Visit Dx:     ICD-10-CM ICD-9-CM   1. Hypokalemia  E87.6 276.8   2. Anorexia  R63.0 783.0   3. Oropharyngeal dysphagia  R13.12 787.22   4. Difficulty walking  R26.2 719.7     Patient Active Problem List   Diagnosis    Posterior tibial tendon dysfunction    Charcot's joint of foot    Arthritis, lumbar spine    Atrial fibrillation    COVID-19 with multiple comorbidities    Hypoxic respiratory failure    Morbid obesity    Diabetes    AMS (altered mental status)     Past Medical History:   Diagnosis Date    A-fib     Anemia     Anxiety     Asthma     Candidiasis of skin and nail     CHF (congestive heart failure)     COPD (chronic obstructive pulmonary disease)     Depression     Diabetes mellitus     GERD (gastroesophageal reflux disease)     Hyperlipidemia     Hypertension     Hypokalemia     Hypomagnesemia     Intervertebral disc disease     Obesity     Osteoarthritis     Panniculitis     Sleep apnea     Vitamin D deficiency      Past Surgical History:   Procedure Laterality Date    BACK SURGERY      STOMACH SURGERY       PT Assessment (Last 12 Hours)       PT Evaluation and Treatment       Row Name 24 1300          Physical Therapy Time and Intention    Subjective Information no complaints  -CS     Document Type evaluation  -CS     Mode of Treatment individual therapy;physical therapy  -CS     Patient Effort poor  -CS     Symptoms Noted During/After Treatment none  -CS       Row Name 24 1300          General Information    Patient Profile Reviewed yes  -CS     Patient Observations alert;poorly cooperative  -CS     Prior Level of Function max assist:;transfer;bed mobility;ADL's  Pt unable to report accurate information due to confusion and poor cognition at this time. Unsure of true PLOF.  -CS     Equipment Currently Used at Home shower  chair;wheelchair;walker, rolling;oxygen;hospital bed  Unsure if patient has been ambulating lately. She is a long-term care resisdent at [facility].  -CS     Existing Precautions/Restrictions fall  -CS     Limitations/Impairments safety/cognitive  -CS     Barriers to Rehab previous functional deficit;cognitive status  -CS       Row Name 11/12/24 1300          Living Environment    Current Living Arrangements extended care facility  -CS     People in Home facility resident  -CS     Primary Care Provided by --  [facility]  -CS       Row Name 11/12/24 1300          Pain    Pretreatment Pain Rating 0/10 - no pain  -CS     Posttreatment Pain Rating 0/10 - no pain  -CS       Row Name 11/12/24 1300          Cognition    Affect/Mental Status (Cognition) confused  -CS     Behavioral Issues (Cognition) uncooperative  -CS     Orientation Status (Cognition) oriented to;person;verbal cues/prompts needed for orientation  -CS       Row Name 11/12/24 1300          Range of Motion Comprehensive    General Range of Motion other (see comments)  pt refused to allow me to assess  -CS       Row Name 11/12/24 1300          Strength Comprehensive (MMT)    General Manual Muscle Testing (MMT) Assessment lower extremity strength deficits identified  -CS     Comment, General Manual Muscle Testing (MMT) Assessment pt refused  -CS       Row Name 11/12/24 1300          Bed Mobility    Bed Mobility bed mobility (all) activities  -CS     All Activities, Sparks (Bed Mobility) verbal cues;dependent (less than 25% patient effort);maximum assist (25% patient effort)  -CS     Bed Mobility, Safety Issues decreased use of legs for bridging/pushing;decreased use of arms for pushing/pulling  -CS     Assistive Device (Bed Mobility) bed rails;repositioning sheet;head of bed elevated  -CS     Comment, (Bed Mobility) Pt refused any other transfers  -CS       Row Name 11/12/24 1300          Gait/Stairs (Locomotion)    Sparks Level (Gait) not  tested  -CS       Row Name 11/12/24 1300          Safety Issues/Impairments Affecting Functional Mobility    Impairments Affecting Function (Mobility) balance;cognition;endurance/activity tolerance;strength  present at prior level  -CS       Row Name             Wound 11/11/24 1733 Right lower abdomen    Wound - Properties Group Placement Date: 11/11/24  -AL, upon admission  Placement Time: 1733  -AL Side: Right  -AL Orientation: lower  -AL Location: abdomen  -AL Present on Original Admission: Y  -AL    Retired Wound - Properties Group Placement Date: 11/11/24  -AL, upon admission  Placement Time: 1733  -AL Present on Original Admission: Y  -AL Side: Right  -AL Orientation: lower  -AL Location: abdomen  -AL    Retired Wound - Properties Group Placement Date: 11/11/24  -AL, upon admission  Placement Time: 1733  -AL Present on Original Admission: Y  -AL Side: Right  -AL Orientation: lower  -AL Location: abdomen  -AL    Retired Wound - Properties Group Date first assessed: 11/11/24  -AL, upon admission  Time first assessed: 1733  -AL Present on Original Admission: Y  -AL Side: Right  -AL Location: abdomen  -AL      Row Name             Wound 11/11/24 1738 Left anterior hip    Wound - Properties Group Placement Date: 11/11/24  -AL Placement Time: 1738  -AL Side: Left  -AL Orientation: anterior  -AL Location: hip  -AL    Retired Wound - Properties Group Placement Date: 11/11/24  -AL Placement Time: 1738  -AL Side: Left  -AL Orientation: anterior  -AL Location: hip  -AL    Retired Wound - Properties Group Placement Date: 11/11/24  -AL Placement Time: 1738  -AL Side: Left  -AL Orientation: anterior  -AL Location: hip  -AL    Retired Wound - Properties Group Date first assessed: 11/11/24  -AL Time first assessed: 1738  -AL Side: Left  -AL Location: hip  -AL      Row Name             Wound 11/11/24 1751 sacral spine    Wound - Properties Group Placement Date: 11/11/24  -AL Placement Time: 1751  -AL Location: sacral spine   -AL    Retired Wound - Properties Group Placement Date: 11/11/24  -AL Placement Time: 1751 -AL Location: sacral spine  -AL    Retired Wound - Properties Group Placement Date: 11/11/24  -AL Placement Time: 1751  -AL Location: sacral spine  -AL    Retired Wound - Properties Group Date first assessed: 11/11/24  -AL Time first assessed: 1751  -AL Location: sacral spine  -AL      Row Name             Wound 11/11/24 1757 Right breast    Wound - Properties Group Placement Date: 11/11/24  -AL Placement Time: 1757  -AL Side: Right  -AL Location: breast  -AL    Retired Wound - Properties Group Placement Date: 11/11/24  -AL Placement Time: 1757  -AL Side: Right  -AL Location: breast  -AL    Retired Wound - Properties Group Placement Date: 11/11/24  -AL Placement Time: 1757  -AL Side: Right  -AL Location: breast  -AL    Retired Wound - Properties Group Date first assessed: 11/11/24  -AL Time first assessed: 1757  -AL Side: Right  -AL Location: breast  -AL      Row Name 11/12/24 1300          Plan of Care Review    Plan of Care Reviewed With patient  -CS     Progress no change  -CS     Outcome Evaluation Pt is a long-term care resident at [facility]. Unable to gather PLOF information. She is not agreeable to therapy and is unable to participate in transfers due to weakness and refusal. Recommend return to [facility] once medically appropriate.  -CS       Row Name 11/12/24 1300          Positioning and Restraints    Pre-Treatment Position in bed  -CS     Post Treatment Position bed  -CS     In Bed supine;call light within reach;encouraged to call for assist;exit alarm on  -CS       Row Name 11/12/24 1300          Therapy Assessment/Plan (PT)    Criteria for Skilled Interventions Met (PT) patient/family refuse skilled intervention at this time;does not meet criteria for skilled intervention  -CS     Therapy Frequency (PT) evaluation only  -CS       Row Name 11/12/24 1300          PT Evaluation Complexity    History, PT  Evaluation Complexity 1-2 personal factors and/or comorbidities  -CS     Examination of Body Systems (PT Eval Complexity) total of 4 or more elements  -CS     Clinical Presentation (PT Evaluation Complexity) stable  -CS     Clinical Decision Making (PT Evaluation Complexity) low complexity  -CS     Overall Complexity (PT Evaluation Complexity) low complexity  -CS       Row Name 11/12/24 1300          Therapy Plan Review/Discharge Plan (PT)    Therapy Plan Review (PT) evaluation/treatment results reviewed;patient  -CS       Row Name 11/12/24 1300          Physical Therapy Goals    Problem Specific Goal Selection (PT) problem specific goal 1, PT  -CS       Row Name 11/12/24 1300          Problem Specific Goal 1 (PT)    Problem Specific Goal 1 (PT) PT evaluation completed  -CS     Time Frame (Problem Specific Goal 1, PT) by discharge  -CS     Progress/Outcome (Problem Specific Goal 1, PT) goal met  -CS               User Key  (r) = Recorded By, (t) = Taken By, (c) = Cosigned By      Initials Name Provider Type    CS Juliet Casey, PT Physical Therapist    Verónica Duggan, RN Registered Nurse                    Physical Therapy Education        No education to display                  PT Recommendation and Plan  Anticipated Discharge Disposition (PT): extended care facility  Therapy Frequency (PT): evaluation only  Plan of Care Reviewed With: patient  Progress: no change  Outcome Evaluation: Pt is a long-term care resident at [facility]. Unable to gather PLOF information. She is not agreeable to therapy and is unable to participate in transfers due to weakness and refusal. Recommend return to [facility] once medically appropriate.   Outcome Measures       Row Name 11/12/24 1300             How much help from another person do you currently need...    Turning from your back to your side while in flat bed without using bedrails? 2  -CS      Moving from lying on back to sitting on the side of a flat bed without  bedrails? 1  -CS      Moving to and from a bed to a chair (including a wheelchair)? 1  -CS      Standing up from a chair using your arms (e.g., wheelchair, bedside chair)? 1  -CS      Climbing 3-5 steps with a railing? 1  -CS      To walk in hospital room? 1  -CS      AM-PAC 6 Clicks Score (PT) 7  -CS         Functional Assessment    Outcome Measure Options AM-PAC 6 Clicks Basic Mobility (PT)  -CS                User Key  (r) = Recorded By, (t) = Taken By, (c) = Cosigned By      Initials Name Provider Type    Juliet Doran, SARAH Physical Therapist                     Time Calculation:    PT Charges       Row Name 11/12/24 1323             Time Calculation    PT Received On 11/12/24  -CS         Untimed Charges    PT Eval/Re-eval Minutes 25  -CS         Total Minutes    Untimed Charges Total Minutes 25  -CS       Total Minutes 25  -CS                User Key  (r) = Recorded By, (t) = Taken By, (c) = Cosigned By      Initials Name Provider Type    Juliet Doran PT Physical Therapist                  Therapy Charges for Today       Code Description Service Date Service Provider Modifiers Qty    [id number]  PT EVAL LOW COMPLEXITY 2 11/12/2024 Juliet Casey, PT GP 1            PT G-Codes  Outcome Measure Options: AM-PAC 6 Clicks Basic Mobility (PT)  AM-PAC 6 Clicks Score (PT): 7  AM-PAC 6 Clicks Score (OT): 10    Juliet Casey, PT  11/12/2024

## 2024-11-12 NOTE — PLAN OF CARE
Goal Outcome Evaluation:  Plan of Care Reviewed With: patient        Progress: no change  Outcome Evaluation: Patient presents with limitations affecting strength, activity tolerance, and balance impacting patient's ability to return home safely and independently.  The skills of a therapist will be required to safely and effectively implement the following treatment plan to restore maximal level of function    Anticipated Discharge Disposition (OT): sub acute care setting

## 2024-11-12 NOTE — PLAN OF CARE
Goal Outcome Evaluation:  Plan of Care Reviewed With: patient        Progress: no change  Outcome Evaluation: Pt is a long-term care resident at [facility]. Unable to gather PLOF information. She is not agreeable to therapy and is unable to participate in transfers due to weakness and refusal. Recommend return to [facility] once medically appropriate.    Anticipated Discharge Disposition (PT): extended care facility

## 2024-11-12 NOTE — PROGRESS NOTES
[facility]   Hospitalist Progress Note  Date: 2024  Patient Name: Inez Doyle  : 1950  MRN: [id number]  Date of admission: 2024    Subjective   Subjective     Chief Complaint:   Altered mental status    Summary:   Inez Doyle is a 74 y.o. female past medical history of atrial fibrillation on A-fib, CHF, COPD, hypertension presented to the emergency department for evaluation of altered mental status from nursing facility.  Patient reportedly has had decreased activity level and decreased p.o. intake.  Per patient she just feels tired and she is not hungry.  She is oriented x 2 did not know location.  However she is oriented to situation.  Able to answer questions appropriately.  She denies any other fevers, chills, sweats, nausea, vomiting, chest pain shortness of breath palpitations, abdominal pain diarrhea constipation, dysuria, rash.  In the emergency department noted to have hypokalemia with potassium 2.6 and is receiving repletion.  She will be admitted for ongoing monitoring management.     Interval Followup:   Patient is not confused this a.m., seems to be back at her baseline mental functioning    Objective   Objective     Vitals:   Temp:  [97.5 °F (36.4 °C)-97.7 °F (36.5 °C)] 97.5 °F (36.4 °C)  Heart Rate:  [] 104  Resp:  [16-20] 20  BP: ()/(49-95) 103/57  Flow (L/min) (Oxygen Therapy):  [2] 2    Physical Exam   GEN: No acute distress  HEENT: Moist mucous membranes  LUNGS: Equal chest rise bilaterally  CARDIAC: Regular rate and rhythm  NEURO: Moving all 4 extremities spontaneously  SKIN: No obvious breakdown    Result Review    Result Review:  I have personally reviewed the results as below  [x]  Laboratory CBC, CMP personally reviewed  CBC          10/26/2024    08:50 2024    11:25 2024    10:08   CBC   WBC 9.03  10.23  9.53    RBC 5.11  5.08  4.75    Hemoglobin 11.9  12.0  11.0    Hematocrit 40.6  38.1  35.4    MCV 79.5  75.0  74.5    MCH 23.3  23.6   23.2    MCHC 29.3  31.5  31.1    RDW 16.3  18.9  18.6    Platelets 147  124  137      CMP          10/26/2024    08:50 11/11/2024    12:33 11/12/2024    10:08   CMP   Glucose 95  140  186    BUN 13  23  19    Creatinine 0.50  0.73  0.64    EGFR 98.6  86.4  92.9    Sodium 138  133  131    Potassium 3.5  2.6  3.0    Chloride 91  93  94    Calcium 8.9  9.0  9.0    Total Protein  6.0  6.2    Albumin  2.8  2.9    Globulin  3.2  3.3    Total Bilirubin  0.6  0.5    Alkaline Phosphatase  107  111    AST (SGOT)  7  7    ALT (SGPT)  8  8    Albumin/Globulin Ratio  0.9  0.9    BUN/Creatinine Ratio 26.0  31.5  29.7    Anion Gap 17.0  16.4  14.3      []  Microbiology  []  Radiology  []  EKG/Telemetry   []  Cardiology/Vascular   []  Pathology  []  Old records  []  Other:    Scheduled medications:  heparin (porcine), 5,000 Units, Subcutaneous, Q8H  insulin regular, 2-7 Units, Subcutaneous, Q6H  ipratropium-albuterol, 3 mL, Nebulization, Q6H  [Held by provider] losartan, 12.5 mg, Oral, Daily  [Held by provider] megestrol, 400 mg, Oral, Daily  [Held by provider] sertraline, 25 mg, Oral, Daily  sodium chloride, 10 mL, Intravenous, Q12H      As needed medications:    acetaminophen **OR** acetaminophen **OR** acetaminophen    dextrose    dextrose    glucagon (human recombinant)    nitroglycerin    ondansetron    sodium chloride    sodium chloride    sodium chloride  Infusions:          Assessment & Plan   Assessment / Plan     Assessment:  Metabolic encephalopathy  Severe life-threatening hypokalemia  Paroxysmal A-fib  History of COPD  History of CHF  Hypertension    Plan:  Patient admitted to the hospital for further workup and management of above  Patient completed 3 days of antibiotics prior to presenting to the hospital, will monitor off antibiotics currently  Mental status improved  Replace potassium as needed to maintain levels around 4  Continue Coumadin, monitor INR  Continue home bronchodilators  Monitor volume status  closely  Monitor blood pressure closely  Frequent reorientation  Out of bed to chair as tolerates  CBC, CMP reviewed  Repeat CBC, CMP, mag and Phos in a.m.     Reviewed patient's labs and imaging, and discussed with patient and nurse at bedside.    VTE Prophylaxis:  Pharmacologic & mechanical VTE prophylaxis orders are present.        CODE STATUS:   Medical Intervention Limits: No intubation (DNI)  Code Status (Patient has no pulse and is not breathing): No CPR (Do Not Attempt to Resuscitate)  Medical Interventions (Patient has pulse or is breathing): Limited Support      Electronically signed by Ramon Lei MD, 11/12/2024, 13:06 EST.

## 2024-11-12 NOTE — SIGNIFICANT NOTE
11/12/24 0930   Coping/Psychosocial   Observed Emotional State calm;cooperative   Verbalized Emotional State grief   Trust Relationship/Rapport empathic listening provided   Involvement in Care interacting with patient   Additional Documentation Spiritual Care (Group)   Spiritual Care   Use of Spiritual Resources non-Rastafarian use of spiritual care   Spiritual Care Source  initiative   Spiritual Care Follow-Up follow-up, none required as presently assessed   Response to Spiritual Care receptive of support   Spiritual Care Interventions supportive conversation provided;prayer support provided   Spiritual Care Visit Type initial   Receptivity to Spiritual Care visit welcomed

## 2024-11-12 NOTE — PLAN OF CARE
Goal Outcome Evaluation:  Plan of Care Reviewed With: patient   Pt is alert to self and situation with intermittent confusion and hallucination. BP has been trending low, 94/49-98/74. Tachycardia 102-158. Wound dressing performed, awaiting wound consult. Pt is on continuous pulse ox. MD contacted because HR increased to 158 an BP dropped, O2 sat was reading 83%. RT contacted and a continuous pulse ox was set up connected to the nose.  O2 sat was 97%. No new complaints.          Pt became uncooperative and refused to have the dressing of her IV changed in the morning. Pt refused her 6 am glucose check and her morning meds. She refused to have further dressing of the wounds. Continue care plan.

## 2024-11-13 ENCOUNTER — APPOINTMENT (OUTPATIENT)
Dept: CT IMAGING | Facility: HOSPITAL | Age: 74
End: 2024-11-13
Payer: MEDICARE

## 2024-11-13 LAB
ALBUMIN SERPL-MCNC: 2.6 G/DL (ref 3.5–5.2)
ALBUMIN/GLOB SERPL: 0.9 G/DL
ALP SERPL-CCNC: 105 U/L (ref 39–117)
ALT SERPL W P-5'-P-CCNC: 9 U/L (ref 1–33)
ANION GAP SERPL CALCULATED.3IONS-SCNC: 13 MMOL/L (ref 5–15)
AST SERPL-CCNC: 7 U/L (ref 1–32)
BACTERIA SPEC AEROBE CULT: NO GROWTH
BASOPHILS # BLD AUTO: 0.04 10*3/MM3 (ref 0–0.2)
BASOPHILS NFR BLD AUTO: 0.6 % (ref 0–1.5)
BILIRUB SERPL-MCNC: 0.4 MG/DL (ref 0–1.2)
BUN SERPL-MCNC: 17 MG/DL (ref 8–23)
BUN/CREAT SERPL: 25.8 (ref 7–25)
CALCIUM SPEC-SCNC: 8.6 MG/DL (ref 8.6–10.5)
CHLORIDE SERPL-SCNC: 94 MMOL/L (ref 98–107)
CO2 SERPL-SCNC: 26 MMOL/L (ref 22–29)
CREAT SERPL-MCNC: 0.66 MG/DL (ref 0.57–1)
DEPRECATED RDW RBC AUTO: 50.4 FL (ref 37–54)
EGFRCR SERPLBLD CKD-EPI 2021: 92.2 ML/MIN/1.73
EOSINOPHIL # BLD AUTO: 0.07 10*3/MM3 (ref 0–0.4)
EOSINOPHIL NFR BLD AUTO: 1 % (ref 0.3–6.2)
ERYTHROCYTE [DISTWIDTH] IN BLOOD BY AUTOMATED COUNT: 18.8 % (ref 12.3–15.4)
GLOBULIN UR ELPH-MCNC: 3 GM/DL
GLUCOSE BLDC GLUCOMTR-MCNC: 144 MG/DL (ref 70–99)
GLUCOSE BLDC GLUCOMTR-MCNC: 148 MG/DL (ref 70–99)
GLUCOSE BLDC GLUCOMTR-MCNC: 153 MG/DL (ref 70–99)
GLUCOSE BLDC GLUCOMTR-MCNC: 153 MG/DL (ref 70–99)
GLUCOSE BLDC GLUCOMTR-MCNC: 155 MG/DL (ref 70–99)
GLUCOSE SERPL-MCNC: 153 MG/DL (ref 65–99)
HCT VFR BLD AUTO: 32 % (ref 34–46.6)
HGB BLD-MCNC: 9.9 G/DL (ref 12–15.9)
HOLD SPECIMEN: NORMAL
IMM GRANULOCYTES # BLD AUTO: 0.09 10*3/MM3 (ref 0–0.05)
IMM GRANULOCYTES NFR BLD AUTO: 1.3 % (ref 0–0.5)
INR PPP: 1.26 (ref 0.86–1.15)
LYMPHOCYTES # BLD AUTO: 0.91 10*3/MM3 (ref 0.7–3.1)
LYMPHOCYTES NFR BLD AUTO: 12.7 % (ref 19.6–45.3)
MAGNESIUM SERPL-MCNC: 1.5 MG/DL (ref 1.6–2.4)
MCH RBC QN AUTO: 23.3 PG (ref 26.6–33)
MCHC RBC AUTO-ENTMCNC: 30.9 G/DL (ref 31.5–35.7)
MCV RBC AUTO: 75.5 FL (ref 79–97)
MONOCYTES # BLD AUTO: 0.64 10*3/MM3 (ref 0.1–0.9)
MONOCYTES NFR BLD AUTO: 8.9 % (ref 5–12)
NEUTROPHILS NFR BLD AUTO: 5.44 10*3/MM3 (ref 1.7–7)
NEUTROPHILS NFR BLD AUTO: 75.5 % (ref 42.7–76)
NRBC BLD AUTO-RTO: 0 /100 WBC (ref 0–0.2)
PHOSPHATE SERPL-MCNC: 1.9 MG/DL (ref 2.5–4.5)
PLATELET # BLD AUTO: 109 10*3/MM3 (ref 140–450)
PMV BLD AUTO: 10.9 FL (ref 6–12)
POTASSIUM SERPL-SCNC: 2.8 MMOL/L (ref 3.5–5.2)
PROT SERPL-MCNC: 5.6 G/DL (ref 6–8.5)
PROTHROMBIN TIME: 16 SECONDS (ref 11.8–14.9)
RBC # BLD AUTO: 4.24 10*6/MM3 (ref 3.77–5.28)
SODIUM SERPL-SCNC: 133 MMOL/L (ref 136–145)
WBC NRBC COR # BLD AUTO: 7.19 10*3/MM3 (ref 3.4–10.8)
WHOLE BLOOD HOLD SPECIMEN: NORMAL

## 2024-11-13 PROCEDURE — 25010000002 HEPARIN (PORCINE) PER 1000 UNITS: Performed by: INTERNAL MEDICINE

## 2024-11-13 PROCEDURE — 80053 COMPREHEN METABOLIC PANEL: CPT | Performed by: INTERNAL MEDICINE

## 2024-11-13 PROCEDURE — 25810000003 SODIUM CHLORIDE 0.9 % SOLUTION: Performed by: INTERNAL MEDICINE

## 2024-11-13 PROCEDURE — 85025 COMPLETE CBC W/AUTO DIFF WBC: CPT | Performed by: INTERNAL MEDICINE

## 2024-11-13 PROCEDURE — 63710000001 INSULIN REGULAR HUMAN PER 5 UNITS: Performed by: INTERNAL MEDICINE

## 2024-11-13 PROCEDURE — 94664 DEMO&/EVAL PT USE INHALER: CPT

## 2024-11-13 PROCEDURE — 82948 REAGENT STRIP/BLOOD GLUCOSE: CPT | Performed by: INTERNAL MEDICINE

## 2024-11-13 PROCEDURE — 97110 THERAPEUTIC EXERCISES: CPT

## 2024-11-13 PROCEDURE — 85610 PROTHROMBIN TIME: CPT | Performed by: INTERNAL MEDICINE

## 2024-11-13 PROCEDURE — 94799 UNLISTED PULMONARY SVC/PX: CPT

## 2024-11-13 PROCEDURE — 99232 SBSQ HOSP IP/OBS MODERATE 35: CPT | Performed by: INTERNAL MEDICINE

## 2024-11-13 PROCEDURE — 74176 CT ABD & PELVIS W/O CONTRAST: CPT

## 2024-11-13 PROCEDURE — 83735 ASSAY OF MAGNESIUM: CPT | Performed by: INTERNAL MEDICINE

## 2024-11-13 PROCEDURE — 82948 REAGENT STRIP/BLOOD GLUCOSE: CPT

## 2024-11-13 PROCEDURE — 84100 ASSAY OF PHOSPHORUS: CPT | Performed by: INTERNAL MEDICINE

## 2024-11-13 PROCEDURE — 92526 ORAL FUNCTION THERAPY: CPT

## 2024-11-13 RX ORDER — FENTANYL/ROPIVACAINE/NS/PF 2-625MCG/1
15 PLASTIC BAG, INJECTION (ML) EPIDURAL
Status: COMPLETED | OUTPATIENT
Start: 2024-11-13 | End: 2024-11-13

## 2024-11-13 RX ADMIN — Medication 473 ML: at 16:49

## 2024-11-13 RX ADMIN — WHITE PETROLATUM 1 APPLICATION: 1.75 OINTMENT TOPICAL at 21:42

## 2024-11-13 RX ADMIN — Medication 10 ML: at 08:01

## 2024-11-13 RX ADMIN — IPRATROPIUM BROMIDE AND ALBUTEROL SULFATE 3 ML: .5; 3 SOLUTION RESPIRATORY (INHALATION) at 12:05

## 2024-11-13 RX ADMIN — COLLAGENASE SANTYL 1 APPLICATION: 250 OINTMENT TOPICAL at 21:42

## 2024-11-13 RX ADMIN — POTASSIUM PHOSPHATE, MONOBASIC AND POTASSIUM PHOSPHATE, DIBASIC 15 MMOL: 224; 236 INJECTION, SOLUTION, CONCENTRATE INTRAVENOUS at 18:52

## 2024-11-13 RX ADMIN — INSULIN HUMAN 2 UNITS: 100 INJECTION, SOLUTION PARENTERAL at 05:30

## 2024-11-13 RX ADMIN — MICONAZOLE NITRATE 1 APPLICATION: 2 POWDER TOPICAL at 16:51

## 2024-11-13 RX ADMIN — Medication 10 ML: at 20:45

## 2024-11-13 RX ADMIN — Medication 473 ML: at 21:42

## 2024-11-13 RX ADMIN — COLLAGENASE SANTYL 1 APPLICATION: 250 OINTMENT TOPICAL at 16:49

## 2024-11-13 RX ADMIN — IPRATROPIUM BROMIDE AND ALBUTEROL SULFATE 3 ML: .5; 3 SOLUTION RESPIRATORY (INHALATION) at 00:59

## 2024-11-13 RX ADMIN — INSULIN HUMAN 2 UNITS: 100 INJECTION, SOLUTION PARENTERAL at 17:12

## 2024-11-13 RX ADMIN — MICONAZOLE NITRATE 1 APPLICATION: 2 POWDER TOPICAL at 21:42

## 2024-11-13 RX ADMIN — WHITE PETROLATUM 1 APPLICATION: 1.75 OINTMENT TOPICAL at 16:50

## 2024-11-13 RX ADMIN — INSULIN HUMAN 2 UNITS: 100 INJECTION, SOLUTION PARENTERAL at 23:09

## 2024-11-13 RX ADMIN — HEPARIN SODIUM 5000 UNITS: 5000 INJECTION INTRAVENOUS; SUBCUTANEOUS at 05:31

## 2024-11-13 RX ADMIN — IPRATROPIUM BROMIDE AND ALBUTEROL SULFATE 3 ML: .5; 3 SOLUTION RESPIRATORY (INHALATION) at 19:57

## 2024-11-13 RX ADMIN — POTASSIUM PHOSPHATE, MONOBASIC AND POTASSIUM PHOSPHATE, DIBASIC 15 MMOL: 224; 236 INJECTION, SOLUTION, CONCENTRATE INTRAVENOUS at 11:09

## 2024-11-13 RX ADMIN — HEPARIN SODIUM 5000 UNITS: 5000 INJECTION INTRAVENOUS; SUBCUTANEOUS at 15:35

## 2024-11-13 RX ADMIN — IPRATROPIUM BROMIDE AND ALBUTEROL SULFATE 3 ML: .5; 3 SOLUTION RESPIRATORY (INHALATION) at 06:11

## 2024-11-13 RX ADMIN — HEPARIN SODIUM 5000 UNITS: 5000 INJECTION INTRAVENOUS; SUBCUTANEOUS at 21:41

## 2024-11-13 RX ADMIN — POTASSIUM PHOSPHATE, MONOBASIC AND POTASSIUM PHOSPHATE, DIBASIC 15 MMOL: 224; 236 INJECTION, SOLUTION, CONCENTRATE INTRAVENOUS at 14:38

## 2024-11-13 NOTE — PROGRESS NOTES
[facility]   Hospitalist Progress Note  Date: 2024  Patient Name: Inez Doyle  : 1950  MRN: [id number]  Date of admission: 2024    Subjective   Subjective     Chief Complaint:   Altered mental status    Summary:   Inez Doyle is a 74 y.o. female past medical history of atrial fibrillation on A-fib, CHF, COPD, hypertension presented to the emergency department for evaluation of altered mental status from nursing facility.  Patient reportedly has had decreased activity level and decreased p.o. intake.  Per patient she just feels tired and she is not hungry.  She is oriented x 2 did not know location.  However she is oriented to situation.  Able to answer questions appropriately.  She denies any other fevers, chills, sweats, nausea, vomiting, chest pain shortness of breath palpitations, abdominal pain diarrhea constipation, dysuria, rash.  In the emergency department noted to have hypokalemia with potassium 2.6 and is receiving repletion.  She will be admitted for ongoing monitoring management.     Interval Followup:   Patient with some confusion this a.m.    Objective   Objective     Vitals:   Temp:  [97.5 °F (36.4 °C)-98.2 °F (36.8 °C)] 98.2 °F (36.8 °C)  Heart Rate:  [] 101  Resp:  [18-22] 18  BP: (107-123)/(50-73) 107/63  Flow (L/min) (Oxygen Therapy):  [2] 2    Physical Exam   GEN: No acute distress  HEENT: Moist mucous membranes  LUNGS: Equal chest rise bilaterally  CARDIAC: Regular rate and rhythm  NEURO: Moving all 4 extremities spontaneously  SKIN: No obvious breakdown    Result Review    Result Review:  I have personally reviewed the results as below  [x]  Laboratory CBC, CMP personally reviewed  CBC          2024    11:25 2024    10:08 2024    06:01   CBC   WBC 10.23  9.53  7.19    RBC 5.08  4.75  4.24    Hemoglobin 12.0  11.0  9.9    Hematocrit 38.1  35.4  32.0    MCV 75.0  74.5  75.5    MCH 23.6  23.2  23.3    MCHC 31.5  31.1  30.9    RDW 18.9  18.6   18.8    Platelets 124  137  109      CMP          11/11/2024    12:33 11/12/2024    10:08 11/13/2024    06:01   [facility]   Glucose 140  186  153    BUN 23  19  17    Creatinine 0.73  0.64  0.66    EGFR 86.4  92.9  92.2    Sodium 133  131  133    Potassium 2.6  3.0  2.8    Chloride 93  94  94    Calcium 9.0  9.0  8.6    Total Protein 6.0  6.2  5.6    Albumin 2.8  2.9  2.6    Globulin 3.2  3.3  3.0    Total Bilirubin 0.6  0.5  0.4    Alkaline Phosphatase 107  111  105    AST (SGOT) 7  7  7    ALT (SGPT) 8  8  9    Albumin/Globulin Ratio 0.9  0.9  0.9    BUN/Creatinine Ratio 31.5  29.7  25.8    Anion Gap 16.4  14.3  13.0      []  Microbiology  []  Radiology  []  EKG/Telemetry   []  Cardiology/Vascular   []  Pathology  []  Old records  []  Other:    Scheduled medications:  collagenase, 1 Application, Topical, 2 times per day  heparin (porcine), 5,000 Units, Subcutaneous, Q8H  insulin regular, 2-7 Units, Subcutaneous, Q6H  ipratropium-albuterol, 3 mL, Nebulization, Q6H  [Held by provider] losartan, 12.5 mg, Oral, Daily  [Held by provider] megestrol, 400 mg, Oral, Daily  miconazole, 1 Application, Topical, 2 times per day  mineral oil-hydrophilic petrolatum, 1 Application, Topical, 2 times per day  potassium phosphate, 15 mmol, Intravenous, Q3H  [Held by provider] sertraline, 25 mg, Oral, Daily  sodium chloride, 10 mL, Intravenous, Q12H  Sodium Hypochlorite, 1 Application, Apply externally, 2 times per day      As needed medications:    acetaminophen **OR** acetaminophen **OR** acetaminophen    dextrose    dextrose    glucagon (human recombinant)    HYDROcodone-acetaminophen    nitroglycerin    ondansetron    sodium chloride    sodium chloride    sodium chloride  Infusions:          Assessment & Plan   Assessment / Plan     Assessment:  Metabolic encephalopathy  Severe life-threatening hypokalemia  Paroxysmal A-fib  History of COPD  History of CHF  Hypertension    Plan:  Patient admitted to the hospital for further workup and  management of above  Patient completed 3 days of antibiotics prior to presenting to the hospital, will monitor off antibiotics currently  Mental status waxing and waning  Give K-Phos today for hypokalemia and hypophosphatemia  Continue Coumadin, monitor INR  Continue home bronchodilators  Monitor volume status closely  Monitor blood pressure closely  Frequent reorientation  Out of bed to chair as tolerates  Wound care nurse consulted  Palliative care for goals of care discussion  CBC, CMP reviewed 11/13/2024   Repeat CBC, CMP, mag and Phos in a.m. 11/13/2024      Reviewed patient's labs and imaging, and discussed with patient and nurse at bedside.    VTE Prophylaxis:  Pharmacologic & mechanical VTE prophylaxis orders are present.        CODE STATUS:   Medical Intervention Limits: No intubation (DNI)  Code Status (Patient has no pulse and is not breathing): No CPR (Do Not Attempt to Resuscitate)  Medical Interventions (Patient has pulse or is breathing): Limited Support      Electronically signed by Ramon Lei MD, 11/13/2024, 14:00 EST.

## 2024-11-13 NOTE — THERAPY TREATMENT NOTE
Patient Name: Inez Doyle  : 1950    MRN: [id number]                              Today's Date: 2024       Admit Date: 2024    Visit Dx:     ICD-10-CM ICD-9-CM   1. Hypokalemia  E87.6 276.8   2. Anorexia  R63.0 783.0   3. Oropharyngeal dysphagia  R13.12 787.22   4. Difficulty walking  R26.2 719.7     Patient Active Problem List   Diagnosis    Posterior tibial tendon dysfunction    Charcot's joint of foot    Arthritis, lumbar spine    Atrial fibrillation    COVID-19 with multiple comorbidities    Hypoxic respiratory failure    Morbid obesity    Diabetes    AMS (altered mental status)     Past Medical History:   Diagnosis Date    A-fib     Anemia     Anxiety     Asthma     Candidiasis of skin and nail     CHF (congestive heart failure)     COPD (chronic obstructive pulmonary disease)     Depression     Diabetes mellitus     GERD (gastroesophageal reflux disease)     Hyperlipidemia     Hypertension     Hypokalemia     Hypomagnesemia     Intervertebral disc disease     Obesity     Osteoarthritis     Panniculitis     Sleep apnea     Vitamin D deficiency      Past Surgical History:   Procedure Laterality Date    BACK SURGERY      STOMACH SURGERY        General Information       Row Name 24 1001          OT Time and Intention    Document Type therapy note (daily note)  -PG     Mode of Treatment individual therapy;occupational therapy  -PG               User Key  (r) = Recorded By, (t) = Taken By, (c) = Cosigned By      Initials Name Provider Type    PG Guanaco Downs OT Occupational Therapist                     Mobility/ADL's    No documentation.                  Obj/Interventions       Row Name 24 1001          Shoulder (Therapeutic Exercise)    Shoulder (Therapeutic Exercise) strengthening exercise  -PG     Shoulder Strengthening (Therapeutic Exercise) 1 lb free weight;15 repititions  -PG       Row Name 24 1001          Elbow/Forearm (Therapeutic Exercise)    Elbow/Forearm  (Therapeutic Exercise) strengthening exercise  -PG     Elbow/Forearm Strengthening (Therapeutic Exercise) 1 lb free weight;15 repititions  -PG       Row Name 11/13/24 1001          Motor Skills    Therapeutic Exercise shoulder;elbow/forearm  -PG               User Key  (r) = Recorded By, (t) = Taken By, (c) = Cosigned By      Initials Name Provider Type    PG Guanaco Downs, OT Occupational Therapist                   Goals/Plan    No documentation.                  Clinical Impression       Row Name 11/13/24 1002          Plan of Care Review    Plan of Care Reviewed With patient  -PG     Progress no change  -PG               User Key  (r) = Recorded By, (t) = Taken By, (c) = Cosigned By      Initials Name Provider Type    PG Guanaco Downs, OT Occupational Therapist                   Outcome Measures       Row Name 11/13/24 1002          How much help from another is currently needed...    Putting on and taking off regular lower body clothing? 1  -PG     Bathing (including washing, rinsing, and drying) 1  -PG     Toileting (which includes using toilet bed pan or urinal) 1  -PG     Putting on and taking off regular upper body clothing 1  -PG     Taking care of personal grooming (such as brushing teeth) 2  -PG     Eating meals 3  -PG     AM-PAC 6 Clicks Score (OT) 9  -PG       Row Name 11/13/24 0700          How much help from another person do you currently need...    Turning from your back to your side while in flat bed without using bedrails? 2  -FL     Moving from lying on back to sitting on the side of a flat bed without bedrails? 1  -FL     Moving to and from a bed to a chair (including a wheelchair)? 1  -FL     Standing up from a chair using your arms (e.g., wheelchair, bedside chair)? 1  -FL     Climbing 3-5 steps with a railing? 1  -FL     To walk in hospital room? 1  -FL     AM-PAC 6 Clicks Score (PT) 7  -FL     Highest Level of Mobility Goal 2 --> Bed activities/dependent transfer  -FL       Row Name  11/13/24 1002          Functional Assessment    Outcome Measure Options AM-PAC 6 Clicks Daily Activity (OT);Optimal Instrument  -PG       Row Name 11/13/24 1002          Optimal Instrument    Optimal Instrument Optimal - 3  -PG     Bending/Stooping 5  -PG     Standing 5  -PG     Reaching 1  -PG               User Key  (r) = Recorded By, (t) = Taken By, (c) = Cosigned By      Initials Name Provider Type    Lori Britton RN Registered Nurse    Guanaco Díaz OT Occupational Therapist                    Occupational Therapy Education       Title: PT OT SLP Therapies (In Progress)       Topic: Occupational Therapy (In Progress)       Point: ADL training (In Progress)       Description:   Instruct learner(s) on proper safety adaptation and remediation techniques during self care or transfers.   Instruct in proper use of assistive devices.                  Learning Progress Summary            Patient Acceptance, E,D, NR by PG at 11/12/2024 0839                      Point: Home exercise program (In Progress)       Description:   Instruct learner(s) on appropriate technique for monitoring, assisting and/or progressing therapeutic exercises/activities.                  Learning Progress Summary            Patient Acceptance, E,D, NR by PG at 11/12/2024 0839                      Point: Precautions (In Progress)       Description:   Instruct learner(s) on prescribed precautions during self-care and functional transfers.                  Learning Progress Summary            Patient Acceptance, E,D, NR by PG at 11/12/2024 0839                      Point: Body mechanics (In Progress)       Description:   Instruct learner(s) on proper positioning and spine alignment during self-care, functional mobility activities and/or exercises.                  Learning Progress Summary            Patient Acceptance, E,D, NR by PG at 11/12/2024 0839                                      User Key       Initials Effective Dates Name  Provider Type Discipline    PG 06/16/21 -  Guanaco Downs OT Occupational Therapist OT                  OT Recommendation and Plan  Planned Therapy Interventions (OT): activity tolerance training, BADL retraining, strengthening exercise, transfer/mobility retraining, patient/caregiver education/training, occupation/activity based interventions  Therapy Frequency (OT): 5 times/wk  Plan of Care Review  Plan of Care Reviewed With: patient  Progress: no change  Outcome Evaluation: Patient presents with limitations affecting strength, activity tolerance, and balance impacting patient's ability to return home safely and independently.  The skills of a therapist will be required to safely and effectively implement the following treatment plan to restore maximal level of function     Time Calculation:   Evaluation Complexity (OT)  Review Occupational Profile/Medical/Therapy History Complexity: brief/low complexity  Assessment, Occupational Performance/Identification of Deficit Complexity: 3-5 performance deficits  Clinical Decision Making Complexity (OT): problem focused assessment/low complexity  Overall Complexity of Evaluation (OT): low complexity     Time Calculation- OT       Row Name 11/13/24 1002             Time Calculation- OT    OT Received On 11/13/24  -PG      OT Goal Re-Cert Due Date 11/21/24  -PG         Timed Charges    27879 - OT Therapeutic Exercise Minutes 10  -PG         Total Minutes    Timed Charges Total Minutes 10  -PG       Total Minutes 10  -PG                User Key  (r) = Recorded By, (t) = Taken By, (c) = Cosigned By      Initials Name Provider Type    PG Guanaco Downs OT Occupational Therapist                  Therapy Charges for Today       Code Description Service Date Service Provider Modifiers Qty    87314401098  OT EVAL LOW COMPLEXITY 3 11/12/2024 Guanaco Downs OT GO 1    [id number]  OT THER PROC EA 15 MIN 11/13/2024 Guanaco Downs OT GO 1                 Guanaco Downs  OT  11/13/2024

## 2024-11-13 NOTE — PLAN OF CARE
Goal Outcome Evaluation:           Progress: no change  Outcome Evaluation: Titrated to room air today. Pleasantly confused. Tolerated wound care. Phosphorus level low, see orders. Required insulin with dinner. See discharge care plans for further details.

## 2024-11-13 NOTE — PLAN OF CARE
Goal Outcome Evaluation:  Plan of Care Reviewed With: patient           Outcome Evaluation: Pt remains alert to self and  situation. VSS. Pt become uncooperative and refused heart monitor and continues pulse ox.  Pt let me do wound care and skin care except for sacral spine wound. Blood sugar monitored. Call light within reach. Continue plan of care.

## 2024-11-13 NOTE — THERAPY TREATMENT NOTE
Acute Care - Speech Language Pathology   Swallow Treatment Note SKY Munoz     Patient Name: Inez Doyle  : 1950  MRN: [id number]  Today's Date: 2024               Admit Date: 2024    Visit Dx:     ICD-10-CM ICD-9-CM   1. Hypokalemia  E87.6 276.8   2. Anorexia  R63.0 783.0   3. Oropharyngeal dysphagia  R13.12 787.22   4. Difficulty walking  R26.2 719.7     Patient Active Problem List   Diagnosis    Posterior tibial tendon dysfunction    Charcot's joint of foot    Arthritis, lumbar spine    Atrial fibrillation    COVID-19 with multiple comorbidities    Hypoxic respiratory failure    Morbid obesity    Diabetes    AMS (altered mental status)     Past Medical History:   Diagnosis Date    A-fib     Anemia     Anxiety     Asthma     Candidiasis of skin and nail     CHF (congestive heart failure)     COPD (chronic obstructive pulmonary disease)     Depression     Diabetes mellitus     GERD (gastroesophageal reflux disease)     Hyperlipidemia     Hypertension     Hypokalemia     Hypomagnesemia     Intervertebral disc disease     Obesity     Osteoarthritis     Panniculitis     Sleep apnea     Vitamin D deficiency      Past Surgical History:   Procedure Laterality Date    BACK SURGERY      STOMACH SURGERY         SPEECH PATHOLOGY DYSPHAGIA TREATMENT    Subjective/Behavioral Observations: Alert and cooperative, assisted sitting upright in bed      Day/time of Treatment: 2024      Current Diet: Soft to chew with whole meats, thin      Current Strategies:Patient may need assistance with set up, cut food items small. Alternate small bites and small sips of solids and liquids at a slow rate.       Treatment received: Dysphagia therapy to address swallow function through exercises and education of strategies.      Results of treatment: P.o. intake with small meal.  Patient taking large bites, extended chewing followed by swallows completed.  No overt clinical signs or symptoms of aspiration noted.   Patient requiring moderate cueing to decrease bite-size.      Progress toward goals: Adequate      Barriers to Achieving goals: N/A      Plan of care:/changes in plan: Continue per current plan.                                                                                                      EDUCATION  The patient has been educated in the following areas:   Modified Diet Instruction.                Time Calculation:    Time Calculation- SLP       Row Name 11/13/24 1000             Time Calculation- SLP    SLP Stop Time 0845  -TB      SLP Received On 11/13/24  -TB         Untimed Charges    12949-QY Treatment Swallow Minutes 40  -TB         Total Minutes    Untimed Charges Total Minutes 40  -TB       Total Minutes 40  -TB                User Key  (r) = Recorded By, (t) = Taken By, (c) = Cosigned By      Initials Name Provider Type    TB Jacque Reddy SLP Speech and Language Pathologist                    Therapy Charges for Today       Code Description Service Date Service Provider Modifiers Qty    11051386496 HC ST EVAL ORAL PHARYNG SWALLOW 4 11/12/2024 Jacque Reddy SLP GN 1    73748513208 HC ST TREATMENT SWALLOW 3 11/13/2024 Jacque Reddy SLP GN 1                 RICHARDSON Mcnamara  11/13/2024

## 2024-11-13 NOTE — PLAN OF CARE
Goal Outcome Evaluation:  Plan of Care Reviewed With: patient        Progress: no change  Outcome Evaluation: Pt is alert to self and situation.  VSS.  Wound Care consulted with pt today and performed dressing changes. Pt has refused care at times this shift.  Pt refuses the heart monitor and pulls the continuous pulse ox off her nose.  Continue POC.

## 2024-11-14 PROBLEM — E43 SEVERE PROTEIN-CALORIE MALNUTRITION: Status: ACTIVE | Noted: 2024-11-14

## 2024-11-14 LAB
ALBUMIN SERPL-MCNC: 2.6 G/DL (ref 3.5–5.2)
ALBUMIN/GLOB SERPL: 0.8 G/DL
ALP SERPL-CCNC: 117 U/L (ref 39–117)
ALT SERPL W P-5'-P-CCNC: 9 U/L (ref 1–33)
ANION GAP SERPL CALCULATED.3IONS-SCNC: 13 MMOL/L (ref 5–15)
AST SERPL-CCNC: 8 U/L (ref 1–32)
BACTERIA SPEC AEROBE CULT: ABNORMAL
BACTERIA SPEC AEROBE CULT: ABNORMAL
BASOPHILS # BLD AUTO: 0.05 10*3/MM3 (ref 0–0.2)
BASOPHILS NFR BLD AUTO: 0.7 % (ref 0–1.5)
BILIRUB SERPL-MCNC: 0.5 MG/DL (ref 0–1.2)
BUN SERPL-MCNC: 13 MG/DL (ref 8–23)
BUN/CREAT SERPL: 25.5 (ref 7–25)
CALCIUM SPEC-SCNC: 8.8 MG/DL (ref 8.6–10.5)
CHLORIDE SERPL-SCNC: 99 MMOL/L (ref 98–107)
CO2 SERPL-SCNC: 25 MMOL/L (ref 22–29)
CREAT SERPL-MCNC: 0.51 MG/DL (ref 0.57–1)
DEPRECATED RDW RBC AUTO: 50.6 FL (ref 37–54)
EGFRCR SERPLBLD CKD-EPI 2021: 98.1 ML/MIN/1.73
EOSINOPHIL # BLD AUTO: 0.08 10*3/MM3 (ref 0–0.4)
EOSINOPHIL NFR BLD AUTO: 1.1 % (ref 0.3–6.2)
ERYTHROCYTE [DISTWIDTH] IN BLOOD BY AUTOMATED COUNT: 19.1 % (ref 12.3–15.4)
GLOBULIN UR ELPH-MCNC: 3.2 GM/DL
GLUCOSE BLDC GLUCOMTR-MCNC: 141 MG/DL (ref 70–99)
GLUCOSE BLDC GLUCOMTR-MCNC: 156 MG/DL (ref 70–99)
GLUCOSE BLDC GLUCOMTR-MCNC: 165 MG/DL (ref 70–99)
GLUCOSE BLDC GLUCOMTR-MCNC: 167 MG/DL (ref 70–99)
GLUCOSE BLDC GLUCOMTR-MCNC: 168 MG/DL (ref 70–99)
GLUCOSE SERPL-MCNC: 155 MG/DL (ref 65–99)
GRAM STN SPEC: ABNORMAL
HCT VFR BLD AUTO: 34.7 % (ref 34–46.6)
HGB BLD-MCNC: 10.4 G/DL (ref 12–15.9)
IMM GRANULOCYTES # BLD AUTO: 0.12 10*3/MM3 (ref 0–0.05)
IMM GRANULOCYTES NFR BLD AUTO: 1.6 % (ref 0–0.5)
INR PPP: 1.27 (ref 0.86–1.15)
ISOLATED FROM: ABNORMAL
ISOLATED FROM: ABNORMAL
LYMPHOCYTES # BLD AUTO: 0.87 10*3/MM3 (ref 0.7–3.1)
LYMPHOCYTES NFR BLD AUTO: 11.6 % (ref 19.6–45.3)
MAGNESIUM SERPL-MCNC: 1.5 MG/DL (ref 1.6–2.4)
MCH RBC QN AUTO: 22.5 PG (ref 26.6–33)
MCHC RBC AUTO-ENTMCNC: 30 G/DL (ref 31.5–35.7)
MCV RBC AUTO: 74.9 FL (ref 79–97)
MONOCYTES # BLD AUTO: 0.73 10*3/MM3 (ref 0.1–0.9)
MONOCYTES NFR BLD AUTO: 9.7 % (ref 5–12)
NEUTROPHILS NFR BLD AUTO: 5.67 10*3/MM3 (ref 1.7–7)
NEUTROPHILS NFR BLD AUTO: 75.3 % (ref 42.7–76)
NRBC BLD AUTO-RTO: 0.3 /100 WBC (ref 0–0.2)
PHOSPHATE SERPL-MCNC: 3.9 MG/DL (ref 2.5–4.5)
PLATELET # BLD AUTO: 123 10*3/MM3 (ref 140–450)
PMV BLD AUTO: 11.2 FL (ref 6–12)
POTASSIUM SERPL-SCNC: 3.3 MMOL/L (ref 3.5–5.2)
PROT SERPL-MCNC: 5.8 G/DL (ref 6–8.5)
PROTHROMBIN TIME: 16.2 SECONDS (ref 11.8–14.9)
RBC # BLD AUTO: 4.63 10*6/MM3 (ref 3.77–5.28)
SODIUM SERPL-SCNC: 137 MMOL/L (ref 136–145)
WBC NRBC COR # BLD AUTO: 7.52 10*3/MM3 (ref 3.4–10.8)

## 2024-11-14 PROCEDURE — 94664 DEMO&/EVAL PT USE INHALER: CPT

## 2024-11-14 PROCEDURE — 25010000002 MAGNESIUM SULFATE 2 GM/50ML SOLUTION: Performed by: INTERNAL MEDICINE

## 2024-11-14 PROCEDURE — 85025 COMPLETE CBC W/AUTO DIFF WBC: CPT | Performed by: INTERNAL MEDICINE

## 2024-11-14 PROCEDURE — 85610 PROTHROMBIN TIME: CPT | Performed by: INTERNAL MEDICINE

## 2024-11-14 PROCEDURE — 25010000002 HEPARIN (PORCINE) PER 1000 UNITS: Performed by: INTERNAL MEDICINE

## 2024-11-14 PROCEDURE — 80053 COMPREHEN METABOLIC PANEL: CPT | Performed by: INTERNAL MEDICINE

## 2024-11-14 PROCEDURE — 84100 ASSAY OF PHOSPHORUS: CPT | Performed by: INTERNAL MEDICINE

## 2024-11-14 PROCEDURE — 82948 REAGENT STRIP/BLOOD GLUCOSE: CPT

## 2024-11-14 PROCEDURE — 94799 UNLISTED PULMONARY SVC/PX: CPT

## 2024-11-14 PROCEDURE — 25010000002 ONDANSETRON PER 1 MG: Performed by: INTERNAL MEDICINE

## 2024-11-14 PROCEDURE — 83735 ASSAY OF MAGNESIUM: CPT | Performed by: INTERNAL MEDICINE

## 2024-11-14 PROCEDURE — 92526 ORAL FUNCTION THERAPY: CPT

## 2024-11-14 PROCEDURE — 63710000001 INSULIN REGULAR HUMAN PER 5 UNITS: Performed by: INTERNAL MEDICINE

## 2024-11-14 PROCEDURE — 94760 N-INVAS EAR/PLS OXIMETRY 1: CPT

## 2024-11-14 PROCEDURE — 99232 SBSQ HOSP IP/OBS MODERATE 35: CPT | Performed by: INTERNAL MEDICINE

## 2024-11-14 PROCEDURE — 82948 REAGENT STRIP/BLOOD GLUCOSE: CPT | Performed by: INTERNAL MEDICINE

## 2024-11-14 RX ORDER — HYDROCODONE BITARTRATE AND ACETAMINOPHEN 5; 325 MG/1; MG/1
1 TABLET ORAL EVERY 6 HOURS PRN
Status: DISPENSED | OUTPATIENT
Start: 2024-11-14 | End: 2024-11-19

## 2024-11-14 RX ORDER — MAGNESIUM SULFATE HEPTAHYDRATE 40 MG/ML
2 INJECTION, SOLUTION INTRAVENOUS ONCE
Status: COMPLETED | OUTPATIENT
Start: 2024-11-14 | End: 2024-11-14

## 2024-11-14 RX ORDER — POTASSIUM CHLORIDE 750 MG/1
40 CAPSULE, EXTENDED RELEASE ORAL EVERY 4 HOURS
Status: COMPLETED | OUTPATIENT
Start: 2024-11-14 | End: 2024-11-14

## 2024-11-14 RX ADMIN — HEPARIN SODIUM 5000 UNITS: 5000 INJECTION INTRAVENOUS; SUBCUTANEOUS at 05:12

## 2024-11-14 RX ADMIN — ONDANSETRON 4 MG: 2 INJECTION INTRAMUSCULAR; INTRAVENOUS at 17:41

## 2024-11-14 RX ADMIN — WHITE PETROLATUM 1 APPLICATION: 1.75 OINTMENT TOPICAL at 11:09

## 2024-11-14 RX ADMIN — Medication 473 ML: at 21:56

## 2024-11-14 RX ADMIN — ONDANSETRON 4 MG: 2 INJECTION INTRAMUSCULAR; INTRAVENOUS at 08:28

## 2024-11-14 RX ADMIN — HEPARIN SODIUM 5000 UNITS: 5000 INJECTION INTRAVENOUS; SUBCUTANEOUS at 21:54

## 2024-11-14 RX ADMIN — IPRATROPIUM BROMIDE AND ALBUTEROL SULFATE 3 ML: .5; 3 SOLUTION RESPIRATORY (INHALATION) at 00:35

## 2024-11-14 RX ADMIN — MICONAZOLE NITRATE 1 APPLICATION: 2 POWDER TOPICAL at 11:09

## 2024-11-14 RX ADMIN — Medication 10 ML: at 21:54

## 2024-11-14 RX ADMIN — MAGNESIUM SULFATE IN WATER 2 G: 40 INJECTION, SOLUTION INTRAVENOUS at 08:28

## 2024-11-14 RX ADMIN — COLLAGENASE SANTYL 1 APPLICATION: 250 OINTMENT TOPICAL at 11:09

## 2024-11-14 RX ADMIN — WHITE PETROLATUM 1 APPLICATION: 1.75 OINTMENT TOPICAL at 21:56

## 2024-11-14 RX ADMIN — HEPARIN SODIUM 5000 UNITS: 5000 INJECTION INTRAVENOUS; SUBCUTANEOUS at 14:07

## 2024-11-14 RX ADMIN — HYDROCODONE BITARTRATE AND ACETAMINOPHEN 1 TABLET: 5; 325 TABLET ORAL at 23:30

## 2024-11-14 RX ADMIN — INSULIN HUMAN 2 UNITS: 100 INJECTION, SOLUTION PARENTERAL at 12:33

## 2024-11-14 RX ADMIN — POTASSIUM CHLORIDE 40 MEQ: 750 CAPSULE, EXTENDED RELEASE ORAL at 08:44

## 2024-11-14 RX ADMIN — ONDANSETRON 4 MG: 2 INJECTION INTRAMUSCULAR; INTRAVENOUS at 23:25

## 2024-11-14 RX ADMIN — Medication 10 ML: at 08:44

## 2024-11-14 RX ADMIN — POTASSIUM CHLORIDE 40 MEQ: 750 CAPSULE, EXTENDED RELEASE ORAL at 14:07

## 2024-11-14 RX ADMIN — IPRATROPIUM BROMIDE AND ALBUTEROL SULFATE 3 ML: .5; 3 SOLUTION RESPIRATORY (INHALATION) at 20:16

## 2024-11-14 RX ADMIN — IPRATROPIUM BROMIDE AND ALBUTEROL SULFATE 3 ML: .5; 3 SOLUTION RESPIRATORY (INHALATION) at 06:42

## 2024-11-14 RX ADMIN — Medication 473 ML: at 11:09

## 2024-11-14 RX ADMIN — HYDROCODONE BITARTRATE AND ACETAMINOPHEN 1 TABLET: 5; 325 TABLET ORAL at 11:07

## 2024-11-14 RX ADMIN — COLLAGENASE SANTYL 1 APPLICATION: 250 OINTMENT TOPICAL at 21:56

## 2024-11-14 RX ADMIN — MICONAZOLE NITRATE 1 APPLICATION: 2 POWDER TOPICAL at 21:55

## 2024-11-14 RX ADMIN — IPRATROPIUM BROMIDE AND ALBUTEROL SULFATE 3 ML: .5; 3 SOLUTION RESPIRATORY (INHALATION) at 13:37

## 2024-11-14 NOTE — SIGNIFICANT NOTE
11/14/24 1347   OTHER   Discipline occupational therapist   Rehab Time/Intention   Session Not Performed patient/family declined treatment

## 2024-11-14 NOTE — SIGNIFICANT NOTE
Wound Eval / Progress Noted    SKY Munoz     Patient Name: Inez Doyle  : 1950  MRN: [id number]  Today's Date: 2024                 Admit Date: 2024    Visit Dx:    ICD-10-CM ICD-9-CM   1. Hypokalemia  E87.6 276.8   2. Anorexia  R63.0 783.0   3. Oropharyngeal dysphagia  R13.12 787.22   4. Difficulty walking  R26.2 719.7         AMS (altered mental status)        Past Medical History:   Diagnosis Date    A-fib     Anemia     Anxiety     Asthma     Candidiasis of skin and nail     CHF (congestive heart failure)     COPD (chronic obstructive pulmonary disease)     Depression     Diabetes mellitus     GERD (gastroesophageal reflux disease)     Hyperlipidemia     Hypertension     Hypokalemia     Hypomagnesemia     Intervertebral disc disease     Obesity     Osteoarthritis     Panniculitis     Sleep apnea     Vitamin D deficiency         Past Surgical History:   Procedure Laterality Date    BACK SURGERY      STOMACH SURGERY           Physical Assessment:     24 1650   Wound 24 1733 Right lower abdomen   Placement Date/Time: (c) 24   Side: Right  Orientation: lower  Location: abdomen  Present on Original Admission: Yes   Wound Image    Dressing Appearance dry;intact   Closure None   Base moist;necrotic;yellow;slough;black;eschar;red   Black (%), Wound Tissue Color 60   Red (%), Wound Tissue Color 10   Yellow (%), Wound Tissue Color 30   Periwound dry;indurated;maroon/purple   Periwound Temperature warm   Periwound Skin Turgor firm   Edges open   Wound Length (cm) 6.4 cm   Wound Width (cm) 15.5 cm   Wound Depth (cm) 2.4 cm   Wound Surface Area (cm^2) 99.2 cm^2   Wound Volume (cm^3) 238.08 cm^3   Drainage Characteristics/Odor tan   Drainage Amount scant   Care, Wound cleansed with;sterile normal saline   Dressing Care dressing removed;dressing applied;gauze, wet-to-moist;silicone border foam   Periwound Care absorptive dressing applied   Wound 24 1738 Left anterior hip    Placement Date/Time: 11/11/24 1738   Side: Left  Orientation: anterior  Location: hip   Wound Image    Dressing Appearance dry;intact   Closure None   Base moist;red;yellow;slough   Red (%), Wound Tissue Color 20   Yellow (%), Wound Tissue Color 80   Periwound dry;indurated;maroon/purple   Periwound Temperature warm   Periwound Skin Turgor firm   Edges open   Wound Length (cm) 3 cm   Wound Width (cm) 5 cm   Wound Depth (cm) 1.2 cm   Wound Surface Area (cm^2) 15 cm^2   Wound Volume (cm^3) 18 cm^3   Drainage Characteristics/Odor tan   Drainage Amount scant   Care, Wound cleansed with;sterile normal saline   Dressing Care dressing removed;dressing applied;gauze, wet-to-moist;silicone border foam   Periwound Care absorptive dressing applied   Wound 11/11/24 1751 sacral spine   Placement Date/Time: 11/11/24 1751   Location: sacral spine   Wound Image    Pressure Injury Stage 3   Dressing Appearance dry;intact   Closure None   Base moist;red;yellow;slough   Red (%), Wound Tissue Color 80   Yellow (%), Wound Tissue Color 20   Periwound dry;redness;maroon/purple;pink   Periwound Temperature warm   Periwound Skin Turgor soft   Edges open   Drainage Characteristics/Odor serosanguineous   Drainage Amount scant   Care, Wound cleansed with;irrigated with;sterile normal saline   Dressing Care dressing removed;dressing applied;silver impregnated;hydrofiber;silicone border foam   Periwound Care absorptive dressing applied   Wound 11/11/24 1757 Right breast   Placement Date/Time: 11/11/24 1757   Side: Right  Location: breast   Wound Image    Dressing Appearance open to air   Closure None   Base moist;red   Red (%), Wound Tissue Color 100   Periwound dry;redness;maroon/purple   Periwound Temperature warm   Periwound Skin Turgor soft   Edges open   Drainage Characteristics/Odor serosanguineous   Drainage Amount scant   Care, Wound cleansed with;sterile normal saline   Dressing Care open to air   Periwound Care dry periwound area  maintained   Wound 11/12/24 1650 Right lateral thigh unspecified   Placement Date/Time: 11/12/24 1650   Side: Right  Orientation: lateral  Location: thigh  Type: unspecified   Wound Image    Dressing Appearance open to air   Closure None   Base moist;red   Red (%), Wound Tissue Color 100   Periwound dry;pink   Periwound Temperature warm   Periwound Skin Turgor soft   Edges open   Drainage Characteristics/Odor serosanguineous   Drainage Amount scant   Care, Wound cleansed with;sterile normal saline   Dressing Care dressing applied;silver impregnated;hydrofiber;silicone border foam   Periwound Care absorptive dressing applied   Wound 11/12/24 1650 Left posterior heel pressure injury   Placement Date/Time: 11/12/24 1650   Side: Left  Orientation: posterior  Location: heel  Primary Wound Type: Pressure Injury  Type: pressure injury   Wound Image    Pressure Injury Stage DTPI   Dressing Appearance open to air   Closure None   Base maroon/purple;nonblanchable;dry   Periwound dry;maroon/purple;redness   Periwound Temperature warm   Periwound Skin Turgor soft   Edges open   Drainage Amount none   Care, Wound cleansed with;sterile normal saline   Dressing Care dressing applied;silver impregnated;hydrofiber;silicone border foam   Periwound Care absorptive dressing applied   Wound 11/12/24 1650 Left breast MASD (moisture associated skin damage)   Placement Date/Time: 11/12/24 1650   Side: Left  Location: breast  Primary Wound Type: MASD (Moisture associated skin damage)  Type: MASD (moisture associated skin damage)   Wound Image    Dressing Appearance open to air   Closure None   Base moist;red   Red (%), Wound Tissue Color 100   Periwound dry;redness;pink   Periwound Temperature warm   Periwound Skin Turgor soft   Edges open   Drainage Characteristics/Odor serosanguineous   Drainage Amount scant   Care, Wound cleansed with;sterile normal saline   Dressing Care open to air   Periwound Care dry periwound area maintained     Right heel    Wound Check / Follow-up:  Patient seen today for wound consult. Patient is awake, alert and responding to staff appropriately. Patient is unable to give specific information regarding her wounds, such as origin or duration. Patient states she resides at [facility] and reports she had imaging done, which showed a hernia. Patient reports excessive pain with wound care. Primary RN notified and PRN analgesic administered. Patient refused nutritional supplements, stating she has tried them before and hates them. Will consult dietitian.  A specialty bed will be ordered for this patient.  Patient with indwelling urinary catheter noted at time of assessment.    Per chart review history of panniculitis with open wounds remaining in August 2024, unable to determine etiology of abdominal wounds at this time.  Ulceration to right aspect of abdomen. Wound base is primarily covered with black eschar and yellow slough. Intermittent areas of yellow adipose tissue and moist red tissue noted. Periwound tissue is dry with purple coloration and induration.  Wound bed is noted to be malodorous.  Gently cleansed with normal saline and gauze, blotted dry. Patient unable to tolerate much stimulation of necrotic tissue. Eschar is able to be shifted, but remains tightly adhered.  Ulceration to left aspect of abdomen. Wound base is primarily covered with yellow slough. Intermittent areas of yellow adipose tissue and moist red tissue noted. Periwound tissue is dry with purple coloration and induration.  Wound bed is noted to be malodorous.  Areas of purple coloration and induration noted to tissue lateral to 9 o'clock of wound base. Gently cleansed with normal saline and gauze, blotted dry. Patient unable to tolerate much stimulation of necrotic tissue.  Recommending twice a day wound care with a nickel thick layer of Santyl being applied to all necrotic tissue, wound bases being filled/covered with 1/8th strength  Dakin's moistened fluffed gauze, and silicone border dressing securement.  Discussed findings with attending MD and orders obtained.    Sacral region presents with four open wound bases within area of pink and maroon/purple tissue.   Stage 3 pressure injury noted to sacrum. Wound base measures 4.3cm x 3.5cm x 1.3xm.  Wound base presents with moist red tissue, with tan and yellow slough.  Periwound tissue is dry with nonblanchable maroon/purple tissue, and blanchable pink tissue.  Stage 3 pressure injury noted to left gluteal aspect. Wound base measures 3.8cm x 4.4cm x 0.7cm.  Wound base presents with moist red tissue and yellow slough.  Area of depth noted at proximal aspect of wound base. Periwound tissue is dry with nonblanchable maroon/purple tissue, and blanchable pink tissue.  Stage 2 pressure injury noted to left medial gluteal aspect. Wound base measures 2.4cm x 1.5cm x 0.1cm. Wound base presents with moist red tissue. Periwound tissue is dry with nonblanchable maroon/purple tissue, and blanchable pink tissue.  Stage 3 pressure injury noted to right gluteal aspect. Wound base measures 1.2cm x 0.8cm x 0.1cm. Wound base is primarily covered with moist yellow slough, with areas of maroon and red tissue noted. Periwound tissue is dry with nonblanchable maroon/purple tissue, and blanchable redness/pink tissue.  Cleansed all ulcerations with normal saline and gauze, blotted dry. Recommending twice a day would care with sacral ulcer being lightly filled with layers of silver impregnated hydrofiber, all other ulcerations being covered with silver impregnated hydrofiber, and silicone border dressing securement.    Bilateral breast and skin folds to bilateral flanks present with moisture associated skin damage, with areas of erosion.  Areas of erosion present with moist red tissue. Periwound tissue is dry with redness and a fungal presentation.  Pinpoint areas of petechiae-appearing maroon tissue noted to right flank  crease.  Cleansed with normal saline and gauze, blotted dry.  Recommending quality skin care and hygiene with application of miconazole powder twice a day, alternating with application of stoma powder twice a day.  May place dry sheets for added moisture absorption.  Discussed findings with attending MD and orders obtained.    Right flank presents with area of linear maroon/redness to intact tissue.  Patient reports this injury occurred while she was being pulled up with a sheet during transfer.  Area appears to be trauma at this time, cannot rule out pressure.  Cleansed tissue with normal saline and gauze, blotted dry.  Recommending quality skin care and hygiene.    Right lateral upper leg presents with area of moist red tissue.  Periwound tissue is dry with no discoloration.  Cleansed with normal saline and gauze, blotted dry.  Recommending daily dressing changes with non-adherent, petroleum-based gauze and silicone border dressing securement.    Deep tissue pressure injury noted to left heel.  Heel is noted to be boggy.  Area of superficial tissue loss noted within nonblanchable maroon/purple tissue.  Area of open tissue is dry with nonblanchable maroon/purple coloration.  Periwound tissue is dry with blanchable redness, with area of blanchable room tissue noted to distal heel.  Cleansed with normal saline and gauze, blotted dry.  Recommending daily dressing changes with non-adherent, petroleum-based gauze silicone border dressing securement.  Recommending heels be elevated at all times with utilization of HeelzUp pillow.    Right heel presents with blanchable redness to intact tissue.  Heel is noted to be boggy.  Cleansed with normal saline and gauze, blotted dry.  Recommending quality skin care and hygiene with application of blue top moisture barrier 4 times a day.     Recommending quality skin care and hygiene with application of blue top moisture barrier to groin, hips, elbows, and gluteal aspects 4 times a  day and as needed for incontinence. Implement every 2 hour turns, and offload at all times.  Keep patient clean, dry, and free from all moisture.    Impression: Ulceration to right and left aspect of abdomen with necrotic tissue.  Stage III pressure injury to sacrum.  Stage III pressure injury to left gluteal aspect.  Stage II pressure injury to left medial gluteal aspect.  Stage III pressure injury to right gluteal aspect.  Moisture associated skin damage with erosion and a fungal presentation of bilateral breast creases and skin folds to bilateral flanks.  Linear area of maroon/red coloration to intact tissue to right flank, suspected trauma caused-cannot rule out pressure.  Area of moist red tissue to right lateral upper leg.  Deep tissue pressure injury with superficial tissue loss to left heel.    Short term goals:  Regain skin integrity, skin protection, moisture prevention, pressure reduction, quality skin care and hygiene, daily dressing changes, twice a day dressing changes, topical treatment.    Candi Mccauley RN    11/13/2024    20:31 EST

## 2024-11-14 NOTE — PLAN OF CARE
Goal Outcome Evaluation:         Outcome Evaluation: Pt is disoriented to situation. Pt is on room air. Gave zofran for nausea, pt stated she felt too sick to eat. Encouraged patient to eat and drink during shift. Wound care completed during shift. Continue plan of care.

## 2024-11-14 NOTE — CONSULTS
Purpose of the visit was to evaluate for: goals of care/advanced care planning and support for patient/family. Spoke with MD and POA and discussed goals of care, care options, resuscitation status, and clarify code status.      Assessment:  RN,CCM,Palliative Care spoke with POAnastasiia SHEEHAN, over phone to complete consult.  Patient confused and unable to answer questions.  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  Medical history of atrial fibrillation on A-fib, CHF, COPD, and hypertension.  POA paperwork on file. Patient is a resident of [facility] since June or July 2024.  Palliative inquired about abdominal wounds with POA reporting the facility informed they were infected cyst areas.  Informed MD of information.  POA reports patient can return to [facility] before 30 days.  EMS will need to provide transportation back to the facility.  Will continue to monitor        Recommendations/Plan:  Return to long term care at [facility] .    Tasks Completed: Code Status clarification and Emotional Support.    Romana CROW RN, CCM  Palliative Care

## 2024-11-14 NOTE — PLAN OF CARE
Goal Outcome Evaluation:  Plan of Care Reviewed With: patient           Outcome Evaluation: VSS.Pt fully alert and oriented during shift.  Remains room air. Wound care completed. Blood glucose monitored. Continue plan of care.

## 2024-11-14 NOTE — PROGRESS NOTES
[facility]   Hospitalist Progress Note  Date: 2024  Patient Name: Inez Doyle  : 1950  MRN: [id number]  Date of admission: 2024    Subjective   Subjective     Chief Complaint:   Altered mental status    Summary:   Inez Doyle is a 74 y.o. female past medical history of atrial fibrillation on A-fib, CHF, COPD, hypertension presented to the emergency department for evaluation of altered mental status from nursing facility.  Patient reportedly has had decreased activity level and decreased p.o. intake.  Per patient she just feels tired and she is not hungry.  She is oriented x 2 did not know location.  However she is oriented to situation.  Able to answer questions appropriately.  She denies any other fevers, chills, sweats, nausea, vomiting, chest pain shortness of breath palpitations, abdominal pain diarrhea constipation, dysuria, rash.  In the emergency department noted to have hypokalemia with potassium 2.6 and is receiving repletion.  She will be admitted for ongoing monitoring management.     Interval Followup:   Patient resting comfortably in bed, speech therapy at bedside    Objective   Objective     Vitals:   Temp:  [97.7 °F (36.5 °C)-98.3 °F (36.8 °C)] 97.7 °F (36.5 °C)  Heart Rate:  [] 81  Resp:  [17-18] 18  BP: ()/(55-84) 132/84    Physical Exam   GEN: No acute distress  HEENT: Moist mucous membranes  LUNGS: Equal chest rise bilaterally  CARDIAC: Regular rate and rhythm  NEURO: Moving all 4 extremities spontaneously  SKIN: No obvious breakdown    Result Review    Result Review:  I have personally reviewed the results as below  [x]  Laboratory CBC, CMP personally reviewed  CBC          2024    10:08 2024    06:01 2024    05:17   CBC   WBC 9.53  7.19  7.52    RBC 4.75  4.24  4.63    Hemoglobin 11.0  9.9  10.4    Hematocrit 35.4  32.0  34.7    MCV 74.5  75.5  74.9    MCH 23.2  23.3  22.5    MCHC 31.1  30.9  30.0    RDW 18.6  18.8  19.1    Platelets  137  109  123      CMP          11/12/2024    10:08 11/13/2024    06:01 11/14/2024    05:17   CMP   Glucose 186  153  155    BUN 19  17  13    Creatinine 0.64  0.66  0.51    EGFR 92.9  92.2  98.1    Sodium 131  133  137    Potassium 3.0  2.8  3.3    Chloride 94  94  99    Calcium 9.0  8.6  8.8    Total Protein 6.2  5.6  5.8    Albumin 2.9  2.6  2.6    Globulin 3.3  3.0  3.2    Total Bilirubin 0.5  0.4  0.5    Alkaline Phosphatase 111  105  117    AST (SGOT) 7  7  8    ALT (SGPT) 8  9  9    Albumin/Globulin Ratio 0.9  0.9  0.8    BUN/Creatinine Ratio 29.7  25.8  25.5    Anion Gap 14.3  13.0  13.0      []  Microbiology  []  Radiology  []  EKG/Telemetry   []  Cardiology/Vascular   []  Pathology  []  Old records  []  Other:    Scheduled medications:  collagenase, 1 Application, Topical, 2 times per day  heparin (porcine), 5,000 Units, Subcutaneous, Q8H  insulin regular, 2-7 Units, Subcutaneous, Q6H  ipratropium-albuterol, 3 mL, Nebulization, Q6H  [Held by provider] losartan, 12.5 mg, Oral, Daily  [Held by provider] megestrol, 400 mg, Oral, Daily  miconazole, 1 Application, Topical, 2 times per day  mineral oil-hydrophilic petrolatum, 1 Application, Topical, 2 times per day  potassium chloride, 40 mEq, Oral, Q4H  [Held by provider] sertraline, 25 mg, Oral, Daily  sodium chloride, 10 mL, Intravenous, Q12H  Sodium Hypochlorite, 1 Application, Apply externally, 2 times per day      As needed medications:    acetaminophen **OR** acetaminophen **OR** acetaminophen    dextrose    dextrose    glucagon (human recombinant)    HYDROcodone-acetaminophen    nitroglycerin    ondansetron    sodium chloride    sodium chloride    sodium chloride  Infusions:          Assessment & Plan   Assessment / Plan     Assessment:  Metabolic encephalopathy  Severe life-threatening hypokalemia  Paroxysmal A-fib  History of COPD  History of CHF  Hypertension    Plan:  Patient admitted to the hospital for further workup and management of  above  Patient completed 3 days of antibiotics prior to presenting to the hospital, will monitor off antibiotics currently  Mental status waxing and waning  Received K-Phos yesterday with improvement, give oral potassium again today  Continue Coumadin, monitor INR  Continue home bronchodilators  Monitor volume status closely  Monitor blood pressure closely  Frequent reorientation  Out of bed to chair as tolerates  Wound care nurse consulted  CT scan of the abdominal wound reviewed  Out of care for goals of care discussion, pending discussion we will likely need general surgery consultation for debridement  CBC, CMP reviewed 11/14/2024   Repeat CBC, CMP, mag and Phos in a.m. 11/14/2024      Reviewed patient's labs and imaging, and discussed with patient and nurse at bedside.    VTE Prophylaxis:  Pharmacologic & mechanical VTE prophylaxis orders are present.        CODE STATUS:   Medical Intervention Limits: No intubation (DNI)  Code Status (Patient has no pulse and is not breathing): No CPR (Do Not Attempt to Resuscitate)  Medical Interventions (Patient has pulse or is breathing): Limited Support      Electronically signed by Ramon Lei MD, 11/14/2024, 12:43 EST.

## 2024-11-14 NOTE — CONSULTS
"Nutrition Services    Patient Name: Inez Doyle  YOB: 1950  MRN: [id number]  Admission date: 11/11/2024      CLINICAL NUTRITION ASSESSMENT      Reason for Assessment  Physician Consult     H&P:  Past Medical History:   Diagnosis Date    A-fib     Anemia     Anxiety     Asthma     Candidiasis of skin and nail     CHF (congestive heart failure)     COPD (chronic obstructive pulmonary disease)     Depression     Diabetes mellitus     GERD (gastroesophageal reflux disease)     Hyperlipidemia     Hypertension     Hypokalemia     Hypomagnesemia     Intervertebral disc disease     Obesity     Osteoarthritis     Panniculitis     Sleep apnea     Vitamin D deficiency         Current Problems:   Active Hospital Problems    Diagnosis     **AMS (altered mental status)         Nutrition/Diet History         Narrative   75 yo female presented with  Altered mental status, decreased p.o. intake per H&P.   Pt visited this AM, unable to obtain wt hx or recent dietary intake. RN at bedside, will encourage meals and ONS.  11/14 SLP recs, Soft to chew with whole meats, thin. Patient may need assistance with set up, cut food items small. Alternate small bites and small sips of solids and liquids at a slow rate.   +BM, 11/12 11/11 Wound RN note, multiple wounds and areas of concern  11/13 Palliative care consult, pending     Anthropometrics        Current Height, Weight Height: 167.6 cm (66\")  Weight: (!) 137 kg (301 lb 2.4 oz)   Current BMI Body mass index is 48.61 kg/m².   BMI Classification Obese Class III   % %, IBW 59 kg   Adjusted Body Weight (ABW) 79 kg   Weight Hx  Wt Readings from Last 30 Encounters:   11/11/24 1513 (!) 137 kg (301 lb 2.4 oz)   11/11/24 1054 123 kg (270 lb 8.1 oz)   10/21/24 1551 131 kg (289 lb 11 oz)   04/22/24 1525 (!) 179 kg (394 lb 6.5 oz)   07/18/23 1446 (!) 151 kg (333 lb)   06/19/23 0500 (!) 159 kg (350 lb 8.5 oz)   06/18/23 0315 (!) 163 kg (360 lb 3.7 oz)   06/14/23 1935 (!) 177 " "kg (389 lb 8.9 oz)   06/14/23 1321 (!) 177 kg (389 lb 8.9 oz)          Wt Change Observation Per EMR, wt down 93# (24%) in < 1 yr; significant     Estimated/Assessed Needs  Estimated Needs based on: Adjusted Body Weight 79 kg       Energy Requirements 25-35 kcal/kg   EST Needs (kcal/day) 2951-6435 kcal        Protein Requirements 1.5-2.5 g/kg   EST Daily Needs (g/day) 119-200 g       Fluid Requirements 25-30 mL/kg    Estimated Needs (mL/day) 2090-5102 mL     Labs/Medications Reviewed         Pertinent Labs Reviewed.   Results from last 7 days   Lab Units 11/14/24  0517 11/13/24  0601 11/12/24  1008   SODIUM mmol/L 137 133* 131*   POTASSIUM mmol/L 3.3* 2.8* 3.0*   CHLORIDE mmol/L 99 94* 94*   CO2 mmol/L 25.0 26.0 22.7   BUN mg/dL 13 17 19   CREATININE mg/dL 0.51* 0.66 0.64   CALCIUM mg/dL 8.8 8.6 9.0   BILIRUBIN mg/dL 0.5 0.4 0.5   ALK PHOS U/L 117 105 111   ALT (SGPT) U/L 9 9 8   AST (SGOT) U/L 8 7 7   GLUCOSE mg/dL 155* 153* 186*     Results from last 7 days   Lab Units 11/14/24  0517 11/13/24  0601 11/12/24  1008 11/11/24  1233   MAGNESIUM mg/dL 1.5* 1.5*  --  1.2*   PHOSPHORUS mg/dL 3.9 1.9*   < >  --    HEMOGLOBIN g/dL 10.4* 9.9*   < >  --    HEMATOCRIT % 34.7 32.0*   < >  --     < > = values in this interval not displayed.     SARS-CoV-2, ROB   Date Value Ref Range Status   08/23/2024 Negative Negative Final     No results found for: \"HGBA1C\"      Pertinent Medications Reviewed.     Malnutrition Severity Assessment      Patient meets criteria for : Severe Malnutrition  Malnutrition Type (Last 8 Hours)       Malnutrition Severity Assessment       Row Name 11/14/24 0843       Malnutrition Severity Assessment    Malnutrition Type Chronic Disease - Related Malnutrition      Row Name 11/14/24 0843       Insufficient Energy Intake     Insufficient Energy Intake Findings Severe    Insufficient Energy Intake  < or equal to 50% of est. energy requirement for > or equal to 5d)      Row Name 11/14/24 0843       " Unintentional Weight Loss     Unintentional Weight Loss Findings Severe    Unintentional Weight Loss  Weight loss greater than 20% in one year      Row Name 11/14/24 0843       Criteria Met (Must meet criteria for severity in at least 2 of these categories: M Wasting, Fat Loss, Fluid, Secondary Signs, Wt. Status, Intake)    Patient meets criteria for  Severe Malnutrition                     Nutrition Diagnosis         Nutrition Dx Problem 1 Severe malnutrition related to decreased ability to consume sufficient energy, wound healing as evidenced by inadequate energy intake., decreased appetite., unintended weight change., and impaired skin integrity.     Nutrition Intervention           Current Nutrition Orders & Evaluation of Intake       Current PO Diet Diet: Diabetic; Consistent Carbohydrate; Texture: Soft to Chew (NDD 3); Soft to Chew: Whole Meat; Fluid Consistency: Thin (IDDSI 0)   Supplement No active supplement orders           Nutrition Intervention/Prescription        Continue Soft to chew, whole meat, carb controlled diet.  Will provide William supplement BID and Boost GC daily.  Recommend adding Thiamine, refeeding. Monitor/replace lytes.   Recommend Vit C, Zinc- wound healing.  Palliative pending, monitor GOC. Enteral support may be warranted r/t extensive wounds, increased nutrient needs.         Medical Nutrition Therapy/Nutrition Education          Learner     Readiness N/a  N/a     Method     Response N/a  N/a     Monitor/Evaluation        Monitor PO intake, Supplement intake, Pertinent labs, Skin status, POC/GOC     Nutrition Discharge Plan         To be determined     Electronically signed by:  Kristen Green RD  11/14/24 08:44 EST

## 2024-11-14 NOTE — SIGNIFICANT NOTE
11/14/24 1326   OTHER   Discipline speech language pathologist   Rehab Time/Intention   Session Not Performed patient/family declined treatment   Recommendations   SLP - Next Appointment 11/15/24     Patient taking few sips of water, refusing assistance for feeding, refusing meal.  Will follow.

## 2024-11-14 NOTE — THERAPY TREATMENT NOTE
Acute Care - Speech Language Pathology   Swallow Treatment Note SKY Munoz     Patient Name: Inez Doyle  : 1950  MRN: [id number]  Today's Date: 2024               Admit Date: 2024    Visit Dx:     ICD-10-CM ICD-9-CM   1. Hypokalemia  E87.6 276.8   2. Anorexia  R63.0 783.0   3. Oropharyngeal dysphagia  R13.12 787.22   4. Difficulty walking  R26.2 719.7     Patient Active Problem List   Diagnosis    Posterior tibial tendon dysfunction    Charcot's joint of foot    Arthritis, lumbar spine    Atrial fibrillation    COVID-19 with multiple comorbidities    Hypoxic respiratory failure    Morbid obesity    Diabetes    AMS (altered mental status)     Past Medical History:   Diagnosis Date    A-fib     Anemia     Anxiety     Asthma     Candidiasis of skin and nail     CHF (congestive heart failure)     COPD (chronic obstructive pulmonary disease)     Depression     Diabetes mellitus     GERD (gastroesophageal reflux disease)     Hyperlipidemia     Hypertension     Hypokalemia     Hypomagnesemia     Intervertebral disc disease     Obesity     Osteoarthritis     Panniculitis     Sleep apnea     Vitamin D deficiency      Past Surgical History:   Procedure Laterality Date    BACK SURGERY      STOMACH SURGERY         SPEECH PATHOLOGY DYSPHAGIA TREATMENT     Subjective/Behavioral Observations: Patient sleeping on entry, awakened without difficulty though patient demonstrating significant confusion.     Day/time of Treatment: 2024        Current Diet: Soft to chew with whole meats, thin        Current Strategies:Patient may need assistance with set up, cut food items small. Alternate small bites and small sips of solids and liquids at a slow rate.         Treatment received: Dysphagia therapy to address swallow function through exercises and education of strategies.        Results of treatment: Patient assisted to sit upright, patient with open mouth posture.  Respirations appeared even.  Patient asking  for sips of water, mod-max cueing for patient to close mouth to straw and sip, swallow completed with minimal delay and vocal quality remaining clear.  Medications attempted with patient refusing.        Progress toward goals: Adequate        Barriers to Achieving goals: N/A        Plan of care:/changes in plan: Recommend hold a.m. meal until patient is fully awake, alert and able to assist in feeding self.  Increase her frequency to speech therapy services 2 times daily.                                                                                            EDUCATION  The patient has been educated in the following areas:   NPO rationale Modified Diet Instruction.                Time Calculation:    Time Calculation- SLP       Row Name 11/14/24 1018             Time Calculation- SLP    SLP Stop Time 0845  -TB      SLP Received On 11/14/24  -TB         Untimed Charges    52276-HG Treatment Swallow Minutes 40  -TB         Total Minutes    Untimed Charges Total Minutes 40  -TB       Total Minutes 40  -TB                User Key  (r) = Recorded By, (t) = Taken By, (c) = Cosigned By      Initials Name Provider Type    TB Jacque Reddy SLP Speech and Language Pathologist                    Therapy Charges for Today       Code Description Service Date Service Provider Modifiers Qty    06955347784 HC ST TREATMENT SWALLOW 3 11/13/2024 Jacque Reddy SLP GN 1    54737427994 HC ST TREATMENT SWALLOW 3 11/14/2024 Jacque Reddy SLP GN 1                 RICHARDSON Mcnamara  11/14/2024

## 2024-11-15 LAB
ANION GAP SERPL CALCULATED.3IONS-SCNC: 12.2 MMOL/L (ref 5–15)
BUN SERPL-MCNC: 14 MG/DL (ref 8–23)
BUN/CREAT SERPL: 24.1 (ref 7–25)
CALCIUM SPEC-SCNC: 8.6 MG/DL (ref 8.6–10.5)
CHLORIDE SERPL-SCNC: 103 MMOL/L (ref 98–107)
CO2 SERPL-SCNC: 20.8 MMOL/L (ref 22–29)
CREAT SERPL-MCNC: 0.58 MG/DL (ref 0.57–1)
EGFRCR SERPLBLD CKD-EPI 2021: 95.1 ML/MIN/1.73
GLUCOSE BLDC GLUCOMTR-MCNC: 146 MG/DL (ref 70–99)
GLUCOSE BLDC GLUCOMTR-MCNC: 151 MG/DL (ref 70–99)
GLUCOSE BLDC GLUCOMTR-MCNC: 155 MG/DL (ref 70–99)
GLUCOSE BLDC GLUCOMTR-MCNC: 158 MG/DL (ref 70–99)
GLUCOSE BLDC GLUCOMTR-MCNC: 164 MG/DL (ref 70–99)
GLUCOSE SERPL-MCNC: 160 MG/DL (ref 65–99)
HOLD SPECIMEN: NORMAL
INR PPP: 1.29 (ref 0.86–1.15)
POTASSIUM SERPL-SCNC: 4.4 MMOL/L (ref 3.5–5.2)
PROTHROMBIN TIME: 16.3 SECONDS (ref 11.8–14.9)
SODIUM SERPL-SCNC: 136 MMOL/L (ref 136–145)
WHOLE BLOOD HOLD SPECIMEN: NORMAL

## 2024-11-15 PROCEDURE — 94799 UNLISTED PULMONARY SVC/PX: CPT

## 2024-11-15 PROCEDURE — 63710000001 INSULIN REGULAR HUMAN PER 5 UNITS: Performed by: INTERNAL MEDICINE

## 2024-11-15 PROCEDURE — 94664 DEMO&/EVAL PT USE INHALER: CPT

## 2024-11-15 PROCEDURE — 25010000002 VANCOMYCIN 5 G RECONSTITUTED SOLUTION: Performed by: INTERNAL MEDICINE

## 2024-11-15 PROCEDURE — 99223 1ST HOSP IP/OBS HIGH 75: CPT | Performed by: SURGERY

## 2024-11-15 PROCEDURE — 87040 BLOOD CULTURE FOR BACTERIA: CPT | Performed by: INTERNAL MEDICINE

## 2024-11-15 PROCEDURE — 82948 REAGENT STRIP/BLOOD GLUCOSE: CPT

## 2024-11-15 PROCEDURE — 85610 PROTHROMBIN TIME: CPT | Performed by: INTERNAL MEDICINE

## 2024-11-15 PROCEDURE — 25810000003 SODIUM CHLORIDE 0.9 % SOLUTION: Performed by: INTERNAL MEDICINE

## 2024-11-15 PROCEDURE — 25010000002 HEPARIN (PORCINE) PER 1000 UNITS: Performed by: INTERNAL MEDICINE

## 2024-11-15 PROCEDURE — 82948 REAGENT STRIP/BLOOD GLUCOSE: CPT | Performed by: INTERNAL MEDICINE

## 2024-11-15 PROCEDURE — 99232 SBSQ HOSP IP/OBS MODERATE 35: CPT | Performed by: INTERNAL MEDICINE

## 2024-11-15 PROCEDURE — 80048 BASIC METABOLIC PNL TOTAL CA: CPT | Performed by: INTERNAL MEDICINE

## 2024-11-15 RX ADMIN — HEPARIN SODIUM 5000 UNITS: 5000 INJECTION INTRAVENOUS; SUBCUTANEOUS at 05:32

## 2024-11-15 RX ADMIN — COLLAGENASE SANTYL 1 APPLICATION: 250 OINTMENT TOPICAL at 12:21

## 2024-11-15 RX ADMIN — MICONAZOLE NITRATE 1 APPLICATION: 2 POWDER TOPICAL at 12:22

## 2024-11-15 RX ADMIN — WHITE PETROLATUM 1 APPLICATION: 1.75 OINTMENT TOPICAL at 12:23

## 2024-11-15 RX ADMIN — Medication 10 ML: at 08:46

## 2024-11-15 RX ADMIN — VANCOMYCIN HYDROCHLORIDE 1250 MG: 5 INJECTION, POWDER, LYOPHILIZED, FOR SOLUTION INTRAVENOUS at 23:21

## 2024-11-15 RX ADMIN — INSULIN HUMAN 2 UNITS: 100 INJECTION, SOLUTION PARENTERAL at 00:04

## 2024-11-15 RX ADMIN — HEPARIN SODIUM 5000 UNITS: 5000 INJECTION INTRAVENOUS; SUBCUTANEOUS at 17:36

## 2024-11-15 RX ADMIN — HEPARIN SODIUM 5000 UNITS: 5000 INJECTION INTRAVENOUS; SUBCUTANEOUS at 21:44

## 2024-11-15 RX ADMIN — IPRATROPIUM BROMIDE AND ALBUTEROL SULFATE 3 ML: .5; 3 SOLUTION RESPIRATORY (INHALATION) at 07:52

## 2024-11-15 RX ADMIN — Medication 473 ML: at 12:22

## 2024-11-15 RX ADMIN — Medication 10 ML: at 21:44

## 2024-11-15 RX ADMIN — IPRATROPIUM BROMIDE AND ALBUTEROL SULFATE 3 ML: .5; 3 SOLUTION RESPIRATORY (INHALATION) at 18:12

## 2024-11-15 RX ADMIN — INSULIN HUMAN 2 UNITS: 100 INJECTION, SOLUTION PARENTERAL at 05:32

## 2024-11-15 RX ADMIN — INSULIN HUMAN 2 UNITS: 100 INJECTION, SOLUTION PARENTERAL at 17:42

## 2024-11-15 RX ADMIN — SODIUM CHLORIDE 2750 MG: 9 INJECTION, SOLUTION INTRAVENOUS at 08:46

## 2024-11-15 NOTE — SIGNIFICANT NOTE
11/15/24 0900   OTHER   Discipline speech language pathologist   Rehab Time/Intention   Session Not Performed patient/family declined treatment   Recommendations   SLP - Next Appointment 11/18/24     Attempted x2 pt stating refusal of PO.

## 2024-11-15 NOTE — PLAN OF CARE
Goal Outcome Evaluation:           Progress: declining  Outcome Evaluation: Pt is disoriented to situation. Pt is on room air. Encouraged patient to eat and drink. Very little output into khan catheter, informed MD. Pt refused to eat or drink during shift. Initiated mouth swabs and chapstick. No other complaints during shift except no appetite. Wound care completed as ordered. Continue plan of care.

## 2024-11-15 NOTE — PROGRESS NOTES
[facility]   Hospitalist Progress Note  Date: 11/15/2024  Patient Name: Inez Doyle  : 1950  MRN: [id number]  Date of admission: 2024    Subjective   Subjective     Chief Complaint:   Altered mental status    Summary:   Inez Doyle is a 74 y.o. female past medical history of atrial fibrillation on A-fib, CHF, COPD, hypertension presented to the emergency department for evaluation of altered mental status from nursing facility.  Patient reportedly has had decreased activity level and decreased p.o. intake.  Per patient she just feels tired and she is not hungry.  She is oriented x 2 did not know location.  However she is oriented to situation.  Able to answer questions appropriately.  She denies any other fevers, chills, sweats, nausea, vomiting, chest pain shortness of breath palpitations, abdominal pain diarrhea constipation, dysuria, rash.  In the emergency department noted to have hypokalemia with potassium 2.6 and is receiving repletion.  She will be admitted for ongoing monitoring management.     Interval Followup:   No acute events overnight, patient evaluated by general surgery, extensive abdominal wound noted and recommending plastic surgery consultation    Objective   Objective     Vitals:   Temp:  [97.7 °F (36.5 °C)-98.4 °F (36.9 °C)] 97.8 °F (36.6 °C)  Heart Rate:  [] 103  Resp:  [18-20] 18  BP: (109-160)/(53-94) 116/57    Physical Exam   GEN: No acute distress  HEENT: Moist mucous membranes  LUNGS: Equal chest rise bilaterally  CARDIAC: Regular rate and rhythm  NEURO: Moving all 4 extremities spontaneously  SKIN: No obvious breakdown    Result Review    Result Review:  I have personally reviewed the results as below  [x]  Laboratory CBC, CMP personally reviewed  CBC          2024    10:08 2024    06:01 2024    05:17   CBC   WBC 9.53  7.19  7.52    RBC 4.75  4.24  4.63    Hemoglobin 11.0  9.9  10.4    Hematocrit 35.4  32.0  34.7    MCV 74.5  75.5  74.9     MCH 23.2  23.3  22.5    MCHC 31.1  30.9  30.0    RDW 18.6  18.8  19.1    Platelets 137  109  123      CMP          11/13/2024    06:01 11/14/2024    05:17 11/15/2024    05:08   CMP   Glucose 153  155  160    BUN 17  13  14    Creatinine 0.66  0.51  0.58    EGFR 92.2  98.1  95.1    Sodium 133  137  136    Potassium 2.8  3.3  4.4    Chloride 94  99  103    Calcium 8.6  8.8  8.6    Total Protein 5.6  5.8     Albumin 2.6  2.6     Globulin 3.0  3.2     Total Bilirubin 0.4  0.5     Alkaline Phosphatase 105  117     AST (SGOT) 7  8     ALT (SGPT) 9  9     Albumin/Globulin Ratio 0.9  0.8     BUN/Creatinine Ratio 25.8  25.5  24.1    Anion Gap 13.0  13.0  12.2      []  Microbiology  []  Radiology  []  EKG/Telemetry   []  Cardiology/Vascular   []  Pathology  []  Old records  []  Other:    Scheduled medications:  collagenase, 1 Application, Topical, 2 times per day  heparin (porcine), 5,000 Units, Subcutaneous, Q8H  insulin regular, 2-7 Units, Subcutaneous, Q6H  ipratropium-albuterol, 3 mL, Nebulization, Q6H  [Held by provider] losartan, 12.5 mg, Oral, Daily  [Held by provider] megestrol, 400 mg, Oral, Daily  miconazole, 1 Application, Topical, 2 times per day  mineral oil-hydrophilic petrolatum, 1 Application, Topical, 2 times per day  [Held by provider] sertraline, 25 mg, Oral, Daily  sodium chloride, 10 mL, Intravenous, Q12H  Sodium Hypochlorite, 1 Application, Apply externally, 2 times per day  vancomycin, 1,250 mg, Intravenous, Q12H      As needed medications:    acetaminophen **OR** acetaminophen **OR** acetaminophen    dextrose    dextrose    glucagon (human recombinant)    HYDROcodone-acetaminophen    nitroglycerin    ondansetron    Pharmacy to dose vancomycin    sodium chloride    sodium chloride    sodium chloride  Infusions:  Pharmacy to dose vancomycin,            Assessment & Plan   Assessment / Plan     Assessment:  Metabolic encephalopathy  Severe life-threatening hypokalemia  Paroxysmal A-fib  History of  COPD  History of CHF  Hypertension    Plan:  Patient admitted to the hospital for further workup and management of above  Patient completed 3 days of antibiotics prior to presenting to the hospital,   Start vancomycin given positive blood cultures, repeat blood cultures today  Mental status waxing and waning, oriented this a.m.  Replace potassium and magnesium closely  Continue Coumadin, monitor INR, may need to hold pending surgical evaluation  Continue home bronchodilators  Monitor volume status closely  Monitor blood pressure closely  Frequent reorientation  Out of bed to chair as tolerates  Wound care nurse consulted  General Surgery consulted, recommending plastic surgery consultation, plastic surgery consulted for evaluation  CT scan of the abdominal wound reviewed  CBC, CMP reviewed 11/15/2024   Repeat CBC, CMP, mag and Phos in a.m. 11/15/2024      Reviewed patient's labs and imaging, and discussed with patient and nurse at bedside.    VTE Prophylaxis:  Pharmacologic & mechanical VTE prophylaxis orders are present.        CODE STATUS:   Medical Intervention Limits: No intubation (DNI)  Code Status (Patient has no pulse and is not breathing): No CPR (Do Not Attempt to Resuscitate)  Medical Interventions (Patient has pulse or is breathing): Limited Support      Electronically signed by Ramon Lei MD, 11/15/2024, 13:38 EST.

## 2024-11-15 NOTE — PROGRESS NOTES
"[facility] Clinical Pharmacy Services: Vancomycin Pharmacokinetic Initial Consult Note    Inez Doyle is a 74 y.o. female who is on day  of pharmacy to dose vancomycin for Bacteremia and Skin and Soft Tissue.    Consult Information  Consulting Provider: Quique  Planned Duration of Therapy: 7 days  Was Patient Receiving Prior to Admission/Consult?: No  Loading Dose Ordered : 2750 mg  iv x 1 this AM.  PK/PD Target: -600 mg/L.hr   Other Antimicrobials: NONE    Imaging Reviewed?: Yes    Microbiology Data  11/15 blood cx ordered   blood cx  [facility] epi       Vitals/Labs  Ht: 167.6 cm (66\"); Wt: (!) 137 kg (301 lb 2.4 oz)  Temp (24hrs), Av.1 °F (36.7 °C), Min:97.7 °F (36.5 °C), Max:98.4 °F (36.9 °C)   Estimated Creatinine Clearance: 138.1 mL/min (A) (by C-G formula based on SCr of 0.51 mg/dL (L)).       Results from last 7 days   Lab Units 24  0517 24  0601 24  1008   CREATININE mg/dL 0.51* 0.66 0.64   WBC 10*3/mm3 7.52 7.19 9.53     Assessment/Plan:    Vancomycin Dose:  1250 mg IV every 12 hours; which provides the following predicted parameters:  AUC24,ss: 492 mg/L.hr  Probability of AUC24 > 400: 65 %  Ctrough,ss: 12.9 mg/L  Probability of Ctrough,ss > 20: 30 %  Probability of nephrotoxicity (Lodise DUSTIN ): 8 %    Vanc Trough ordered for AM.      Pharmacy will follow patient's kidney function and will adjust doses and obtain levels as necessary. Thank you for involving pharmacy in this patient's care. Please contact pharmacy with any questions or concerns.                           Jackelin Marcelo  Clinical Pharmacist    "

## 2024-11-15 NOTE — PLAN OF CARE
Goal Outcome Evaluation:  Patient alert to self, able to make needs known.  Patient wound care completed as ordered.  Patient with c/o pain x1 this shift and nausea x1 this shift, prn medications administered as ordered.  Patient with low urine output (125ml) this shift, patient with khan, pt bladderscanned x3 with reading of 0ml each time.  Patient with no other needs at this time.  Continue plan of care.

## 2024-11-15 NOTE — SIGNIFICANT NOTE
11/15/24 1338   OTHER   Discipline occupational therapist   Rehab Time/Intention   Session Not Performed patient/family declined treatment  (x2 attempts)

## 2024-11-15 NOTE — CONSULTS
History and Physical    Patient Name:  Inez Doyle  YOB: 1950  [id number]    Referring Provider: Dr. Lei      Patient Care Team:  Christopher Hanson MD as PCP - General (Internal Medicine)    Reason for consult/Chief complaint: abdominal wounds    Subjective .     History of present illness:  Inez Doyle is a 74 y.o. female who was admitted to the hospital on 11/11/2024 for altered mental status.  She was found to have some large wounds on her panniculus.        History:  Past Medical History:   Diagnosis Date    A-fib     Anemia     Anxiety     Asthma     Candidiasis of skin and nail     CHF (congestive heart failure)     COPD (chronic obstructive pulmonary disease)     Depression     Diabetes mellitus     GERD (gastroesophageal reflux disease)     Hyperlipidemia     Hypertension     Hypokalemia     Hypomagnesemia     Intervertebral disc disease     Obesity     Osteoarthritis     Panniculitis     Sleep apnea     Vitamin D deficiency        Past Surgical History:   Procedure Laterality Date    BACK SURGERY      STOMACH SURGERY         History reviewed. No pertinent family history.    Social History     Tobacco Use    Smoking status: Never   Vaping Use    Vaping status: Never Used   Substance Use Topics    Alcohol use: No    Drug use: Never       Review of Systems:  A complete ROS was performed and all are negative except for what is documented in the HPI.    MEDS:  Prior to Admission medications    Medication Sig Start Date End Date Taking? Authorizing Provider   acetaminophen (TYLENOL) 325 MG tablet Take 2 tablets by mouth Every 4 (Four) Hours As Needed. 10/7/24  Yes Chayo Emmanuel MD   ferrous sulfate 325 (65 FE) MG tablet Take 1 tablet by mouth 2 (Two) Times a Day.   Yes hCayo Emmanuel MD   furosemide (LASIX) 40 MG tablet Take 1 tablet by mouth Daily.   Yes Chayo Emmanuel MD   gabapentin (NEURONTIN) 800 MG tablet Take 1 tablet by mouth every night at bedtime. 6/5/23  Yes  Chayo Emmanuel MD   ipratropium-albuterol (DUO-NEB) 0.5-2.5 mg/3 ml nebulizer Take 3 mL by nebulization Every 6 (Six) Hours.   Yes Chayo Emmanuel MD   levoFLOXacin (LEVAQUIN) 750 MG tablet Take 1 tablet by mouth Daily. 11/8/24 11/15/24 Yes Chayo Emmanuel MD   LORazepam (ATIVAN) 0.5 MG tablet Take 1 tablet by mouth Every 12 (Twelve) Hours As Needed for Anxiety.   Yes Chayo Emmanuel MD   losartan (COZAAR) 25 MG tablet Take 0.5 tablets by mouth Daily. 6/5/23  Yes Chayo Emmanuel MD   magnesium hydroxide (MILK OF MAGNESIA) 400 MG/5ML suspension Take 30 mL by mouth Daily As Needed for Constipation. 7/19/24  Yes Chayo Emmanuel MD   megestrol (MEGACE) 40 MG/ML suspension Take 10 mL by mouth Daily.   Yes Chayo Emmanuel MD   melatonin 3 MG tablet Take 1 tablet by mouth Daily. 7/19/24  Yes Chayo Emmanuel MD   metFORMIN (GLUCOPHAGE) 500 MG tablet Take 1 tablet by mouth 2 (Two) Times a Day With Meals.   Yes Chayo Emmanuel MD   metoclopramide (REGLAN) 5 MG tablet Take 1 tablet by mouth 4 (Four) Times a Day Before Meals & at Bedtime.   Yes Chayo Emmanuel MD   miconazole (MICOTIN) 2 % powder Apply 1 Application topically to the appropriate area as directed 2 (Two) Times a Day. 7/19/24  Yes Chayo Emmanuel MD   ondansetron ODT (ZOFRAN-ODT) 4 MG disintegrating tablet Place 1 tablet on the tongue Every 4 (Four) Hours As Needed for Nausea or Vomiting.   Yes Chayo Emmanuel MD   potassium chloride 10 MEQ CR tablet Take 1 tablet by mouth Daily.   Yes Chayo Emmanuel MD   saccharomyces boulardii (FLORASTOR) 250 MG capsule Take 1 capsule by mouth 2 (Two) Times a Day. 11/10/24 11/24/24 Yes Chayo Emmanuel MD   Santyl 250 UNIT/GM ointment Apply 1 Application topically to the appropriate area as directed Daily. 10/31/24  Yes Chayo Emmanuel MD   sertraline (ZOLOFT) 25 MG tablet Take 1 tablet by mouth Daily.   Yes Chayo Emmanuel MD  "  sodium hypochlorite (HYSEPT) 0.25 % solution topical solution Apply 1 mL topically to the appropriate area as directed Daily. Coccyx Wound   Yes Chayo Emmanuel MD   traMADol (ULTRAM) 50 MG tablet Take 1 tablet by mouth Every 6 (Six) Hours As Needed for Moderate Pain.   Yes Chayo Emmanuel MD   vitamin B-12 (CYANOCOBALAMIN) 1000 MCG tablet Take 1 tablet by mouth Daily.   Yes Chayo Emmanuel MD   Liraglutide (VICTOZA) 18 MG/3ML solution pen-injector injection Inject 0.6 mg under the skin into the appropriate area as directed Daily. 6/2/23   Chayo Emmanuel MD   loperamide (IMODIUM) 2 MG capsule Take 1 capsule by mouth As Needed for Diarrhea.    Chayo Emmanuel MD        collagenase, 1 Application, Topical, 2 times per day  heparin (porcine), 5,000 Units, Subcutaneous, Q8H  insulin regular, 2-7 Units, Subcutaneous, Q6H  ipratropium-albuterol, 3 mL, Nebulization, Q6H  [Held by provider] losartan, 12.5 mg, Oral, Daily  [Held by provider] megestrol, 400 mg, Oral, Daily  miconazole, 1 Application, Topical, 2 times per day  mineral oil-hydrophilic petrolatum, 1 Application, Topical, 2 times per day  [Held by provider] sertraline, 25 mg, Oral, Daily  sodium chloride, 10 mL, Intravenous, Q12H  Sodium Hypochlorite, 1 Application, Apply externally, 2 times per day  vancomycin, 1,250 mg, Intravenous, Q12H         Pharmacy to dose vancomycin,          Allergies:  Codeine, Dye fdc red [red dye #40 (allura red)], Iodinated contrast media, Penicillins, and Sulfa antibiotics    Objective         Physical Exam:      Constitutional:  well nourished, no acute distress, appears stated age /57 (BP Location: Left arm, Patient Position: Lying)   Pulse 103   Temp 97.8 °F (36.6 °C) (Oral)   Resp 18   Ht 167.6 cm (66\")   Wt (!) 137 kg (301 lb 2.4 oz)   SpO2 98%   BMI 48.61 kg/m²    Eyes:  anicteric sclerae, moist conjunctivae, no lid lag, PERRLA  ENMT:  oropharynx clear, moist mucous membranes, " good dentition  Neck:   full ROM, trachea midline  Cardiovascular: RRR, S1 and S2 present, no MRG, heart rate 103, no pedal edema  Respiratory: lungs CTA, respirations even and unlabored  GI:  Abdomen soft, nontender, nondistended, no HSM     Skin:  warm and dry, normal turgor, no rashes, panniculus with large open wounds with necrotic tissues and foul smelling drainage  Psychiatric:  alert and oriented x1       Results Review: I have reviewed the patient's labs and imaging including CBC, CT of abd and pelvis    LABS/IMAGING:    WBC   Date Value Ref Range Status   11/14/2024 7.52 3.40 - 10.80 10*3/mm3 Final     RBC   Date Value Ref Range Status   11/14/2024 4.63 3.77 - 5.28 10*6/mm3 Final     Hemoglobin   Date Value Ref Range Status   11/14/2024 10.4 (L) 12.0 - 15.9 g/dL Final     Hematocrit   Date Value Ref Range Status   11/14/2024 34.7 34.0 - 46.6 % Final     MCV   Date Value Ref Range Status   11/14/2024 74.9 (L) 79.0 - 97.0 fL Final     MCH   Date Value Ref Range Status   11/14/2024 22.5 (L) 26.6 - 33.0 pg Final     MCHC   Date Value Ref Range Status   11/14/2024 30.0 (L) 31.5 - 35.7 g/dL Final     RDW   Date Value Ref Range Status   11/14/2024 19.1 (H) 12.3 - 15.4 % Final     RDW-SD   Date Value Ref Range Status   11/14/2024 50.6 37.0 - 54.0 fl Final     MPV   Date Value Ref Range Status   11/14/2024 11.2 6.0 - 12.0 fL Final     Platelets   Date Value Ref Range Status   11/14/2024 123 (L) 140 - 450 10*3/mm3 Final     Neutrophil %   Date Value Ref Range Status   11/14/2024 75.3 42.7 - 76.0 % Final     Lymphocyte %   Date Value Ref Range Status   11/14/2024 11.6 (L) 19.6 - 45.3 % Final     Monocyte %   Date Value Ref Range Status   11/14/2024 9.7 5.0 - 12.0 % Final     Eosinophil %   Date Value Ref Range Status   11/14/2024 1.1 0.3 - 6.2 % Final     Basophil %   Date Value Ref Range Status   11/14/2024 0.7 0.0 - 1.5 % Final     Immature Grans %   Date Value Ref Range Status   11/14/2024 1.6 (H) 0.0 - 0.5 % Final  "    Neutrophils, Absolute   Date Value Ref Range Status   11/14/2024 5.67 1.70 - 7.00 10*3/mm3 Final     Lymphocytes, Absolute   Date Value Ref Range Status   11/14/2024 0.87 0.70 - 3.10 10*3/mm3 Final     Monocytes, Absolute   Date Value Ref Range Status   11/14/2024 0.73 0.10 - 0.90 10*3/mm3 Final     Eosinophils, Absolute   Date Value Ref Range Status   11/14/2024 0.08 0.00 - 0.40 10*3/mm3 Final     Basophils, Absolute   Date Value Ref Range Status   11/14/2024 0.05 0.00 - 0.20 10*3/mm3 Final     Immature Grans, Absolute   Date Value Ref Range Status   11/14/2024 0.12 (H) 0.00 - 0.05 10*3/mm3 Final     nRBC   Date Value Ref Range Status   11/14/2024 0.3 (H) 0.0 - 0.2 /100 WBC Final        Basic Metabolic Panel    Sodium Sodium   Date Value Ref Range Status   11/15/2024 136 136 - 145 mmol/L Final   11/14/2024 137 136 - 145 mmol/L Final   11/13/2024 133 (L) 136 - 145 mmol/L Final      Potassium Potassium   Date Value Ref Range Status   11/15/2024 4.4 3.5 - 5.2 mmol/L Final   11/14/2024 3.3 (L) 3.5 - 5.2 mmol/L Final   11/13/2024 2.8 (L) 3.5 - 5.2 mmol/L Final      Chloride Chloride   Date Value Ref Range Status   11/15/2024 103 98 - 107 mmol/L Final   11/14/2024 99 98 - 107 mmol/L Final   11/13/2024 94 (L) 98 - 107 mmol/L Final      Bicarbonate No results found for: \"PLASMABICARB\"   BUN BUN   Date Value Ref Range Status   11/15/2024 14 8 - 23 mg/dL Final   11/14/2024 13 8 - 23 mg/dL Final   11/13/2024 17 8 - 23 mg/dL Final      Creatinine Creatinine   Date Value Ref Range Status   11/15/2024 0.58 0.57 - 1.00 mg/dL Final   11/14/2024 0.51 (L) 0.57 - 1.00 mg/dL Final   11/13/2024 0.66 0.57 - 1.00 mg/dL Final      Calcium Calcium   Date Value Ref Range Status   11/15/2024 8.6 8.6 - 10.5 mg/dL Final   11/14/2024 8.8 8.6 - 10.5 mg/dL Final   11/13/2024 8.6 8.6 - 10.5 mg/dL Final      Glucose      No components found for: \"GLUCOSE.*\"        Lab Results   Component Value Date    GLUCOSE 160 (H) 11/15/2024    BUN 14 " 11/15/2024    CREATININE 0.58 11/15/2024    BCR 24.1 11/15/2024    K 4.4 11/15/2024    CO2 20.8 (L) 11/15/2024    CALCIUM 8.6 11/15/2024    ALBUMIN 2.6 (L) 11/14/2024    AST 8 11/14/2024    ALT 9 11/14/2024          CT Abdomen Pelvis Without Contrast    Result Date: 11/13/2024  CT ABDOMEN PELVIS WO CONTRAST Date of Exam: 11/13/2024 8:28 PM EST Indication: lower abdominal wound. Comparison: 11/5/2024 Technique: Axial CT images were obtained of the abdomen and pelvis without the administration of contrast. Reconstructed coronal and sagittal images were also obtained. Automated exposure control and iterative construction methods were used. Findings: Motion degradation is present. There is a right lower lobe pulmonary nodule measuring about 6 mm in size. Lung bases are otherwise clear except for a left lower lobe calcified granuloma. Spinal stimulator is present. There is coronary artery disease and atherosclerotic disease. No aortic aneurysm. Gallbladder is surgically absent. There is no biliary obstruction. There is some atrophy of the pancreas. Spleen is borderline enlarged. The solid abdominal organs otherwise appear grossly normal. The kidneys are nonobstructed. Urinary bladder is decompressed around a Calix catheter. Solid pelvic organs are unremarkable. Large bowel is normal with no evidence of colitis. The appendix is normal. No small bowel obstruction is seen. Postoperative changes are noted involving the stomach. [place] ventral wall hernia containing large and small bowel loops is unchanged. This is nonincarcerating. There is no adenopathy or free fluid. There appears to be a soft tissue wound involving the right lateral aspect of the patient's panniculus. There is some associated air in the soft tissues. Correlate with physical exam. No associated fluid collection. There is some skin thickening that may reflect cellulitis. There is diffuse lumbar degenerative disease with old compression deformities from  L3-L5.     Impression: 1. There appears to be a soft tissue wound with skin breakdown and cellulitis involving the right lateral aspect of the patient's panniculus. No associated fluid collection. 2. No other acute findings are identified in the abdomen and pelvis. The kidneys are nonobstructed, as is the bowel. 3. 6 mm right lower lobe pulmonary nodule. Indeterminate solid pulmonary nodule measuring 6 mm. In a patient of unknown risk level with a solid nodule of 6-8 mm, recommend CT at 6-12 months. In a low-risk patient, then consider CT at 18-24 months. In a high-risk patient, if the nodule is stable at 6-12 months, recommend CT at 18-24 months. Electronically Signed: Michael García MD  11/13/2024 9:33 PM EST  Workstation ID: [id number]    CT Abdomen Without Contrast    Result Date: 11/8/2024  CT ABDOMEN WO CONTRAST Date of Exam: 11/5/2024 4:40 PM EST Indication: e66.01. Comparison: None available. Technique: Axial CT images were obtained of the abdomen without contrast administration. Reconstructed coronal and sagittal images were also obtained. Automated exposure control and iterative construction methods were used. Findings: LUNG BASES:  Unremarkable without mass or infiltrate. LIVER:  Unremarkable parenchyma without focal lesion. BILIARY/GALLBLADDER: Cholecystectomy SPLEEN:  Unremarkable PANCREAS:  Unremarkable ADRENAL:  Unremarkable KIDNEYS:  Unremarkable parenchyma with no solid mass identified. No obstruction.  No calculus identified. GASTROINTESTINAL/MESENTERY:  No evidence of obstruction nor inflammation. There are gastric postoperative changes.  AORTA/IVC:  Normal caliber. RETROPERITONEUM/LYMPH NODES:  Unremarkable OSSEUS STRUCTURES: There are mild likely chronic compression deformities of L3-L5. No acute fracture lines are identified. There is a [place] ventral central abdominal hernia containing nonobstructed, noninflamed loops of both large and small intestine. Defect in the abdominal wall measures  16 cm in diameter.     Impression: Impression: 1. Prominent ventral hernia containing loops of nonobstructed, noninflamed large and small intestine. Other incidental findings detailed above. Electronically Signed: Hari Cline MD  11/8/2024 10:01 AM EST  Workstation ID: [id number]          Assessment & Plan         AMS (altered mental status)    Severe protein-calorie malnutrition   Panniculitis with large open wounds   No surgery at this time from General surgery   Would recommend having plastics evaluate          Electronically signed by MIKE Neri, 11/15/24, 11:58 AM EST.

## 2024-11-15 NOTE — CONSULTS
"Plastic Surgery  Note    Current Date: 11/15/2024  Name: Inez Doyle  MRN: [id number]  YOB: 1950  VS: /76 (BP Location: Left arm, Patient Position: Lying)   Pulse 79   Temp 97.7 °F (36.5 °C) (Oral)   Resp 16   Ht 167.6 cm (66\")   Wt (!) 137 kg (301 lb 2.4 oz)   SpO2 98%   BMI 48.61 kg/m²   ?  ?  History of Present Illness:  Inez Doyle is a female 74 y.o.  admitted due to encephalopathy who was found to have  a large chronic wound on her pannus.     Family History: History reviewed. No pertinent family history.  Social History:   Social History     Socioeconomic History    Marital status:    Tobacco Use    Smoking status: Never   Vaping Use    Vaping status: Never Used   Substance and Sexual Activity    Alcohol use: No    Drug use: Never    Sexual activity: Defer     Medical Problems: [unfilled]  Past Medical History:   Past Medical History:   Diagnosis Date    A-fib     Anemia     Anxiety     Asthma     Candidiasis of skin and nail     CHF (congestive heart failure)     COPD (chronic obstructive pulmonary disease)     Depression     Diabetes mellitus     GERD (gastroesophageal reflux disease)     Hyperlipidemia     Hypertension     Hypokalemia     Hypomagnesemia     Intervertebral disc disease     Obesity     Osteoarthritis     Panniculitis     Sleep apnea     Vitamin D deficiency      Past Surgical History:   Past Surgical History:   Procedure Laterality Date    BACK SURGERY      STOMACH SURGERY       Medications:   Current Facility-Administered Medications:     acetaminophen (TYLENOL) tablet 650 mg, 650 mg, Oral, Q4H PRN **OR** acetaminophen (TYLENOL) 160 MG/5ML oral solution 650 mg, 650 mg, Oral, Q4H PRN **OR** acetaminophen (TYLENOL) suppository 650 mg, 650 mg, Rectal, Q4H PRN, Power Reyes Jr., MD    collagenase ointment 1 Application, 1 Application, Topical, 2 times per day, Ramon Lei MD, 1 Application at 11/15/24 1221    dextrose (D50W) (25 g/50 mL) " IV injection 25 g, 25 g, Intravenous, Q15 Min PRN, Power Reyes Jr., MD    dextrose (GLUTOSE) oral gel 15 g, 15 g, Oral, Q15 Min PRN, Power Reyes Jr., MD    glucagon (GLUCAGEN) injection 1 mg, 1 mg, Intramuscular, Q15 Min PRN, Power Reyes Jr., MD    heparin (porcine) 5000 UNIT/ML injection 5,000 Units, 5,000 Units, Subcutaneous, Q8H, Power Reyes Jr., MD, 5,000 Units at 11/15/24 1736    HYDROcodone-acetaminophen (NORCO) 5-325 MG per tablet 1 tablet, 1 tablet, Oral, Q6H PRN, Ramon Lei MD, 1 tablet at 11/14/24 2330    insulin regular (humuLIN R,novoLIN R) injection 2-7 Units, 2-7 Units, Subcutaneous, Q6H, Pwoer Reyes Jr., MD, 2 Units at 11/15/24 1742    ipratropium-albuterol (DUO-NEB) nebulizer solution 3 mL, 3 mL, Nebulization, Q6H, Power Reyes Jr., MD, 3 mL at 11/15/24 1812    [Held by provider] losartan (COZAAR) tablet 12.5 mg, 12.5 mg, Oral, Daily, Power Reyes Jr., MD    [Held by provider] megestrol (MEGACE) 40 MG/ML suspension 400 mg, 400 mg, Oral, Daily, Power Reyes Jr., MD    miconazole (MICOTIN) 2 % powder 1 Application, 1 Application, Topical, 2 times per day, Ramon Lei MD, 1 Application at 11/15/24 1222    mineral oil-hydrophilic petrolatum (AQUAPHOR) ointment 1 Application, 1 Application, Topical, 2 times per day, Ramon Lei MD, 1 Application at 11/15/24 1223    nitroglycerin (NITROSTAT) SL tablet 0.4 mg, 0.4 mg, Sublingual, Q5 Min PRN, Power Reyes Jr., MD    ondansetron (ZOFRAN) injection 4 mg, 4 mg, Intravenous, Q6H PRN, Power Reyes Jr., MD, 4 mg at 11/14/24 0716    Pharmacy to dose vancomycin, , Not Applicable, Continuous PRN, Ramon Lei MD    [Held by provider] sertraline (ZOLOFT) tablet 25 mg, 25 mg, Oral, Daily, Power Reyes Jr., MD    sodium chloride 0.9 % flush 10 mL, 10 mL, Intravenous, PRN, Power Reyes Jr., MD    sodium chloride 0.9 % flush 10 mL, 10 mL, Intravenous, Q12H, Power Reyes Jr., MD, 10 mL  "at 11/15/24 0846    sodium chloride 0.9 % flush 10 mL, 10 mL, Intravenous, PRN, Power Reyes Jr., MD    sodium chloride 0.9 % infusion 40 mL, 40 mL, Intravenous, PRN, Power Reyes Jr., MD    Sodium Hypochlorite 0.062 % solution 473 mL, 1 Application, Apply externally, 2 times per day, Ramon Lei MD, 473 mL at 11/15/24 1222    vancomycin 1250 mg/250 mL 0.9% NS IVPB ([facility]), 1,250 mg, Intravenous, Q12H, Ramon Lei MD  Allergies:   Allergies   Allergen Reactions    Codeine     Dye Fdc Red [Red Dye #40 (Allura Red)] Other (See Comments)          Iodinated Contrast Media Nausea Only    Penicillins     Sulfa Antibiotics      ?  Review of Systems: All system were reviewed and were negative, except the ones noted above.   Physical Examination:   Blood pressure 123/76, pulse 79, temperature 97.7 °F (36.5 °C), temperature source Oral, resp. rate 16, height 167.6 cm (66\"), weight (!) 137 kg (301 lb 2.4 oz), SpO2 98%.  Physical examination demonstrates  General appearance: patient is awake and looks comfortable.   She didn't allow me to check her abdomen or her wounds.     Assessment and Plan:   Diagnosis Plan   1. Hypokalemia        2. Anorexia        3. Oropharyngeal dysphagia        4. Difficulty walking        Panniculitis    I could only evaluate patient's wounds through pictures at my chart. When I came to evaluate her, I introduced myself and asked her name. She could answer me and she told me she lives in a nursing home. When, I went to examine her, she blocked my hands and covered her abdomen and told me she doesn't want to be examined. I explained that I am the surgeon that can help her and she told me she does not want to be helped.     From the picture, the wound is surgical, even with the hernia seen in the CT. The wound is caudal to the hernia and could be debrided or removed without violating the hernia however, patient is not able to give consent and she is not currently in good " clinical condition to be submitted to surgery. I understand she does not have family and her POA is a friend who lives in another [place].   If she improves and agrees to be treated, we will re evaluate her.     Shalonda Brown. MD, PhD  Plastic and reconstructive surgery

## 2024-11-16 LAB
ANION GAP SERPL CALCULATED.3IONS-SCNC: 14.4 MMOL/L (ref 5–15)
BUN SERPL-MCNC: 14 MG/DL (ref 8–23)
BUN/CREAT SERPL: 22.6 (ref 7–25)
CALCIUM SPEC-SCNC: 8.4 MG/DL (ref 8.6–10.5)
CHLORIDE SERPL-SCNC: 103 MMOL/L (ref 98–107)
CO2 SERPL-SCNC: 19.6 MMOL/L (ref 22–29)
CREAT SERPL-MCNC: 0.62 MG/DL (ref 0.57–1)
EGFRCR SERPLBLD CKD-EPI 2021: 93.6 ML/MIN/1.73
GLUCOSE BLDC GLUCOMTR-MCNC: 126 MG/DL (ref 70–99)
GLUCOSE BLDC GLUCOMTR-MCNC: 139 MG/DL (ref 70–99)
GLUCOSE BLDC GLUCOMTR-MCNC: 141 MG/DL (ref 70–99)
GLUCOSE BLDC GLUCOMTR-MCNC: 150 MG/DL (ref 70–99)
GLUCOSE BLDC GLUCOMTR-MCNC: 160 MG/DL (ref 70–99)
GLUCOSE SERPL-MCNC: 149 MG/DL (ref 65–99)
INR PPP: 1.35 (ref 0.86–1.15)
POTASSIUM SERPL-SCNC: 4.7 MMOL/L (ref 3.5–5.2)
PROTHROMBIN TIME: 16.9 SECONDS (ref 11.8–14.9)
SODIUM SERPL-SCNC: 137 MMOL/L (ref 136–145)
VANCOMYCIN TROUGH SERPL-MCNC: 22.35 MCG/ML (ref 5–20)
VIT B2 BLD-MCNC: 182 UG/L (ref 137–370)

## 2024-11-16 PROCEDURE — 25010000002 HEPARIN (PORCINE) PER 1000 UNITS: Performed by: INTERNAL MEDICINE

## 2024-11-16 PROCEDURE — 82948 REAGENT STRIP/BLOOD GLUCOSE: CPT

## 2024-11-16 PROCEDURE — 85610 PROTHROMBIN TIME: CPT | Performed by: INTERNAL MEDICINE

## 2024-11-16 PROCEDURE — 80202 ASSAY OF VANCOMYCIN: CPT | Performed by: INTERNAL MEDICINE

## 2024-11-16 PROCEDURE — 25810000003 SODIUM CHLORIDE 0.9 % SOLUTION 250 ML FLEX CONT: Performed by: INTERNAL MEDICINE

## 2024-11-16 PROCEDURE — 82948 REAGENT STRIP/BLOOD GLUCOSE: CPT | Performed by: INTERNAL MEDICINE

## 2024-11-16 PROCEDURE — 94799 UNLISTED PULMONARY SVC/PX: CPT

## 2024-11-16 PROCEDURE — 94664 DEMO&/EVAL PT USE INHALER: CPT

## 2024-11-16 PROCEDURE — 80048 BASIC METABOLIC PNL TOTAL CA: CPT | Performed by: INTERNAL MEDICINE

## 2024-11-16 PROCEDURE — 99232 SBSQ HOSP IP/OBS MODERATE 35: CPT | Performed by: INTERNAL MEDICINE

## 2024-11-16 PROCEDURE — 94760 N-INVAS EAR/PLS OXIMETRY 1: CPT

## 2024-11-16 PROCEDURE — 25010000002 VANCOMYCIN 1 G RECONSTITUTED SOLUTION 1 EACH VIAL: Performed by: INTERNAL MEDICINE

## 2024-11-16 RX ADMIN — HEPARIN SODIUM 5000 UNITS: 5000 INJECTION INTRAVENOUS; SUBCUTANEOUS at 06:04

## 2024-11-16 RX ADMIN — IPRATROPIUM BROMIDE AND ALBUTEROL SULFATE 3 ML: .5; 3 SOLUTION RESPIRATORY (INHALATION) at 06:29

## 2024-11-16 RX ADMIN — Medication 473 ML: at 12:53

## 2024-11-16 RX ADMIN — WHITE PETROLATUM 1 APPLICATION: 1.75 OINTMENT TOPICAL at 12:53

## 2024-11-16 RX ADMIN — HEPARIN SODIUM 5000 UNITS: 5000 INJECTION INTRAVENOUS; SUBCUTANEOUS at 14:41

## 2024-11-16 RX ADMIN — Medication 10 ML: at 09:39

## 2024-11-16 RX ADMIN — COLLAGENASE SANTYL 1 APPLICATION: 250 OINTMENT TOPICAL at 02:23

## 2024-11-16 RX ADMIN — HEPARIN SODIUM 5000 UNITS: 5000 INJECTION INTRAVENOUS; SUBCUTANEOUS at 21:59

## 2024-11-16 RX ADMIN — WHITE PETROLATUM 1 APPLICATION: 1.75 OINTMENT TOPICAL at 21:59

## 2024-11-16 RX ADMIN — VANCOMYCIN HYDROCHLORIDE 1000 MG: 1 INJECTION, POWDER, LYOPHILIZED, FOR SOLUTION INTRAVENOUS at 14:41

## 2024-11-16 RX ADMIN — MICONAZOLE NITRATE 1 APPLICATION: 2 POWDER TOPICAL at 22:00

## 2024-11-16 RX ADMIN — COLLAGENASE SANTYL 1 APPLICATION: 250 OINTMENT TOPICAL at 21:59

## 2024-11-16 RX ADMIN — COLLAGENASE SANTYL 1 APPLICATION: 250 OINTMENT TOPICAL at 12:53

## 2024-11-16 RX ADMIN — Medication 473 ML: at 21:59

## 2024-11-16 RX ADMIN — Medication 473 ML: at 02:23

## 2024-11-16 RX ADMIN — MICONAZOLE NITRATE 1 APPLICATION: 2 POWDER TOPICAL at 03:02

## 2024-11-16 RX ADMIN — Medication 10 ML: at 20:53

## 2024-11-16 RX ADMIN — WHITE PETROLATUM 1 APPLICATION: 1.75 OINTMENT TOPICAL at 03:00

## 2024-11-16 RX ADMIN — MICONAZOLE NITRATE 1 APPLICATION: 2 POWDER TOPICAL at 12:53

## 2024-11-16 NOTE — PROGRESS NOTES
[facility]   Hospitalist Progress Note  Date: 2024  Patient Name: Inez Doyle  : 1950  MRN: [id number]  Date of admission: 2024    Subjective   Subjective     Chief Complaint:   Altered mental status    Summary:   Inez Doyle is a 74 y.o. female past medical history of atrial fibrillation on A-fib, CHF, COPD, hypertension presented to the emergency department for evaluation of altered mental status from nursing facility.  Patient reportedly has had decreased activity level and decreased p.o. intake.  Per patient she just feels tired and she is not hungry.  She is oriented x 2 did not know location.  However she is oriented to situation.  Able to answer questions appropriately.  She denies any other fevers, chills, sweats, nausea, vomiting, chest pain shortness of breath palpitations, abdominal pain diarrhea constipation, dysuria, rash.  In the emergency department noted to have hypokalemia with potassium 2.6 and is receiving repletion.  She will be admitted for ongoing monitoring management.     Interval Followup:   Over last 24 hours patient was seen by plastic surgery, patient had refused wound evaluation however, patient continues to have intermittent confusion    Objective   Objective     Vitals:   Temp:  [97.5 °F (36.4 °C)-98.1 °F (36.7 °C)] 98.1 °F (36.7 °C)  Heart Rate:  [] 103  Resp:  [16-20] 20  BP: (118-137)/(57-95) 126/57    Physical Exam   GEN: No acute distress  HEENT: Moist mucous membranes  LUNGS: Equal chest rise bilaterally  CARDIAC: Regular rate and rhythm  NEURO: Moving all 4 extremities spontaneously  SKIN: No obvious breakdown    Result Review    Result Review:  I have personally reviewed the results as below  [x]  Laboratory CBC, CMP personally reviewed  CBC          2024    10:08 2024    06:01 2024    05:17   CBC   WBC 9.53  7.19  7.52    RBC 4.75  4.24  4.63    Hemoglobin 11.0  9.9  10.4    Hematocrit 35.4  32.0  34.7    MCV 74.5  75.5   74.9    MCH 23.2  23.3  22.5    MCHC 31.1  30.9  30.0    RDW 18.6  18.8  19.1    Platelets 137  109  123      CMP          11/14/2024    05:17 11/15/2024    05:08 11/16/2024    05:51   CMP   Glucose 155  160  149    BUN 13  14  14    Creatinine 0.51  0.58  0.62    EGFR 98.1  95.1  93.6    Sodium 137  136  137    Potassium 3.3  4.4  4.7    Chloride 99  103  103    Calcium 8.8  8.6  8.4    Total Protein 5.8      Albumin 2.6      Globulin 3.2      Total Bilirubin 0.5      Alkaline Phosphatase 117      AST (SGOT) 8      ALT (SGPT) 9      Albumin/Globulin Ratio 0.8      BUN/Creatinine Ratio 25.5  24.1  22.6    Anion Gap 13.0  12.2  14.4      []  Microbiology  []  Radiology  []  EKG/Telemetry   []  Cardiology/Vascular   []  Pathology  []  Old records  []  Other:    Scheduled medications:  collagenase, 1 Application, Topical, 2 times per day  heparin (porcine), 5,000 Units, Subcutaneous, Q8H  insulin regular, 2-7 Units, Subcutaneous, Q6H  ipratropium-albuterol, 3 mL, Nebulization, Q6H  [Held by provider] losartan, 12.5 mg, Oral, Daily  [Held by provider] megestrol, 400 mg, Oral, Daily  miconazole, 1 Application, Topical, 2 times per day  mineral oil-hydrophilic petrolatum, 1 Application, Topical, 2 times per day  [Held by provider] sertraline, 25 mg, Oral, Daily  sodium chloride, 10 mL, Intravenous, Q12H  Sodium Hypochlorite, 1 Application, Apply externally, 2 times per day  vancomycin, 1,000 mg, Intravenous, Q12H      As needed medications:    acetaminophen **OR** acetaminophen **OR** acetaminophen    dextrose    dextrose    glucagon (human recombinant)    HYDROcodone-acetaminophen    nitroglycerin    ondansetron    Pharmacy to dose vancomycin    sodium chloride    sodium chloride    sodium chloride  Infusions:  Pharmacy to dose vancomycin,            Assessment & Plan   Assessment / Plan     Assessment:  Metabolic encephalopathy  Severe life-threatening hypokalemia  Paroxysmal A-fib  History of COPD  History of  CHF  Hypertension    Plan:  Patient admitted to the hospital for further workup and management of above  Patient completed 3 days of antibiotics prior to presenting to the hospital,   Continue vancomycin given positive blood cultures, repeat blood cultures today  Mental status waxing and waning, oriented this a.m.  Replace potassium and magnesium closely  Continue Coumadin, monitor INR, may need to hold pending surgical evaluation  Continue home bronchodilators  Monitor volume status closely  Monitor blood pressure closely  Frequent reorientation  Out of bed to chair as tolerates  Wound care nurse consulted  General Surgery consulted, recommending plastic surgery consultation, plastic surgery consulted for evaluation  CT scan of the abdominal wound reviewed  CBC, CMP reviewed 11/16/2024   Repeat CBC, CMP, mag and Phos in a.m. 11/16/2024      Reviewed patient's labs and imaging, and discussed with patient and nurse at bedside.    VTE Prophylaxis:  Pharmacologic & mechanical VTE prophylaxis orders are present.        CODE STATUS:   Medical Intervention Limits: No intubation (DNI)  Code Status (Patient has no pulse and is not breathing): No CPR (Do Not Attempt to Resuscitate)  Medical Interventions (Patient has pulse or is breathing): Limited Support      Electronically signed by Ramon Lei MD, 11/16/2024, 14:26 EST.

## 2024-11-16 NOTE — PLAN OF CARE
Goal Outcome Evaluation:  Patient alert to self and at times situation.  Patient wound care completed as ordered.  Patient with khan, urine in khan, is cloudy, sediment, mucus threads noted, seems thick and is malodorous. Patient did drink some water this shift, about 220ml. We encourage patient to drink due to low intake, patient wanted nothing to eat this shift.  Call light within reach.  Continue plan of care.

## 2024-11-16 NOTE — PLAN OF CARE
Goal Outcome Evaluation:  Plan of Care Reviewed With: patient        Progress: no change  Outcome Evaluation: VSS, blood glucose monitored this shift. Encouraged pt to eat and drink but has refused. LIttle output in khan catheter this shift. Wound care complete and tolerated well. No new concerns this shift, continue plan of care.

## 2024-11-16 NOTE — PROGRESS NOTES
"[facility] Clinical Pharmacy Services: Vancomycin Monitoring Note    Inez Doyle is a 74 y.o. female who is on day 2 of pharmacy to dose vancomycin for Bacteremia and Skin and Soft Tissue.    Previous Vancomycin Dose:   1250 mg IV every  12  hours  Imaging Reviewed?: Yes  Updated Cultures and Sensitivities:   11/15 blood cx NGTD   urine cx -Negative   blood cx  staph epi    Vitals/Labs  Ht: 167.6 cm (66\"); Wt: (!) 137 kg (301 lb 2.4 oz)   Temp (24hrs), Av.8 °F (36.6 °C), Min:97.5 °F (36.4 °C), Max:98.1 °F (36.7 °C)   Estimated Creatinine Clearance: 113.6 mL/min (by C-G formula based on SCr of 0.62 mg/dL).       Results from last 7 days   Lab Units 24  0842 24  0551 11/15/24  0508 24  0517 24  0601 24  1008   VANCOMYCIN TR mcg/mL 22.35*  --   --   --   --   --    CREATININE mg/dL  --  0.62 0.58 0.51* 0.66 0.64   WBC 10*3/mm3  --   --   --  7.52 7.19 9.53     Assessment/Plan    Current Vancomycin Dose:  1000 mg IV every 12 hours; which provides the following predicted parameters:  AUC24,ss: 525 mg/L.hr  Probability of AUC24 > 400: 100 %  Ctrough,ss: 15.2 mg/L  Probability of Ctrough,ss > 20: 19 %  Probability of nephrotoxicity (Lodise DUSTIN ): 10 %    Next Vanc Trough planned for steady state.  We will continue to monitor patient changes and renal function     Thank you for involving pharmacy in this patient's care. Please contact pharmacy with any questions or concerns.    Jackelin Marcelo  Clinical Pharmacist    "

## 2024-11-17 LAB
ALBUMIN SERPL-MCNC: 2.2 G/DL (ref 3.5–5.2)
ALBUMIN/GLOB SERPL: 0.6 G/DL
ALP SERPL-CCNC: 116 U/L (ref 39–117)
ALT SERPL W P-5'-P-CCNC: 9 U/L (ref 1–33)
ANION GAP SERPL CALCULATED.3IONS-SCNC: 16.6 MMOL/L (ref 5–15)
ANISOCYTOSIS BLD QL: NORMAL
AST SERPL-CCNC: 8 U/L (ref 1–32)
BASOPHILS # BLD AUTO: 0.04 10*3/MM3 (ref 0–0.2)
BASOPHILS NFR BLD AUTO: 0.4 % (ref 0–1.5)
BILIRUB SERPL-MCNC: 0.4 MG/DL (ref 0–1.2)
BUN SERPL-MCNC: 17 MG/DL (ref 8–23)
BUN/CREAT SERPL: 25 (ref 7–25)
CALCIUM SPEC-SCNC: 8.2 MG/DL (ref 8.6–10.5)
CHLORIDE SERPL-SCNC: 101 MMOL/L (ref 98–107)
CO2 SERPL-SCNC: 16.4 MMOL/L (ref 22–29)
CREAT SERPL-MCNC: 0.68 MG/DL (ref 0.57–1)
DEPRECATED RDW RBC AUTO: 56.2 FL (ref 37–54)
EGFRCR SERPLBLD CKD-EPI 2021: 91.5 ML/MIN/1.73
EOSINOPHIL # BLD AUTO: 0.13 10*3/MM3 (ref 0–0.4)
EOSINOPHIL NFR BLD AUTO: 1.4 % (ref 0.3–6.2)
ERYTHROCYTE [DISTWIDTH] IN BLOOD BY AUTOMATED COUNT: 19.9 % (ref 12.3–15.4)
GLOBULIN UR ELPH-MCNC: 3.4 GM/DL
GLUCOSE BLDC GLUCOMTR-MCNC: 120 MG/DL (ref 70–99)
GLUCOSE BLDC GLUCOMTR-MCNC: 127 MG/DL (ref 70–99)
GLUCOSE BLDC GLUCOMTR-MCNC: 137 MG/DL (ref 70–99)
GLUCOSE BLDC GLUCOMTR-MCNC: 147 MG/DL (ref 70–99)
GLUCOSE BLDC GLUCOMTR-MCNC: 151 MG/DL (ref 70–99)
GLUCOSE SERPL-MCNC: 162 MG/DL (ref 65–99)
HCT VFR BLD AUTO: 34.7 % (ref 34–46.6)
HGB BLD-MCNC: 10 G/DL (ref 12–15.9)
IMM GRANULOCYTES # BLD AUTO: 0.18 10*3/MM3 (ref 0–0.05)
IMM GRANULOCYTES NFR BLD AUTO: 1.9 % (ref 0–0.5)
INR PPP: 1.21 (ref 0.86–1.15)
LYMPHOCYTES # BLD AUTO: 1.4 10*3/MM3 (ref 0.7–3.1)
LYMPHOCYTES NFR BLD AUTO: 14.8 % (ref 19.6–45.3)
MAGNESIUM SERPL-MCNC: 1.7 MG/DL (ref 1.6–2.4)
MCH RBC QN AUTO: 23.1 PG (ref 26.6–33)
MCHC RBC AUTO-ENTMCNC: 28.8 G/DL (ref 31.5–35.7)
MCV RBC AUTO: 80.1 FL (ref 79–97)
MICROCYTES BLD QL: NORMAL
MONOCYTES # BLD AUTO: 0.78 10*3/MM3 (ref 0.1–0.9)
MONOCYTES NFR BLD AUTO: 8.2 % (ref 5–12)
NEUTROPHILS NFR BLD AUTO: 6.96 10*3/MM3 (ref 1.7–7)
NEUTROPHILS NFR BLD AUTO: 73.3 % (ref 42.7–76)
NRBC BLD AUTO-RTO: 0.3 /100 WBC (ref 0–0.2)
PHOSPHATE SERPL-MCNC: 3 MG/DL (ref 2.5–4.5)
PLATELET # BLD AUTO: 139 10*3/MM3 (ref 140–450)
PMV BLD AUTO: 11.3 FL (ref 6–12)
POTASSIUM SERPL-SCNC: 4.3 MMOL/L (ref 3.5–5.2)
PROT SERPL-MCNC: 5.6 G/DL (ref 6–8.5)
PROTHROMBIN TIME: 15.5 SECONDS (ref 11.8–14.9)
RBC # BLD AUTO: 4.33 10*6/MM3 (ref 3.77–5.28)
SMALL PLATELETS BLD QL SMEAR: NORMAL
SODIUM SERPL-SCNC: 134 MMOL/L (ref 136–145)
TOXIC GRANULATION: NORMAL
WBC NRBC COR # BLD AUTO: 9.49 10*3/MM3 (ref 3.4–10.8)

## 2024-11-17 PROCEDURE — 25010000002 HEPARIN (PORCINE) PER 1000 UNITS: Performed by: INTERNAL MEDICINE

## 2024-11-17 PROCEDURE — 82948 REAGENT STRIP/BLOOD GLUCOSE: CPT | Performed by: INTERNAL MEDICINE

## 2024-11-17 PROCEDURE — 94664 DEMO&/EVAL PT USE INHALER: CPT

## 2024-11-17 PROCEDURE — 94760 N-INVAS EAR/PLS OXIMETRY 1: CPT

## 2024-11-17 PROCEDURE — 94799 UNLISTED PULMONARY SVC/PX: CPT

## 2024-11-17 PROCEDURE — 82948 REAGENT STRIP/BLOOD GLUCOSE: CPT

## 2024-11-17 PROCEDURE — 85025 COMPLETE CBC W/AUTO DIFF WBC: CPT | Performed by: INTERNAL MEDICINE

## 2024-11-17 PROCEDURE — 85007 BL SMEAR W/DIFF WBC COUNT: CPT | Performed by: INTERNAL MEDICINE

## 2024-11-17 PROCEDURE — 85610 PROTHROMBIN TIME: CPT | Performed by: INTERNAL MEDICINE

## 2024-11-17 PROCEDURE — 99232 SBSQ HOSP IP/OBS MODERATE 35: CPT | Performed by: INTERNAL MEDICINE

## 2024-11-17 PROCEDURE — 25010000002 VANCOMYCIN 1 G RECONSTITUTED SOLUTION 1 EACH VIAL: Performed by: INTERNAL MEDICINE

## 2024-11-17 PROCEDURE — 80053 COMPREHEN METABOLIC PANEL: CPT | Performed by: INTERNAL MEDICINE

## 2024-11-17 PROCEDURE — 83735 ASSAY OF MAGNESIUM: CPT | Performed by: INTERNAL MEDICINE

## 2024-11-17 PROCEDURE — 25010000002 ENOXAPARIN PER 10 MG: Performed by: INTERNAL MEDICINE

## 2024-11-17 PROCEDURE — 25810000003 SODIUM CHLORIDE 0.9 % SOLUTION 250 ML FLEX CONT: Performed by: INTERNAL MEDICINE

## 2024-11-17 PROCEDURE — 84100 ASSAY OF PHOSPHORUS: CPT | Performed by: INTERNAL MEDICINE

## 2024-11-17 RX ORDER — ENOXAPARIN SODIUM 150 MG/ML
1 INJECTION SUBCUTANEOUS EVERY 12 HOURS
Status: DISCONTINUED | OUTPATIENT
Start: 2024-11-17 | End: 2024-11-23

## 2024-11-17 RX ADMIN — IPRATROPIUM BROMIDE AND ALBUTEROL SULFATE 3 ML: .5; 3 SOLUTION RESPIRATORY (INHALATION) at 20:38

## 2024-11-17 RX ADMIN — Medication 10 ML: at 21:02

## 2024-11-17 RX ADMIN — VANCOMYCIN HYDROCHLORIDE 1000 MG: 1 INJECTION, POWDER, LYOPHILIZED, FOR SOLUTION INTRAVENOUS at 03:53

## 2024-11-17 RX ADMIN — COLLAGENASE SANTYL 1 APPLICATION: 250 OINTMENT TOPICAL at 14:36

## 2024-11-17 RX ADMIN — MICONAZOLE NITRATE 1 APPLICATION: 2 POWDER TOPICAL at 14:37

## 2024-11-17 RX ADMIN — Medication 10 ML: at 09:01

## 2024-11-17 RX ADMIN — Medication 473 ML: at 14:36

## 2024-11-17 RX ADMIN — HEPARIN SODIUM 5000 UNITS: 5000 INJECTION INTRAVENOUS; SUBCUTANEOUS at 05:34

## 2024-11-17 RX ADMIN — WHITE PETROLATUM 1 APPLICATION: 1.75 OINTMENT TOPICAL at 14:37

## 2024-11-17 RX ADMIN — ENOXAPARIN SODIUM 135 MG: 150 INJECTION SUBCUTANEOUS at 15:52

## 2024-11-17 RX ADMIN — IPRATROPIUM BROMIDE AND ALBUTEROL SULFATE 3 ML: .5; 3 SOLUTION RESPIRATORY (INHALATION) at 08:11

## 2024-11-17 RX ADMIN — VANCOMYCIN HYDROCHLORIDE 1000 MG: 1 INJECTION, POWDER, LYOPHILIZED, FOR SOLUTION INTRAVENOUS at 15:52

## 2024-11-17 NOTE — PLAN OF CARE
Goal Outcome Evaluation:  Plan of Care Reviewed With: patient        Progress: no change  Outcome Evaluation: VSS, blood glucose monitored ACHS, no c/o pain this shift. Wound care complete this shift, tolerated well. No new concerns this shift, continue plan of care.

## 2024-11-17 NOTE — PLAN OF CARE
Goal Outcome Evaluation:Patient alert to self, able to make needs known. Patient wound care completed, tolerated ok, patient would grab and squeeze the pannus with the wounds. Patient with no acute events thus far this shift.  Continue plan of care.

## 2024-11-17 NOTE — PROGRESS NOTES
Pharmacy to Dose Enoxaparin    Dose: Lovenox 135 mg SQ q12h  Consulting provider: Quique  Indication: treatment for A.fib    Estimated Creatinine Clearance: 103.6 mL/min (by C-G formula based on SCr of 0.68 mg/dL).   PD/HD/CRRT ? No      Documented weights    11/11/24 1054 11/11/24 1513   Weight: 123 kg (270 lb 8.1 oz) (!) 137 kg (301 lb 2.4 oz)        Lab Results   Component Value Date    HGB 10.0 (L) 11/17/2024    HGB 10.4 (L) 11/14/2024     (L) 11/17/2024     (L) 11/14/2024     - Ordered Anti-Xa level after 3rd dose on 11/18/24 at 1545.      Enoxaparin Dosing Guidelines      When to use this guidance:    Morbid obesity (weight >190 kg or body mass index >40 kg/m2)   Very low body weight (weight <55 kg)   Significant renal impairment (CrCl <30 mL/min)   Patients who receive extended therapy (>1 month)       Prophylaxis Treatment Comments   BMI < 18 or ABW < 55 kg   30 mg SubQ daily 1mg/kg SubQ q 12h    BMI > 40 40 mg SubQ q 12h 1 mg/kg ABW SubQ q 12h   (cap at 190mg unless Xa levels available)   Recommend switching to      Heparin if no onsite Xa       level monitoring    (cap at 190mg unless Xa levels   available)      Avoid once-daily dosing   BMI > 50 60 mg SubQ q 12h 0.7 mg/kg ABW SubQ q 12h  (cap at 190mg unless Xa levels available)   Recommend switching to        Heparin if no onsite Xa      level monitoring  (cap at 190mg unless Xa levels   available)    Avoid once-daily dosing   Abbreviations: BMI = body mass index, ABW = Actual Body Weight  BMI > 40 and CrCl < 30 mL/min: Recommend switching to Heparin if no onsite Xa levels  Round doses nearest 10 mg    Enoxaparin Twice Daily Treatment Dosing Nomogram Based on Anti-Factor Xa Levels  For optimal level interpretation, levels should be collected 4 hours after a dose.   Peak Anti-Factor Xa Level   Hold Next Dose Dose Change Next Level   < 0.35 units/mL NO Increase dose by 25% Initial Level Recommendations:  Draw level after 2nd or 3rd dose   to  monitor the peak   anticoagulation effect.  Subsequent Level Monitoring:  After dose change, re-check level   no sooner than the 4th dose of   the adjusted regimen.     0.35-0.59 units/mL NO Increase dose by 10%    0.6-1 units/mL NO No change    1.1-1.5 units/mL NO Decrease dose by 20%    1.6-2 units/mL 3 hours Decrease dose by 30%    >2 units/mL Until concentration is   < 0.6 units/mL Decrease dose by 40%    **Target trough level drawn before the next dose is < 0.6 units/mL

## 2024-11-17 NOTE — PROGRESS NOTES
[facility]   Hospitalist Progress Note  Date: 2024  Patient Name: Inez Doyle  : 1950  MRN: [id number]  Date of admission: 2024    Subjective   Subjective     Chief Complaint:   Altered mental status    Summary:   Inez Doyle is a 74 y.o. female past medical history of atrial fibrillation on A-fib, CHF, COPD, hypertension presented to the emergency department for evaluation of altered mental status from nursing facility.  Patient reportedly has had decreased activity level and decreased p.o. intake.  Per patient she just feels tired and she is not hungry.  She is oriented x 2 did not know location.  However she is oriented to situation.  Able to answer questions appropriately.  She denies any other fevers, chills, sweats, nausea, vomiting, chest pain shortness of breath palpitations, abdominal pain diarrhea constipation, dysuria, rash.  In the emergency department noted to have hypokalemia with potassium 2.6 and is receiving repletion.  She will be admitted for ongoing monitoring management.     Interval Followup:   No acute events over the last 24 hours, patient alert, oriented this a.m., again is agreeable to plastic surgery evaluation for abdominal wounds    Objective   Objective     Vitals:   Temp:  [97.7 °F (36.5 °C)-98.3 °F (36.8 °C)] 98.2 °F (36.8 °C)  Heart Rate:  [] 108  Resp:  [16-20] 16  BP: ()/(63-83) 128/76    Physical Exam   GEN: No acute distress  HEENT: Moist mucous membranes  LUNGS: Equal chest rise bilaterally  CARDIAC: Regular rate and rhythm  NEURO: Moving all 4 extremities spontaneously  SKIN: No obvious breakdown    Result Review    Result Review:  I have personally reviewed the results as below  [x]  Laboratory CBC, CMP personally reviewed  CBC          2024    06:01 2024    05:17 2024    08:51   CBC   WBC 7.19  7.52  9.49    RBC 4.24  4.63  4.33    Hemoglobin 9.9  10.4  10.0    Hematocrit 32.0  34.7  34.7    MCV 75.5  74.9  80.1     MCH 23.3  22.5  23.1    MCHC 30.9  30.0  28.8    RDW 18.8  19.1  19.9    Platelets 109  123  139      CMP          11/15/2024    05:08 11/16/2024    05:51 11/17/2024    08:51   CMP   Glucose 160  149  162    BUN 14  14  17    Creatinine 0.58  0.62  0.68    EGFR 95.1  93.6  91.5    Sodium 136  137  134    Potassium 4.4  4.7  4.3    Chloride 103  103  101    Calcium 8.6  8.4  8.2    Total Protein   5.6    Albumin   2.2    Globulin   3.4    Total Bilirubin   0.4    Alkaline Phosphatase   116    AST (SGOT)   8    ALT (SGPT)   9    Albumin/Globulin Ratio   0.6    BUN/Creatinine Ratio 24.1  22.6  25.0    Anion Gap 12.2  14.4  16.6      []  Microbiology  []  Radiology  []  EKG/Telemetry   []  Cardiology/Vascular   []  Pathology  []  Old records  []  Other:    Scheduled medications:  collagenase, 1 Application, Topical, 2 times per day  heparin (porcine), 5,000 Units, Subcutaneous, Q8H  insulin regular, 2-7 Units, Subcutaneous, Q6H  ipratropium-albuterol, 3 mL, Nebulization, Q6H  [Held by provider] losartan, 12.5 mg, Oral, Daily  [Held by provider] megestrol, 400 mg, Oral, Daily  miconazole, 1 Application, Topical, 2 times per day  mineral oil-hydrophilic petrolatum, 1 Application, Topical, 2 times per day  [Held by provider] sertraline, 25 mg, Oral, Daily  sodium chloride, 10 mL, Intravenous, Q12H  Sodium Hypochlorite, 1 Application, Apply externally, 2 times per day  vancomycin, 1,000 mg, Intravenous, Q12H      As needed medications:    acetaminophen **OR** acetaminophen **OR** acetaminophen    dextrose    dextrose    glucagon (human recombinant)    HYDROcodone-acetaminophen    nitroglycerin    ondansetron    Pharmacy to dose vancomycin    sodium chloride    sodium chloride    sodium chloride  Infusions:  Pharmacy to dose vancomycin,            Assessment & Plan   Assessment / Plan     Assessment:  Metabolic encephalopathy  Severe life-threatening hypokalemia  Paroxysmal A-fib  History of COPD  History of  CHF  Hypertension    Plan:  Patient admitted to the hospital for further workup and management of above  Patient completed 3 days of antibiotics prior to presenting to the hospital,   Continue vancomycin given positive blood cultures,  repeat blood cultures with no growth to date  Mental status waxing and waning, continues to be oriented this a.m.  Replace potassium and magnesium closely  Hold Coumadin for now, treat with Lovenox pending further surgical evaluation  Continue home bronchodilators  Monitor volume status closely  Monitor blood pressure closely  Frequent reorientation  Out of bed to chair as tolerates  Wound care nurse consulted  General Surgery consulted, recommending plastic surgery consultation, plastic surgery consulted for evaluation  CT scan of the abdominal wound reviewed  CBC, CMP reviewed 11/17/2024   Repeat CBC, CMP, mag and Phos in a.m. 11/17/2024      Reviewed patient's labs and imaging, and discussed with patient and nurse at bedside.    VTE Prophylaxis:  Pharmacologic & mechanical VTE prophylaxis orders are present.        CODE STATUS:   Medical Intervention Limits: No intubation (DNI)  Code Status (Patient has no pulse and is not breathing): No CPR (Do Not Attempt to Resuscitate)  Medical Interventions (Patient has pulse or is breathing): Limited Support      Electronically signed by Ramon Lei MD, 11/17/2024, 11:43 EST.

## 2024-11-18 LAB
ALBUMIN SERPL-MCNC: 2.2 G/DL (ref 3.5–5.2)
ALBUMIN/GLOB SERPL: 0.7 G/DL
ALP SERPL-CCNC: 106 U/L (ref 39–117)
ALT SERPL W P-5'-P-CCNC: 9 U/L (ref 1–33)
ANION GAP SERPL CALCULATED.3IONS-SCNC: 13.2 MMOL/L (ref 5–15)
AST SERPL-CCNC: 6 U/L (ref 1–32)
BASOPHILS # BLD AUTO: 0.05 10*3/MM3 (ref 0–0.2)
BASOPHILS NFR BLD AUTO: 0.7 % (ref 0–1.5)
BILIRUB SERPL-MCNC: 0.4 MG/DL (ref 0–1.2)
BUN SERPL-MCNC: 16 MG/DL (ref 8–23)
BUN/CREAT SERPL: 24.2 (ref 7–25)
CALCIUM SPEC-SCNC: 8.1 MG/DL (ref 8.6–10.5)
CHLORIDE SERPL-SCNC: 106 MMOL/L (ref 98–107)
CO2 SERPL-SCNC: 19.8 MMOL/L (ref 22–29)
CREAT SERPL-MCNC: 0.66 MG/DL (ref 0.57–1)
DEPRECATED RDW RBC AUTO: 52.9 FL (ref 37–54)
EGFRCR SERPLBLD CKD-EPI 2021: 92.2 ML/MIN/1.73
EOSINOPHIL # BLD AUTO: 0.14 10*3/MM3 (ref 0–0.4)
EOSINOPHIL NFR BLD AUTO: 1.9 % (ref 0.3–6.2)
ERYTHROCYTE [DISTWIDTH] IN BLOOD BY AUTOMATED COUNT: 19.6 % (ref 12.3–15.4)
GLOBULIN UR ELPH-MCNC: 3.1 GM/DL
GLUCOSE BLDC GLUCOMTR-MCNC: 136 MG/DL (ref 70–99)
GLUCOSE SERPL-MCNC: 137 MG/DL (ref 65–99)
HCT VFR BLD AUTO: 30.9 % (ref 34–46.6)
HGB BLD-MCNC: 9.3 G/DL (ref 12–15.9)
IMM GRANULOCYTES # BLD AUTO: 0.18 10*3/MM3 (ref 0–0.05)
IMM GRANULOCYTES NFR BLD AUTO: 2.5 % (ref 0–0.5)
INR PPP: 1.4 (ref 0.86–1.15)
LYMPHOCYTES # BLD AUTO: 1.26 10*3/MM3 (ref 0.7–3.1)
LYMPHOCYTES NFR BLD AUTO: 17.3 % (ref 19.6–45.3)
MAGNESIUM SERPL-MCNC: 1.6 MG/DL (ref 1.6–2.4)
MCH RBC QN AUTO: 23.1 PG (ref 26.6–33)
MCHC RBC AUTO-ENTMCNC: 30.1 G/DL (ref 31.5–35.7)
MCV RBC AUTO: 76.9 FL (ref 79–97)
MONOCYTES # BLD AUTO: 0.51 10*3/MM3 (ref 0.1–0.9)
MONOCYTES NFR BLD AUTO: 7 % (ref 5–12)
NEUTROPHILS NFR BLD AUTO: 5.13 10*3/MM3 (ref 1.7–7)
NEUTROPHILS NFR BLD AUTO: 70.6 % (ref 42.7–76)
NRBC BLD AUTO-RTO: 0 /100 WBC (ref 0–0.2)
PHOSPHATE SERPL-MCNC: 3.4 MG/DL (ref 2.5–4.5)
PLATELET # BLD AUTO: 97 10*3/MM3 (ref 140–450)
PMV BLD AUTO: 11 FL (ref 6–12)
POTASSIUM SERPL-SCNC: 3.8 MMOL/L (ref 3.5–5.2)
PROT SERPL-MCNC: 5.3 G/DL (ref 6–8.5)
PROTHROMBIN TIME: 17.4 SECONDS (ref 11.8–14.9)
RBC # BLD AUTO: 4.02 10*6/MM3 (ref 3.77–5.28)
SODIUM SERPL-SCNC: 139 MMOL/L (ref 136–145)
VANCOMYCIN SERPL-MCNC: 24.24 MCG/ML (ref 5–40)
WBC NRBC COR # BLD AUTO: 7.27 10*3/MM3 (ref 3.4–10.8)

## 2024-11-18 PROCEDURE — 25010000002 ENOXAPARIN PER 10 MG: Performed by: INTERNAL MEDICINE

## 2024-11-18 PROCEDURE — 25810000003 SODIUM CHLORIDE 0.9 % SOLUTION 250 ML FLEX CONT: Performed by: INTERNAL MEDICINE

## 2024-11-18 PROCEDURE — 94799 UNLISTED PULMONARY SVC/PX: CPT

## 2024-11-18 PROCEDURE — 80053 COMPREHEN METABOLIC PANEL: CPT | Performed by: INTERNAL MEDICINE

## 2024-11-18 PROCEDURE — 99232 SBSQ HOSP IP/OBS MODERATE 35: CPT | Performed by: INTERNAL MEDICINE

## 2024-11-18 PROCEDURE — 84100 ASSAY OF PHOSPHORUS: CPT | Performed by: INTERNAL MEDICINE

## 2024-11-18 PROCEDURE — 25010000002 VANCOMYCIN 5 G RECONSTITUTED SOLUTION: Performed by: INTERNAL MEDICINE

## 2024-11-18 PROCEDURE — 85610 PROTHROMBIN TIME: CPT | Performed by: INTERNAL MEDICINE

## 2024-11-18 PROCEDURE — 25810000003 SODIUM CHLORIDE 0.9 % SOLUTION: Performed by: INTERNAL MEDICINE

## 2024-11-18 PROCEDURE — 80202 ASSAY OF VANCOMYCIN: CPT | Performed by: INTERNAL MEDICINE

## 2024-11-18 PROCEDURE — 83735 ASSAY OF MAGNESIUM: CPT | Performed by: INTERNAL MEDICINE

## 2024-11-18 PROCEDURE — 25010000002 VANCOMYCIN 1 G RECONSTITUTED SOLUTION 1 EACH VIAL: Performed by: INTERNAL MEDICINE

## 2024-11-18 PROCEDURE — 85025 COMPLETE CBC W/AUTO DIFF WBC: CPT | Performed by: INTERNAL MEDICINE

## 2024-11-18 PROCEDURE — 92526 ORAL FUNCTION THERAPY: CPT

## 2024-11-18 RX ORDER — VANCOMYCIN/0.9 % SOD CHLORIDE 1.5G/250ML
1500 PLASTIC BAG, INJECTION (ML) INTRAVENOUS EVERY 24 HOURS
Status: DISCONTINUED | OUTPATIENT
Start: 2024-11-18 | End: 2024-11-22

## 2024-11-18 RX ADMIN — Medication 473 ML: at 10:14

## 2024-11-18 RX ADMIN — VANCOMYCIN HYDROCHLORIDE 1000 MG: 1 INJECTION, POWDER, LYOPHILIZED, FOR SOLUTION INTRAVENOUS at 03:43

## 2024-11-18 RX ADMIN — MICONAZOLE NITRATE 1 APPLICATION: 2 POWDER TOPICAL at 10:13

## 2024-11-18 RX ADMIN — COLLAGENASE SANTYL 1 APPLICATION: 250 OINTMENT TOPICAL at 10:13

## 2024-11-18 RX ADMIN — VANCOMYCIN HYDROCHLORIDE 1500 MG: 5 INJECTION, POWDER, LYOPHILIZED, FOR SOLUTION INTRAVENOUS at 21:00

## 2024-11-18 RX ADMIN — IPRATROPIUM BROMIDE AND ALBUTEROL SULFATE 3 ML: .5; 3 SOLUTION RESPIRATORY (INHALATION) at 00:08

## 2024-11-18 RX ADMIN — Medication 10 ML: at 20:56

## 2024-11-18 RX ADMIN — MICONAZOLE NITRATE 1 APPLICATION: 2 POWDER TOPICAL at 21:00

## 2024-11-18 RX ADMIN — WHITE PETROLATUM 1 APPLICATION: 1.75 OINTMENT TOPICAL at 21:00

## 2024-11-18 RX ADMIN — Medication 473 ML: at 21:00

## 2024-11-18 RX ADMIN — COLLAGENASE SANTYL 1 APPLICATION: 250 OINTMENT TOPICAL at 21:00

## 2024-11-18 RX ADMIN — ENOXAPARIN SODIUM 135 MG: 150 INJECTION SUBCUTANEOUS at 00:21

## 2024-11-18 RX ADMIN — WHITE PETROLATUM 1 APPLICATION: 1.75 OINTMENT TOPICAL at 10:14

## 2024-11-18 NOTE — PROGRESS NOTES
"[facility] Clinical Pharmacy Services: Vancomycin Monitoring Note    Inez Doyle is a 74 y.o. female who is on day 4 of pharmacy to dose vancomycin for Bacteremia and Skin and Soft Tissue.    Previous Vancomycin Dose:   1250 mg IV every  12  hours  Imaging Reviewed?: Yes  Updated Cultures and Sensitivities:   11/15 blood cx NGTD   urine cx -Negative   blood cx  staph epi    Vitals/Labs  Ht: 167.6 cm (66\"); Wt: (!) 137 kg (301 lb 2.4 oz)   Temp (24hrs), Av.1 °F (36.7 °C), Min:98 °F (36.7 °C), Max:98.2 °F (36.8 °C)   Estimated Creatinine Clearance: 106.7 mL/min (by C-G formula based on SCr of 0.66 mg/dL).     Results from last 7 days   Lab Units 24  1215 24  0538 24  0851 24  0842 24  0551 11/15/24  0508 24  0517   VANCOMYCIN RM mcg/mL 24.24  --   --   --   --   --   --    VANCOMYCIN TR mcg/mL  --   --   --  22.35*  --   --   --    CREATININE mg/dL  --  0.66 0.68  --  0.62   < > 0.51*   WBC 10*3/mm3  --  7.27 9.49  --   --   --  7.52    < > = values in this interval not displayed.     Assessment/Plan  Vancomycin level today reported as 24.24.  Will adjust vancomycin to 1500 mg every 24 hours starting at 2100 today.     Current Vancomycin Dose:  1500 mg IV every 24 hours; which provides the following predicted parameters:    Regimen: 1500 mg IV every 24 hours.  Exposure target: AUC24 (range)400-600 mg/L.hr   AUC24,ss: 499 mg/L.hr  Probability of AUC24 > 400: 99 %  Ctrough,ss: 13.8 mg/L  Probability of Ctrough,ss > 20: 2 %  Probability of nephrotoxicity (Lodise DUSTIN ): 9 %    Thank you for involving pharmacy in this patient's care. Please contact pharmacy with any questions or concerns.    Cali Beltran  Clinical Pharmacist  "

## 2024-11-18 NOTE — PROGRESS NOTES
[facility]   Hospitalist Progress Note  Date: 2024  Patient Name: Inez Doyle  : 1950  MRN: [id number]  Date of admission: 2024    Subjective   Subjective     Chief Complaint:   Altered mental status    Summary:   Inez Doyle is a 74 y.o. female past medical history of atrial fibrillation on A-fib, CHF, COPD, hypertension presented to the emergency department for evaluation of altered mental status from nursing facility.  Patient reportedly has had decreased activity level and decreased p.o. intake.  Per patient she just feels tired and she is not hungry.  She is oriented x 2 did not know location.  However she is oriented to situation.  Able to answer questions appropriately.  She denies any other fevers, chills, sweats, nausea, vomiting, chest pain shortness of breath palpitations, abdominal pain diarrhea constipation, dysuria, rash.  In the emergency department noted to have hypokalemia with potassium 2.6 and is receiving repletion.  She will be admitted for ongoing monitoring management.     Interval Followup:   Patient with increased confusion this a.m., refusing wound care by nursing staff    Objective   Objective     Vitals:   Temp:  [98 °F (36.7 °C)-98.2 °F (36.8 °C)] 98.2 °F (36.8 °C)  Heart Rate:  [] 77  Resp:  [16] 16  BP: (121-138)/(69-96) 128/69    Physical Exam   GEN: No acute distress  HEENT: Moist mucous membranes  LUNGS: Equal chest rise bilaterally  CARDIAC: Regular rate and rhythm  NEURO: Moving all 4 extremities spontaneously  SKIN: No obvious breakdown    Result Review    Result Review:  I have personally reviewed the results as below  [x]  Laboratory CBC, CMP personally reviewed  CBC          2024    05:17 2024    08:51 2024    05:38   CBC   WBC 7.52  9.49  7.27    RBC 4.63  4.33  4.02    Hemoglobin 10.4  10.0  9.3    Hematocrit 34.7  34.7  30.9    MCV 74.9  80.1  76.9    MCH 22.5  23.1  23.1    MCHC 30.0  28.8  30.1    RDW 19.1  19.9   19.6    Platelets 123  139  97      CMP          11/16/2024    05:51 11/17/2024    08:51 11/18/2024    05:38   CMP   Glucose 149  162  137    BUN 14  17  16    Creatinine 0.62  0.68  0.66    EGFR 93.6  91.5  92.2    Sodium 137  134  139    Potassium 4.7  4.3  3.8    Chloride 103  101  106    Calcium 8.4  8.2  8.1    Total Protein  5.6  5.3    Albumin  2.2  2.2    Globulin  3.4  3.1    Total Bilirubin  0.4  0.4    Alkaline Phosphatase  116  106    AST (SGOT)  8  6    ALT (SGPT)  9  9    Albumin/Globulin Ratio  0.6  0.7    BUN/Creatinine Ratio 22.6  25.0  24.2    Anion Gap 14.4  16.6  13.2      []  Microbiology  []  Radiology  []  EKG/Telemetry   []  Cardiology/Vascular   []  Pathology  []  Old records  []  Other:    Scheduled medications:  collagenase, 1 Application, Topical, 2 times per day  enoxaparin, 1 mg/kg, Subcutaneous, Q12H  insulin regular, 2-7 Units, Subcutaneous, Q6H  ipratropium-albuterol, 3 mL, Nebulization, Q6H  [Held by provider] losartan, 12.5 mg, Oral, Daily  [Held by provider] megestrol, 400 mg, Oral, Daily  miconazole, 1 Application, Topical, 2 times per day  mineral oil-hydrophilic petrolatum, 1 Application, Topical, 2 times per day  [Held by provider] sertraline, 25 mg, Oral, Daily  sodium chloride, 10 mL, Intravenous, Q12H  Sodium Hypochlorite, 1 Application, Apply externally, 2 times per day  vancomycin, 1,000 mg, Intravenous, Q12H      As needed medications:    acetaminophen **OR** acetaminophen **OR** acetaminophen    dextrose    dextrose    glucagon (human recombinant)    HYDROcodone-acetaminophen    nitroglycerin    ondansetron    Pharmacy to Dose enoxaparin (LOVENOX)    Pharmacy to dose vancomycin    sodium chloride    sodium chloride    sodium chloride  Infusions:  Pharmacy to Dose enoxaparin (LOVENOX),   Pharmacy to dose vancomycin,            Assessment & Plan   Assessment / Plan     Assessment:  Metabolic encephalopathy  Severe life-threatening hypokalemia  Paroxysmal A-fib  History of  COPD  History of CHF  Hypertension    Plan:  Patient admitted to the hospital for further workup and management of above  Patient completed 3 days of antibiotics prior to presenting to the hospital for suspected UTI,   Continue vancomycin given positive blood cultures,  repeat blood cultures with no growth to date  Mental status waxing and waning, not oriented this a.m.  Monitor and replace electrolytes as needed  Holding Coumadin, continue Lovenox pending further surgical evaluation  Continue home bronchodilators  Monitor volume status closely  Monitor blood pressure closely  Frequent reorientation  Out of bed to chair as tolerates  Wound care nurse consulted  General Surgery consulted, recommending plastic surgery consultation, plastic surgery consulted for evaluation, patient refused surgery evaluation she continues to have intermittent confusion, goals of care have been clarified with POA and patient is full treatment  CT scan of the abdominal wound reviewed  Wound ostomy nurse consult, question if patient has pyoderma biopsy would be beneficial if she allows us to surgically debride  CBC, CMP reviewed 11/18/2024   Repeat CBC, CMP, mag and Phos in a.m. 11/18/2024      Reviewed patient's labs and imaging, and discussed with patient and nurse at bedside.    VTE Prophylaxis:  Pharmacologic & mechanical VTE prophylaxis orders are present.        CODE STATUS:   Medical Intervention Limits: No intubation (DNI)  Code Status (Patient has no pulse and is not breathing): No CPR (Do Not Attempt to Resuscitate)  Medical Interventions (Patient has pulse or is breathing): Limited Support      Electronically signed by Ramon Lei MD, 11/18/2024, 12:27 EST.
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
## 2024-11-18 NOTE — PLAN OF CARE
Goal Outcome Evaluation:  Plan of Care Reviewed With: patient        Progress: no change  Outcome Evaluation: VSS. A&Ox3, disoriented to situation. Pt irritable and uncooperative this shift. Pt refused wound care, skin care, blood glucose checks despite being educated on the importance. Pt rested well throughout shift. No c/o pain or discomfort at this time.

## 2024-11-18 NOTE — PLAN OF CARE
Problem: Adult Inpatient Plan of Care  Goal: Plan of Care Review  Outcome: Progressing  Flowsheets (Taken 11/18/2024 1543)  Progress: no change  Outcome Evaluation: Patient is alert and oriented x2. Patient disoriented to place and situation at this time. Patient refusing vital signs and blood glucose monitoring. Patient has no complaints at this time.  Plan of Care Reviewed With: patient  Goal: Patient-Specific Goal (Individualized)  Outcome: Progressing  Goal: Absence of Hospital-Acquired Illness or Injury  Outcome: Progressing  Intervention: Identify and Manage Fall Risk  Recent Flowsheet Documentation  Taken 11/18/2024 1542 by Anni Tee, RN  Safety Promotion/Fall Prevention:   assistive device/personal items within reach   clutter free environment maintained   fall prevention program maintained   lighting adjusted   nonskid shoes/slippers when out of bed   room organization consistent   safety round/check completed  Taken 11/18/2024 1400 by Anni Tee, RN  Safety Promotion/Fall Prevention:   assistive device/personal items within reach   clutter free environment maintained   fall prevention program maintained   lighting adjusted   nonskid shoes/slippers when out of bed   room organization consistent   safety round/check completed  Taken 11/18/2024 1340 by Anni Tee, RN  Safety Promotion/Fall Prevention:   assistive device/personal items within reach   clutter free environment maintained   fall prevention program maintained   lighting adjusted   nonskid shoes/slippers when out of bed   room organization consistent   safety round/check completed  Taken 11/18/2024 1142 by Anni Tee, RN  Safety Promotion/Fall Prevention:   assistive device/personal items within reach   clutter free environment maintained   fall prevention program maintained   lighting adjusted   nonskid shoes/slippers when out of bed   room organization consistent   safety round/check completed  Taken 11/18/2024 1014 by  Tee, Anni, RN  Safety Promotion/Fall Prevention:   assistive device/personal items within reach   clutter free environment maintained   fall prevention program maintained   lighting adjusted   nonskid shoes/slippers when out of bed   room organization consistent   safety round/check completed  Taken 11/18/2024 0815 by Anni Tee RN  Safety Promotion/Fall Prevention:   assistive device/personal items within reach   clutter free environment maintained   fall prevention program maintained   lighting adjusted   nonskid shoes/slippers when out of bed   room organization consistent   safety round/check completed  Taken 11/18/2024 0715 by Anni Tee RN  Safety Promotion/Fall Prevention:   assistive device/personal items within reach   clutter free environment maintained   fall prevention program maintained   lighting adjusted   nonskid shoes/slippers when out of bed   room organization consistent   safety round/check completed  Intervention: Prevent Infection  Recent Flowsheet Documentation  Taken 11/18/2024 1542 by Anni Tee RN  Infection Prevention:   cohorting utilized   environmental surveillance performed   equipment surfaces disinfected   hand hygiene promoted   personal protective equipment utilized   rest/sleep promoted   single patient room provided  Taken 11/18/2024 1400 by Anni Tee RN  Infection Prevention:   cohorting utilized   environmental surveillance performed   equipment surfaces disinfected   hand hygiene promoted   personal protective equipment utilized   rest/sleep promoted   single patient room provided  Taken 11/18/2024 1340 by Anni Tee RN  Infection Prevention:   cohorting utilized   environmental surveillance performed   equipment surfaces disinfected   hand hygiene promoted   personal protective equipment utilized   rest/sleep promoted   single patient room provided  Taken 11/18/2024 1142 by Anni Tee RN  Infection Prevention:   cohorting utilized    environmental surveillance performed   equipment surfaces disinfected   hand hygiene promoted   personal protective equipment utilized   rest/sleep promoted   single patient room provided  Taken 11/18/2024 1014 by Anni Tee RN  Infection Prevention:   cohorting utilized   environmental surveillance performed   equipment surfaces disinfected   hand hygiene promoted   personal protective equipment utilized   rest/sleep promoted   single patient room provided  Taken 11/18/2024 0815 by Anni Tee RN  Infection Prevention:   cohorting utilized   environmental surveillance performed   equipment surfaces disinfected   hand hygiene promoted   personal protective equipment utilized   rest/sleep promoted   single patient room provided  Taken 11/18/2024 0715 by Anni Tee RN  Infection Prevention:   cohorting utilized   environmental surveillance performed   equipment surfaces disinfected   hand hygiene promoted   personal protective equipment utilized   rest/sleep promoted   single patient room provided  Goal: Optimal Comfort and Wellbeing  Outcome: Progressing  Intervention: Provide Person-Centered Care  Recent Flowsheet Documentation  Taken 11/18/2024 0815 by Anni Tee RN  Trust Relationship/Rapport:   care explained   choices provided   emotional support provided   empathic listening provided   questions answered   questions encouraged   reassurance provided   thoughts/feelings acknowledged  Goal: Readiness for Transition of Care  Outcome: Progressing     Problem: Fall Injury Risk  Goal: Absence of Fall and Fall-Related Injury  Outcome: Progressing  Intervention: Identify and Manage Contributors  Recent Flowsheet Documentation  Taken 11/18/2024 0815 by Anni Tee RN  Medication Review/Management:   medications reviewed   high-risk medications identified  Intervention: Promote Injury-Free Environment  Recent Flowsheet Documentation  Taken 11/18/2024 1542 by Anni Tee, RN  Safety  Promotion/Fall Prevention:   assistive device/personal items within reach   clutter free environment maintained   fall prevention program maintained   lighting adjusted   nonskid shoes/slippers when out of bed   room organization consistent   safety round/check completed  Taken 11/18/2024 1400 by Anni Tee RN  Safety Promotion/Fall Prevention:   assistive device/personal items within reach   clutter free environment maintained   fall prevention program maintained   lighting adjusted   nonskid shoes/slippers when out of bed   room organization consistent   safety round/check completed  Taken 11/18/2024 1340 by Anni Tee RN  Safety Promotion/Fall Prevention:   assistive device/personal items within reach   clutter free environment maintained   fall prevention program maintained   lighting adjusted   nonskid shoes/slippers when out of bed   room organization consistent   safety round/check completed  Taken 11/18/2024 1142 by Anni Tee RN  Safety Promotion/Fall Prevention:   assistive device/personal items within reach   clutter free environment maintained   fall prevention program maintained   lighting adjusted   nonskid shoes/slippers when out of bed   room organization consistent   safety round/check completed  Taken 11/18/2024 1014 by Anni Tee, RN  Safety Promotion/Fall Prevention:   assistive device/personal items within reach   clutter free environment maintained   fall prevention program maintained   lighting adjusted   nonskid shoes/slippers when out of bed   room organization consistent   safety round/check completed  Taken 11/18/2024 0815 by Anni Tee, RN  Safety Promotion/Fall Prevention:   assistive device/personal items within reach   clutter free environment maintained   fall prevention program maintained   lighting adjusted   nonskid shoes/slippers when out of bed   room organization consistent   safety round/check completed  Taken 11/18/2024 0715 by Anni Tee  RN  Safety Promotion/Fall Prevention:   assistive device/personal items within reach   clutter free environment maintained   fall prevention program maintained   lighting adjusted   nonskid shoes/slippers when out of bed   room organization consistent   safety round/check completed     Problem: Comorbidity Management  Goal: Maintenance of COPD Symptom Control  Outcome: Progressing  Intervention: Maintain COPD (Chronic Obstructive Pulmonary Disease) Symptom Control  Recent Flowsheet Documentation  Taken 11/18/2024 0815 by Anni Tee RN  Medication Review/Management:   medications reviewed   high-risk medications identified  Goal: Blood Glucose Level Within Target Range  Outcome: Progressing  Intervention: Monitor and Manage Glycemia  Recent Flowsheet Documentation  Taken 11/18/2024 0815 by Anni Tee RN  Medication Review/Management:   medications reviewed   high-risk medications identified  Goal: Maintenance of Heart Failure Symptom Control  Outcome: Progressing  Intervention: Maintain Heart Failure Management  Recent Flowsheet Documentation  Taken 11/18/2024 0815 by Anni Tee RN  Medication Review/Management:   medications reviewed   high-risk medications identified  Goal: Blood Pressure in Desired Range  Outcome: Progressing  Intervention: Maintain Blood Pressure Management  Recent Flowsheet Documentation  Taken 11/18/2024 0815 by Anni Tee RN  Medication Review/Management:   medications reviewed   high-risk medications identified     Problem: Skin Injury Risk Increased  Goal: Skin Health and Integrity  Outcome: Progressing     Problem: Sepsis/Septic Shock  Goal: Optimal Coping  Outcome: Progressing  Goal: Absence of Bleeding  Outcome: Progressing  Goal: Blood Glucose Level Within Target Range  Outcome: Progressing  Goal: Absence of Infection Signs and Symptoms  Outcome: Progressing  Intervention: Initiate Sepsis Management  Recent Flowsheet Documentation  Taken 11/18/2024 1542 by Randal  GISSEL Hutton  Infection Prevention:   cohorting utilized   environmental surveillance performed   equipment surfaces disinfected   hand hygiene promoted   personal protective equipment utilized   rest/sleep promoted   single patient room provided  Taken 11/18/2024 1400 by Anni Tee RN  Infection Prevention:   cohorting utilized   environmental surveillance performed   equipment surfaces disinfected   hand hygiene promoted   personal protective equipment utilized   rest/sleep promoted   single patient room provided  Taken 11/18/2024 1340 by Anni Tee RN  Infection Prevention:   cohorting utilized   environmental surveillance performed   equipment surfaces disinfected   hand hygiene promoted   personal protective equipment utilized   rest/sleep promoted   single patient room provided  Taken 11/18/2024 1142 by Anni Tee RN  Infection Prevention:   cohorting utilized   environmental surveillance performed   equipment surfaces disinfected   hand hygiene promoted   personal protective equipment utilized   rest/sleep promoted   single patient room provided  Taken 11/18/2024 1014 by Anni Tee RN  Infection Prevention:   cohorting utilized   environmental surveillance performed   equipment surfaces disinfected   hand hygiene promoted   personal protective equipment utilized   rest/sleep promoted   single patient room provided  Taken 11/18/2024 0815 by Anni Tee RN  Infection Prevention:   cohorting utilized   environmental surveillance performed   equipment surfaces disinfected   hand hygiene promoted   personal protective equipment utilized   rest/sleep promoted   single patient room provided  Taken 11/18/2024 0715 by Anni Tee RN  Infection Prevention:   cohorting utilized   environmental surveillance performed   equipment surfaces disinfected   hand hygiene promoted   personal protective equipment utilized   rest/sleep promoted   single patient room provided  Goal: Optimal  Nutrition Delivery  Outcome: Progressing     Problem: Palliative Care  Goal: Enhanced Quality of Life  Outcome: Progressing     Problem: Pain Acute  Goal: Optimal Pain Control and Function  Outcome: Progressing  Intervention: Prevent or Manage Pain  Recent Flowsheet Documentation  Taken 11/18/2024 0815 by Anni Tee, RN  Medication Review/Management:   medications reviewed   high-risk medications identified   Goal Outcome Evaluation:  Plan of Care Reviewed With: patient        Progress: no change  Outcome Evaluation: Patient is alert and oriented x2. Patient disoriented to place and situation at this time. Patient refusing vital signs and blood glucose monitoring. Patient has no complaints at this time.

## 2024-11-18 NOTE — THERAPY TREATMENT NOTE
Acute Care - Speech Language Pathology   Swallow Treatment Note SYK Munoz     Patient Name: Inez Doyle  : 1950  MRN: [id number]  Today's Date: 2024               Admit Date: 2024    Visit Dx:     ICD-10-CM ICD-9-CM   1. Hypokalemia  E87.6 276.8   2. Anorexia  R63.0 783.0   3. Oropharyngeal dysphagia  R13.12 787.22   4. Difficulty walking  R26.2 719.7     Patient Active Problem List   Diagnosis    Posterior tibial tendon dysfunction    Charcot's joint of foot    Arthritis, lumbar spine    Atrial fibrillation    COVID-19 with multiple comorbidities    Hypoxic respiratory failure    Morbid obesity    Diabetes    AMS (altered mental status)    Severe protein-calorie malnutrition     Past Medical History:   Diagnosis Date    A-fib     Anemia     Anxiety     Asthma     Candidiasis of skin and nail     CHF (congestive heart failure)     COPD (chronic obstructive pulmonary disease)     Depression     Diabetes mellitus     GERD (gastroesophageal reflux disease)     Hyperlipidemia     Hypertension     Hypokalemia     Hypomagnesemia     Intervertebral disc disease     Obesity     Osteoarthritis     Panniculitis     Sleep apnea     Vitamin D deficiency      Past Surgical History:   Procedure Laterality Date    BACK SURGERY      STOMACH SURGERY         SPEECH PATHOLOGY DYSPHAGIA TREATMENT     Subjective/Behavioral Observations: Alert and cooperative, assisted sitting upright in bed        Day/time of Treatment: 2024        Current Diet: Mechanical ground, thin        Current Strategies:Patient may need assistance with set up, cut food items small. Alternate small bites and small sips of solids and liquids at a slow rate.         Treatment received: Dysphagia therapy to address swallow function through exercises and education of strategies.        Results of treatment: P.o. intake less than 25%.  Patient feeding self.  Sips of thin liquid by straw with patient demonstrating sequential swallow, no  overt clinical signs or symptoms of aspiration noted.  Appropriate bite-size independently.       Progress toward goals: Adequate        Barriers to Achieving goals: N/A        Plan of care:/changes in plan: Continue per current plan.                                                                                          EDUCATION  The patient has been educated in the following areas:   Modified Diet Instruction.                Time Calculation:    Time Calculation- SLP       Row Name 11/18/24 1244             Time Calculation- SLP    SLP Stop Time 1244  -TB         Untimed Charges    04404-OX Treatment Swallow Minutes 40  -TB         Total Minutes    Untimed Charges Total Minutes 40  -TB       Total Minutes 40  -TB                User Key  (r) = Recorded By, (t) = Taken By, (c) = Cosigned By      Initials Name Provider Type    TB Jacque Reddy SLP Speech and Language Pathologist                    Therapy Charges for Today       Code Description Service Date Service Provider Modifiers Qty    03163559601  ST TREATMENT SWALLOW 3 11/18/2024 Jacque Reddy SLP GN 1                 RICHARDSON Mcnamara  11/18/2024

## 2024-11-18 NOTE — PROGRESS NOTES
"Nutrition Services    Patient Name: Inez Doyle  YOB: 1950  MRN: [id number]  Admission date: 11/11/2024      CLINICAL NUTRITION ASSESSMENT      Reason for Assessment  Follow Up     H&P:  Past Medical History:   Diagnosis Date    A-fib     Anemia     Anxiety     Asthma     Candidiasis of skin and nail     CHF (congestive heart failure)     COPD (chronic obstructive pulmonary disease)     Depression     Diabetes mellitus     GERD (gastroesophageal reflux disease)     Hyperlipidemia     Hypertension     Hypokalemia     Hypomagnesemia     Intervertebral disc disease     Obesity     Osteoarthritis     Panniculitis     Sleep apnea     Vitamin D deficiency         Current Problems:   Active Hospital Problems    Diagnosis     **AMS (altered mental status)     Severe protein-calorie malnutrition         Nutrition/Diet History         Narrative   73 yo female presented with Altered mental status, decreased p.o. intake per H&P.   Pt eating 0-25% of meals. Often refuses. RN/staff encouraging po intake/ONS.  11/18 SLP recs, Mechanical ground, thin.  11/17 MD note, consulted, recommending plastic surgery consultation, plastic surgery consulted for evaluation. 11/15 MD Plastics note, \"not currently in good clinical condition to be submitted to surgery\".   +BM, 11/17 11/11 Wound RN following, multiple wounds and areas of concern.  11/15 Palliative care, following for GOC.     Anthropometrics        Current Height, Weight Height: 167.6 cm (66\")  Weight: (!) 137 kg (301 lb 2.4 oz)   Current BMI Body mass index is 48.61 kg/m².   BMI Classification Obese Class III   % %, IBW 59 kg   Adjusted Body Weight (ABW) 79 kg   Weight Hx  Wt Readings from Last 30 Encounters:   11/11/24 1513 (!) 137 kg (301 lb 2.4 oz)   11/11/24 1054 123 kg (270 lb 8.1 oz)   10/21/24 1551 131 kg (289 lb 11 oz)   04/22/24 1525 (!) 179 kg (394 lb 6.5 oz)   07/18/23 1446 (!) 151 kg (333 lb)   06/19/23 0500 (!) 159 kg (350 lb 8.5 oz) " "  06/18/23 0315 (!) 163 kg (360 lb 3.7 oz)   06/14/23 1935 (!) 177 kg (389 lb 8.9 oz)   06/14/23 1321 (!) 177 kg (389 lb 8.9 oz)          Wt Change Observation Per EMR, wt down 93# (24%) in < 1 yr; significant     Estimated/Assessed Needs  Estimated Needs based on: Adjusted Body Weight 79 kg       Energy Requirements 25-35 kcal/kg   EST Needs (kcal/day) 7491-2548 kcal        Protein Requirements 1.5-2.5 g/kg   EST Daily Needs (g/day) 119-200 g       Fluid Requirements 25-30 mL/kg    Estimated Needs (mL/day) 9389-9785 mL     Labs/Medications Reviewed         Pertinent Labs Reviewed.   Results from last 7 days   Lab Units 11/18/24 0538 11/17/24  0851 11/16/24  0551 11/15/24  0508 11/14/24  0517   SODIUM mmol/L 139 134* 137   < > 137   POTASSIUM mmol/L 3.8 4.3 4.7   < > 3.3*   CHLORIDE mmol/L 106 101 103   < > 99   CO2 mmol/L 19.8* 16.4* 19.6*   < > 25.0   BUN mg/dL 16 17 14   < > 13   CREATININE mg/dL 0.66 0.68 0.62   < > 0.51*   CALCIUM mg/dL 8.1* 8.2* 8.4*   < > 8.8   BILIRUBIN mg/dL 0.4 0.4  --   --  0.5   ALK PHOS U/L 106 116  --   --  117   ALT (SGPT) U/L 9 9  --   --  9   AST (SGOT) U/L 6 8  --   --  8   GLUCOSE mg/dL 137* 162* 149*   < > 155*    < > = values in this interval not displayed.     Results from last 7 days   Lab Units 11/18/24  0538 11/17/24  0851 11/14/24  0517   MAGNESIUM mg/dL 1.6 1.7 1.5*   PHOSPHORUS mg/dL 3.4 3.0 3.9   HEMOGLOBIN g/dL 9.3* 10.0* 10.4*   HEMATOCRIT % 30.9* 34.7 34.7     SARS-CoV-2, ROB   Date Value Ref Range Status   08/23/2024 Negative Negative Final     No results found for: \"HGBA1C\"      Pertinent Medications Reviewed.     Malnutrition Severity Assessment      Patient meets criteria for : Severe Malnutrition         Nutrition Diagnosis         Nutrition Dx Problem 1 Severe malnutrition related to decreased ability to consume sufficient energy, wound healing as evidenced by inadequate energy intake., decreased appetite., unintended weight change., and impaired skin " integrity.     Nutrition Intervention           Current Nutrition Orders & Evaluation of Intake       Current PO Diet Diet: Diabetic; Consistent Carbohydrate; Texture: Mechanical Ground (NDD 2); Fluid Consistency: Thin (IDDSI 0)   Supplement Orders Placed This Encounter      Dietary Nutrition Supplements William      Dietary Nutrition Supplements Boost Glucose Control (Glucerna Shake)           Nutrition Intervention/Prescription        Enteral support may be warranted r/t extensive wounds, increased nutrient needs, malnutrition.   Continue Mechanical ground diet.   Providing William supplement BID and Boost GC daily.  Monitor/replace lytes.   Palliative following, monitor GOC.         Medical Nutrition Therapy/Nutrition Education          Learner     Readiness N/a  N/a     Method     Response N/a  N/a     Monitor/Evaluation        Monitor PO intake, Supplement intake, Pertinent labs, Skin status, POC/GOC     Nutrition Discharge Plan         To be determined     Electronically signed by:  Kristen Green RD  11/18/24 10:15 EST

## 2024-11-18 NOTE — NURSING NOTE
Patient remains confused.  DNR/DNI.  Will continue to monitor    Romana CROW RN, [facility]  Palliative Care

## 2024-11-19 LAB
ANION GAP SERPL CALCULATED.3IONS-SCNC: 10.7 MMOL/L (ref 5–15)
BUN SERPL-MCNC: 14 MG/DL (ref 8–23)
BUN/CREAT SERPL: 22.6 (ref 7–25)
CALCIUM SPEC-SCNC: 8.4 MG/DL (ref 8.6–10.5)
CHLORIDE SERPL-SCNC: 104 MMOL/L (ref 98–107)
CO2 SERPL-SCNC: 22.3 MMOL/L (ref 22–29)
CREAT SERPL-MCNC: 0.62 MG/DL (ref 0.57–1)
EGFRCR SERPLBLD CKD-EPI 2021: 93.6 ML/MIN/1.73
GLUCOSE BLDC GLUCOMTR-MCNC: 112 MG/DL (ref 70–99)
GLUCOSE BLDC GLUCOMTR-MCNC: 128 MG/DL (ref 70–99)
GLUCOSE BLDC GLUCOMTR-MCNC: 135 MG/DL (ref 70–99)
GLUCOSE BLDC GLUCOMTR-MCNC: 141 MG/DL (ref 70–99)
GLUCOSE BLDC GLUCOMTR-MCNC: 144 MG/DL (ref 70–99)
GLUCOSE SERPL-MCNC: 128 MG/DL (ref 65–99)
INR PPP: 1.22 (ref 0.86–1.15)
MRSA DNA SPEC QL NAA+PROBE: ABNORMAL
POTASSIUM SERPL-SCNC: 3.5 MMOL/L (ref 3.5–5.2)
PROTHROMBIN TIME: 15.7 SECONDS (ref 11.8–14.9)
SODIUM SERPL-SCNC: 137 MMOL/L (ref 136–145)
UFH PPP CHRO-ACNC: 0.78 IU/ML
WHOLE BLOOD HOLD SPECIMEN: NORMAL

## 2024-11-19 PROCEDURE — 94799 UNLISTED PULMONARY SVC/PX: CPT

## 2024-11-19 PROCEDURE — 97110 THERAPEUTIC EXERCISES: CPT

## 2024-11-19 PROCEDURE — 25810000003 SODIUM CHLORIDE 0.9 % SOLUTION: Performed by: INTERNAL MEDICINE

## 2024-11-19 PROCEDURE — 82948 REAGENT STRIP/BLOOD GLUCOSE: CPT

## 2024-11-19 PROCEDURE — 85520 HEPARIN ASSAY: CPT | Performed by: INTERNAL MEDICINE

## 2024-11-19 PROCEDURE — 25010000002 VANCOMYCIN 5 G RECONSTITUTED SOLUTION: Performed by: INTERNAL MEDICINE

## 2024-11-19 PROCEDURE — 82948 REAGENT STRIP/BLOOD GLUCOSE: CPT | Performed by: INTERNAL MEDICINE

## 2024-11-19 PROCEDURE — 99232 SBSQ HOSP IP/OBS MODERATE 35: CPT | Performed by: INTERNAL MEDICINE

## 2024-11-19 PROCEDURE — 85610 PROTHROMBIN TIME: CPT | Performed by: INTERNAL MEDICINE

## 2024-11-19 PROCEDURE — 87641 MR-STAPH DNA AMP PROBE: CPT | Performed by: INTERNAL MEDICINE

## 2024-11-19 PROCEDURE — 92526 ORAL FUNCTION THERAPY: CPT

## 2024-11-19 PROCEDURE — 25010000002 ENOXAPARIN PER 10 MG: Performed by: INTERNAL MEDICINE

## 2024-11-19 PROCEDURE — 80048 BASIC METABOLIC PNL TOTAL CA: CPT | Performed by: INTERNAL MEDICINE

## 2024-11-19 RX ADMIN — ENOXAPARIN SODIUM 135 MG: 150 INJECTION SUBCUTANEOUS at 13:03

## 2024-11-19 RX ADMIN — WHITE PETROLATUM 1 APPLICATION: 1.75 OINTMENT TOPICAL at 21:35

## 2024-11-19 RX ADMIN — VANCOMYCIN HYDROCHLORIDE 1500 MG: 5 INJECTION, POWDER, LYOPHILIZED, FOR SOLUTION INTRAVENOUS at 21:34

## 2024-11-19 RX ADMIN — Medication 473 ML: at 21:35

## 2024-11-19 RX ADMIN — Medication 10 ML: at 21:34

## 2024-11-19 RX ADMIN — WHITE PETROLATUM 1 APPLICATION: 1.75 OINTMENT TOPICAL at 13:03

## 2024-11-19 RX ADMIN — Medication 10 ML: at 13:03

## 2024-11-19 RX ADMIN — MICONAZOLE NITRATE 1 APPLICATION: 2 POWDER TOPICAL at 13:02

## 2024-11-19 RX ADMIN — ENOXAPARIN SODIUM 135 MG: 150 INJECTION SUBCUTANEOUS at 00:24

## 2024-11-19 RX ADMIN — COLLAGENASE SANTYL 1 APPLICATION: 250 OINTMENT TOPICAL at 13:02

## 2024-11-19 RX ADMIN — MICONAZOLE NITRATE 1 APPLICATION: 2 POWDER TOPICAL at 21:35

## 2024-11-19 RX ADMIN — COLLAGENASE SANTYL 1 APPLICATION: 250 OINTMENT TOPICAL at 21:35

## 2024-11-19 RX ADMIN — Medication 473 ML: at 13:02

## 2024-11-19 RX ADMIN — IPRATROPIUM BROMIDE AND ALBUTEROL SULFATE 3 ML: .5; 3 SOLUTION RESPIRATORY (INHALATION) at 01:02

## 2024-11-19 NOTE — PLAN OF CARE
Goal Outcome Evaluation:  Plan of Care Reviewed With: patient        Progress: no change  Outcome Evaluation: Vitals and blood glucose stable. Wound care completed per order. No complaints of pain. Calix in place and patent. Will document any changes.

## 2024-11-19 NOTE — PROGRESS NOTES
"[facility] Clinical Pharmacy Services: Vancomycin Monitoring Note    Inez Doyle is a 74 y.o. female who is on day 5 of pharmacy to dose vancomycin for Bacteremia and Skin and Soft Tissue.    Previous Vancomycin Dose:   1250 mg IV every  12  hours  Imaging Reviewed?: Yes  Updated Cultures and Sensitivities:   11/15 blood cx NGTD   urine cx -Negative   blood cx  staph epi    Vitals/Labs  Ht: 167.6 cm (66\"); Wt: (!) 137 kg (301 lb 2.4 oz)   Temp (24hrs), Av °F (36.7 °C), Min:97.7 °F (36.5 °C), Max:98.2 °F (36.8 °C)   Estimated Creatinine Clearance: 113.6 mL/min (by C-G formula based on SCr of 0.62 mg/dL).     Results from last 7 days   Lab Units 24  0537 24  1215 24  0538 24  0851 24  0842 11/15/24  0508 24  0517   VANCOMYCIN RM mcg/mL  --  24.24  --   --   --   --   --    VANCOMYCIN TR mcg/mL  --   --   --   --  22.35*  --   --    CREATININE mg/dL 0.62  --  0.66 0.68  --    < > 0.51*   WBC 10*3/mm3  --   --  7.27 9.49  --   --  7.52    < > = values in this interval not displayed.     Assessment/Plan  Current Vancomycin Dose:  1500 mg IV every 24 hours; which provides the following predicted parameters:    Regimen: 1500 mg IV every 24 hours.  Start time: 21:00 on 2024  Exposure target: AUC24 (range)400-600 mg/L.hr   AUC24,ss: 468 mg/L.hr  Probability of AUC24 > 400: 95 %  Ctrough,ss: 12.7 mg/L  Probability of Ctrough,ss > 20: 1 %  Probability of nephrotoxicity (Lodise DUSTIN ): 8 %    Thank you for involving pharmacy in this patient's care. Please contact pharmacy with any questions or concerns.    Cali Beltran  Clinical Pharmacist  "

## 2024-11-19 NOTE — THERAPY TREATMENT NOTE
Acute Care - Speech Language Pathology   Swallow Treatment Note SKY Munoz     Patient Name: Inez Doyle  : 1950  MRN: [id number]  Today's Date: 2024               Admit Date: 2024    Visit Dx:     ICD-10-CM ICD-9-CM   1. Hypokalemia  E87.6 276.8   2. Anorexia  R63.0 783.0   3. Oropharyngeal dysphagia  R13.12 787.22   4. Difficulty walking  R26.2 719.7     Patient Active Problem List   Diagnosis    Posterior tibial tendon dysfunction    Charcot's joint of foot    Arthritis, lumbar spine    Atrial fibrillation    COVID-19 with multiple comorbidities    Hypoxic respiratory failure    Morbid obesity    Diabetes    AMS (altered mental status)    Severe protein-calorie malnutrition     Past Medical History:   Diagnosis Date    A-fib     Anemia     Anxiety     Asthma     Candidiasis of skin and nail     CHF (congestive heart failure)     COPD (chronic obstructive pulmonary disease)     Depression     Diabetes mellitus     GERD (gastroesophageal reflux disease)     Hyperlipidemia     Hypertension     Hypokalemia     Hypomagnesemia     Intervertebral disc disease     Obesity     Osteoarthritis     Panniculitis     Sleep apnea     Vitamin D deficiency      Past Surgical History:   Procedure Laterality Date    BACK SURGERY      STOMACH SURGERY         SPEECH PATHOLOGY DYSPHAGIA TREATMENT     Subjective/Behavioral Observations: Alert and cooperative, assisted sitting upright in bed        Day/time of Treatment: 2024        Current Diet: Mechanical ground, thin        Current Strategies:Patient may need assistance with set up, cut food items small. Alternate small bites and small sips of solids and liquids at a slow rate.         Treatment received: Dysphagia therapy to address swallow function through exercises and education of strategies.        Results of treatment: P.o. intake with 50% of small meal.  Patient not wanting served breakfast, given Cheerios with milk.  Patient feeding self.  Sips of  thin liquid by straw with patient demonstrating sequential swallow, no overt clinical signs or symptoms of aspiration noted.  Minimal cue for appropriate bite-size.   Progress toward goals: Adequate        Barriers to Achieving goals: N/A        Plan of care:/changes in plan: Continue per current plan.                                                                                          EDUCATION  The patient has been educated in the following areas:   Modified Diet Instruction.                Time Calculation:    Time Calculation- SLP       Row Name 11/19/24 0958             Time Calculation- SLP    SLP Stop Time 0830  -TB      SLP Received On 11/19/24  -TB         Untimed Charges    15195-LF Treatment Swallow Minutes 40  -TB         Total Minutes    Untimed Charges Total Minutes 40  -TB       Total Minutes 40  -TB                User Key  (r) = Recorded By, (t) = Taken By, (c) = Cosigned By      Initials Name Provider Type    TB Jacque Reddy SLP Speech and Language Pathologist                    Therapy Charges for Today       Code Description Service Date Service Provider Modifiers Qty    97773451757 HC ST TREATMENT SWALLOW 3 11/18/2024 Jacque Reddy SLP GN 1    79776589082 HC ST TREATMENT SWALLOW 3 11/19/2024 Jacque Rdedy SLP GN 1                 RICHARDSON Mcnamara  11/19/2024

## 2024-11-19 NOTE — NURSING NOTE
Palliative met with patient at bedside.  Patient is able to answer some questions but remains confused.  Will continue to monitor and reach out to POA when Plastic Surgeon is able to evaluate for possible surgery.      Romana CROW RN, [facility]  Palliative Care

## 2024-11-19 NOTE — PROGRESS NOTES
[facility]   Hospitalist Progress Note  Date: 2024  Patient Name: Inez Doyle  : 1950  MRN: [id number]  Date of admission: 2024    Subjective   Subjective     Chief Complaint:   Altered mental status    Summary:   Inez Doyle is a 74 y.o. female past medical history of atrial fibrillation on A-fib, CHF, COPD, hypertension presented to the emergency department for evaluation of altered mental status from nursing facility.  Patient reportedly has had decreased activity level and decreased p.o. intake.  Per patient she just feels tired and she is not hungry.  She is oriented x 2 did not know location.  However she is oriented to situation.  Able to answer questions appropriately.  She denies any other fevers, chills, sweats, nausea, vomiting, chest pain shortness of breath palpitations, abdominal pain diarrhea constipation, dysuria, rash.  In the emergency department noted to have hypokalemia with potassium 2.6 and is receiving repletion.  She will be admitted for ongoing monitoring management.  Patient was found to have a large abdominal wound, patient was evaluated by general surgery who felt as though she would need plastic surgery consultation.  Plastic surgery was consulted but patient refused exam as she was having some intermittent confusion, I did discuss with patient and she is agreeable to being evaluated by plastic surgery and even surgery if needed.  Her blood cultures did return positive 2 out of 2 for staph epi, she is currently on vancomycin, repeat blood cultures have been negative    Interval Followup:   No acute events overnight, patient resting comfortably in bed, alert and oriented this a.m.    Objective   Objective     Vitals:   Temp:  [97.4 °F (36.3 °C)-97.7 °F (36.5 °C)] 97.4 °F (36.3 °C)  Heart Rate:  [] 104  Resp:  [16-20] 20  BP: (135-137)/(59-85) 136/72    Physical Exam   GEN: No acute distress  HEENT: Moist mucous membranes  LUNGS: Equal chest rise  bilaterally  CARDIAC: Regular rate and rhythm  NEURO: Moving all 4 extremities spontaneously  SKIN: No obvious breakdown    Result Review    Result Review:  I have personally reviewed the results as below  [x]  Laboratory CBC, CMP personally reviewed  CBC          11/14/2024    05:17 11/17/2024    08:51 11/18/2024    05:38   CBC   WBC 7.52  9.49  7.27    RBC 4.63  4.33  4.02    Hemoglobin 10.4  10.0  9.3    Hematocrit 34.7  34.7  30.9    MCV 74.9  80.1  76.9    MCH 22.5  23.1  23.1    MCHC 30.0  28.8  30.1    RDW 19.1  19.9  19.6    Platelets 123  139  97      CMP          11/17/2024    08:51 11/18/2024    05:38 11/19/2024    05:37   CMP   Glucose 162  137  128    BUN 17  16  14    Creatinine 0.68  0.66  0.62    EGFR 91.5  92.2  93.6    Sodium 134  139  137    Potassium 4.3  3.8  3.5    Chloride 101  106  104    Calcium 8.2  8.1  8.4    Total Protein 5.6  5.3     Albumin 2.2  2.2     Globulin 3.4  3.1     Total Bilirubin 0.4  0.4     Alkaline Phosphatase 116  106     AST (SGOT) 8  6     ALT (SGPT) 9  9     Albumin/Globulin Ratio 0.6  0.7     BUN/Creatinine Ratio 25.0  24.2  22.6    Anion Gap 16.6  13.2  10.7      []  Microbiology  []  Radiology  []  EKG/Telemetry   []  Cardiology/Vascular   []  Pathology  []  Old records  []  Other:    Scheduled medications:  collagenase, 1 Application, Topical, 2 times per day  enoxaparin, 1 mg/kg, Subcutaneous, Q12H  insulin regular, 2-7 Units, Subcutaneous, TID AC  ipratropium-albuterol, 3 mL, Nebulization, Q6H  [Held by provider] losartan, 12.5 mg, Oral, Daily  [Held by provider] megestrol, 400 mg, Oral, Daily  miconazole, 1 Application, Topical, 2 times per day  mineral oil-hydrophilic petrolatum, 1 Application, Topical, 2 times per day  [Held by provider] sertraline, 25 mg, Oral, Daily  sodium chloride, 10 mL, Intravenous, Q12H  Sodium Hypochlorite, 1 Application, Apply externally, 2 times per day  vancomycin, 1,500 mg, Intravenous, Q24H      As needed medications:     acetaminophen **OR** acetaminophen **OR** acetaminophen    dextrose    dextrose    glucagon (human recombinant)    HYDROcodone-acetaminophen    nitroglycerin    ondansetron    Pharmacy to Dose enoxaparin (LOVENOX)    Pharmacy to dose vancomycin    sodium chloride    sodium chloride    sodium chloride  Infusions:  Pharmacy to Dose enoxaparin (LOVENOX),   Pharmacy to dose vancomycin,            Assessment & Plan   Assessment / Plan     Assessment:  Metabolic encephalopathy  Severe life-threatening hypokalemia  Paroxysmal A-fib  History of COPD  History of CHF  Hypertension    Plan:  Patient admitted to the hospital for further workup and management of above  Patient completed 3 days of antibiotics prior to presenting to the hospital for suspected UTI,   Continue vancomycin given positive blood cultures,  repeat blood cultures with no growth to date, continue to monitor  Mental status waxing and waning, alert and oriented this a.m.  Monitor and replace electrolytes as needed  Holding Coumadin, continue Lovenox pending further surgical evaluation  Continue home bronchodilators  Monitor volume status closely  Monitor blood pressure closely  Frequent reorientation  Out of bed to chair as tolerates  Wound care nurse consulted  General Surgery consulted, recommending plastic surgery consultation, plastic surgery consulted for evaluation, patient refused surgery evaluation initially she continues to have intermittent confusion, goals of care have been clarified with POA and patient is full treatment, patient is now agreeable to evaluation by plastic surgery  CT scan of the abdominal wound reviewed  Wound ostomy nurse consult, question if patient has pyoderma biopsy would be beneficial if she allows us to surgically debride  CBC, CMP reviewed 11/19/2024   Repeat CBC, CMP, mag and Phos in a.m. 11/19/2024      Reviewed patient's labs and imaging, and discussed with patient and nurse at bedside.    VTE  Prophylaxis:  Pharmacologic & mechanical VTE prophylaxis orders are present.        CODE STATUS:   Medical Intervention Limits: No intubation (DNI)  Code Status (Patient has no pulse and is not breathing): No CPR (Do Not Attempt to Resuscitate)  Medical Interventions (Patient has pulse or is breathing): Limited Support      Electronically signed by Ramon Lei MD, 11/19/2024, 12:01 EST.

## 2024-11-19 NOTE — THERAPY TREATMENT NOTE
Patient Name: Inez Doyle  : 1950    MRN: [id number]                              Today's Date: 2024       Admit Date: 2024    Visit Dx:     ICD-10-CM ICD-9-CM   1. Hypokalemia  E87.6 276.8   2. Anorexia  R63.0 783.0   3. Oropharyngeal dysphagia  R13.12 787.22   4. Difficulty walking  R26.2 719.7     Patient Active Problem List   Diagnosis    Posterior tibial tendon dysfunction    Charcot's joint of foot    Arthritis, lumbar spine    Atrial fibrillation    COVID-19 with multiple comorbidities    Hypoxic respiratory failure    Morbid obesity    Diabetes    AMS (altered mental status)    Severe protein-calorie malnutrition     Past Medical History:   Diagnosis Date    A-fib     Anemia     Anxiety     Asthma     Candidiasis of skin and nail     CHF (congestive heart failure)     COPD (chronic obstructive pulmonary disease)     Depression     Diabetes mellitus     GERD (gastroesophageal reflux disease)     Hyperlipidemia     Hypertension     Hypokalemia     Hypomagnesemia     Intervertebral disc disease     Obesity     Osteoarthritis     Panniculitis     Sleep apnea     Vitamin D deficiency      Past Surgical History:   Procedure Laterality Date    BACK SURGERY      STOMACH SURGERY        General Information       Row Name 24 1011          OT Time and Intention    Document Type therapy note (daily note)  -PG     Mode of Treatment individual therapy;occupational therapy  -PG               User Key  (r) = Recorded By, (t) = Taken By, (c) = Cosigned By      Initials Name Provider Type    PG Guanaco Downs OT Occupational Therapist                     Mobility/ADL's    No documentation.                  Obj/Interventions       Row Name 24 1011          Shoulder (Therapeutic Exercise)    Shoulder (Therapeutic Exercise) strengthening exercise  -PG     Shoulder Strengthening (Therapeutic Exercise) 1 lb free weight;15 repititions  -PG       Row Name 24 1011          Elbow/Forearm  (Therapeutic Exercise)    Elbow/Forearm (Therapeutic Exercise) strengthening exercise  -PG     Elbow/Forearm Strengthening (Therapeutic Exercise) 1 lb free weight;15 repititions  -PG       Row Name 11/19/24 1011          Motor Skills    Therapeutic Exercise shoulder;elbow/forearm  -PG               User Key  (r) = Recorded By, (t) = Taken By, (c) = Cosigned By      Initials Name Provider Type    Guanaco Díaz OT Occupational Therapist                   Goals/Plan    No documentation.                  Clinical Impression       Row Name 11/19/24 1012          Plan of Care Review    Plan of Care Reviewed With patient  -PG     Progress no change  -PG               User Key  (r) = Recorded By, (t) = Taken By, (c) = Cosigned By      Initials Name Provider Type    PG Guanaco Downs OT Occupational Therapist                   Outcome Measures       Row Name 11/19/24 1012          How much help from another is currently needed...    Putting on and taking off regular lower body clothing? 1  -PG     Bathing (including washing, rinsing, and drying) 1  -PG     Toileting (which includes using toilet bed pan or urinal) 1  -PG     Putting on and taking off regular upper body clothing 1  -PG     Taking care of personal grooming (such as brushing teeth) 2  -PG     Eating meals 4  -PG     AM-PAC 6 Clicks Score (OT) 10  -PG       Row Name 11/19/24 1012          Functional Assessment    Outcome Measure Options AM-PAC 6 Clicks Daily Activity (OT);Optimal Instrument  -PG       Row Name 11/19/24 1012          Optimal Instrument    Optimal Instrument Optimal - 3  -PG     Bending/Stooping 5  -PG     Standing 5  -PG     Reaching 2  -PG               User Key  (r) = Recorded By, (t) = Taken By, (c) = Cosigned By      Initials Name Provider Type    Guanaco Díaz OT Occupational Therapist                    Occupational Therapy Education       Title: PT OT SLP Therapies (In Progress)       Topic: Occupational Therapy (In Progress)        Point: ADL training (In Progress)       Description:   Instruct learner(s) on proper safety adaptation and remediation techniques during self care or transfers.   Instruct in proper use of assistive devices.                  Learning Progress Summary            Patient Acceptance, E,D, NR by PG at 11/12/2024 0839                      Point: Home exercise program (In Progress)       Description:   Instruct learner(s) on appropriate technique for monitoring, assisting and/or progressing therapeutic exercises/activities.                  Learning Progress Summary            Patient Acceptance, E,D, NR by PG at 11/12/2024 0839                      Point: Precautions (In Progress)       Description:   Instruct learner(s) on prescribed precautions during self-care and functional transfers.                  Learning Progress Summary            Patient Acceptance, E,D, NR by PG at 11/12/2024 0839                      Point: Body mechanics (In Progress)       Description:   Instruct learner(s) on proper positioning and spine alignment during self-care, functional mobility activities and/or exercises.                  Learning Progress Summary            Patient Acceptance, E,D, NR by PG at 11/12/2024 0839                                      User Key       Initials Effective Dates Name Provider Type Discipline     06/16/21 -  Guanaco Downs OT Occupational Therapist OT                  OT Recommendation and Plan  Planned Therapy Interventions (OT): activity tolerance training, BADL retraining, strengthening exercise, transfer/mobility retraining, patient/caregiver education/training, occupation/activity based interventions  Therapy Frequency (OT): 5 times/wk  Plan of Care Review  Plan of Care Reviewed With: patient  Progress: no change  Outcome Evaluation: Patient presents with limitations affecting strength, activity tolerance, and balance impacting patient's ability to return home safely and independently.  The skills of  a therapist will be required to safely and effectively implement the following treatment plan to restore maximal level of function     Time Calculation:   Evaluation Complexity (OT)  Review Occupational Profile/Medical/Therapy History Complexity: brief/low complexity  Assessment, Occupational Performance/Identification of Deficit Complexity: 3-5 performance deficits  Clinical Decision Making Complexity (OT): problem focused assessment/low complexity  Overall Complexity of Evaluation (OT): low complexity     Time Calculation- OT       Row Name 11/19/24 1013             Time Calculation- OT    OT Received On 11/19/24  -PG      OT Goal Re-Cert Due Date 11/21/24  -PG         Timed Charges    22112 - OT Therapeutic Exercise Minutes 10  -PG         Total Minutes    Timed Charges Total Minutes 10  -PG       Total Minutes 10  -PG                User Key  (r) = Recorded By, (t) = Taken By, (c) = Cosigned By      Initials Name Provider Type    PG Guanaco Downs OT Occupational Therapist                  Therapy Charges for Today       Code Description Service Date Service Provider Modifiers Qty    06761285392 HC OT THER PROC EA 15 MIN 11/19/2024 Guanaco Downs OT GO 1                 Guanaco Downs OT  11/19/2024

## 2024-11-20 PROBLEM — L08.9 CHRONIC WOUND INFECTION OF ABDOMEN: Status: ACTIVE | Noted: 2024-11-11

## 2024-11-20 PROBLEM — S31.109A CHRONIC WOUND INFECTION OF ABDOMEN: Status: ACTIVE | Noted: 2024-11-11

## 2024-11-20 LAB
ALBUMIN SERPL-MCNC: 2 G/DL (ref 3.5–5.2)
ALBUMIN/GLOB SERPL: 0.7 G/DL
ALP SERPL-CCNC: 103 U/L (ref 39–117)
ALT SERPL W P-5'-P-CCNC: 9 U/L (ref 1–33)
ANION GAP SERPL CALCULATED.3IONS-SCNC: 12.3 MMOL/L (ref 5–15)
AST SERPL-CCNC: 6 U/L (ref 1–32)
BACTERIA SPEC AEROBE CULT: NORMAL
BACTERIA SPEC AEROBE CULT: NORMAL
BASOPHILS # BLD AUTO: 0.03 10*3/MM3 (ref 0–0.2)
BASOPHILS NFR BLD AUTO: 0.4 % (ref 0–1.5)
BILIRUB SERPL-MCNC: 0.3 MG/DL (ref 0–1.2)
BUN SERPL-MCNC: 13 MG/DL (ref 8–23)
BUN/CREAT SERPL: 20.3 (ref 7–25)
CALCIUM SPEC-SCNC: 8.1 MG/DL (ref 8.6–10.5)
CHLORIDE SERPL-SCNC: 105 MMOL/L (ref 98–107)
CO2 SERPL-SCNC: 18.7 MMOL/L (ref 22–29)
CREAT SERPL-MCNC: 0.64 MG/DL (ref 0.57–1)
DEPRECATED RDW RBC AUTO: 53 FL (ref 37–54)
EGFRCR SERPLBLD CKD-EPI 2021: 92.9 ML/MIN/1.73
EOSINOPHIL # BLD AUTO: 0.12 10*3/MM3 (ref 0–0.4)
EOSINOPHIL NFR BLD AUTO: 1.7 % (ref 0.3–6.2)
ERYTHROCYTE [DISTWIDTH] IN BLOOD BY AUTOMATED COUNT: 19.8 % (ref 12.3–15.4)
GLOBULIN UR ELPH-MCNC: 2.8 GM/DL
GLUCOSE BLDC GLUCOMTR-MCNC: 136 MG/DL (ref 70–99)
GLUCOSE BLDC GLUCOMTR-MCNC: 144 MG/DL (ref 70–99)
GLUCOSE BLDC GLUCOMTR-MCNC: 157 MG/DL (ref 70–99)
GLUCOSE SERPL-MCNC: 131 MG/DL (ref 65–99)
HCT VFR BLD AUTO: 28.8 % (ref 34–46.6)
HGB BLD-MCNC: 8.9 G/DL (ref 12–15.9)
IMM GRANULOCYTES # BLD AUTO: 0.14 10*3/MM3 (ref 0–0.05)
IMM GRANULOCYTES NFR BLD AUTO: 2 % (ref 0–0.5)
INR PPP: 1.31 (ref 0.86–1.15)
LYMPHOCYTES # BLD AUTO: 1.14 10*3/MM3 (ref 0.7–3.1)
LYMPHOCYTES NFR BLD AUTO: 16.4 % (ref 19.6–45.3)
MAGNESIUM SERPL-MCNC: 1.7 MG/DL (ref 1.6–2.4)
MCH RBC QN AUTO: 23.2 PG (ref 26.6–33)
MCHC RBC AUTO-ENTMCNC: 30.9 G/DL (ref 31.5–35.7)
MCV RBC AUTO: 75 FL (ref 79–97)
MONOCYTES # BLD AUTO: 0.55 10*3/MM3 (ref 0.1–0.9)
MONOCYTES NFR BLD AUTO: 7.9 % (ref 5–12)
NEUTROPHILS NFR BLD AUTO: 4.98 10*3/MM3 (ref 1.7–7)
NEUTROPHILS NFR BLD AUTO: 71.6 % (ref 42.7–76)
NRBC BLD AUTO-RTO: 0.3 /100 WBC (ref 0–0.2)
PHOSPHATE SERPL-MCNC: 3.1 MG/DL (ref 2.5–4.5)
PLATELET # BLD AUTO: 94 10*3/MM3 (ref 140–450)
PMV BLD AUTO: 10.7 FL (ref 6–12)
POTASSIUM SERPL-SCNC: 3.4 MMOL/L (ref 3.5–5.2)
PROT SERPL-MCNC: 4.8 G/DL (ref 6–8.5)
PROTHROMBIN TIME: 16.6 SECONDS (ref 11.8–14.9)
RBC # BLD AUTO: 3.84 10*6/MM3 (ref 3.77–5.28)
SODIUM SERPL-SCNC: 136 MMOL/L (ref 136–145)
WBC NRBC COR # BLD AUTO: 6.96 10*3/MM3 (ref 3.4–10.8)

## 2024-11-20 PROCEDURE — 84100 ASSAY OF PHOSPHORUS: CPT | Performed by: INTERNAL MEDICINE

## 2024-11-20 PROCEDURE — 97110 THERAPEUTIC EXERCISES: CPT

## 2024-11-20 PROCEDURE — 83735 ASSAY OF MAGNESIUM: CPT | Performed by: INTERNAL MEDICINE

## 2024-11-20 PROCEDURE — 25010000002 ENOXAPARIN PER 10 MG: Performed by: INTERNAL MEDICINE

## 2024-11-20 PROCEDURE — 80053 COMPREHEN METABOLIC PANEL: CPT | Performed by: INTERNAL MEDICINE

## 2024-11-20 PROCEDURE — 82948 REAGENT STRIP/BLOOD GLUCOSE: CPT

## 2024-11-20 PROCEDURE — 92526 ORAL FUNCTION THERAPY: CPT

## 2024-11-20 PROCEDURE — 94799 UNLISTED PULMONARY SVC/PX: CPT

## 2024-11-20 PROCEDURE — 25010000002 VANCOMYCIN 5 G RECONSTITUTED SOLUTION: Performed by: INTERNAL MEDICINE

## 2024-11-20 PROCEDURE — 25810000003 SODIUM CHLORIDE 0.9 % SOLUTION: Performed by: INTERNAL MEDICINE

## 2024-11-20 PROCEDURE — 85610 PROTHROMBIN TIME: CPT | Performed by: INTERNAL MEDICINE

## 2024-11-20 PROCEDURE — 82948 REAGENT STRIP/BLOOD GLUCOSE: CPT | Performed by: INTERNAL MEDICINE

## 2024-11-20 PROCEDURE — 99232 SBSQ HOSP IP/OBS MODERATE 35: CPT | Performed by: INTERNAL MEDICINE

## 2024-11-20 PROCEDURE — 85025 COMPLETE CBC W/AUTO DIFF WBC: CPT | Performed by: INTERNAL MEDICINE

## 2024-11-20 RX ORDER — POTASSIUM CHLORIDE 750 MG/1
40 CAPSULE, EXTENDED RELEASE ORAL ONCE
Status: COMPLETED | OUTPATIENT
Start: 2024-11-20 | End: 2024-11-20

## 2024-11-20 RX ADMIN — Medication 10 ML: at 10:29

## 2024-11-20 RX ADMIN — WHITE PETROLATUM 1 APPLICATION: 1.75 OINTMENT TOPICAL at 22:28

## 2024-11-20 RX ADMIN — POTASSIUM CHLORIDE 40 MEQ: 750 CAPSULE, EXTENDED RELEASE ORAL at 12:45

## 2024-11-20 RX ADMIN — Medication 473 ML: at 22:29

## 2024-11-20 RX ADMIN — Medication 10 ML: at 22:28

## 2024-11-20 RX ADMIN — ENOXAPARIN SODIUM 135 MG: 150 INJECTION SUBCUTANEOUS at 00:21

## 2024-11-20 RX ADMIN — Medication 473 ML: at 10:30

## 2024-11-20 RX ADMIN — WHITE PETROLATUM 1 APPLICATION: 1.75 OINTMENT TOPICAL at 10:29

## 2024-11-20 RX ADMIN — COLLAGENASE SANTYL 1 APPLICATION: 250 OINTMENT TOPICAL at 22:30

## 2024-11-20 RX ADMIN — MICONAZOLE NITRATE 1 APPLICATION: 2 POWDER TOPICAL at 10:31

## 2024-11-20 RX ADMIN — VANCOMYCIN HYDROCHLORIDE 1500 MG: 5 INJECTION, POWDER, LYOPHILIZED, FOR SOLUTION INTRAVENOUS at 22:27

## 2024-11-20 RX ADMIN — IPRATROPIUM BROMIDE AND ALBUTEROL SULFATE 3 ML: .5; 3 SOLUTION RESPIRATORY (INHALATION) at 00:43

## 2024-11-20 RX ADMIN — MICONAZOLE NITRATE 1 APPLICATION: 2 POWDER TOPICAL at 22:28

## 2024-11-20 RX ADMIN — Medication 473 ML: at 09:22

## 2024-11-20 RX ADMIN — ENOXAPARIN SODIUM 135 MG: 150 INJECTION SUBCUTANEOUS at 12:45

## 2024-11-20 RX ADMIN — COLLAGENASE SANTYL 1 APPLICATION: 250 OINTMENT TOPICAL at 09:22

## 2024-11-20 NOTE — PROGRESS NOTES
PLASTIC SURGERY PROGRESS NOTE    Patient Identification:  Name: Inez Doyle    Age: 74 y.o.    Sex: female   :  1950  MRN: [id number]               Subjective:  No acute events.    Objective:    Continuous Infusions:Pharmacy to Dose enoxaparin (LOVENOX),   Pharmacy to dose vancomycin,         Scheduled Meds:collagenase, 1 Application, Topical, 2 times per day  enoxaparin, 1 mg/kg, Subcutaneous, Q12H  insulin regular, 2-7 Units, Subcutaneous, TID AC  ipratropium-albuterol, 3 mL, Nebulization, Q6H  [Held by provider] losartan, 12.5 mg, Oral, Daily  [Held by provider] megestrol, 400 mg, Oral, Daily  miconazole, 1 Application, Topical, 2 times per day  mineral oil-hydrophilic petrolatum, 1 Application, Topical, 2 times per day  potassium chloride, 40 mEq, Oral, Once  [Held by provider] sertraline, 25 mg, Oral, Daily  sodium chloride, 10 mL, Intravenous, Q12H  Sodium Hypochlorite, 1 Application, Apply externally, 2 times per day  vancomycin, 1,500 mg, Intravenous, Q24H      PRN Meds:  acetaminophen **OR** acetaminophen **OR** acetaminophen    dextrose    dextrose    glucagon (human recombinant)    nitroglycerin    ondansetron    Pharmacy to Dose enoxaparin (LOVENOX)    Pharmacy to dose vancomycin    sodium chloride    sodium chloride    sodium chloride    Vital signs in last 24 hours:  Vitals:    24 0017 24 0043 24 0310 24 0657   BP: 116/67  120/70 127/69   BP Location: Right arm  Left arm Left arm   Patient Position: Lying  Lying Lying   Pulse: 110 109 110 103   Resp: 18 18 18 20   Temp: 97.7 °F (36.5 °C)  98 °F (36.7 °C) 98.2 °F (36.8 °C)   TempSrc: Oral  Oral Oral   SpO2: 98% 98% 98% 97%   Weight:       Height:           Intake/Output:I/O last 3 completed shifts:  In: 440 [P.O.:440]  Out: 200 [Urine:200]    Exam:  GEN: no acute distress, resting quietly   Large necrotic with foul smelling wound on the right abdomen.       Recent Results (from the past 24 hours)   POC Glucose Once     Collection Time: 11/19/24 11:56 AM    Specimen: Blood   Result Value Ref Range    Glucose 128 (H) 70 - 99 mg/dL   MRSA Screen, PCR (Inpatient) - Swab, Nares    Collection Time: 11/19/24  2:54 PM    Specimen: Nares; Swab   Result Value Ref Range    MRSA PCR MRSA Detected (A) No MRSA Detected   Heparin Anti-Xa LMWH    Collection Time: 11/19/24  4:47 PM    Specimen: Arm, Left; Blood   Result Value Ref Range    Heparin Anti-Xa (LMWH) 0.78   IU/ml   POC Glucose Finger 4x Daily Before Meals & at Bedtime    Collection Time: 11/19/24  5:43 PM    Specimen: Finger; Blood   Result Value Ref Range    Glucose 144 (H) 70 - 99 mg/dL   POC Glucose Once    Collection Time: 11/19/24  9:42 PM    Specimen: Blood   Result Value Ref Range    Glucose 135 (H) 70 - 99 mg/dL   Protime-INR    Collection Time: 11/20/24  5:14 AM    Specimen: Arm, Left; Blood   Result Value Ref Range    Protime 16.6 (H) 11.8 - 14.9 Seconds    INR 1.31 (H) 0.86 - 1.15   Phosphorus    Collection Time: 11/20/24  5:14 AM    Specimen: Arm, Left; Blood   Result Value Ref Range    Phosphorus 3.1 2.5 - 4.5 mg/dL   Comprehensive Metabolic Panel    Collection Time: 11/20/24  5:14 AM    Specimen: Arm, Left; Blood   Result Value Ref Range    Glucose 131 (H) 65 - 99 mg/dL    BUN 13 8 - 23 mg/dL    Creatinine 0.64 0.57 - 1.00 mg/dL    Sodium 136 136 - 145 mmol/L    Potassium 3.4 (L) 3.5 - 5.2 mmol/L    Chloride 105 98 - 107 mmol/L    CO2 18.7 (L) 22.0 - 29.0 mmol/L    Calcium 8.1 (L) 8.6 - 10.5 mg/dL    Total Protein 4.8 (L) 6.0 - 8.5 g/dL    Albumin 2.0 (L) 3.5 - 5.2 g/dL    ALT (SGPT) 9 1 - 33 U/L    AST (SGOT) 6 1 - 32 U/L    Alkaline Phosphatase 103 39 - 117 U/L    Total Bilirubin 0.3 0.0 - 1.2 mg/dL    Globulin 2.8 gm/dL    A/G Ratio 0.7 g/dL    BUN/Creatinine Ratio 20.3 7.0 - 25.0    Anion Gap 12.3 5.0 - 15.0 mmol/L    eGFR 92.9 >60.0 mL/min/1.73   Magnesium    Collection Time: 11/20/24  5:14 AM    Specimen: Arm, Left; Blood   Result Value Ref Range    Magnesium 1.7 1.6  - 2.4 mg/dL   CBC Auto Differential    Collection Time: 11/20/24  5:14 AM    Specimen: Arm, Left; Blood   Result Value Ref Range    WBC 6.96 3.40 - 10.80 10*3/mm3    RBC 3.84 3.77 - 5.28 10*6/mm3    Hemoglobin 8.9 (L) 12.0 - 15.9 g/dL    Hematocrit 28.8 (L) 34.0 - 46.6 %    MCV 75.0 (L) 79.0 - 97.0 fL    MCH 23.2 (L) 26.6 - 33.0 pg    MCHC 30.9 (L) 31.5 - 35.7 g/dL    RDW 19.8 (H) 12.3 - 15.4 %    RDW-SD 53.0 37.0 - 54.0 fl    MPV 10.7 6.0 - 12.0 fL    Platelets 94 (L) 140 - 450 10*3/mm3    Neutrophil % 71.6 42.7 - 76.0 %    Lymphocyte % 16.4 (L) 19.6 - 45.3 %    Monocyte % 7.9 5.0 - 12.0 %    Eosinophil % 1.7 0.3 - 6.2 %    Basophil % 0.4 0.0 - 1.5 %    Immature Grans % 2.0 (H) 0.0 - 0.5 %    Neutrophils, Absolute 4.98 1.70 - 7.00 10*3/mm3    Lymphocytes, Absolute 1.14 0.70 - 3.10 10*3/mm3    Monocytes, Absolute 0.55 0.10 - 0.90 10*3/mm3    Eosinophils, Absolute 0.12 0.00 - 0.40 10*3/mm3    Basophils, Absolute 0.03 0.00 - 0.20 10*3/mm3    Immature Grans, Absolute 0.14 (H) 0.00 - 0.05 10*3/mm3    nRBC 0.3 (H) 0.0 - 0.2 /100 WBC         Assessment:    AMS (altered mental status)    Severe protein-calorie malnutrition      * No surgery found *     Plan:  Patient more alert today and agrees to be seen. I asked if she wants me to help her with her wound and she said yes. I explained I plan to operate her this upcoming Friday and she is ok with that. She can be kept on Lovenox including the day of surgery. High risk of wound opening due to patient not be eating well.   Consent; abdominal wall debridement with possible closure and wound vac placement  [id number] debridement abdominal wall   53981- complex wound closure, trunk  27419- wound vac placement        Shalonda Brown MD    11/20/2024

## 2024-11-20 NOTE — PROGRESS NOTES
[facility]   Hospitalist Progress Note  Date: 2024  Patient Name: Inez Doyle  : 1950  MRN: [id number]  Date of admission: 2024    Antibiotics:  Vancomycin day 6     Consults:  General Surgery -Reina, signed off  Plastic surgery -tentatively planning on surgery on Friday    Subjective   Subjective     Chief Complaint:   Altered mental status    Summary:   Inez Doyle is a 74 y.o. female past medical history of atrial fibrillation on A-fib, CHF, COPD, hypertension presented to the emergency department for evaluation of altered mental status from nursing facility.  Patient reportedly has had decreased activity level and decreased p.o. intake.  Per patient she just feels tired and she is not hungry.  She is oriented x 2 did not know location.  However she is oriented to situation.  Able to answer questions appropriately.  She denies any other fevers, chills, sweats, nausea, vomiting, chest pain shortness of breath palpitations, abdominal pain diarrhea constipation, dysuria, rash.  In the emergency department noted to have hypokalemia with potassium 2.6 and is receiving repletion.  She will be admitted for ongoing monitoring management.  Patient was found to have a large abdominal wound, patient was evaluated by general surgery who felt as though she would need plastic surgery consultation.  Plastic surgery was consulted but patient refused exam as she was having some intermittent confusion, I did discuss with patient and she is agreeable to being evaluated by plastic surgery and even surgery if needed.  Her blood cultures did return positive 2 out of 2 for staph epi, she is currently on vancomycin, repeat blood cultures have been negative.  [facility] plans to treat for 7 days for skin soft tissue infection with vancomycin, currently day 6 of 7, patient was evaluated by plastic surgery who is planning on operative management on Friday, have requested biopsies during that time as  well.    Interval Followup:   No acute events overnight, patient resting comfortably in bed.    Objective   Objective     Vitals:   Temp:  [97.7 °F (36.5 °C)-98.2 °F (36.8 °C)] 98.2 °F (36.8 °C)  Heart Rate:  [103-110] 103  Resp:  [18-20] 20  BP: (116-129)/(67-88) 127/69    Physical Exam   GEN: No acute distress  HEENT: Moist mucous membranes  LUNGS: Equal chest rise bilaterally  CARDIAC: Regular rate and rhythm  NEURO: Moving all 4 extremities spontaneously  SKIN: Large necrotic abdominal wound    Result Review    Result Review:  I have personally reviewed the results as below  [x]  Laboratory CBC, CMP personally reviewed  CBC          11/17/2024    08:51 11/18/2024    05:38 11/20/2024    05:14   CBC   WBC 9.49  7.27  6.96    RBC 4.33  4.02  3.84    Hemoglobin 10.0  9.3  8.9    Hematocrit 34.7  30.9  28.8    MCV 80.1  76.9  75.0    MCH 23.1  23.1  23.2    MCHC 28.8  30.1  30.9    RDW 19.9  19.6  19.8    Platelets 139  97  94      CMP          11/18/2024    05:38 11/19/2024    05:37 11/20/2024    05:14   CMP   Glucose 137  128  131    BUN 16  14  13    Creatinine 0.66  0.62  0.64    EGFR 92.2  93.6  92.9    Sodium 139  137  136    Potassium 3.8  3.5  3.4    Chloride 106  104  105    Calcium 8.1  8.4  8.1    Total Protein 5.3   4.8    Albumin 2.2   2.0    Globulin 3.1   2.8    Total Bilirubin 0.4   0.3    Alkaline Phosphatase 106   103    AST (SGOT) 6   6    ALT (SGPT) 9   9    Albumin/Globulin Ratio 0.7   0.7    BUN/Creatinine Ratio 24.2  22.6  20.3    Anion Gap 13.2  10.7  12.3      []  Microbiology  []  Radiology  []  EKG/Telemetry   []  Cardiology/Vascular   []  Pathology  []  Old records  []  Other:    Scheduled medications:  collagenase, 1 Application, Topical, 2 times per day  enoxaparin, 1 mg/kg, Subcutaneous, Q12H  insulin regular, 2-7 Units, Subcutaneous, TID AC  ipratropium-albuterol, 3 mL, Nebulization, Q6H  [Held by provider] losartan, 12.5 mg, Oral, Daily  [Held by provider] megestrol, 400 mg, Oral,  Daily  miconazole, 1 Application, Topical, 2 times per day  mineral oil-hydrophilic petrolatum, 1 Application, Topical, 2 times per day  potassium chloride, 40 mEq, Oral, Once  [Held by provider] sertraline, 25 mg, Oral, Daily  sodium chloride, 10 mL, Intravenous, Q12H  Sodium Hypochlorite, 1 Application, Apply externally, 2 times per day  vancomycin, 1,500 mg, Intravenous, Q24H      As needed medications:    acetaminophen **OR** acetaminophen **OR** acetaminophen    dextrose    dextrose    glucagon (human recombinant)    nitroglycerin    ondansetron    Pharmacy to Dose enoxaparin (LOVENOX)    Pharmacy to dose vancomycin    sodium chloride    sodium chloride    sodium chloride  Infusions:  Pharmacy to Dose enoxaparin (LOVENOX),   Pharmacy to dose vancomycin,            Assessment & Plan   Assessment / Plan     Assessment:  Metabolic encephalopathy  Severe life-threatening hypokalemia  Paroxysmal A-fib  History of COPD  History of CHF  Hypertension    Plan:  Patient admitted to the hospital for further workup and management of above  Patient completed 3 days of antibiotics prior to presenting to the hospital for suspected UTI,   Continue vancomycin given positive blood cultures,  repeat blood cultures with no growth to date, continue to monitor day 6 of 7 after discussion with ID pharmacy  Mental status waxing and waning, alert and oriented this a.m.  Monitor and replace electrolytes as needed  Holding Coumadin, continue Lovenox pending further surgical evaluation, hold night prior to surgery  Continue home bronchodilators  Monitor volume status closely  Monitor blood pressure closely  Frequent reorientation  Out of bed to chair as tolerates  Wound care nurse consulted  General Surgery consulted, recommending plastic surgery consultation, plastic surgery consulted for evaluation, patient refused surgery evaluation initially she continues to have intermittent confusion, goals of care have been clarified with POA and  patient is full treatment, patient is now agreeable to evaluation by plastic surgery, patient was evaluated on 11/19/2024 who is agreeable to surgical debridement, plastic surgery arranging timing, suspect later this week  CT scan of the abdominal wound reviewed  Wound ostomy nurse consult, question if patient has pyoderma biopsy would be beneficial if she allows us to surgically debride  CBC, CMP reviewed 11/20/2024   Repeat CBC, CMP, mag and Phos in a.m. 11/20/2024      Reviewed patient's labs and imaging, and discussed with patient and nurse at bedside.    VTE Prophylaxis:  Pharmacologic & mechanical VTE prophylaxis orders are present.        CODE STATUS:   Medical Intervention Limits: No intubation (DNI)  Code Status (Patient has no pulse and is not breathing): No CPR (Do Not Attempt to Resuscitate)  Medical Interventions (Patient has pulse or is breathing): Limited Support      Electronically signed by Ramon Lei MD, 11/20/2024, 10:51 EST.

## 2024-11-20 NOTE — PLAN OF CARE
Goal Outcome Evaluation:           Progress: declining  Outcome Evaluation: Pt is disoriented to time and situation today. Called pts POA and informed her of surgery time. Wound care completed as ordered. Contacted Dr. Brown about going over risks and benefits with pt before consent can be signed. Pt has no complaints during shift. Continue plan of care.

## 2024-11-20 NOTE — THERAPY TREATMENT NOTE
Patient Name: Inez Doyle  : 1950    MRN: [id number]                              Today's Date: 2024       Admit Date: 2024    Visit Dx:     ICD-10-CM ICD-9-CM   1. Chronic wound infection of abdomen, sequela  S31.109S 906.0    L08.9    2. Hypokalemia  E87.6 276.8   3. Anorexia  R63.0 783.0   4. Oropharyngeal dysphagia  R13.12 787.22   5. Difficulty walking  R26.2 719.7     Patient Active Problem List   Diagnosis    Posterior tibial tendon dysfunction    Charcot's joint of foot    Arthritis, lumbar spine    Atrial fibrillation    COVID-19 with multiple comorbidities    Hypoxic respiratory failure    Morbid obesity    Diabetes    AMS (altered mental status)    Severe protein-calorie malnutrition    Chronic wound infection of abdomen     Past Medical History:   Diagnosis Date    A-fib     Anemia     Anxiety     Asthma     Candidiasis of skin and nail     CHF (congestive heart failure)     COPD (chronic obstructive pulmonary disease)     Depression     Diabetes mellitus     GERD (gastroesophageal reflux disease)     Hyperlipidemia     Hypertension     Hypokalemia     Hypomagnesemia     Intervertebral disc disease     Obesity     Osteoarthritis     Panniculitis     Sleep apnea     Vitamin D deficiency      Past Surgical History:   Procedure Laterality Date    BACK SURGERY      STOMACH SURGERY        General Information       Row Name 24 1348          OT Time and Intention    Subjective Information no complaints (P)   -AJ     Document Type therapy note (daily note) (P)   -AJ     Mode of Treatment individual therapy;occupational therapy (P)   -AJ     Patient Effort poor (P)   -AJ     Comment Patient is very confused and disoriented to place/situation, intially agreeable to therapy. Half-way through patient looked confused about what she was doing and c/o of pain. (P)   -AJ       Row Name 24 1340          General Information    Existing Precautions/Restrictions fall (P)   -AJ       Row  Name 11/20/24 1348          Cognition    Orientation Status (Cognition) oriented to;person;verbal cues/prompts needed for orientation (P)   -       Row Name 11/20/24 1348          Safety Issues/Impairments Affecting Functional Mobility    Impairments Affecting Function (Mobility) balance;cognition;endurance/activity tolerance;strength (P)   -               User Key  (r) = Recorded By, (t) = Taken By, (c) = Cosigned By      Initials Name Provider Type    Josseline Martinez OT Student OT Student                     Mobility/ADL's       Row Name 11/20/24 1350          Bed Mobility    Comment, (Bed Mobility) Transfer deffered limited secondary to patient poor cognition and limited ability to follw verbal instructions. (P)   -               User Key  (r) = Recorded By, (t) = Taken By, (c) = Cosigned By      Initials Name Provider Type    Josseline Martinez OT Student OT Student                   Obj/Interventions       Row Name 11/20/24 1352          Shoulder (Therapeutic Exercise)    Shoulder (Therapeutic Exercise) strengthening exercise (P)   -     Shoulder Strengthening (Therapeutic Exercise) bilateral;flexion;horizontal aBduction/aDduction;resistance band;yellow;10 repetitions;2 sets;sitting (P)   -       Row Name 11/20/24 1352          Elbow/Forearm (Therapeutic Exercise)    Elbow/Forearm (Therapeutic Exercise) strengthening exercise (P)   -     Elbow/Forearm Strengthening (Therapeutic Exercise) bilateral;flexion;extension;resistance band;yellow;10 repetitions;2 sets;sitting (P)   -       Row Name 11/20/24 1352          Motor Skills    Motor Skills functional endurance (P)   -     Functional Endurance poor+, patient completed exercises sitting up in bed (P)   -     Therapeutic Exercise shoulder;elbow/forearm (P)   -               User Key  (r) = Recorded By, (t) = Taken By, (c) = Cosigned By      Initials Name Provider Type    Josseline Martinez OT Student OT Student                    Goals/Plan    No documentation.                  Clinical Impression       Row Name 11/20/24 1355          Pain Assessment    Pretreatment Pain Rating 0/10 - no pain (P)   -AJ     Posttreatment Pain Rating 0/10 - no pain (P)   -MIHIR       Row Name 11/20/24 1351          Plan of Care Review    Plan of Care Reviewed With patient (P)   -MIHIR     Progress no change (P)   -MIHIR     Outcome Evaluation Patient AO to self and agreeable to therapy with max encouragement. Patient participated in UE exercises seated in bed, with minimal improvement limited secondary to lack of effort towards completing exercises. Patient shows no improvement in endurance by not tolerating sitting edge of bed, inhibiting independence in ADLs. Patient would benefit from skilled OT intervention addressing deficits to maximize independence in ADLs. (P)   -MIHIR       Row Name 11/20/24 1974          Positioning and Restraints    Pre-Treatment Position in bed (P)   -AJ     Post Treatment Position bed (P)   -AJ     In Bed supine;call light within reach;encouraged to call for assist;exit alarm on (P)   -MIHIR               User Key  (r) = Recorded By, (t) = Taken By, (c) = Cosigned By      Initials Name Provider Type    Josseline Martinez, OT Student OT Student                   Outcome Measures       Row Name 11/20/24 1402          How much help from another is currently needed...    Putting on and taking off regular lower body clothing? 1 (P)   -AJ     Bathing (including washing, rinsing, and drying) 1 (P)   -AJ     Toileting (which includes using toilet bed pan or urinal) 1 (P)   -AJ     Putting on and taking off regular upper body clothing 1 (P)   -AJ     Taking care of personal grooming (such as brushing teeth) 2 (P)   -AJ     Eating meals 4 (P)   -MIHIR     AM-PAC 6 Clicks Score (OT) 10 (P)   -MIHIR       Row Name 11/20/24 0820          How much help from another person do you currently need...    Turning from your back to your side  while in flat bed without using bedrails? 1  -AN     Moving from lying on back to sitting on the side of a flat bed without bedrails? 1  -AN     Moving to and from a bed to a chair (including a wheelchair)? 1  -AN     Standing up from a chair using your arms (e.g., wheelchair, bedside chair)? 1  -AN     Climbing 3-5 steps with a railing? 1  -AN     To walk in hospital room? 1  -AN     AM-PAC 6 Clicks Score (PT) 6  -AN     Highest Level of Mobility Goal 2 --> Bed activities/dependent transfer  -AN       Row Name 11/20/24 1402          Functional Assessment    Outcome Measure Options AM-PAC 6 Clicks Daily Activity (OT) (P)   -MIHIR               User Key  (r) = Recorded By, (t) = Taken By, (c) = Cosigned By      Initials Name Provider Type    Alyssa Rome, RN Registered Nurse    Josseline Martinez, OT Student OT Student                    Occupational Therapy Education       Title: PT OT SLP Therapies (In Progress)       Topic: Occupational Therapy (In Progress)       Point: ADL training (In Progress)       Description:   Instruct learner(s) on proper safety adaptation and remediation techniques during self care or transfers.   Instruct in proper use of assistive devices.                  Learning Progress Summary            Patient Acceptance, E,D, NR by PG at 11/12/2024 0839                      Point: Home exercise program (In Progress)       Description:   Instruct learner(s) on appropriate technique for monitoring, assisting and/or progressing therapeutic exercises/activities.                  Learning Progress Summary            Patient Acceptance, E,D, NR by PG at 11/12/2024 0839                      Point: Precautions (In Progress)       Description:   Instruct learner(s) on prescribed precautions during self-care and functional transfers.                  Learning Progress Summary            Patient Acceptance, E,D, NR by PG at 11/12/2024 0839                      Point: Body mechanics (In  Progress)       Description:   Instruct learner(s) on proper positioning and spine alignment during self-care, functional mobility activities and/or exercises.                  Learning Progress Summary            Patient Acceptance, E,D, NR by PG at 11/12/2024 0839                                      User Key       Initials Effective Dates Name Provider Type Discipline    PG 06/16/21 -  [place] OT Occupational Therapist OT                  OT Recommendation and Plan     Plan of Care Review  Plan of Care Reviewed With: (P) patient  Progress: (P) no change  Outcome Evaluation: (P) Patient AO to self and agreeable to therapy with max encouragement. Patient participated in UE exercises seated in bed, with minimal improvement limited secondary to lack of effort towards completing exercises. Patient shows no improvement in endurance by not tolerating sitting edge of bed, inhibiting independence in ADLs. Patient would benefit from skilled OT intervention addressing deficits to maximize independence in ADLs.     Time Calculation:         Time Calculation- OT       Row Name 11/20/24 1402             Time Calculation- OT    OT Received On 11/20/24 (P)   -      OT Goal Re-Cert Due Date 11/21/24 (P)   -         Timed Charges    63021 - OT Therapeutic Exercise Minutes 15 (P)   -         Total Minutes    Timed Charges Total Minutes 15 (P)   -       Total Minutes 15 (P)   -AJ                User Key  (r) = Recorded By, (t) = Taken By, (c) = Cosigned By      Initials Name Provider Type    Josseline Martinez OT Student OT Student                  Therapy Charges for Today       Code Description Service Date Service Provider Modifiers Qty    45625051070 HC OT THER PROC EA 15 MIN 11/20/2024 Josseline Felder OT Student GO 1                 JOANNA Cardoso  11/20/2024

## 2024-11-20 NOTE — PLAN OF CARE
Goal Outcome Evaluation:  Plan of Care Reviewed With: patient        Progress: no change     VSS and remains confused to situation and place. Free from falls this shift. Blood sugar stable and required no treatment per sliding scale orders. Wound care completed per orders.

## 2024-11-20 NOTE — PLAN OF CARE
Goal Outcome Evaluation:  Plan of Care Reviewed With: patient        Progress: no change  Outcome Evaluation: Vitals and blood glucose stable. Wound care completed per order. No complaints of pain. Calix in place and patent. Contact precautions maintained. Will document any changes.

## 2024-11-20 NOTE — THERAPY TREATMENT NOTE
"Acute Care - Speech Language Pathology   Swallow Treatment Note SKY Munoz     Patient Name: Inez Doyle  : 1950  MRN: [id number]  Today's Date: 2024               Admit Date: 2024    Visit Dx:     ICD-10-CM ICD-9-CM   1. Chronic wound infection of abdomen, sequela  S31.109S 906.0    L08.9    2. Hypokalemia  E87.6 276.8   3. Anorexia  R63.0 783.0   4. Oropharyngeal dysphagia  R13.12 787.22   5. Difficulty walking  R26.2 719.7     Patient Active Problem List   Diagnosis    Posterior tibial tendon dysfunction    Charcot's joint of foot    Arthritis, lumbar spine    Atrial fibrillation    COVID-19 with multiple comorbidities    Hypoxic respiratory failure    Morbid obesity    Diabetes    AMS (altered mental status)    Severe protein-calorie malnutrition     Past Medical History:   Diagnosis Date    A-fib     Anemia     Anxiety     Asthma     Candidiasis of skin and nail     CHF (congestive heart failure)     COPD (chronic obstructive pulmonary disease)     Depression     Diabetes mellitus     GERD (gastroesophageal reflux disease)     Hyperlipidemia     Hypertension     Hypokalemia     Hypomagnesemia     Intervertebral disc disease     Obesity     Osteoarthritis     Panniculitis     Sleep apnea     Vitamin D deficiency      Past Surgical History:   Procedure Laterality Date    BACK SURGERY      STOMACH SURGERY         SPEECH PATHOLOGY DYSPHAGIA TREATMENT     Subjective/Behavioral Observations: Alert and cooperative, assisted sitting upright in bed.  Patient frequently states \"I do not want any help\".        Day/time of Treatment: 2024        Current Diet: Mechanical ground, thin        Current Strategies:Patient may need assistance with set up, cut food items small. Alternate small bites and small sips of solids and liquids at a slow rate.         Treatment received: Dysphagia therapy to address swallow function through exercises and education of strategies.        Results of treatment: " P.o. intake with 75% of small meal.   Patient feeding self.  Sips of thin liquid by straw with patient demonstrating sequential swallow, no overt clinical signs or symptoms of aspiration noted.  Minimal cue for appropriate bite-size.     Progress toward goals: Adequate        Barriers to Achieving goals: N/A        Plan of care:/changes in plan: Discharge direct speech pathology services at this time.  Patient demonstrating swallow which is grossly functional for nutritional needs.  Patient does require encouragement to eat/drink.                                                                                          EDUCATION  The patient has been educated in the following areas:   Modified Diet Instruction.                Time Calculation:    Time Calculation- SLP       Row Name 11/20/24 0954             Time Calculation- SLP    SLP Stop Time 0900  -TB      SLP Received On 11/20/24  -TB         Untimed Charges    87282-GE Treatment Swallow Minutes 40  -TB         Total Minutes    Untimed Charges Total Minutes 40  -TB       Total Minutes 40  -TB                User Key  (r) = Recorded By, (t) = Taken By, (c) = Cosigned By      Initials Name Provider Type    TB Jacque Reddy SLP Speech and Language Pathologist                    Therapy Charges for Today       Code Description Service Date Service Provider Modifiers Qty    47713145686 HC ST TREATMENT SWALLOW 3 11/19/2024 Jacque Reddy SLP GN 1    27054128713 HC ST TREATMENT SWALLOW 3 11/20/2024 Jacque Reddy SLP GN 1                 RICHARDSON Mcnamara  11/20/2024

## 2024-11-20 NOTE — PROGRESS NOTES
"[facility] Clinical Pharmacy Services: Vancomycin Monitoring Note    Inez Doyle is a 74 y.o. female who is on day  of pharmacy to dose vancomycin for Bacteremia and Skin and Soft Tissue.    Previous Vancomycin Dose:   1500 mg IV every  24  hours  Imaging Reviewed?: Yes   CTAP: soft tissue wound with skin breakdown and cellulitis involving the right lateral aspect of the patient's panniculus; no associated fluid collection; no other acute findings; 6 mm RLL pulmonary nodule; spinal stimulator present    Updated Cultures and Sensitivities:    MRSA PCR: detected  11/15 Blood cx 2/2: NGTD   Urine cx, catheter: no growth   Blood cx 2: oxacillin-resistant staph epi     Vitals/Labs  Ht: 167.6 cm (66\"); Wt: (!) 137 kg (301 lb 2.4 oz)   Temp (24hrs), Av.8 °F (36.6 °C), Min:97.4 °F (36.3 °C), Max:98.2 °F (36.8 °C)   Estimated Creatinine Clearance: 110.1 mL/min (by C-G formula based on SCr of 0.64 mg/dL).     Results from last 7 days   Lab Units 24  0514 24  0537 24  1215 24  0538 24  0851 24  0842   VANCOMYCIN RM mcg/mL  --   --  24.24  --   --   --    VANCOMYCIN TR mcg/mL  --   --   --   --   --  22.35*   CREATININE mg/dL 0.64 0.62  --  0.66 0.68  --    WBC 10*3/mm3 6.96  --   --  7.27 9.49  --      Assessment/Plan  Current Vancomycin Dose:  1500 mg IV every 24 hours; which provides the following predicted parameters:  AUC24,ss: 474 mg/L.hr  Probability of AUC24 > 400: 98 %  Ctrough,ss: 13 mg/L  Probability of Ctrough,ss > 20: 1 %  Probability of nephrotoxicity (Lodise DUSTIN ): 8 %  No further vancomycin levels ordered unless consult extended or PATRICIA develops  We will continue to monitor patient changes and renal function     Thank you for involving pharmacy in this patient's care. Please contact pharmacy with any questions or concerns.    Jayshree Figueroa RPH  Clinical Pharmacist  "

## 2024-11-21 LAB
ALBUMIN SERPL-MCNC: 2.3 G/DL (ref 3.5–5.2)
ALBUMIN/GLOB SERPL: 0.7 G/DL
ALP SERPL-CCNC: 114 U/L (ref 39–117)
ALT SERPL W P-5'-P-CCNC: 10 U/L (ref 1–33)
ANION GAP SERPL CALCULATED.3IONS-SCNC: 12.3 MMOL/L (ref 5–15)
AST SERPL-CCNC: 7 U/L (ref 1–32)
BASOPHILS # BLD AUTO: 0.04 10*3/MM3 (ref 0–0.2)
BASOPHILS NFR BLD AUTO: 0.6 % (ref 0–1.5)
BILIRUB SERPL-MCNC: 0.4 MG/DL (ref 0–1.2)
BUN SERPL-MCNC: 12 MG/DL (ref 8–23)
BUN/CREAT SERPL: 17.1 (ref 7–25)
CALCIUM SPEC-SCNC: 8.7 MG/DL (ref 8.6–10.5)
CHLORIDE SERPL-SCNC: 104 MMOL/L (ref 98–107)
CO2 SERPL-SCNC: 20.7 MMOL/L (ref 22–29)
CREAT SERPL-MCNC: 0.7 MG/DL (ref 0.57–1)
DEPRECATED RDW RBC AUTO: 52.9 FL (ref 37–54)
EGFRCR SERPLBLD CKD-EPI 2021: 90.9 ML/MIN/1.73
EOSINOPHIL # BLD AUTO: 0.13 10*3/MM3 (ref 0–0.4)
EOSINOPHIL NFR BLD AUTO: 1.8 % (ref 0.3–6.2)
ERYTHROCYTE [DISTWIDTH] IN BLOOD BY AUTOMATED COUNT: 19.7 % (ref 12.3–15.4)
GLOBULIN UR ELPH-MCNC: 3.1 GM/DL
GLUCOSE BLDC GLUCOMTR-MCNC: 126 MG/DL (ref 70–99)
GLUCOSE BLDC GLUCOMTR-MCNC: 126 MG/DL (ref 70–99)
GLUCOSE BLDC GLUCOMTR-MCNC: 130 MG/DL (ref 70–99)
GLUCOSE BLDC GLUCOMTR-MCNC: 134 MG/DL (ref 70–99)
GLUCOSE SERPL-MCNC: 141 MG/DL (ref 65–99)
HCT VFR BLD AUTO: 31.7 % (ref 34–46.6)
HGB BLD-MCNC: 9.7 G/DL (ref 12–15.9)
IMM GRANULOCYTES # BLD AUTO: 0.19 10*3/MM3 (ref 0–0.05)
IMM GRANULOCYTES NFR BLD AUTO: 2.7 % (ref 0–0.5)
INR PPP: 1.32 (ref 0.86–1.15)
LYMPHOCYTES # BLD AUTO: 1.21 10*3/MM3 (ref 0.7–3.1)
LYMPHOCYTES NFR BLD AUTO: 17.1 % (ref 19.6–45.3)
MAGNESIUM SERPL-MCNC: 2.2 MG/DL (ref 1.6–2.4)
MCH RBC QN AUTO: 23.2 PG (ref 26.6–33)
MCHC RBC AUTO-ENTMCNC: 30.6 G/DL (ref 31.5–35.7)
MCV RBC AUTO: 75.7 FL (ref 79–97)
MONOCYTES # BLD AUTO: 0.55 10*3/MM3 (ref 0.1–0.9)
MONOCYTES NFR BLD AUTO: 7.8 % (ref 5–12)
NEUTROPHILS NFR BLD AUTO: 4.95 10*3/MM3 (ref 1.7–7)
NEUTROPHILS NFR BLD AUTO: 70 % (ref 42.7–76)
NRBC BLD AUTO-RTO: 0.6 /100 WBC (ref 0–0.2)
PHOSPHATE SERPL-MCNC: 2.9 MG/DL (ref 2.5–4.5)
PLATELET # BLD AUTO: 101 10*3/MM3 (ref 140–450)
PMV BLD AUTO: 10.4 FL (ref 6–12)
POTASSIUM SERPL-SCNC: 3.9 MMOL/L (ref 3.5–5.2)
PROT SERPL-MCNC: 5.4 G/DL (ref 6–8.5)
PROTHROMBIN TIME: 16.7 SECONDS (ref 11.8–14.9)
RBC # BLD AUTO: 4.19 10*6/MM3 (ref 3.77–5.28)
SODIUM SERPL-SCNC: 137 MMOL/L (ref 136–145)
WBC NRBC COR # BLD AUTO: 7.07 10*3/MM3 (ref 3.4–10.8)

## 2024-11-21 PROCEDURE — 80053 COMPREHEN METABOLIC PANEL: CPT | Performed by: INTERNAL MEDICINE

## 2024-11-21 PROCEDURE — 83735 ASSAY OF MAGNESIUM: CPT | Performed by: INTERNAL MEDICINE

## 2024-11-21 PROCEDURE — 97168 OT RE-EVAL EST PLAN CARE: CPT

## 2024-11-21 PROCEDURE — 25010000002 ENOXAPARIN PER 10 MG: Performed by: INTERNAL MEDICINE

## 2024-11-21 PROCEDURE — 84100 ASSAY OF PHOSPHORUS: CPT | Performed by: INTERNAL MEDICINE

## 2024-11-21 PROCEDURE — 82948 REAGENT STRIP/BLOOD GLUCOSE: CPT

## 2024-11-21 PROCEDURE — 85610 PROTHROMBIN TIME: CPT | Performed by: INTERNAL MEDICINE

## 2024-11-21 PROCEDURE — 82948 REAGENT STRIP/BLOOD GLUCOSE: CPT | Performed by: INTERNAL MEDICINE

## 2024-11-21 PROCEDURE — 94799 UNLISTED PULMONARY SVC/PX: CPT

## 2024-11-21 PROCEDURE — 97110 THERAPEUTIC EXERCISES: CPT

## 2024-11-21 PROCEDURE — 25810000003 SODIUM CHLORIDE 0.9 % SOLUTION: Performed by: INTERNAL MEDICINE

## 2024-11-21 PROCEDURE — 99233 SBSQ HOSP IP/OBS HIGH 50: CPT | Performed by: INTERNAL MEDICINE

## 2024-11-21 PROCEDURE — 85025 COMPLETE CBC W/AUTO DIFF WBC: CPT | Performed by: INTERNAL MEDICINE

## 2024-11-21 PROCEDURE — 25010000002 VANCOMYCIN 5 G RECONSTITUTED SOLUTION: Performed by: INTERNAL MEDICINE

## 2024-11-21 RX ORDER — FUROSEMIDE 40 MG/1
40 TABLET ORAL DAILY
Status: DISCONTINUED | OUTPATIENT
Start: 2024-11-21 | End: 2024-11-25

## 2024-11-21 RX ORDER — MULTIPLE VITAMINS W/ MINERALS TAB 9MG-400MCG
1 TAB ORAL DAILY
Status: DISCONTINUED | OUTPATIENT
Start: 2024-11-22 | End: 2024-12-10 | Stop reason: HOSPADM

## 2024-11-21 RX ADMIN — VANCOMYCIN HYDROCHLORIDE 1500 MG: 5 INJECTION, POWDER, LYOPHILIZED, FOR SOLUTION INTRAVENOUS at 21:29

## 2024-11-21 RX ADMIN — IPRATROPIUM BROMIDE AND ALBUTEROL SULFATE 3 ML: .5; 3 SOLUTION RESPIRATORY (INHALATION) at 19:00

## 2024-11-21 RX ADMIN — COLLAGENASE SANTYL 1 APPLICATION: 250 OINTMENT TOPICAL at 22:21

## 2024-11-21 RX ADMIN — ENOXAPARIN SODIUM 135 MG: 150 INJECTION SUBCUTANEOUS at 00:32

## 2024-11-21 RX ADMIN — Medication 473 ML: at 22:21

## 2024-11-21 RX ADMIN — Medication 10 ML: at 11:45

## 2024-11-21 RX ADMIN — Medication 473 ML: at 11:46

## 2024-11-21 RX ADMIN — COLLAGENASE SANTYL 1 APPLICATION: 250 OINTMENT TOPICAL at 11:45

## 2024-11-21 RX ADMIN — Medication 10 ML: at 21:28

## 2024-11-21 RX ADMIN — WHITE PETROLATUM 1 APPLICATION: 1.75 OINTMENT TOPICAL at 11:45

## 2024-11-21 RX ADMIN — MICONAZOLE NITRATE 1 APPLICATION: 2 POWDER TOPICAL at 11:45

## 2024-11-21 NOTE — PROGRESS NOTES
PLASTIC SURGERY PROGRESS NOTE    Patient Identification:  Name: Inez Doyle    Age: 74 y.o.    Sex: female   :  1950  MRN: [id number]               Subjective:  No acute events.    Objective:    Continuous Infusions:Pharmacy to Dose enoxaparin (LOVENOX),   Pharmacy to dose vancomycin,         Scheduled Meds:collagenase, 1 Application, Topical, 2 times per day  enoxaparin, 1 mg/kg, Subcutaneous, Q12H  insulin regular, 2-7 Units, Subcutaneous, TID AC  ipratropium-albuterol, 3 mL, Nebulization, Q6H  [Held by provider] losartan, 12.5 mg, Oral, Daily  [Held by provider] megestrol, 400 mg, Oral, Daily  miconazole, 1 Application, Topical, 2 times per day  mineral oil-hydrophilic petrolatum, 1 Application, Topical, 2 times per day  [Held by provider] sertraline, 25 mg, Oral, Daily  sodium chloride, 10 mL, Intravenous, Q12H  Sodium Hypochlorite, 1 Application, Apply externally, 2 times per day  vancomycin, 1,500 mg, Intravenous, Q24H      PRN Meds:  acetaminophen **OR** acetaminophen **OR** acetaminophen    dextrose    dextrose    glucagon (human recombinant)    nitroglycerin    ondansetron    Pharmacy to Dose enoxaparin (LOVENOX)    Pharmacy to dose vancomycin    sodium chloride    sodium chloride    sodium chloride    Vital signs in last 24 hours:  Vitals:    24 1859 24 0019 24 0632 24 0653   BP: 107/60 111/77  126/64   BP Location: Left arm Left arm  Left arm   Patient Position: Lying Lying  Lying   Pulse: 99 97  103   Resp: 16 16  16   Temp: 98.2 °F (36.8 °C) 98.1 °F (36.7 °C)  98 °F (36.7 °C)   TempSrc: Oral Oral  Oral   SpO2: 95% 94%  95%   Weight:   (!) 136 kg (300 lb 4.3 oz)    Height:           Intake/Output:I/O last 3 completed shifts:  In: 430 [P.O.:430]  Out: 525 [Urine:525]    Exam:  GEN: no acute distress, resting quietly   Right breast cellulitis with improvement      Recent Results (from the past 24 hours)   POC Glucose Finger 4x Daily Before Meals & at Bedtime     Collection Time: 11/20/24 12:05 PM    Specimen: Finger; Blood   Result Value Ref Range    Glucose 136 (H) 70 - 99 mg/dL   POC Glucose Once    Collection Time: 11/20/24  8:16 PM    Specimen: Blood   Result Value Ref Range    Glucose 157 (H) 70 - 99 mg/dL   Protime-INR    Collection Time: 11/21/24  5:26 AM    Specimen: Arm, Left; Blood   Result Value Ref Range    Protime 16.7 (H) 11.8 - 14.9 Seconds    INR 1.32 (H) 0.86 - 1.15   Phosphorus    Collection Time: 11/21/24  5:26 AM    Specimen: Arm, Left; Blood   Result Value Ref Range    Phosphorus 2.9 2.5 - 4.5 mg/dL   Comprehensive Metabolic Panel    Collection Time: 11/21/24  5:26 AM    Specimen: Arm, Left; Blood   Result Value Ref Range    Glucose 141 (H) 65 - 99 mg/dL    BUN 12 8 - 23 mg/dL    Creatinine 0.70 0.57 - 1.00 mg/dL    Sodium 137 136 - 145 mmol/L    Potassium 3.9 3.5 - 5.2 mmol/L    Chloride 104 98 - 107 mmol/L    CO2 20.7 (L) 22.0 - 29.0 mmol/L    Calcium 8.7 8.6 - 10.5 mg/dL    Total Protein 5.4 (L) 6.0 - 8.5 g/dL    Albumin 2.3 (L) 3.5 - 5.2 g/dL    ALT (SGPT) 10 1 - 33 U/L    AST (SGOT) 7 1 - 32 U/L    Alkaline Phosphatase 114 39 - 117 U/L    Total Bilirubin 0.4 0.0 - 1.2 mg/dL    Globulin 3.1 gm/dL    A/G Ratio 0.7 g/dL    BUN/Creatinine Ratio 17.1 7.0 - 25.0    Anion Gap 12.3 5.0 - 15.0 mmol/L    eGFR 90.9 >60.0 mL/min/1.73   Magnesium    Collection Time: 11/21/24  5:26 AM    Specimen: Arm, Left; Blood   Result Value Ref Range    Magnesium 2.2 1.6 - 2.4 mg/dL   CBC Auto Differential    Collection Time: 11/21/24  5:26 AM    Specimen: Arm, Left; Blood   Result Value Ref Range    WBC 7.07 3.40 - 10.80 10*3/mm3    RBC 4.19 3.77 - 5.28 10*6/mm3    Hemoglobin 9.7 (L) 12.0 - 15.9 g/dL    Hematocrit 31.7 (L) 34.0 - 46.6 %    MCV 75.7 (L) 79.0 - 97.0 fL    MCH 23.2 (L) 26.6 - 33.0 pg    MCHC 30.6 (L) 31.5 - 35.7 g/dL    RDW 19.7 (H) 12.3 - 15.4 %    RDW-SD 52.9 37.0 - 54.0 fl    MPV 10.4 6.0 - 12.0 fL    Platelets 101 (L) 140 - 450 10*3/mm3    Neutrophil %  70.0 42.7 - 76.0 %    Lymphocyte % 17.1 (L) 19.6 - 45.3 %    Monocyte % 7.8 5.0 - 12.0 %    Eosinophil % 1.8 0.3 - 6.2 %    Basophil % 0.6 0.0 - 1.5 %    Immature Grans % 2.7 (H) 0.0 - 0.5 %    Neutrophils, Absolute 4.95 1.70 - 7.00 10*3/mm3    Lymphocytes, Absolute 1.21 0.70 - 3.10 10*3/mm3    Monocytes, Absolute 0.55 0.10 - 0.90 10*3/mm3    Eosinophils, Absolute 0.13 0.00 - 0.40 10*3/mm3    Basophils, Absolute 0.04 0.00 - 0.20 10*3/mm3    Immature Grans, Absolute 0.19 (H) 0.00 - 0.05 10*3/mm3    nRBC 0.6 (H) 0.0 - 0.2 /100 WBC   POC Glucose Once    Collection Time: 11/21/24  7:35 AM    Specimen: Blood   Result Value Ref Range    Glucose 130 (H) 70 - 99 mg/dL         Assessment:    AMS (altered mental status)    Severe protein-calorie malnutrition    Chronic wound infection of abdomen      * No surgery found * Procedure(s) (LRB):  TRUNK DEBRIDEMENTabdominal wall debridement with possible closure and wound vac placement- we will need two OR beds due to patient's weight (Bilateral)    Plan:  Culture from the breast is negative so  far, drain with serous fluid.   No change on management       Shalonda Brown MD    11/21/2024

## 2024-11-21 NOTE — H&P (VIEW-ONLY)
PLASTIC SURGERY PROGRESS NOTE    Patient Identification:  Name: Inez Doyle    Age: 74 y.o.    Sex: female   :  1950  MRN: [id number]               Subjective:  No acute events.    Objective:    Continuous Infusions:Pharmacy to Dose enoxaparin (LOVENOX),   Pharmacy to dose vancomycin,         Scheduled Meds:collagenase, 1 Application, Topical, 2 times per day  enoxaparin, 1 mg/kg, Subcutaneous, Q12H  insulin regular, 2-7 Units, Subcutaneous, TID AC  ipratropium-albuterol, 3 mL, Nebulization, Q6H  [Held by provider] losartan, 12.5 mg, Oral, Daily  [Held by provider] megestrol, 400 mg, Oral, Daily  miconazole, 1 Application, Topical, 2 times per day  mineral oil-hydrophilic petrolatum, 1 Application, Topical, 2 times per day  [Held by provider] sertraline, 25 mg, Oral, Daily  sodium chloride, 10 mL, Intravenous, Q12H  Sodium Hypochlorite, 1 Application, Apply externally, 2 times per day  vancomycin, 1,500 mg, Intravenous, Q24H      PRN Meds:  acetaminophen **OR** acetaminophen **OR** acetaminophen    dextrose    dextrose    glucagon (human recombinant)    nitroglycerin    ondansetron    Pharmacy to Dose enoxaparin (LOVENOX)    Pharmacy to dose vancomycin    sodium chloride    sodium chloride    sodium chloride    Vital signs in last 24 hours:  Vitals:    24 1859 24 0019 24 0632 24 0653   BP: 107/60 111/77  126/64   BP Location: Left arm Left arm  Left arm   Patient Position: Lying Lying  Lying   Pulse: 99 97  103   Resp: 16 16  16   Temp: 98.2 °F (36.8 °C) 98.1 °F (36.7 °C)  98 °F (36.7 °C)   TempSrc: Oral Oral  Oral   SpO2: 95% 94%  95%   Weight:   (!) 136 kg (300 lb 4.3 oz)    Height:           Intake/Output:I/O last 3 completed shifts:  In: 430 [P.O.:430]  Out: 525 [Urine:525]    Exam:  GEN: no acute distress, resting quietly   Right breast cellulitis with improvement      Recent Results (from the past 24 hours)   POC Glucose Finger 4x Daily Before Meals & at Bedtime     Collection Time: 11/20/24 12:05 PM    Specimen: Finger; Blood   Result Value Ref Range    Glucose 136 (H) 70 - 99 mg/dL   POC Glucose Once    Collection Time: 11/20/24  8:16 PM    Specimen: Blood   Result Value Ref Range    Glucose 157 (H) 70 - 99 mg/dL   Protime-INR    Collection Time: 11/21/24  5:26 AM    Specimen: Arm, Left; Blood   Result Value Ref Range    Protime 16.7 (H) 11.8 - 14.9 Seconds    INR 1.32 (H) 0.86 - 1.15   Phosphorus    Collection Time: 11/21/24  5:26 AM    Specimen: Arm, Left; Blood   Result Value Ref Range    Phosphorus 2.9 2.5 - 4.5 mg/dL   Comprehensive Metabolic Panel    Collection Time: 11/21/24  5:26 AM    Specimen: Arm, Left; Blood   Result Value Ref Range    Glucose 141 (H) 65 - 99 mg/dL    BUN 12 8 - 23 mg/dL    Creatinine 0.70 0.57 - 1.00 mg/dL    Sodium 137 136 - 145 mmol/L    Potassium 3.9 3.5 - 5.2 mmol/L    Chloride 104 98 - 107 mmol/L    CO2 20.7 (L) 22.0 - 29.0 mmol/L    Calcium 8.7 8.6 - 10.5 mg/dL    Total Protein 5.4 (L) 6.0 - 8.5 g/dL    Albumin 2.3 (L) 3.5 - 5.2 g/dL    ALT (SGPT) 10 1 - 33 U/L    AST (SGOT) 7 1 - 32 U/L    Alkaline Phosphatase 114 39 - 117 U/L    Total Bilirubin 0.4 0.0 - 1.2 mg/dL    Globulin 3.1 gm/dL    A/G Ratio 0.7 g/dL    BUN/Creatinine Ratio 17.1 7.0 - 25.0    Anion Gap 12.3 5.0 - 15.0 mmol/L    eGFR 90.9 >60.0 mL/min/1.73   Magnesium    Collection Time: 11/21/24  5:26 AM    Specimen: Arm, Left; Blood   Result Value Ref Range    Magnesium 2.2 1.6 - 2.4 mg/dL   CBC Auto Differential    Collection Time: 11/21/24  5:26 AM    Specimen: Arm, Left; Blood   Result Value Ref Range    WBC 7.07 3.40 - 10.80 10*3/mm3    RBC 4.19 3.77 - 5.28 10*6/mm3    Hemoglobin 9.7 (L) 12.0 - 15.9 g/dL    Hematocrit 31.7 (L) 34.0 - 46.6 %    MCV 75.7 (L) 79.0 - 97.0 fL    MCH 23.2 (L) 26.6 - 33.0 pg    MCHC 30.6 (L) 31.5 - 35.7 g/dL    RDW 19.7 (H) 12.3 - 15.4 %    RDW-SD 52.9 37.0 - 54.0 fl    MPV 10.4 6.0 - 12.0 fL    Platelets 101 (L) 140 - 450 10*3/mm3    Neutrophil %  70.0 42.7 - 76.0 %    Lymphocyte % 17.1 (L) 19.6 - 45.3 %    Monocyte % 7.8 5.0 - 12.0 %    Eosinophil % 1.8 0.3 - 6.2 %    Basophil % 0.6 0.0 - 1.5 %    Immature Grans % 2.7 (H) 0.0 - 0.5 %    Neutrophils, Absolute 4.95 1.70 - 7.00 10*3/mm3    Lymphocytes, Absolute 1.21 0.70 - 3.10 10*3/mm3    Monocytes, Absolute 0.55 0.10 - 0.90 10*3/mm3    Eosinophils, Absolute 0.13 0.00 - 0.40 10*3/mm3    Basophils, Absolute 0.04 0.00 - 0.20 10*3/mm3    Immature Grans, Absolute 0.19 (H) 0.00 - 0.05 10*3/mm3    nRBC 0.6 (H) 0.0 - 0.2 /100 WBC   POC Glucose Once    Collection Time: 11/21/24  7:35 AM    Specimen: Blood   Result Value Ref Range    Glucose 130 (H) 70 - 99 mg/dL         Assessment:    AMS (altered mental status)    Severe protein-calorie malnutrition    Chronic wound infection of abdomen      * No surgery found * Procedure(s) (LRB):  TRUNK DEBRIDEMENTabdominal wall debridement with possible closure and wound vac placement- we will need two OR beds due to patient's weight (Bilateral)    Plan:  Culture from the breast is negative so  far, drain with serous fluid.   No change on management       Shalonda Brown MD    11/21/2024

## 2024-11-21 NOTE — THERAPY RE-EVALUATION
Patient Name: Inez Doyle  : 1950    MRN: [id number]                              Today's Date: 2024       Admit Date: 2024    Visit Dx:     ICD-10-CM ICD-9-CM   1. Chronic wound infection of abdomen, sequela  S31.109S 906.0    L08.9    2. Hypokalemia  E87.6 276.8   3. Anorexia  R63.0 783.0   4. Oropharyngeal dysphagia  R13.12 787.22   5. Difficulty walking  R26.2 719.7     Patient Active Problem List   Diagnosis    Posterior tibial tendon dysfunction    Charcot's joint of foot    Arthritis, lumbar spine    Atrial fibrillation    COVID-19 with multiple comorbidities    Hypoxic respiratory failure    Morbid obesity    Diabetes    AMS (altered mental status)    Severe protein-calorie malnutrition    Chronic wound infection of abdomen     Past Medical History:   Diagnosis Date    A-fib     Anemia     Anxiety     Asthma     Candidiasis of skin and nail     CHF (congestive heart failure)     COPD (chronic obstructive pulmonary disease)     Depression     Diabetes mellitus     GERD (gastroesophageal reflux disease)     Hyperlipidemia     Hypertension     Hypokalemia     Hypomagnesemia     Intervertebral disc disease     Obesity     Osteoarthritis     Panniculitis     Sleep apnea     Vitamin D deficiency      Past Surgical History:   Procedure Laterality Date    BACK SURGERY      STOMACH SURGERY        General Information       Row Name 24 1455 24 1431       OT Time and Intention    Document Type therapy note (daily note)  -SC re-evaluation  -SC    Mode of Treatment individual therapy;occupational therapy  -SC individual therapy;occupational therapy  -SC    Patient Effort fair  -SC fair  -SC    Symptoms Noted During/After Treatment -- fatigue  -SC    Comment -- Patient awake, alert, oriented x 1 and receptive of therapy session .  -SC      Row Name 24 1455 24 1431       General Information    Patient Profile Reviewed yes  -SC yes  -SC    Prior Level of Function -- max assist:  " PLOF is unknown. Patient a poor historian. She does report that at her facility, she stayed in bed with a gown on unless she asked to get up. When she reports staff used a jermaine lift. Family not present for confirmation.  -SC    Existing Precautions/Restrictions -- fall  -SC    Barriers to Rehab -- medically complex;cognitive status  -SC      Row Name 24 1431          Occupational Profile    Reason for Services/Referral (Occupational Profile) Patient is a 74 year-old female admitted to [facility] on 24 with altered mental status and UTI. She is being seen today for re-assessment for continued need of care and assessment of progress.  -SC     Patient Goals (Occupational Profile) Patient unable to state a goal. When asked if she is satisfied with current function, she reports, \"yes.\"  -SC       Row Name 24 1431          Living Environment    People in Home facility resident  -SC       Row Name 24 1431          Home Main Entrance    Number of Stairs, Main Entrance none  -SC     Stair Railings, Main Entrance none  -SC       Row Name 24 1431          Stairs Within Home, Primary    Number of Stairs, Within Home, Primary none  -SC     Stair Railings, Within Home, Primary none  -SC       Row Name 24 1455 24 1431       Cognition    Orientation Status (Cognition) oriented to;person  Pt is able to converse and follow simple commands. She is receptive to exercise and participation in light ADLs this a.m.  -SC oriented to;person  Patient is alert, oriented to name and , not place or situation.  -SC      Row Name 24 1431          Safety Issues/Impairments Affecting Functional Mobility    Safety Issues Affecting Function (Mobility) insight into deficits/self-awareness  -SC     Impairments Affecting Function (Mobility) balance;cognition;endurance/activity tolerance;strength;grasp  -SC     Cognitive Impairments, Mobility Safety/Performance insight into deficits/self-awareness  -SC       " "        User Key  (r) = Recorded By, (t) = Taken By, (c) = Cosigned By      Initials Name Provider Type    SC Shraddha Hernandez OT Occupational Therapist                     Mobility/ADL's       Row Name 11/21/24 1440          Bed Mobility    Bed Mobility bed mobility (all) activities  -SC     All Activities, [place] (Bed Mobility) verbal cues;dependent (less than 25% patient effort);maximum assist (25% patient effort)  -SC     Comment, (Bed Mobility) Patient required max assist to roll to the left. She declined roll to right stating, \"Im tired, leave me alone now.\"  -SC       Row Name 11/21/24 1440          Transfers    Comment, (Transfers) Deferred as patient is reportedly at of and uses a jermaine lift at facility.  -SC       Row Name 11/21/24 1440          Activities of Daily Living    BADL Assessment/Intervention bathing;upper body dressing;lower body dressing;grooming;feeding;toileting  -SC       Row Name 11/21/24 1440          Bathing Assessment/Intervention    [place] Level (Bathing) maximum assist (25% patient effort)  -SC       Row Name 11/21/24 1440          Upper Body Dressing Assessment/Training    [place] Level (Upper Body Dressing) upper body dressing skills;maximum assist (25% patient effort)  -SC       Row Name 11/21/24 1440          Lower Body Dressing Assessment/Training    [place] Level (Lower Body Dressing) lower body dressing skills;dependent (less than 25% patient effort)  -SC       Row Name 11/21/24 1440          Grooming Assessment/Training    [place] Level (Grooming) grooming skills;minimum assist (75% patient effort);standby assist;wash face, hands;hair care, combing/brushing  -SC     Position (Grooming) supine  -SC       Row Name 11/21/24 1440          Toileting Assessment/Training    [place] Level (Toileting) toileting skills;dependent (less than 25% patient effort)  -[facility] Name 11/21/24 1440          Self-Feeding Assessment/Training    [place] Level " (Feeding) set up;verbal cues  -SC               User Key  (r) = Recorded By, (t) = Taken By, (c) = Cosigned By      Initials Name Provider Type    SC Shraddha Hernandez OT Occupational Therapist                   Obj/Interventions       [facility] Name 11/21/24 1444          Sensory Assessment (Somatosensory)    Sensory Assessment (Somatosensory) sensation intact  -[facility] Name 11/21/24 1444          Vision Assessment/Intervention    Visual Impairment/Limitations WFL  -[facility] Name 11/21/24 1444          Range of Motion Comprehensive    General Range of Motion bilateral upper extremity ROM WFL  -[facility] Name 11/21/24 1444          Strength Comprehensive (MMT)    Comment, General Manual Muscle Testing (MMT) Assessment Biceps, Triceps, and grip4-/5  -[facility] Name 11/21/24 1457          Shoulder (Therapeutic Exercise)    Shoulder (Therapeutic Exercise) strengthening exercise  -SC     Shoulder Strengthening (Therapeutic Exercise) bilateral;flexion;extension;1 lb free weight;10 repetitions;2 sets;horizontal aBduction/aDduction  -[facility] Name 11/21/24 1457          Elbow/Forearm (Therapeutic Exercise)    Elbow/Forearm (Therapeutic Exercise) strengthening exercise  -SC     Elbow/Forearm Strengthening (Therapeutic Exercise) bilateral;flexion;extension;1 lb free weight;10 repetitions;2 sets  -[facility] Name 11/21/24 1457 11/21/24 1444       Motor Skills    Motor Skills -- coordination;functional endurance  -SC    Coordination -- moderate impairment  -SC    Functional Endurance -- poor+  -SC    Therapeutic Exercise shoulder;elbow/forearm  -SC --              User Key  (r) = Recorded By, (t) = Taken By, (c) = Cosigned By      Initials Name Provider Type    SC Shraddha Hernandez OT Occupational Therapist                   Goals/Plan       [facility] Name 11/21/24 1450          Bed Mobility Goal 1 (OT)    Activity/Assistive Device (Bed Mobility Goal 1, OT) bed mobility activities, all  -SC     [place] Level/Cues  Needed (Bed Mobility Goal 1, OT) minimum assist (75% or more patient effort)  -SC     Time Frame (Bed Mobility Goal 1, OT) long term goal (LTG);10 days  -SC       Row Name 11/21/24 1450          Bathing Goal 1 (OT)    Activity/Device (Bathing Goal 1, OT) bathing skills, all  -SC     [place] Level/Cues Needed (Bathing Goal 1, OT) moderate assist (50-74% patient effort)  -SC     Time Frame (Bathing Goal 1, OT) long term goal (LTG);10 days  -SC       Row Name 11/21/24 1450          Dressing Goal 1 (OT)    Activity/Device (Dressing Goal 1, OT) dressing skills, all  -SC     [place]/Cues Needed (Dressing Goal 1, OT) minimum assist (75% or more patient effort)  -SC     Time Frame (Dressing Goal 1, OT) long term goal (LTG);10 days  -SC       Row Name 11/21/24 1450          Grooming Goal 1 (OT)    Activity/Device (Grooming Goal 1, OT) grooming skills, all  -SC     [place] (Grooming Goal 1, OT) standby assist  -SC     Time Frame (Grooming Goal 1, OT) long term goal (LTG);10 days  -SC       Row Name 11/21/24 1450          Self-Feeding Goal 1 (OT)    Activity/Device (Self-Feeding Goal 1, OT) self-feeding skills, all  -SC     [place] Level/Cues Needed (Self-Feeding Goal 1, OT) standby assist  -SC     Time Frame (Self-Feeding Goal 1, OT) long term goal (LTG);10 days  -SC       Row Name 11/21/24 1450          Strength Goal 1 (OT)    Strength Goal 1 (OT) Patient will improve bilateral upper extremity strength to 4+/5 to support independence with self-care tasks  -SC     Time Frame (Strength Goal 1, OT) long term goal (LTG);10 days  -SC       Row Name 11/21/24 1450          Problem Specific Goal 1 (OT)    Problem Specific Goal 1 (OT) Patient will improve activity tolerance to fair plus to support independence with self-care activity and functional transfers  -SC     Time Frame (Problem Specific Goal 1, OT) long term goal (LTG);10 days  -SC       Row Name 11/21/24 1450          Therapy Assessment/Plan (OT)     Planned Therapy Interventions (OT) activity tolerance training;occupation/activity based interventions;BADL retraining;ROM/therapeutic exercise;patient/caregiver education/training;strengthening exercise  -SC               User Key  (r) = Recorded By, (t) = Taken By, (c) = Cosigned By      Initials Name Provider Type    SC Shraddha Hernandez OT Occupational Therapist                   Clinical Impression       Row Name 11/21/24 1445          Pain Assessment    Pretreatment Pain Rating 0/10 - no pain  -SC     Posttreatment Pain Rating 0/10 - no pain  -SC       Row Name 11/21/24 1445          Plan of Care Review    Plan of Care Reviewed With patient  -SC     Progress improving  -SC     Outcome Evaluation Patient presents with limitations of cognition, strength, and activity tolerance which impede her ability to perform basic ADLs as prior on a consistent basis.  The skills of a therapist will be required to safely and effectively implement treatment plan to restore maximum level function.  -SC       Row Name 11/21/24 1445          Therapy Assessment/Plan (OT)    Rehab Potential (OT) good  -SC     Criteria for Skilled Therapeutic Interventions Met (OT) yes;meets criteria;skilled treatment is necessary  -SC     Therapy Frequency (OT) 5 times/wk  -SC       Row Name 11/21/24 1445          Therapy Plan Review/Discharge Plan (OT)    Anticipated Discharge Disposition (OT) sub acute care setting  -SC       Row Name 11/21/24 1444          Positioning and Restraints    Pre-Treatment Position in bed  -SC     Post Treatment Position bed  -SC     In Bed call light within reach;encouraged to call for assist;exit alarm on  -SC               User Key  (r) = Recorded By, (t) = Taken By, (c) = Cosigned By      Initials Name Provider Type    SC Shraddha Hernandez OT Occupational Therapist                   Outcome Measures       Row Name 11/21/24 1681          How much help from another is currently needed...    Putting on and taking off  regular lower body clothing? 1  -SC     Bathing (including washing, rinsing, and drying) 1  -SC     Toileting (which includes using toilet bed pan or urinal) 1  -SC     Putting on and taking off regular upper body clothing 2  -SC     Taking care of personal grooming (such as brushing teeth) 3  -SC     Eating meals 4  -SC     AM-PAC 6 Clicks Score (OT) 12  -SC       Row Name 11/21/24 0736          How much help from another person do you currently need...    Turning from your back to your side while in flat bed without using bedrails? 1  -SS     Moving from lying on back to sitting on the side of a flat bed without bedrails? 1  -SS     Moving to and from a bed to a chair (including a wheelchair)? 1  -SS     Standing up from a chair using your arms (e.g., wheelchair, bedside chair)? 1  -SS     Climbing 3-5 steps with a railing? 1  -SS     To walk in hospital room? 1  -SS     AM-PAC 6 Clicks Score (PT) 6  -SS     Highest Level of Mobility Goal 2 --> Bed activities/dependent transfer  -       Row Name 11/21/24 1452          Functional Assessment    Outcome Measure Options AM-PAC 6 Clicks Daily Activity (OT);Optimal Instrument  -SC       Row Name 11/21/24 1452          Optimal Instrument    Optimal Instrument Optimal - 3  -SC     Bending/Stooping 5  -SC     Standing 5  -SC     Reaching 5  -SC               User Key  (r) = Recorded By, (t) = Taken By, (c) = Cosigned By      Initials Name Provider Type     Radhika Aguiar RN Registered Nurse    Shraddha Lyn OT Occupational Therapist                    Occupational Therapy Education       Title: PT OT SLP Therapies (In Progress)       Topic: Occupational Therapy (In Progress)       Point: ADL training (In Progress)       Description:   Instruct learner(s) on proper safety adaptation and remediation techniques during self care or transfers.   Instruct in proper use of assistive devices.                  Learning Progress Summary            Patient Acceptance, E,D,  NR by PG at 11/12/2024 0839                      Point: Home exercise program (In Progress)       Description:   Instruct learner(s) on appropriate technique for monitoring, assisting and/or progressing therapeutic exercises/activities.                  Learning Progress Summary            Patient Acceptance, E,D, NR by PG at 11/12/2024 0839                      Point: Precautions (In Progress)       Description:   Instruct learner(s) on prescribed precautions during self-care and functional transfers.                  Learning Progress Summary            Patient Acceptance, E,D, NR by PG at 11/12/2024 0839                      Point: Body mechanics (In Progress)       Description:   Instruct learner(s) on proper positioning and spine alignment during self-care, functional mobility activities and/or exercises.                  Learning Progress Summary            Patient Acceptance, E,D, NR by PG at 11/12/2024 0839                                      User Key       Initials Effective Dates Name Provider Type Discipline    PG 06/16/21 -  [place] OT Occupational Therapist OT                  OT Recommendation and Plan  Planned Therapy Interventions (OT): activity tolerance training, occupation/activity based interventions, BADL retraining, ROM/therapeutic exercise, patient/caregiver education/training, strengthening exercise  Therapy Frequency (OT): 5 times/wk  Plan of Care Review  Plan of Care Reviewed With: patient  Progress: improving  Outcome Evaluation: Patient presents with limitations of cognition, strength, and activity tolerance which impede her ability to perform basic ADLs as prior on a consistent basis.  The skills of a therapist will be required to safely and effectively implement treatment plan to restore maximum level function.     Time Calculation:   Evaluation Complexity (OT)  Review Occupational Profile/Medical/Therapy History Complexity: expanded/moderate complexity  Assessment, Occupational  Performance/Identification of Deficit Complexity: 1-3 performance deficits  Clinical Decision Making Complexity (OT): problem focused assessment/low complexity  Overall Complexity of Evaluation (OT): low complexity     Time Calculation- OT       Row Name 11/21/24 1454             Time Calculation- OT    OT Received On 11/21/24  -SC      OT Goal Re-Cert Due Date 11/30/24  -SC         Timed Charges    14361 - OT Therapeutic Exercise Minutes 8  -SC         Untimed Charges    OT Eval/Re-eval Minutes 28  -SC         Total Minutes    Timed Charges Total Minutes 8  -SC      Untimed Charges Total Minutes 28  -SC       Total Minutes 36  -SC                User Key  (r) = Recorded By, (t) = Taken By, (c) = Cosigned By      Initials Name Provider Type    SC Shraddha Hernandez OT Occupational Therapist                  Therapy Charges for Today       Code Description Service Date Service Provider Modifiers Qty    [id number]  OT THER PROC EA 15 MIN 11/21/2024 Shraddha Hernandez OT GO 1    [id number]  OT RE-EVAL 2 11/21/2024 Shraddha Hernandez OT GO 1                 Shraddha Hernandez OT  11/21/2024

## 2024-11-21 NOTE — PROGRESS NOTES
[facility]   Hospitalist Progress Note    Date of admission: 11/11/2024  Patient Name: Inez Doyle  1950  Date: 11/21/2024      Subjective     Chief Complaint   Patient presents with    Altered Mental Status     Staff at [facility] said started yesterday and gotten worse. Not taking medicines and eating and drinking. Hx of diabetes, COPD, CHF, Afib    Nausea       Interval Followup: No significant pain denies fevers.  Surgery pending for tomorrow      Objective     Vitals:   Temp:  [97.7 °F (36.5 °C)-98.2 °F (36.8 °C)] 98 °F (36.7 °C)  Heart Rate:  [] 103  Resp:  [16-20] 16  BP: (107-131)/(60-77) 126/64    Physical Exam  Awake conversant appears older than stated age thin frail has baseline resting tremor of the hands  CTAB on room air  Elevated rate regular rhythm  No chronic abdominal wound photos reviewed, abdomen nondistended  Calix catheter with yellow urine output        Result Review:  Vital signs, labs and recent relevant imaging reviewed.      CBC          11/18/2024    05:38 11/20/2024    05:14 11/21/2024    05:26   CBC   WBC 7.27  6.96  7.07    RBC 4.02  3.84  4.19    Hemoglobin 9.3  8.9  9.7    Hematocrit 30.9  28.8  31.7    MCV 76.9  75.0  75.7    MCH 23.1  23.2  23.2    MCHC 30.1  30.9  30.6    RDW 19.6  19.8  19.7    Platelets 97  94  101      CMP          11/19/2024    05:37 11/20/2024    05:14 11/21/2024    05:26   CMP   Glucose 128  131  141    BUN 14  13  12    Creatinine 0.62  0.64  0.70    EGFR 93.6  92.9  90.9    Sodium 137  136  137    Potassium 3.5  3.4  3.9    Chloride 104  105  104    Calcium 8.4  8.1  8.7    Total Protein  4.8  5.4    Albumin  2.0  2.3    Globulin  2.8  3.1    Total Bilirubin  0.3  0.4    Alkaline Phosphatase  103  114    AST (SGOT)  6  7    ALT (SGPT)  9  10    Albumin/Globulin Ratio  0.7  0.7    BUN/Creatinine Ratio 22.6  20.3  17.1    Anion Gap 10.7  12.3  12.3          acetaminophen **OR** acetaminophen **OR** acetaminophen    dextrose    dextrose     glucagon (human recombinant)    nitroglycerin    ondansetron    Pharmacy to Dose enoxaparin (LOVENOX)    Pharmacy to dose vancomycin    sodium chloride    sodium chloride    sodium chloride    collagenase, 1 Application, Topical, 2 times per day  enoxaparin, 1 mg/kg, Subcutaneous, Q12H  insulin regular, 2-7 Units, Subcutaneous, TID AC  ipratropium-albuterol, 3 mL, Nebulization, Q6H  [Held by provider] losartan, 12.5 mg, Oral, Daily  miconazole, 1 Application, Topical, 2 times per day  mineral oil-hydrophilic petrolatum, 1 Application, Topical, 2 times per day  [Held by provider] sertraline, 25 mg, Oral, Daily  sodium chloride, 10 mL, Intravenous, Q12H  Sodium Hypochlorite, 1 Application, Apply externally, 2 times per day  vancomycin, 1,500 mg, Intravenous, Q24H        CT Abdomen Pelvis Without Contrast    Result Date: 11/13/2024  1. There appears to be a soft tissue wound with skin breakdown and cellulitis involving the right lateral aspect of the patient's panniculus. No associated fluid collection. 2. No other acute findings are identified in the abdomen and pelvis. The kidneys are nonobstructed, as is the bowel. 3. 6 mm right lower lobe pulmonary nodule. Indeterminate solid pulmonary nodule measuring 6 mm. In a patient of unknown risk level with a solid nodule of 6-8 mm, recommend CT at 6-12 months. In a low-risk patient, then consider CT at 18-24 months. In a high-risk patient, if the nodule is stable at 6-12 months, recommend CT at 18-24 months. Electronically Signed: Michael García MD  11/13/2024 9:33 PM EST  Workstation ID: [id number]     Assessment / Plan     Summary: 74 y.o. female with A-fib on warfarin, CHF, COPD on room air baseline, presented from nursing facility with AMS and decreased p.o. intake found to have large abdominal wound, general surgery and plastic surgery ultimately evaluated and treating for skin and soft tissue infection of her pannus.  Blood cultures with 2 of 2 for staph epi.  Plastic  surgery planning for abdominal wall debridement possible closure and wound VAC placement for healing risk plans for surgery on Friday    Assessment/Plan (clinically significant if listed here)  Staph epi bacteremia secondary to severe SSTI likely from large abdominal and/or sacral  Multiple skin and soft tissue wounds less notable for sacral, large lower abdominal pannus exposed wound  Paroxysmal A-fib ?on coumadin outpatient  Acute toxic and metabolic encephalopathy  Polypharmacy contributing to confusion as well  History of hypertension  DM2 on metformin outpatient  Morbid obesity BMI of 48.5    Continue IV vancomycin for bacteremia, extending duration/adjusting depending on timing of surgery, repeat cultures cleared, monitor Vanco levels and for toxicity.  Florastor.  Patient also had a course of Levaquin prior to come to the hospital for UTI which was completed.  Urine with no growth.  Cont wound care, may need wound vac/more extensive care depending on how surgery goes  N.p.o. after midnight for surgery tomorrow per plastics appreciate assistance  Will hold Lovenox tomorrow  Surgery currently pending for Friday, bridging with Lovenox Coumadin on hold for paroxysmal A-fib  Patient appears only be on losartan for blood pressure, is currently on hold, daily started on metoprolol or other agent given possible A-fib history instead defer for now given plans for surgery more acutely and can reassess postoperatively.  Hypokalemia doing better monitor p.o. intake  Monitor mental status, is on multiple sedating medications which have been held since admission including nightly gabapentin, twice daily as needed of lorazepam, tramadol 50 mg every 6 hours as needed, Reglan -seems to be doing better since holding this  Appreciate palliative care assistance, guarded long-term prognosis given patient's morbid obesity, multiple comorbidities.  Currently DNR/DNI POA has had to help out  Discontinue megestrol patient with severe  morbid obesity will need to discontinue this/would not continue at discharge  PT/OT  Check a.m. CBC, BMP, magnesium, phosphorus  Continue hospitalization at current level of care    Dispo: Has a bed hold at [facility] once medically stable.   D/w SW    VTE Prophylaxis:  Pharmacologic & mechanical VTE prophylaxis orders are present.      Medical Intervention Limits: No intubation (DNI)  Code Status (Patient has no pulse and is not breathing): No CPR (Do Not Attempt to Resuscitate)  Medical Interventions (Patient has pulse or is breathing): Limited Support

## 2024-11-21 NOTE — SIGNIFICANT NOTE
Wound Eval / Progress Noted    SKY Munoz     Patient Name: Inez Doyle  : 1950  MRN: [id number]  Today's Date: 2024                 Admit Date: 2024    Visit Dx:    ICD-10-CM ICD-9-CM   1. Chronic wound infection of abdomen, sequela  S31.109S 906.0    L08.9    2. Hypokalemia  E87.6 276.8   3. Anorexia  R63.0 783.0   4. Oropharyngeal dysphagia  R13.12 787.22   5. Difficulty walking  R26.2 719.7         AMS (altered mental status)    Severe protein-calorie malnutrition    Chronic wound infection of abdomen        Past Medical History:   Diagnosis Date    A-fib     Anemia     Anxiety     Asthma     Candidiasis of skin and nail     CHF (congestive heart failure)     COPD (chronic obstructive pulmonary disease)     Depression     Diabetes mellitus     GERD (gastroesophageal reflux disease)     Hyperlipidemia     Hypertension     Hypokalemia     Hypomagnesemia     Intervertebral disc disease     Obesity     Osteoarthritis     Panniculitis     Sleep apnea     Vitamin D deficiency         Past Surgical History:   Procedure Laterality Date    BACK SURGERY      STOMACH SURGERY           Physical Assessment:  Wound 24 1733 Right lower abdomen (Active)   Wound Image   24   Dressing Appearance intact;dry 24   Closure None 24   Base red;slough;moist;necrotic;subcutaneous;black;eschar 24   Black (%), Wound Tissue Color 20 24   Red (%), Wound Tissue Color 5 24   Yellow (%), Wound Tissue Color 75 24   Periwound indurated;redness;dry 24   Periwound Temperature warm 24   Periwound Skin Turgor firm 24   Edges open 24   Wound Length (cm) 6.9 cm 24   Wound Width (cm) 71.5 cm 24   Wound Depth (cm) 2.6 cm 24   Wound Surface Area (cm^2) 493.35 cm^2 24   Wound Volume (cm^3) 1282.71 cm^3 24   Drainage Characteristics/Odor  malodorous;tan;serosanguineous 11/21/24 0915   Drainage Amount small 11/21/24 0915   Care, Wound cleansed with;irrigated with;sterile normal saline;collagenase agent applied 11/21/24 0915   Dressing Care dressing applied;dressing moistened;gauze, antimicrobial;gauze, [id number];abdominal pad 11/21/24 0915   Periwound Care absorptive dressing applied 11/21/24 0915       Wound 11/11/24 1751 sacral spine (Active)   Wound Image   11/21/24 0915   Pressure Injury Stage 3 11/21/24 0915   Dressing Appearance moist drainage;intact 11/21/24 0915   Closure None 11/21/24 0915   Base nonblanchable;red;moist 11/21/24 0915   Red (%), Wound Tissue Color 90 11/21/24 0915   Yellow (%), Wound Tissue Color 10 11/21/24 0915   Periwound dry;redness 11/21/24 0915   Periwound Temperature warm 11/21/24 0915   Periwound Skin Turgor soft 11/21/24 0915   Edges open 11/21/24 0915   Wound Length (cm) 2.9 cm 11/21/24 0915   Wound Width (cm) 3 cm 11/21/24 0915   Wound Depth (cm) 1.1 cm 11/21/24 0915   Wound Surface Area (cm^2) 8.7 cm^2 11/21/24 0915   Wound Volume (cm^3) 9.57 cm^3 11/21/24 0915   Undermining [Depth (cm)/Location] circumfrential undermining present with the deepest point being 0.5 cm at 12 o'clock 11/21/24 0915   Drainage Characteristics/Odor serosanguineous 11/21/24 0915   Drainage Amount small 11/21/24 0915   Care, Wound cleansed with;irrigated with;sterile normal saline 11/21/24 0915   Dressing Care dressing applied;dressing moistened;gauze, [id number];gauze, antimicrobial;silicone border foam;silver impregnated;border dressing 11/21/24 0915   Periwound Care absorptive dressing applied 11/21/24 0915       Wound 11/11/24 1757 Right breast (Active)   Dressing Appearance open to air 11/21/24 0915   Closure None 11/21/24 0915   Base dry 11/21/24 0915   Periwound intact;dry;pink 11/21/24 0915   Periwound Temperature warm 11/21/24 0915   Periwound Skin Turgor soft 11/21/24 0915   Edges rolled/closed 11/21/24 0915   Drainage  Characteristics/Odor serosanguineous 11/20/24 1709   Drainage Amount none 11/21/24 0915   Care, Wound cleansed with;sterile normal saline 11/21/24 0915   Dressing Care open to air 11/21/24 0915   Periwound Care barrier ointment applied 11/21/24 0915       Wound 11/12/24 1650 Left posterior heel pressure injury (Active)   Wound Image   11/21/24 0915   Pressure Injury Stage 1 11/21/24 0915   Dressing Appearance intact;dry 11/21/24 0915   Closure None 11/21/24 0915   Base dry;closed/resurfaced;nonblanchable 11/21/24 0915   Periwound intact;dry;maroon/purple 11/21/24 0915   Periwound Temperature warm 11/21/24 0915   Periwound Skin Turgor soft 11/21/24 0915   Edges rolled/closed 11/21/24 0915   Drainage Amount none 11/21/24 0915   Care, Wound cleansed with;sterile normal saline 11/21/24 0915   Dressing Care dressing reinforced 11/21/24 0915       Wound 11/12/24 1650 Left breast MASD (moisture associated skin damage) (Active)   Dressing Appearance open to air 11/21/24 0915   Closure None 11/21/24 0915   Base pink;dry 11/21/24 0915   Periwound intact;dry;pink 11/21/24 0915   Periwound Temperature warm 11/21/24 0915   Periwound Skin Turgor soft 11/21/24 0915   Edges rolled/closed 11/21/24 0915   Drainage Characteristics/Odor tan 11/20/24 1709   Drainage Amount none 11/21/24 0915   Care, Wound cleansed with;sterile normal saline 11/21/24 0915   Dressing Care open to air 11/21/24 0915   Periwound Care barrier film applied 11/21/24 0915       Wound 11/17/24 0250 Left arm skin tear (Active)   Wound Image   11/21/24 0915   Dressing Appearance intact;dry 11/21/24 0915   Closure None 11/21/24 0915   Base red;moist 11/21/24 0915   Periwound intact;dry;ecchymotic 11/21/24 0915   Periwound Temperature warm 11/21/24 0915   Periwound Skin Turgor soft 11/21/24 0915   Edges open 11/21/24 0915   Drainage Characteristics/Odor serosanguineous 11/21/24 0915   Drainage Amount scant 11/21/24 0915   Care, Wound cleansed with;sterile normal  saline 11/21/24 0915   Dressing Care dressing reinforced 11/21/24 0915       Wound 11/17/24 1428 Right anterior greater trochanter (Active)   Wound Image   11/21/24 0915   Dressing Appearance open to air 11/21/24 0915   Closure None 11/21/24 0915   Base red;moist 11/21/24 0915   Periwound dry;intact 11/21/24 0915   Periwound Temperature warm 11/21/24 0915   Periwound Skin Turgor soft 11/21/24 0915   Edges open 11/21/24 0915   Drainage Amount none 11/21/24 0915   Care, Wound cleansed with;sterile normal saline 11/21/24 0915   Dressing Care open to air 11/21/24 0915   Periwound Care topical treatment applied 11/21/24 0915       Wound 11/21/24 1234 Left anterior groin MASD (moisture associated skin damage) (Active)   Wound Image   11/21/24 0915   Dressing Appearance open to air 11/21/24 0915   Closure None 11/21/24 0915   Base red;moist 11/21/24 0915   Periwound intact;dry;pink 11/21/24 0915   Periwound Temperature warm 11/21/24 0915   Periwound Skin Turgor soft 11/21/24 0915   Edges open 11/21/24 0915   Drainage Characteristics/Odor serosanguineous 11/21/24 0915   Drainage Amount scant 11/21/24 0915   Care, Wound cleansed with;sterile normal saline 11/21/24 0915   Dressing Care open to air 11/21/24 0915   Periwound Care barrier ointment applied 11/21/24 0915       Wound 11/21/24 1239 Right proximal arm skin tear (Active)   Wound Image   11/21/24 0915   Dressing Appearance intact;dry 11/21/24 0915   Closure None 11/21/24 0915   Base pink;moist 11/21/24 0915   Periwound intact;dry;ecchymotic 11/21/24 0915   Periwound Temperature warm 11/21/24 0915   Periwound Skin Turgor soft 11/21/24 0915   Edges rolled/closed;open 11/21/24 0915   Drainage Amount none 11/21/24 0915   Care, Wound cleansed with;sterile normal saline 11/21/24 0915   Dressing Care dressing reinforced 11/21/24 0915        Wound Check / Follow-up: Patient seen today for a wound check and dressing change. Patient is awake, alert and oriented at the time of  the assessment; agreeable to the wound check.     Primary RN had already perform the wound care prior to arrival to the unit. Patient going to go to surgery tomorrow for a trunk debridement and possible placement of a wound vac.    Thickened tightly adhered sloughing tissue present expanding over a majority of the wound base with a large area of black necrotic tissue present as well. Minimal red moist tissue is present. There is currently no tunneling or undermining present. Strong odor detected upon removal of the dressing. Periwound is reddened to the edges with induration present. Recommending to continue the current wound care as ordered until the patient has surgery, once the surgery is performed wound care should be deferred to the surgeon until wound care.    MASD remains present to the bilateral groin creases with the presence of red moist tissue. Abdominal creases and under the bilateral breasts, tissue is now closed. No fungal presentation present at this time. Recommend to provide quality skin care and hygiene, after daily bathing apply a light dusting of the cornstarch powder and cover with pillow cases.     Category 2 skin tears present to the bilateral lower arms with skin flap that has been re-approximated, small open wound base present with red moist tissue. Recommending to continue follow the skin tear protocol with Q3day dressing changes with non-adherent petroleum gauze to the wounds.    Stage I present to the left heel with red intact non-blanchable tissue present. Periwound remains soft and intact. Recommending to continue the current wound care with non-adherent petroleum gauze to the wound, change the dressing every other day.    Stage III pressure injury remains present to the sacrum with surrounding tissue loss to the bilateral gluteal aspects. Sacral wound is red and moist, non-blanchable. There is evidence of circumferential undermining present to the wound as well. Recommending to apply  silver impregnated hydrofiber to the open tissue on the bilateral gluteal aspects and fill the cavity to the sacrum with Dakins moistened fluffed gauze. Continue provided Q2H turns and offload at all times, keep the patient clean and free from all moisture.    Impression: stage I to heel; stage III to sacrum with tissue loss to surrounding gluteal aspects; soft tissue necrosis to abdomen; MASD    Short term goals:  regain skin integrity, skin protection, topical treatment, pressure reduction, moisture prevention, BID dressing changes, skin tear protocol, quality skin care and hygiene.    Casi Villanueva RN    11/21/2024    13:01 EST

## 2024-11-21 NOTE — PROGRESS NOTES
"Nutrition Services    Patient Name: Inez Doyle  YOB: 1950  MRN: [id number]  Admission date: 11/11/2024    PROGRESS NOTE      Encounter Information: Follow Up       PO Diet: Diet: Diabetic; Consistent Carbohydrate; Texture: Mechanical Ground (NDD 2); Fluid Consistency: Thin (IDDSI 0)   PO Supplements: William BID and Boost GC daily   PO Intake:  Intake variable, 11/20 intake 100% B/dinner meals. Previous day, 25% B, refused L/D. Encouraged ONS. Staff is encouraging her as much as possible. Intake not meeting estimated needs.        Current nutrition support:        Estimated Needs: 3442-4180 kcal, 119-200 g pro, 1562-5549 mL fluid       Nutrition support review: At        Labs (reviewed below): reviewed        GI Function:  +BM, 11/21  Trace edema       Nutrition Intervention Updates: Suggest adding MVI, Zinc and Vit C, wound healing.  Monitor and replace lytes.  Continue current diet, consistency per SLP recs.  Continue ONS.     Results from last 7 days   Lab Units 11/21/24  0526 11/20/24  0514 11/19/24  0537 11/18/24  0538   SODIUM mmol/L 137 136 137 139   POTASSIUM mmol/L 3.9 3.4* 3.5 3.8   CHLORIDE mmol/L 104 105 104 106   CO2 mmol/L 20.7* 18.7* 22.3 19.8*   BUN mg/dL 12 13 14 16   CREATININE mg/dL 0.70 0.64 0.62 0.66   CALCIUM mg/dL 8.7 8.1* 8.4* 8.1*   BILIRUBIN mg/dL 0.4 0.3  --  0.4   ALK PHOS U/L 114 103  --  106   ALT (SGPT) U/L 10 9  --  9   AST (SGOT) U/L 7 6  --  6   GLUCOSE mg/dL 141* 131* 128* 137*     Results from last 7 days   Lab Units 11/21/24  0526 11/20/24  0514 11/18/24  0538   MAGNESIUM mg/dL 2.2 1.7 1.6   PHOSPHORUS mg/dL 2.9 3.1 3.4   HEMOGLOBIN g/dL 9.7* 8.9* 9.3*   HEMATOCRIT % 31.7* 28.8* 30.9*     SARS-CoV-2, ROB   Date Value Ref Range Status   08/23/2024 Negative Negative Final     No results found for: \"HGBA1C\"    RD to follow up per protocol.    Electronically signed by:  Kristen Green RD  11/21/24 11:57 EST     "

## 2024-11-21 NOTE — PLAN OF CARE
Goal Outcome Evaluation:              Outcome Evaluation: Patient alert to self and place only. Pt reports no pain or discomfort at rest, but with any activity or dressing change, becomes combative and resistant. Patient refused dressing change, but promised to allow it before breakfast.

## 2024-11-21 NOTE — PROGRESS NOTES
"[facility] Clinical Pharmacy Services: Vancomycin Monitoring Note    Inez Doyle is a 74 y.o. female who is on day 7 of pharmacy to dose vancomycin for Bacteremia and Skin and Soft Tissue.    Previous Vancomycin Dose:   1500 mg IV every  24  hours  Imaging Reviewed?: Yes   CTAP: soft tissue wound with skin breakdown and cellulitis involving the right lateral aspect of the patient's panniculus; no associated fluid collection; no other acute findings; 6 mm RLL pulmonary nodule; spinal stimulator present    Updated Cultures and Sensitivities:    MRSA PCR: detected  11/15 Blood cx 2/2: NGTD   Urine cx, catheter: no growth   Blood cx 2: oxacillin-resistant staph epi     Vitals/Labs  Ht: 167.6 cm (66\"); Wt: (!) 136 kg (300 lb 4.3 oz)   Temp (24hrs), Av °F (36.7 °C), Min:97.7 °F (36.5 °C), Max:98.2 °F (36.8 °C)   Estimated Creatinine Clearance: 100.2 mL/min (by C-G formula based on SCr of 0.7 mg/dL).     Results from last 7 days   Lab Units 24  0526 24  0514 24  0537 24  1215 24  0538 24  0851 24  0842   VANCOMYCIN RM mcg/mL  --   --   --  24.24  --   --   --    VANCOMYCIN TR mcg/mL  --   --   --   --   --   --  22.35*   CREATININE mg/dL 0.70 0.64 0.62  --  0.66   < >  --    WBC 10*3/mm3 7.07 6.96  --   --  7.27   < >  --     < > = values in this interval not displayed.     Assessment/Plan  Current Vancomycin Dose:  1500 mg IV every 24 hours; which provides the following predicted parameters:    Regimen: 1500 mg IV every 24 hours.  Start time: 22:27 on 2024  Exposure target: AUC24 (range)400-600 mg/L.hr   AUC24,ss: 514 mg/L.hr  Probability of AUC24 > 400: 100 %  Ctrough,ss: 14.6 mg/L  Probability of Ctrough,ss > 20: 2 %  Probability of nephrotoxicity (Lodise DUSTIN ): 10 %    No further vancomycin levels ordered unless consult extended or PATRICIA develops  We will continue to monitor patient changes and renal function     Thank you for " involving pharmacy in this patient's care. Please contact pharmacy with any questions or concerns.    Cali Beltran  Clinical Pharmacist

## 2024-11-21 NOTE — PLAN OF CARE
Goal Outcome Evaluation:  Plan of Care Reviewed With: patient        Progress: no change  Outcome Evaluation: VSS, blood glucose monitored this shift. No c/o pain. Wound care complete by wound care team, see flowsheets. NPO at midnight for wound debridement of the trunk. Consent is signed. No new concerns at this time. Continue plan of care.

## 2024-11-22 ENCOUNTER — ANESTHESIA (OUTPATIENT)
Dept: PERIOP | Facility: HOSPITAL | Age: 74
End: 2024-11-22
Payer: MEDICARE

## 2024-11-22 ENCOUNTER — ANESTHESIA EVENT (OUTPATIENT)
Dept: PERIOP | Facility: HOSPITAL | Age: 74
End: 2024-11-22
Payer: MEDICARE

## 2024-11-22 LAB
ANION GAP SERPL CALCULATED.3IONS-SCNC: 11.3 MMOL/L (ref 5–15)
BASOPHILS # BLD AUTO: 0.04 10*3/MM3 (ref 0–0.2)
BASOPHILS NFR BLD AUTO: 0.7 % (ref 0–1.5)
BUN SERPL-MCNC: 10 MG/DL (ref 8–23)
BUN/CREAT SERPL: 17.5 (ref 7–25)
CALCIUM SPEC-SCNC: 7.9 MG/DL (ref 8.6–10.5)
CHLORIDE SERPL-SCNC: 108 MMOL/L (ref 98–107)
CO2 SERPL-SCNC: 18.7 MMOL/L (ref 22–29)
CREAT SERPL-MCNC: 0.57 MG/DL (ref 0.57–1)
DEPRECATED RDW RBC AUTO: 53.5 FL (ref 37–54)
EGFRCR SERPLBLD CKD-EPI 2021: 95.5 ML/MIN/1.73
EOSINOPHIL # BLD AUTO: 0.13 10*3/MM3 (ref 0–0.4)
EOSINOPHIL NFR BLD AUTO: 2.2 % (ref 0.3–6.2)
ERYTHROCYTE [DISTWIDTH] IN BLOOD BY AUTOMATED COUNT: 19.9 % (ref 12.3–15.4)
GLUCOSE BLDC GLUCOMTR-MCNC: 116 MG/DL (ref 70–99)
GLUCOSE BLDC GLUCOMTR-MCNC: 120 MG/DL (ref 70–99)
GLUCOSE BLDC GLUCOMTR-MCNC: 163 MG/DL (ref 70–99)
GLUCOSE SERPL-MCNC: 126 MG/DL (ref 65–99)
HCT VFR BLD AUTO: 29.1 % (ref 34–46.6)
HGB BLD-MCNC: 8.7 G/DL (ref 12–15.9)
IMM GRANULOCYTES # BLD AUTO: 0.12 10*3/MM3 (ref 0–0.05)
IMM GRANULOCYTES NFR BLD AUTO: 2.1 % (ref 0–0.5)
INR PPP: 1.14 (ref 0.86–1.15)
LYMPHOCYTES # BLD AUTO: 0.95 10*3/MM3 (ref 0.7–3.1)
LYMPHOCYTES NFR BLD AUTO: 16.3 % (ref 19.6–45.3)
MAGNESIUM SERPL-MCNC: 1.5 MG/DL (ref 1.6–2.4)
MCH RBC QN AUTO: 22.8 PG (ref 26.6–33)
MCHC RBC AUTO-ENTMCNC: 29.9 G/DL (ref 31.5–35.7)
MCV RBC AUTO: 76.4 FL (ref 79–97)
MONOCYTES # BLD AUTO: 0.48 10*3/MM3 (ref 0.1–0.9)
MONOCYTES NFR BLD AUTO: 8.2 % (ref 5–12)
NEUTROPHILS NFR BLD AUTO: 4.11 10*3/MM3 (ref 1.7–7)
NEUTROPHILS NFR BLD AUTO: 70.5 % (ref 42.7–76)
NRBC BLD AUTO-RTO: 0.3 /100 WBC (ref 0–0.2)
PHOSPHATE SERPL-MCNC: 3.2 MG/DL (ref 2.5–4.5)
PLATELET # BLD AUTO: 85 10*3/MM3 (ref 140–450)
PMV BLD AUTO: 11.1 FL (ref 6–12)
POTASSIUM SERPL-SCNC: 3.6 MMOL/L (ref 3.5–5.2)
PROTHROMBIN TIME: 14.9 SECONDS (ref 11.8–14.9)
RBC # BLD AUTO: 3.81 10*6/MM3 (ref 3.77–5.28)
SODIUM SERPL-SCNC: 138 MMOL/L (ref 136–145)
VANCOMYCIN SERPL-MCNC: 33 MCG/ML (ref 5–40)
WBC NRBC COR # BLD AUTO: 5.83 10*3/MM3 (ref 3.4–10.8)

## 2024-11-22 PROCEDURE — 94799 UNLISTED PULMONARY SVC/PX: CPT

## 2024-11-22 PROCEDURE — 80048 BASIC METABOLIC PNL TOTAL CA: CPT | Performed by: INTERNAL MEDICINE

## 2024-11-22 PROCEDURE — 83735 ASSAY OF MAGNESIUM: CPT | Performed by: INTERNAL MEDICINE

## 2024-11-22 PROCEDURE — 25010000002 SUGAMMADEX 200 MG/2ML SOLUTION

## 2024-11-22 PROCEDURE — 25010000002 LIDOCAINE PF 2% 2 % SOLUTION

## 2024-11-22 PROCEDURE — 25010000002 MAGNESIUM SULFATE 2 GM/50ML SOLUTION: Performed by: INTERNAL MEDICINE

## 2024-11-22 PROCEDURE — 25010000002 ONDANSETRON PER 1 MG

## 2024-11-22 PROCEDURE — 82948 REAGENT STRIP/BLOOD GLUCOSE: CPT

## 2024-11-22 PROCEDURE — 99233 SBSQ HOSP IP/OBS HIGH 50: CPT | Performed by: INTERNAL MEDICINE

## 2024-11-22 PROCEDURE — 84100 ASSAY OF PHOSPHORUS: CPT | Performed by: INTERNAL MEDICINE

## 2024-11-22 PROCEDURE — 0JB80ZZ EXCISION OF ABDOMEN SUBCUTANEOUS TISSUE AND FASCIA, OPEN APPROACH: ICD-10-PCS | Performed by: SURGERY

## 2024-11-22 PROCEDURE — 25810000003 LACTATED RINGERS PER 1000 ML: Performed by: ANESTHESIOLOGY

## 2024-11-22 PROCEDURE — 88304 TISSUE EXAM BY PATHOLOGIST: CPT | Performed by: SURGERY

## 2024-11-22 PROCEDURE — 85610 PROTHROMBIN TIME: CPT | Performed by: INTERNAL MEDICINE

## 2024-11-22 PROCEDURE — 25010000002 DEXAMETHASONE PER 1 MG

## 2024-11-22 PROCEDURE — 85025 COMPLETE CBC W/AUTO DIFF WBC: CPT | Performed by: INTERNAL MEDICINE

## 2024-11-22 PROCEDURE — 25010000002 PROPOFOL 10 MG/ML EMULSION

## 2024-11-22 PROCEDURE — 80202 ASSAY OF VANCOMYCIN: CPT | Performed by: INTERNAL MEDICINE

## 2024-11-22 PROCEDURE — 94761 N-INVAS EAR/PLS OXIMETRY MLT: CPT

## 2024-11-22 DEVICE — LIGACLIP MCA MULTIPLE CLIP APPLIERS, 20 MEDIUM CLIPS
Type: IMPLANTABLE DEVICE | Site: ABDOMEN | Status: FUNCTIONAL
Brand: LIGACLIP

## 2024-11-22 RX ORDER — ARFORMOTEROL TARTRATE 15 UG/2ML
15 SOLUTION RESPIRATORY (INHALATION)
Status: DISCONTINUED | OUTPATIENT
Start: 2024-11-22 | End: 2024-12-10 | Stop reason: HOSPADM

## 2024-11-22 RX ORDER — OXYCODONE HYDROCHLORIDE 5 MG/1
5 TABLET ORAL
Status: DISCONTINUED | OUTPATIENT
Start: 2024-11-22 | End: 2024-11-22 | Stop reason: HOSPADM

## 2024-11-22 RX ORDER — ONDANSETRON 2 MG/ML
4 INJECTION INTRAMUSCULAR; INTRAVENOUS ONCE AS NEEDED
Status: DISCONTINUED | OUTPATIENT
Start: 2024-11-22 | End: 2024-11-22 | Stop reason: HOSPADM

## 2024-11-22 RX ORDER — PROPOFOL 10 MG/ML
VIAL (ML) INTRAVENOUS AS NEEDED
Status: DISCONTINUED | OUTPATIENT
Start: 2024-11-22 | End: 2024-11-22 | Stop reason: SURG

## 2024-11-22 RX ORDER — BUDESONIDE 0.5 MG/2ML
0.5 INHALANT ORAL
Status: DISCONTINUED | OUTPATIENT
Start: 2024-11-22 | End: 2024-12-10 | Stop reason: HOSPADM

## 2024-11-22 RX ORDER — LIDOCAINE HYDROCHLORIDE 20 MG/ML
INJECTION, SOLUTION EPIDURAL; INFILTRATION; INTRACAUDAL; PERINEURAL AS NEEDED
Status: DISCONTINUED | OUTPATIENT
Start: 2024-11-22 | End: 2024-11-22 | Stop reason: SURG

## 2024-11-22 RX ORDER — PHENYLEPHRINE HCL IN 0.9% NACL 1 MG/10 ML
SYRINGE (ML) INTRAVENOUS AS NEEDED
Status: DISCONTINUED | OUTPATIENT
Start: 2024-11-22 | End: 2024-11-22 | Stop reason: SURG

## 2024-11-22 RX ORDER — PETROLATUM,WHITE
OINTMENT IN PACKET (GRAM) TOPICAL AS NEEDED
Status: DISCONTINUED | OUTPATIENT
Start: 2024-11-22 | End: 2024-11-22 | Stop reason: SURG

## 2024-11-22 RX ORDER — MAGNESIUM SULFATE HEPTAHYDRATE 40 MG/ML
2 INJECTION, SOLUTION INTRAVENOUS ONCE
Status: COMPLETED | OUTPATIENT
Start: 2024-11-22 | End: 2024-11-22

## 2024-11-22 RX ORDER — SODIUM CHLORIDE, SODIUM LACTATE, POTASSIUM CHLORIDE, CALCIUM CHLORIDE 600; 310; 30; 20 MG/100ML; MG/100ML; MG/100ML; MG/100ML
9 INJECTION, SOLUTION INTRAVENOUS CONTINUOUS PRN
Status: DISCONTINUED | OUTPATIENT
Start: 2024-11-22 | End: 2024-11-22 | Stop reason: HOSPADM

## 2024-11-22 RX ORDER — PROMETHAZINE HYDROCHLORIDE 25 MG/1
25 SUPPOSITORY RECTAL ONCE AS NEEDED
Status: DISCONTINUED | OUTPATIENT
Start: 2024-11-22 | End: 2024-11-22 | Stop reason: HOSPADM

## 2024-11-22 RX ORDER — DEXAMETHASONE SODIUM PHOSPHATE 4 MG/ML
INJECTION, SOLUTION INTRA-ARTICULAR; INTRALESIONAL; INTRAMUSCULAR; INTRAVENOUS; SOFT TISSUE AS NEEDED
Status: DISCONTINUED | OUTPATIENT
Start: 2024-11-22 | End: 2024-11-22 | Stop reason: SURG

## 2024-11-22 RX ORDER — ONDANSETRON 2 MG/ML
INJECTION INTRAMUSCULAR; INTRAVENOUS AS NEEDED
Status: DISCONTINUED | OUTPATIENT
Start: 2024-11-22 | End: 2024-11-22 | Stop reason: SURG

## 2024-11-22 RX ORDER — MAGNESIUM HYDROXIDE 1200 MG/15ML
LIQUID ORAL AS NEEDED
Status: DISCONTINUED | OUTPATIENT
Start: 2024-11-22 | End: 2024-11-22 | Stop reason: HOSPADM

## 2024-11-22 RX ORDER — PROMETHAZINE HYDROCHLORIDE 12.5 MG/1
25 TABLET ORAL ONCE AS NEEDED
Status: DISCONTINUED | OUTPATIENT
Start: 2024-11-22 | End: 2024-11-22 | Stop reason: HOSPADM

## 2024-11-22 RX ORDER — ROCURONIUM BROMIDE 10 MG/ML
INJECTION, SOLUTION INTRAVENOUS AS NEEDED
Status: DISCONTINUED | OUTPATIENT
Start: 2024-11-22 | End: 2024-11-22 | Stop reason: SURG

## 2024-11-22 RX ADMIN — MAGNESIUM SULFATE IN WATER 2 G: 40 INJECTION, SOLUTION INTRAVENOUS at 09:04

## 2024-11-22 RX ADMIN — LIDOCAINE HYDROCHLORIDE 20 MG: 20 INJECTION, SOLUTION INTRAVENOUS at 17:01

## 2024-11-22 RX ADMIN — IPRATROPIUM BROMIDE AND ALBUTEROL SULFATE 3 ML: .5; 3 SOLUTION RESPIRATORY (INHALATION) at 20:24

## 2024-11-22 RX ADMIN — BUDESONIDE 0.5 MG: 0.5 INHALANT ORAL at 09:20

## 2024-11-22 RX ADMIN — Medication 10 ML: at 09:04

## 2024-11-22 RX ADMIN — PROPOFOL 100 MG: 10 INJECTION, EMULSION INTRAVENOUS at 17:01

## 2024-11-22 RX ADMIN — Medication 473 ML: at 14:36

## 2024-11-22 RX ADMIN — SODIUM CHLORIDE, POTASSIUM CHLORIDE, SODIUM LACTATE AND CALCIUM CHLORIDE 9 ML/HR: 600; 310; 30; 20 INJECTION, SOLUTION INTRAVENOUS at 16:09

## 2024-11-22 RX ADMIN — ROCURONIUM BROMIDE 100 MG: 10 INJECTION, SOLUTION INTRAVENOUS at 17:01

## 2024-11-22 RX ADMIN — Medication 1 PACKAGE: at 17:46

## 2024-11-22 RX ADMIN — Medication 150 MCG: at 17:07

## 2024-11-22 RX ADMIN — Medication 1 PACKAGE: at 17:04

## 2024-11-22 RX ADMIN — IPRATROPIUM BROMIDE AND ALBUTEROL SULFATE 3 ML: .5; 3 SOLUTION RESPIRATORY (INHALATION) at 00:13

## 2024-11-22 RX ADMIN — PROPOFOL 20 MG: 10 INJECTION, EMULSION INTRAVENOUS at 18:02

## 2024-11-22 RX ADMIN — ONDANSETRON HYDROCHLORIDE 4 MG: 2 SOLUTION INTRAMUSCULAR; INTRAVENOUS at 17:08

## 2024-11-22 RX ADMIN — ARFORMOTEROL TARTRATE 15 MCG: 15 SOLUTION RESPIRATORY (INHALATION) at 20:24

## 2024-11-22 RX ADMIN — DEXAMETHASONE SODIUM PHOSPHATE 4 MG: 4 INJECTION, SOLUTION INTRAMUSCULAR; INTRAVENOUS at 17:08

## 2024-11-22 RX ADMIN — BUDESONIDE 0.5 MG: 0.5 INHALANT ORAL at 20:23

## 2024-11-22 RX ADMIN — Medication 100 MCG: at 17:17

## 2024-11-22 RX ADMIN — SUGAMMADEX 400 MG: 100 INJECTION, SOLUTION INTRAVENOUS at 18:04

## 2024-11-22 RX ADMIN — Medication 10 ML: at 22:40

## 2024-11-22 RX ADMIN — Medication 150 MCG: at 17:29

## 2024-11-22 RX ADMIN — ARFORMOTEROL TARTRATE 15 MCG: 15 SOLUTION RESPIRATORY (INHALATION) at 09:19

## 2024-11-22 NOTE — ANESTHESIA PREPROCEDURE EVALUATION
Anesthesia Evaluation     Patient summary reviewed and Nursing notes reviewed   no history of anesthetic complications:   NPO Solid Status: > 8 hours  NPO Liquid Status: > 2 hours           Airway   Mallampati: III  TM distance: >3 FB  Neck ROM: full  No difficulty expected  Dental    (+) edentulous    Pulmonary - normal exam    breath sounds clear to auscultation  (+) COPD, asthma,sleep apnea  Cardiovascular - normal exam  Exercise tolerance: poor (<4 METS)    ECG reviewed  Rhythm: regular  Rate: normal    (+) hypertension, dysrhythmias (patient thinks warfarin stopped for months) Atrial Fib, CHF Diastolic >=55%, hyperlipidemia    ROS comment: Echo (6/15/23, dysrhythmia):  Normal left ventricular systolic function.  No significant valve abnormalities noted.     EK bpm  Atrial fibrillation  Nonspecific T abnormalities, diffuse leads  Date and Time of Study:2024 11:48:10      Neuro/Psych  (+) psychiatric history Anxiety and Depression  GI/Hepatic/Renal/Endo    (+) morbid obesity, GERD, diabetes mellitus (Victoza (last dose 24)) type 2    Musculoskeletal     Abdominal   (+) obese   Substance History - negative use     OB/GYN negative ob/gyn ROS         Other   arthritis,     ROS/Med Hx Other: Presented from nursing facility with AMS and decreased p.o. intake found to have large abdominal wound, acute toxic and metabolic encephalopathy.                        Anesthesia Plan    ASA 3     general     intravenous induction     Anesthetic plan, risks, benefits, and alternatives have been provided, discussed and informed consent has been obtained with: patient.    Use of blood products discussed with patient .    Plan discussed with CRNA.        CODE STATUS:    Medical Intervention Limits: No intubation (DNI)  Code Status (Patient has no pulse and is not breathing): No CPR (Do Not Attempt to Resuscitate)  Medical Interventions (Patient has pulse or is breathing): Limited Support

## 2024-11-22 NOTE — INTERVAL H&P NOTE
H&P reviewed. The patient was examined and there are no changes to the H&P.    Patient is not tachycardic  Respirations are non elaborated  Vitals:    11/22/24 1426   BP: 123/42   Pulse: 88   Resp: 20   Temp: 97.5 °F (36.4 °C)   SpO2: 100%     Risks and benefits reminded to patient who agrees to proceed

## 2024-11-22 NOTE — OP NOTE
Plastic Surgery Op Note  PANNICULECTOMY WITH WOUND VAC PLACEMENT     Inez Doyle  11/22/2024    Pre-op Diagnosis:   Chronic wound infection of abdomen, sequela [S31.109S, L08.9]      Post-Op Diagnosis Codes:     * Chronic wound infection of abdomen, sequela [S31.109S, L08.9]        Anesthesia: General    Staff:   Owenulator: Daisha Holloway RN  Scrub Person: Justa Dobson  Pre-op Nurse: Tiera Chris  Assistant: Tegan Fry PA-C; Chaya Peterson RN CSA    Estimated Blood Loss: 150 mL    Specimens:   Order Name Source Comment Collection Info Order Time   TISSUE PATHOLOGY EXAM Abdomen, Lower  Collected By: Shalonda Brown MD 11/22/2024  5:42 PM     Release to patient   Routine Release              Drains:   Closed/Suction Drain 1 LLQ Bulb 15 Fr. (Active)       Urethral Catheter (Active)   Daily Indications Chronic Urinary Retention related to Obstructive, Infectious/Inflammatory, Neurologic or Traumatic Causes 11/22/24 0705   Site Assessment Clean;Skin intact 11/22/24 0705   Collection Container Standard drainage bag 11/22/24 0705   Securement Method Securing device 11/22/24 0705   Catheter care complete Yes 11/22/24 0341   Output (mL) 275 mL 11/22/24 0625       Findings:   Two ulcers on the abdomen. Both were removed en block on the pannus excision of 65x 25cm - sent to pathology       Complications: none      Date: 11/22/2024  Time: 18:12 EST    Procedure:  After risks and benefits were discussed with patient and informed consent was signed, patient was taken to the procedure room and positioned supine. A time out was performed confirming patient's name, procedure and location. All members at the OR room were identified.   I incised the pannus en block removing both ulcers, hemostasis was obtained. Then, a drain was placed. The incision was closed with insorb followed by staples. A wound vac was placed over the closed incision on previna mode.     There were no complications and patient tolerated  procedure well.      Shalonda Brown MD  11/22/24  18:12 EST

## 2024-11-22 NOTE — PROGRESS NOTES
[facility]   Hospitalist Progress Note    Date of admission: 11/11/2024  Patient Name: Inez Doyle  1950  Date: 11/22/2024      Subjective     Chief Complaint   Patient presents with    Altered Mental Status     Staff at [place] said started yesterday and gotten worse. Not taking medicines and eating and drinking. Hx of diabetes, COPD, CHF, Afib    Nausea       Interval Followup: No acute events overnight.  N.p.o. for surgery today denies fevers or chills     Objective     Vitals:   Temp:  [97.3 °F (36.3 °C)-98.1 °F (36.7 °C)] 97.5 °F (36.4 °C)  Heart Rate:  [] 88  Resp:  [16-20] 20  BP: ()/(42-77) 123/42    Physical Exam  Awake conversant appears older than stated age thin frail has baseline resting tremor of the hands  CTAB on room air  RRR  Abdominal wounds dressed no acute discharge   Calix catheter with yellow urine output        Result Review:  Vital signs, labs and recent relevant imaging reviewed.      CBC          11/20/2024    05:14 11/21/2024    05:26 11/22/2024    04:56   CBC   WBC 6.96  7.07  5.83    RBC 3.84  4.19  3.81    Hemoglobin 8.9  9.7  8.7    Hematocrit 28.8  31.7  29.1    MCV 75.0  75.7  76.4    MCH 23.2  23.2  22.8    MCHC 30.9  30.6  29.9    RDW 19.8  19.7  19.9    Platelets 94  101  85      CMP          11/20/2024    05:14 11/21/2024    05:26 11/22/2024    04:56   CMP   Glucose 131  141  126    BUN 13  12  10    Creatinine 0.64  0.70  0.57    EGFR 92.9  90.9  95.5    Sodium 136  137  138    Potassium 3.4  3.9  3.6    Chloride 105  104  108    Calcium 8.1  8.7  7.9    Total Protein 4.8  5.4     Albumin 2.0  2.3     Globulin 2.8  3.1     Total Bilirubin 0.3  0.4     Alkaline Phosphatase 103  114     AST (SGOT) 6  7     ALT (SGPT) 9  10     Albumin/Globulin Ratio 0.7  0.7     BUN/Creatinine Ratio 20.3  17.1  17.5    Anion Gap 12.3  12.3  11.3          acetaminophen **OR** acetaminophen **OR** acetaminophen    dextrose    dextrose    glucagon (human recombinant)     lactated ringers    nitroglycerin    ondansetron    Pharmacy to Dose enoxaparin (LOVENOX)    Pharmacy to dose vancomycin    sodium chloride    sodium chloride    sodium chloride    arformoterol, 15 mcg, Nebulization, BID - RT  budesonide, 0.5 mg, Nebulization, BID - RT  collagenase, 1 Application, Topical, 2 times per day  [Held by provider] enoxaparin, 1 mg/kg, Subcutaneous, Q12H  [Held by provider] furosemide, 40 mg, Oral, Daily  insulin regular, 2-7 Units, Subcutaneous, TID AC  ipratropium-albuterol, 3 mL, Nebulization, Q6H  [Held by provider] losartan, 12.5 mg, Oral, Daily  multivitamin with minerals, 1 tablet, Oral, Daily  [Held by provider] sertraline, 25 mg, Oral, Daily  sodium chloride, 10 mL, Intravenous, Q12H  Sodium Hypochlorite, 1 Application, Apply externally, 2 times per day        No radiology results for the last 7 days    Assessment / Plan     Summary: 74 y.o. female with A-fib on warfarin, CHF, COPD on room air baseline, presented from nursing facility with AMS and decreased p.o. intake found to have large abdominal wound, general surgery and plastic surgery ultimately evaluated and treating for skin and soft tissue infection of her pannus.  Blood cultures with 2 of 2 for staph epi.  Plastic surgery planning for abdominal wall debridement possible closure and wound VAC placement for healing risk plans for surgery on Friday    Assessment/Plan (clinically significant if listed here)  Staph epi bacteremia secondary to severe SSTI likely from large abdominal and/or sacral  Multiple skin and soft tissue wounds less notable for sacral, large lower abdominal pannus exposed wound  Paroxysmal A-fib ?on coumadin outpatient  Acute toxic and metabolic encephalopathy  Polypharmacy contributing to confusion as well  History of hypertension  DM2 on metformin outpatient  Morbid obesity BMI of 48.5    Continue IV vancomycin for bacteremia, extending duration/adjusting depending on findings with surgery and  continued response to treatment.  Wbc wnl afebrile currently.  Continue with Florastor.    Patient had completed a course of Levaquin prior to come to the hospital for UTI which was completed.  Urine with no growth which may be falsely negative given prior antibiotics.  Cont wound care, may need wound vac/more extensive care depending on how surgery goes  N.p.o. for surgery today per plastics appreciate assistance, holding Lovenox, resume when able to bridge but to Coumadin could consider Eliquis as well last had undergone indication to avoid this may have been financial related unclear at this time  Replace magnesium iv, monitor electrolytes  Patient appears only be on losartan for blood pressure, is currently on hold, daily started on metoprolol or other agent given possible A-fib history instead defer for now given plans for surgery more acutely and can reassess postoperatively.  Monitor mental status, is on multiple sedating medications at baseline which have been held since admission including nightly gabapentin, twice daily as needed of lorazepam, tramadol 50 mg every 6 hours as needed, Reglan -seems to be doing better since holding this  Appreciate palliative care assistance, guarded long-term prognosis given patient's morbid obesity, multiple comorbidities.  Currently DNR/DNI POA has had to help out  Discontinued megestrol patient with severe morbid obesity will need to discontinue this/would not continue at discharge  PT/OT  Check a.m. CBC, BMP, magnesium, phosphorus, vancomycin levels   Continue hospitalization at current level of care    Dispo: Has a bed hold at [facility] once medically stable.  Suspect will be several days at least especially given postoperative wound care and needs  D/w SW    VTE Prophylaxis:  Pharmacologic & mechanical VTE prophylaxis orders are present.      Medical Intervention Limits: No intubation (DNI)  Code Status (Patient has no pulse and is not breathing): No CPR (Do Not Attempt  to Resuscitate)  Medical Interventions (Patient has pulse or is breathing): Limited Support

## 2024-11-22 NOTE — PLAN OF CARE
Goal Outcome Evaluation:           Progress: no change  Outcome Evaluation: Vss. Rested well. No acute changes. Blood glucose montired. Wound care done. Continuing POC.

## 2024-11-22 NOTE — PROGRESS NOTES
Respiratory Therapist Broncho-Pulmonary Hygiene Progress Note      Patient Name:  Inez Doyle  YOB: 1950    Inez Doyle meets the qualification for Level 1 of the Bronco-Pulmonary Hygiene Protocol. This was based on my daily patient assessment and includes review of chest x-ray results, cough ability and quality, oxygenation, secretions or risk for secretion development and patient mobility.     Broncho-Pulmonary Hygiene Assessment:    Level of Movement: Actively changing positions without assistance  Alert/ oriented/ cooperative    Breath Sounds: Clear to slightly diminished    Cough: Strong, effective    Chest X-Ray: Possible signs of consolidation and/or atelectasis or clear.     Sputum Productions: None or small amount of thin or watery secretions with effective cough    History and Physical: None    SpO2 to Oxygen Need: greater than 92% on room air or  less than 3L nasal canula    Current SpO2 is: 94% on room air    Based on this information, I have completed the following interventions: Teach/Instruct patient on cough and deep breathe      Electronically signed by Lucero Tse RRT, 11/22/24, 9:05 AM EST.

## 2024-11-22 NOTE — PLAN OF CARE
Goal Outcome Evaluation:  Plan of Care Reviewed With: patient        Progress: no change  Outcome Evaluation: VSS, wound care complete except wound on pannus. Wound debridement today. NPO since midnight, plan to come back to 3[facility] after procedure. No new concerns this shift, continue plan of care.

## 2024-11-23 LAB
ANION GAP SERPL CALCULATED.3IONS-SCNC: 13 MMOL/L (ref 5–15)
ANISOCYTOSIS BLD QL: NORMAL
BASOPHILS # BLD AUTO: 0.02 10*3/MM3 (ref 0–0.2)
BASOPHILS NFR BLD AUTO: 0.3 % (ref 0–1.5)
BUN SERPL-MCNC: 11 MG/DL (ref 8–23)
BUN/CREAT SERPL: 18.6 (ref 7–25)
CALCIUM SPEC-SCNC: 7.9 MG/DL (ref 8.6–10.5)
CHLORIDE SERPL-SCNC: 103 MMOL/L (ref 98–107)
CO2 SERPL-SCNC: 19 MMOL/L (ref 22–29)
CREAT SERPL-MCNC: 0.59 MG/DL (ref 0.57–1)
DEPRECATED RDW RBC AUTO: 55.9 FL (ref 37–54)
EGFRCR SERPLBLD CKD-EPI 2021: 94.7 ML/MIN/1.73
EOSINOPHIL # BLD AUTO: 0.02 10*3/MM3 (ref 0–0.4)
EOSINOPHIL NFR BLD AUTO: 0.3 % (ref 0.3–6.2)
ERYTHROCYTE [DISTWIDTH] IN BLOOD BY AUTOMATED COUNT: 19.9 % (ref 12.3–15.4)
GLUCOSE BLDC GLUCOMTR-MCNC: 139 MG/DL (ref 70–99)
GLUCOSE BLDC GLUCOMTR-MCNC: 151 MG/DL (ref 70–99)
GLUCOSE BLDC GLUCOMTR-MCNC: 157 MG/DL (ref 70–99)
GLUCOSE BLDC GLUCOMTR-MCNC: 157 MG/DL (ref 70–99)
GLUCOSE SERPL-MCNC: 174 MG/DL (ref 65–99)
HCT VFR BLD AUTO: 29 % (ref 34–46.6)
HGB BLD-MCNC: 8.5 G/DL (ref 12–15.9)
HYPOCHROMIA BLD QL: NORMAL
IMM GRANULOCYTES # BLD AUTO: 0.13 10*3/MM3 (ref 0–0.05)
IMM GRANULOCYTES NFR BLD AUTO: 1.9 % (ref 0–0.5)
INR PPP: 1.14 (ref 0.86–1.15)
LYMPHOCYTES # BLD AUTO: 0.68 10*3/MM3 (ref 0.7–3.1)
LYMPHOCYTES NFR BLD AUTO: 9.9 % (ref 19.6–45.3)
MAGNESIUM SERPL-MCNC: 1.8 MG/DL (ref 1.6–2.4)
MCH RBC QN AUTO: 23.3 PG (ref 26.6–33)
MCHC RBC AUTO-ENTMCNC: 29.3 G/DL (ref 31.5–35.7)
MCV RBC AUTO: 79.5 FL (ref 79–97)
MICROCYTES BLD QL: NORMAL
MONOCYTES # BLD AUTO: 0.39 10*3/MM3 (ref 0.1–0.9)
MONOCYTES NFR BLD AUTO: 5.7 % (ref 5–12)
NEUTROPHILS NFR BLD AUTO: 5.65 10*3/MM3 (ref 1.7–7)
NEUTROPHILS NFR BLD AUTO: 81.9 % (ref 42.7–76)
NRBC BLD AUTO-RTO: 0.3 /100 WBC (ref 0–0.2)
OVALOCYTES BLD QL SMEAR: NORMAL
PHOSPHATE SERPL-MCNC: 3.9 MG/DL (ref 2.5–4.5)
PLATELET # BLD AUTO: 90 10*3/MM3 (ref 140–450)
PMV BLD AUTO: 10.4 FL (ref 6–12)
POTASSIUM SERPL-SCNC: 3.9 MMOL/L (ref 3.5–5.2)
PROTHROMBIN TIME: 14.8 SECONDS (ref 11.8–14.9)
RBC # BLD AUTO: 3.65 10*6/MM3 (ref 3.77–5.28)
SMALL PLATELETS BLD QL SMEAR: NORMAL
SODIUM SERPL-SCNC: 135 MMOL/L (ref 136–145)
WBC MORPH BLD: NORMAL
WBC NRBC COR # BLD AUTO: 6.89 10*3/MM3 (ref 3.4–10.8)

## 2024-11-23 PROCEDURE — 83735 ASSAY OF MAGNESIUM: CPT | Performed by: SURGERY

## 2024-11-23 PROCEDURE — 84100 ASSAY OF PHOSPHORUS: CPT | Performed by: SURGERY

## 2024-11-23 PROCEDURE — 94799 UNLISTED PULMONARY SVC/PX: CPT

## 2024-11-23 PROCEDURE — 85007 BL SMEAR W/DIFF WBC COUNT: CPT | Performed by: SURGERY

## 2024-11-23 PROCEDURE — 80048 BASIC METABOLIC PNL TOTAL CA: CPT | Performed by: SURGERY

## 2024-11-23 PROCEDURE — 63710000001 INSULIN REGULAR HUMAN PER 5 UNITS: Performed by: SURGERY

## 2024-11-23 PROCEDURE — 85610 PROTHROMBIN TIME: CPT | Performed by: SURGERY

## 2024-11-23 PROCEDURE — 82948 REAGENT STRIP/BLOOD GLUCOSE: CPT

## 2024-11-23 PROCEDURE — 94664 DEMO&/EVAL PT USE INHALER: CPT

## 2024-11-23 PROCEDURE — 25810000003 SODIUM CHLORIDE 0.9 % SOLUTION 250 ML FLEX CONT: Performed by: INTERNAL MEDICINE

## 2024-11-23 PROCEDURE — 25010000002 ENOXAPARIN PER 10 MG: Performed by: INTERNAL MEDICINE

## 2024-11-23 PROCEDURE — 25010000002 VANCOMYCIN 1 G RECONSTITUTED SOLUTION 1 EACH VIAL: Performed by: INTERNAL MEDICINE

## 2024-11-23 PROCEDURE — 99233 SBSQ HOSP IP/OBS HIGH 50: CPT | Performed by: INTERNAL MEDICINE

## 2024-11-23 PROCEDURE — 85025 COMPLETE CBC W/AUTO DIFF WBC: CPT | Performed by: SURGERY

## 2024-11-23 PROCEDURE — 82948 REAGENT STRIP/BLOOD GLUCOSE: CPT | Performed by: SURGERY

## 2024-11-23 PROCEDURE — 94761 N-INVAS EAR/PLS OXIMETRY MLT: CPT

## 2024-11-23 RX ORDER — WARFARIN SODIUM 2.5 MG/1
5 TABLET ORAL
Status: COMPLETED | OUTPATIENT
Start: 2024-11-23 | End: 2024-11-23

## 2024-11-23 RX ORDER — ENOXAPARIN SODIUM 150 MG/ML
1 INJECTION SUBCUTANEOUS EVERY 12 HOURS
Status: DISCONTINUED | OUTPATIENT
Start: 2024-11-23 | End: 2024-11-24

## 2024-11-23 RX ORDER — SODIUM BICARBONATE 650 MG/1
650 TABLET ORAL 3 TIMES DAILY
Status: ACTIVE | OUTPATIENT
Start: 2024-11-23 | End: 2024-11-24

## 2024-11-23 RX ADMIN — Medication 10 ML: at 21:45

## 2024-11-23 RX ADMIN — Medication 1 TABLET: at 09:43

## 2024-11-23 RX ADMIN — ENOXAPARIN SODIUM 135 MG: 150 INJECTION SUBCUTANEOUS at 19:40

## 2024-11-23 RX ADMIN — WARFARIN SODIUM 5 MG: 2.5 TABLET ORAL at 17:14

## 2024-11-23 RX ADMIN — Medication 473 ML: at 23:36

## 2024-11-23 RX ADMIN — IPRATROPIUM BROMIDE AND ALBUTEROL SULFATE 3 ML: .5; 3 SOLUTION RESPIRATORY (INHALATION) at 23:38

## 2024-11-23 RX ADMIN — Medication 10 ML: at 10:29

## 2024-11-23 RX ADMIN — Medication 473 ML: at 10:30

## 2024-11-23 RX ADMIN — ARFORMOTEROL TARTRATE 15 MCG: 15 SOLUTION RESPIRATORY (INHALATION) at 06:14

## 2024-11-23 RX ADMIN — ACETAMINOPHEN 650 MG: 325 TABLET ORAL at 23:35

## 2024-11-23 RX ADMIN — SODIUM BICARBONATE 650 MG TABLET 650 MG: at 15:57

## 2024-11-23 RX ADMIN — IPRATROPIUM BROMIDE AND ALBUTEROL SULFATE 3 ML: .5; 3 SOLUTION RESPIRATORY (INHALATION) at 00:38

## 2024-11-23 RX ADMIN — VANCOMYCIN HYDROCHLORIDE 1000 MG: 1 INJECTION, POWDER, LYOPHILIZED, FOR SOLUTION INTRAVENOUS at 15:41

## 2024-11-23 RX ADMIN — SODIUM BICARBONATE 650 MG TABLET 650 MG: at 09:43

## 2024-11-23 RX ADMIN — IPRATROPIUM BROMIDE AND ALBUTEROL SULFATE 3 ML: .5; 3 SOLUTION RESPIRATORY (INHALATION) at 19:31

## 2024-11-23 RX ADMIN — BUDESONIDE 0.5 MG: 0.5 INHALANT ORAL at 19:31

## 2024-11-23 RX ADMIN — BUDESONIDE 0.5 MG: 0.5 INHALANT ORAL at 06:14

## 2024-11-23 RX ADMIN — INSULIN HUMAN 2 UNITS: 100 INJECTION, SOLUTION PARENTERAL at 17:14

## 2024-11-23 RX ADMIN — IPRATROPIUM BROMIDE AND ALBUTEROL SULFATE 3 ML: .5; 3 SOLUTION RESPIRATORY (INHALATION) at 11:34

## 2024-11-23 RX ADMIN — Medication 473 ML: at 05:06

## 2024-11-23 RX ADMIN — SODIUM BICARBONATE 650 MG TABLET 650 MG: at 20:54

## 2024-11-23 RX ADMIN — IPRATROPIUM BROMIDE AND ALBUTEROL SULFATE 3 ML: .5; 3 SOLUTION RESPIRATORY (INHALATION) at 06:14

## 2024-11-23 RX ADMIN — ARFORMOTEROL TARTRATE 15 MCG: 15 SOLUTION RESPIRATORY (INHALATION) at 19:31

## 2024-11-23 NOTE — PROGRESS NOTES
[facility]   Hospitalist Progress Note    Date of admission: 11/11/2024  Patient Name: Inez Doyle  1950  Date: 11/23/2024      Subjective     Chief Complaint   Patient presents with    Altered Mental Status     Staff at [place] said started yesterday and gotten worse. Not taking medicines and eating and drinking. Hx of diabetes, COPD, CHF, Afib    Nausea       Interval Followup: Tolerated surgery well yesterday, denying any acute complaints of pain.  Wound VAC in place.    Patient notes previously had Eliquis for anticoagulation but made her feel funny and wants to continue with warfarin for resumption    Objective     Vitals:   Temp:  [97 °F (36.1 °C)-98.6 °F (37 °C)] 97 °F (36.1 °C)  Heart Rate:  [] 93  Resp:  [12-25] 18  BP: ()/(36-98) 119/88    Physical Exam  Wakes easily, conversant appears older than stated age, morbidly obese with frail/poor muscle tone extremities with hypothenar wasting  CTAB on room air  RRR  Lower abdominal wound VAC in place no acute discharge and wound VAC container  Calix catheter with yellow urine output        Result Review:  Vital signs, labs and recent relevant imaging reviewed.      CBC          11/21/2024    05:26 11/22/2024    04:56 11/23/2024    04:34   CBC   WBC 7.07  5.83  6.89    RBC 4.19  3.81  3.65    Hemoglobin 9.7  8.7  8.5    Hematocrit 31.7  29.1  29.0    MCV 75.7  76.4  79.5    MCH 23.2  22.8  23.3    MCHC 30.6  29.9  29.3    RDW 19.7  19.9  19.9    Platelets 101  85  90      CMP          11/21/2024    05:26 11/22/2024    04:56 11/23/2024    04:34   CMP   Glucose 141  126  174    BUN 12  10  11    Creatinine 0.70  0.57  0.59    EGFR 90.9  95.5  94.7    Sodium 137  138  135    Potassium 3.9  3.6  3.9    Chloride 104  108  103    Calcium 8.7  7.9  7.9    Total Protein 5.4      Albumin 2.3      Globulin 3.1      Total Bilirubin 0.4      Alkaline Phosphatase 114      AST (SGOT) 7      ALT (SGPT) 10      Albumin/Globulin Ratio 0.7       BUN/Creatinine Ratio 17.1  17.5  18.6    Anion Gap 12.3  11.3  13.0          acetaminophen **OR** acetaminophen **OR** acetaminophen    dextrose    dextrose    glucagon (human recombinant)    nitroglycerin    ondansetron    Pharmacy to Dose enoxaparin (LOVENOX)    Pharmacy to dose vancomycin    Pharmacy to dose warfarin    sodium chloride    sodium chloride    arformoterol, 15 mcg, Nebulization, BID - RT  budesonide, 0.5 mg, Nebulization, BID - RT  enoxaparin, 1 mg/kg, Subcutaneous, Q12H  [Held by provider] furosemide, 40 mg, Oral, Daily  insulin regular, 2-7 Units, Subcutaneous, TID AC  ipratropium-albuterol, 3 mL, Nebulization, Q6H  [Held by provider] losartan, 12.5 mg, Oral, Daily  multivitamin with minerals, 1 tablet, Oral, Daily  [Held by provider] sertraline, 25 mg, Oral, Daily  sodium bicarbonate, 650 mg, Oral, TID  sodium chloride, 10 mL, Intravenous, Q12H  Sodium Hypochlorite, 1 Application, Apply externally, 2 times per day        No radiology results for the last 7 days    Assessment / Plan     Summary: 74 y.o. female with A-fib on warfarin, CHF, COPD on room air baseline, presented from nursing facility with AMS and decreased p.o. intake found to have large abdominal wound, general surgery and plastic surgery ultimately evaluated and treating for skin and soft tissue infection of her pannus.  Blood cultures with 2 of 2 for staph epi.  Plastic surgery planning for abdominal wall debridement possible closure and wound VAC placement for healing risk plans for surgery on Friday    Assessment/Plan (clinically significant if listed here)  Staph epi bacteremia secondary to severe SSTI likely from large abdominal and/or sacral  Multiple skin and soft tissue wounds less notable for sacral, large lower abdominal pannus exposed wound  Paroxysmal A-fib on coumadin outpatient  Acute toxic and metabolic encephalopathy  COPD on room air baseline  CHF not acutely exacerbated  Polypharmacy contributing to confusion as  well  History of hypertension  DM2 on metformin outpatient  Morbid obesity BMI of 48.5    Continue IV vancomycin for bacteremia, day 9 today, will have complete a dose tomorrow and then discontinue for 10-day course, repeat cultures cleared  Continue Florastor  Surgery yesterday tolerated well and continues with wound VAC plan for 7-day course per plastic surgery patient with high risk for wound dehiscence.    Appreciate plastic surgery assistance with perioperative care, drain/wound VAC  Okay to resume anticoagulation per discussion with surgery  Restart Lovenox, warfarin, monitor INR goal 2-3 for paroxysmal A-fib, subtherapeutic today as expected as it is been held  Patient appears only be on losartan for blood pressure, is currently on hold, daily started on metoprolol or other agent given possible A-fib history instead defer for now given plans for surgery more acutely and can reassess postoperatively.  Monitor mental status, is on multiple sedating medications at baseline which have been held since admission including nightly gabapentin, twice daily as needed of lorazepam, tramadol 50 mg every 6 hours as needed, Reglan -seems to be doing better since holding these.  Not requiring narcotic pain medications currently/surprisingly despite surgery  brov, pulm, cont duonebs, bronchodilator protocol, BPH  Appreciate palliative care assistance, guarded long-term prognosis given patient's morbid obesity, multiple comorbidities.  Currently DNR/DNI POA has had to help out  Discontinued megestrol patient with severe morbid obesity will need to discontinue this/would not continue at discharge  PT/OT  Check a.m. CBC, BMP, magnesium, phosphorus, vancomycin levels , INR  Continue hospitalization at current level of care    Dispo: Has a bed hold at [place] [facility] once medically stable.  Suspect will be several days at least especially given postoperative wound care and needs.    D/w SW    VTE Prophylaxis:  Pharmacologic & mechanical  VTE prophylaxis orders are present.      Medical Intervention Limits: No intubation (DNI)  Code Status (Patient has no pulse and is not breathing): No CPR (Do Not Attempt to Resuscitate)  Medical Interventions (Patient has pulse or is breathing): Limited Support

## 2024-11-23 NOTE — PLAN OF CARE
Goal Outcome Evaluation:  Plan of Care Reviewed With: patient        Progress: no change  Outcome Evaluation: pt oriented to person and situation.  pt refused to eat, blood sugar monitored, blood sugar stayed in 150's, no insulin given due to not eating.  Pt stayed awake throughout the shift, no signs of distress.

## 2024-11-23 NOTE — ANESTHESIA POSTPROCEDURE EVALUATION
Patient: Inez Doyle    Procedure Summary       Date: 11/22/24 Room / Location: [facility] OR  / [facility] MAIN OR    Anesthesia Start: 1655 Anesthesia Stop: 1816    Procedure: TRUNK DEBRIDEMENTabdominal wall debridement with possible closure and wound vac placement- we will need two OR beds due to patient's weight (Bilateral) Diagnosis:       Chronic wound infection of abdomen, sequela      (Chronic wound infection of abdomen, sequela [S31.109S, L08.9])    Surgeons: Shalonda Brown MD Provider: Michael Myrick MD    Anesthesia Type: general ASA Status: 3            Anesthesia Type: general    Vitals  Vitals Value Taken Time   /36 11/22/24 1940   Temp 36.3 °C (97.3 °F) 11/22/24 1920   Pulse 103 11/22/24 1940   Resp 12 11/22/24 1940   SpO2 95 % 11/22/24 1940           Post Anesthesia Care and Evaluation    Patient location during evaluation: bedside  Patient participation: complete - patient participated  Level of consciousness: awake  Pain management: adequate    Airway patency: patent  PONV Status: none  Cardiovascular status: acceptable and stable  Respiratory status: acceptable  Hydration status: acceptable

## 2024-11-23 NOTE — PLAN OF CARE
Goal Outcome Evaluation:              Outcome Evaluation: Patient transferred to 5STU from PACU. Brothers at bedside on patient's arrival from PACU. Patient oriented to person and situation. Patient mostly cooperative with care. Call light within reach and bed alert in place. Q2 turns and wound care per orders.

## 2024-11-23 NOTE — PROGRESS NOTES
PLASTIC SURGERY PROGRESS NOTE    Patient Identification:  Name: Inez Doyle    Age: 74 y.o.    Sex: female   :  1950  MRN: [id number]               Subjective:  No acute events.    Objective:    Continuous Infusions:Pharmacy to Dose enoxaparin (LOVENOX),   Pharmacy to dose vancomycin,         Scheduled Meds:arformoterol, 15 mcg, Nebulization, BID - RT  budesonide, 0.5 mg, Nebulization, BID - RT  [Held by provider] enoxaparin, 1 mg/kg, Subcutaneous, Q12H  [Held by provider] furosemide, 40 mg, Oral, Daily  insulin regular, 2-7 Units, Subcutaneous, TID AC  ipratropium-albuterol, 3 mL, Nebulization, Q6H  [Held by provider] losartan, 12.5 mg, Oral, Daily  multivitamin with minerals, 1 tablet, Oral, Daily  [Held by provider] sertraline, 25 mg, Oral, Daily  sodium chloride, 10 mL, Intravenous, Q12H  Sodium Hypochlorite, 1 Application, Apply externally, 2 times per day      PRN Meds:  acetaminophen **OR** acetaminophen **OR** acetaminophen    dextrose    dextrose    glucagon (human recombinant)    nitroglycerin    ondansetron    Pharmacy to Dose enoxaparin (LOVENOX)    Pharmacy to dose vancomycin    sodium chloride    sodium chloride    sodium chloride    Vital signs in last 24 hours:  Vitals:    24 0610 24 0614 24 0618 24 0728   BP:    119/88   BP Location:    Right arm   Patient Position:    Lying   Pulse: 104 104 99 93   Resp: 16 16 16 18   Temp:    97 °F (36.1 °C)   TempSrc:    Oral   SpO2: 95%   97%   Weight:       Height:           Intake/Output:I/O last 3 completed shifts:  In: 750 [I.V.:750]  Out: 695 [Urine:525; Drains:20; Blood:150]    Exam:  GEN: no acute distress, resting quietly   Wound vac in place       Recent Results (from the past 24 hours)   Vancomycin, Random    Collection Time: 24 10:15 AM    Specimen: Arm, Right; Blood   Result Value Ref Range    Vancomycin Random 33.00 5.00 - 40.00 mcg/mL   POC Glucose Once    Collection Time: 24 11:54 AM    Specimen:  Blood   Result Value Ref Range    Glucose 116 (H) 70 - 99 mg/dL   POC Glucose Once    Collection Time: 11/22/24  8:31 PM    Specimen: Blood   Result Value Ref Range    Glucose 163 (H) 70 - 99 mg/dL   Protime-INR    Collection Time: 11/23/24  4:34 AM    Specimen: Arm, Right; Blood   Result Value Ref Range    Protime 14.8 11.8 - 14.9 Seconds    INR 1.14 0.86 - 1.15   Basic Metabolic Panel    Collection Time: 11/23/24  4:34 AM    Specimen: Arm, Right; Blood   Result Value Ref Range    Glucose 174 (H) 65 - 99 mg/dL    BUN 11 8 - 23 mg/dL    Creatinine 0.59 0.57 - 1.00 mg/dL    Sodium 135 (L) 136 - 145 mmol/L    Potassium 3.9 3.5 - 5.2 mmol/L    Chloride 103 98 - 107 mmol/L    CO2 19.0 (L) 22.0 - 29.0 mmol/L    Calcium 7.9 (L) 8.6 - 10.5 mg/dL    BUN/Creatinine Ratio 18.6 7.0 - 25.0    Anion Gap 13.0 5.0 - 15.0 mmol/L    eGFR 94.7 >60.0 mL/min/1.73   Magnesium    Collection Time: 11/23/24  4:34 AM    Specimen: Arm, Right; Blood   Result Value Ref Range    Magnesium 1.8 1.6 - 2.4 mg/dL   Phosphorus    Collection Time: 11/23/24  4:34 AM    Specimen: Arm, Right; Blood   Result Value Ref Range    Phosphorus 3.9 2.5 - 4.5 mg/dL   CBC Auto Differential    Collection Time: 11/23/24  4:34 AM    Specimen: Arm, Right; Blood   Result Value Ref Range    WBC 6.89 3.40 - 10.80 10*3/mm3    RBC 3.65 (L) 3.77 - 5.28 10*6/mm3    Hemoglobin 8.5 (L) 12.0 - 15.9 g/dL    Hematocrit 29.0 (L) 34.0 - 46.6 %    MCV 79.5 79.0 - 97.0 fL    MCH 23.3 (L) 26.6 - 33.0 pg    MCHC 29.3 (L) 31.5 - 35.7 g/dL    RDW 19.9 (H) 12.3 - 15.4 %    RDW-SD 55.9 (H) 37.0 - 54.0 fl    MPV 10.4 6.0 - 12.0 fL    Platelets 90 (L) 140 - 450 10*3/mm3    Neutrophil % 81.9 (H) 42.7 - 76.0 %    Lymphocyte % 9.9 (L) 19.6 - 45.3 %    Monocyte % 5.7 5.0 - 12.0 %    Eosinophil % 0.3 0.3 - 6.2 %    Basophil % 0.3 0.0 - 1.5 %    Immature Grans % 1.9 (H) 0.0 - 0.5 %    Neutrophils, Absolute 5.65 1.70 - 7.00 10*3/mm3    Lymphocytes, Absolute 0.68 (L) 0.70 - 3.10 10*3/mm3     Monocytes, Absolute 0.39 0.10 - 0.90 10*3/mm3    Eosinophils, Absolute 0.02 0.00 - 0.40 10*3/mm3    Basophils, Absolute 0.02 0.00 - 0.20 10*3/mm3    Immature Grans, Absolute 0.13 (H) 0.00 - 0.05 10*3/mm3    nRBC 0.3 (H) 0.0 - 0.2 /100 WBC   Scan Slide    Collection Time: 11/23/24  4:34 AM    Specimen: Arm, Right; Blood   Result Value Ref Range    Anisocytosis Mod/2+ None Seen    Hypochromia Slight/1+ None Seen    Microcytes Slight/1+ None Seen    Ovalocytes Slight/1+ None Seen    WBC Morphology Normal Normal    Platelet Estimate Decreased Normal         Assessment:    AMS (altered mental status)    Severe protein-calorie malnutrition    Chronic wound infection of abdomen      1 Day Post-Op Procedure(s) (LRB):  TRUNK DEBRIDEMENTabdominal wall debridement with possible closure and wound vac placement- we will need two OR beds due to patient's weight (Bilateral)    Plan:  Doing well. Patient to keep wound vac for 7 days ( without changes) unless she is discharged before. If that is the case, it is ok to dc the wound vac and place ABD dressings. Wound vac is on previna mode and her wound is closed under it. The wound vac has been placed to avoid dehiscence since she has a high risk for dehiscence.   Shalonda Brown MD    11/23/2024

## 2024-11-23 NOTE — PROGRESS NOTES
Pharmacy to Dose: Warfarin  Consulting provider: Bakari   Indication for warfarin: Afib   Goal INR range: 2 - 3  Home warfarin dose:  Restarting warfarin therapy. Patient previously on warfarin  in May, 2024. Recently on apixaban, per note apixaban makes patient feel funny, and would like to restart warfarin  Previous warfarin dose: 4 mg, then 5mg, then 5mg, rotating. Previous admission in April, 2024: average dose of 5.3 mg      Date INR Warfarin Dose Given   11/23 1.14 5 mg                         Any Vitamin K given?: No  Drug interactions Reviewed: Yes.  Is patient on bridging therapy with another anticoagulant?: Yes; Enoxaparin 135 mg SQ Q12H    Lab Results   Component Value Date    PLT 90 (L) 11/23/2024    PLT 85 (L) 11/22/2024    HGB 8.5 (L) 11/23/2024    HGB 8.7 (L) 11/22/2024       Plan:  INR of 1.14. Will restart warfarin therapy with 5 mg. Will use two of the 2.5 mg tablets as patient has reported allergy to red dye.    Daily INRs ordered     Thank you for this consult. Pharmacy will continue to monitor.   Toño Harper RPH

## 2024-11-23 NOTE — NURSING NOTE
Patient was in OR at the beginning of this shift, will transfer to 503 from PACU, report called to GISSEL Underwood from PACU and this nurse.

## 2024-11-24 LAB
ANION GAP SERPL CALCULATED.3IONS-SCNC: 10 MMOL/L (ref 5–15)
BASOPHILS # BLD AUTO: 0.03 10*3/MM3 (ref 0–0.2)
BASOPHILS NFR BLD AUTO: 0.4 % (ref 0–1.5)
BUN SERPL-MCNC: 12 MG/DL (ref 8–23)
BUN/CREAT SERPL: 18.8 (ref 7–25)
CALCIUM SPEC-SCNC: 7.8 MG/DL (ref 8.6–10.5)
CHLORIDE SERPL-SCNC: 107 MMOL/L (ref 98–107)
CO2 SERPL-SCNC: 22 MMOL/L (ref 22–29)
CREAT SERPL-MCNC: 0.64 MG/DL (ref 0.57–1)
DEPRECATED RDW RBC AUTO: 53.2 FL (ref 37–54)
EGFRCR SERPLBLD CKD-EPI 2021: 92.9 ML/MIN/1.73
EOSINOPHIL # BLD AUTO: 0.09 10*3/MM3 (ref 0–0.4)
EOSINOPHIL NFR BLD AUTO: 1.3 % (ref 0.3–6.2)
ERYTHROCYTE [DISTWIDTH] IN BLOOD BY AUTOMATED COUNT: 19.9 % (ref 12.3–15.4)
FERRITIN SERPL-MCNC: 923.9 NG/ML (ref 13–150)
GLUCOSE BLDC GLUCOMTR-MCNC: 101 MG/DL (ref 70–99)
GLUCOSE BLDC GLUCOMTR-MCNC: 108 MG/DL (ref 70–99)
GLUCOSE BLDC GLUCOMTR-MCNC: 115 MG/DL (ref 70–99)
GLUCOSE BLDC GLUCOMTR-MCNC: 139 MG/DL (ref 70–99)
GLUCOSE SERPL-MCNC: 121 MG/DL (ref 65–99)
HCT VFR BLD AUTO: 25.5 % (ref 34–46.6)
HGB BLD-MCNC: 7.6 G/DL (ref 12–15.9)
IMM GRANULOCYTES # BLD AUTO: 0.09 10*3/MM3 (ref 0–0.05)
IMM GRANULOCYTES NFR BLD AUTO: 1.3 % (ref 0–0.5)
INR PPP: 1.32 (ref 0.86–1.15)
IRON 24H UR-MRATE: 28 MCG/DL (ref 37–145)
IRON SATN MFR SERPL: 22 % (ref 20–50)
LYMPHOCYTES # BLD AUTO: 1.18 10*3/MM3 (ref 0.7–3.1)
LYMPHOCYTES NFR BLD AUTO: 17.5 % (ref 19.6–45.3)
MAGNESIUM SERPL-MCNC: 1.7 MG/DL (ref 1.6–2.4)
MCH RBC QN AUTO: 22.7 PG (ref 26.6–33)
MCHC RBC AUTO-ENTMCNC: 29.8 G/DL (ref 31.5–35.7)
MCV RBC AUTO: 76.1 FL (ref 79–97)
MONOCYTES # BLD AUTO: 0.53 10*3/MM3 (ref 0.1–0.9)
MONOCYTES NFR BLD AUTO: 7.9 % (ref 5–12)
NEUTROPHILS NFR BLD AUTO: 4.81 10*3/MM3 (ref 1.7–7)
NEUTROPHILS NFR BLD AUTO: 71.6 % (ref 42.7–76)
NRBC BLD AUTO-RTO: 0.3 /100 WBC (ref 0–0.2)
PHOSPHATE SERPL-MCNC: 3.1 MG/DL (ref 2.5–4.5)
PLATELET # BLD AUTO: 82 10*3/MM3 (ref 140–450)
PMV BLD AUTO: 10.7 FL (ref 6–12)
POTASSIUM SERPL-SCNC: 3.2 MMOL/L (ref 3.5–5.2)
PROTHROMBIN TIME: 16.7 SECONDS (ref 11.8–14.9)
RBC # BLD AUTO: 3.35 10*6/MM3 (ref 3.77–5.28)
SODIUM SERPL-SCNC: 139 MMOL/L (ref 136–145)
TIBC SERPL-MCNC: 127 MCG/DL (ref 298–536)
TRANSFERRIN SERPL-MCNC: 85 MG/DL (ref 200–360)
WBC NRBC COR # BLD AUTO: 6.73 10*3/MM3 (ref 3.4–10.8)

## 2024-11-24 PROCEDURE — 85025 COMPLETE CBC W/AUTO DIFF WBC: CPT | Performed by: SURGERY

## 2024-11-24 PROCEDURE — 99233 SBSQ HOSP IP/OBS HIGH 50: CPT | Performed by: INTERNAL MEDICINE

## 2024-11-24 PROCEDURE — 25010000002 VANCOMYCIN 1 G RECONSTITUTED SOLUTION 1 EACH VIAL: Performed by: INTERNAL MEDICINE

## 2024-11-24 PROCEDURE — 82728 ASSAY OF FERRITIN: CPT | Performed by: INTERNAL MEDICINE

## 2024-11-24 PROCEDURE — 94799 UNLISTED PULMONARY SVC/PX: CPT

## 2024-11-24 PROCEDURE — 82948 REAGENT STRIP/BLOOD GLUCOSE: CPT | Performed by: SURGERY

## 2024-11-24 PROCEDURE — 25810000003 SODIUM CHLORIDE 0.9 % SOLUTION 250 ML FLEX CONT: Performed by: INTERNAL MEDICINE

## 2024-11-24 PROCEDURE — 84466 ASSAY OF TRANSFERRIN: CPT | Performed by: INTERNAL MEDICINE

## 2024-11-24 PROCEDURE — 25010000002 MAGNESIUM SULFATE IN D5W 1G/100ML (PREMIX) 1-5 GM/100ML-% SOLUTION: Performed by: INTERNAL MEDICINE

## 2024-11-24 PROCEDURE — 83540 ASSAY OF IRON: CPT | Performed by: INTERNAL MEDICINE

## 2024-11-24 PROCEDURE — 82948 REAGENT STRIP/BLOOD GLUCOSE: CPT

## 2024-11-24 PROCEDURE — 83735 ASSAY OF MAGNESIUM: CPT | Performed by: SURGERY

## 2024-11-24 PROCEDURE — 25010000002 ENOXAPARIN PER 10 MG: Performed by: INTERNAL MEDICINE

## 2024-11-24 PROCEDURE — 84100 ASSAY OF PHOSPHORUS: CPT | Performed by: SURGERY

## 2024-11-24 PROCEDURE — 94664 DEMO&/EVAL PT USE INHALER: CPT

## 2024-11-24 PROCEDURE — 94760 N-INVAS EAR/PLS OXIMETRY 1: CPT

## 2024-11-24 PROCEDURE — 85610 PROTHROMBIN TIME: CPT | Performed by: SURGERY

## 2024-11-24 PROCEDURE — 80048 BASIC METABOLIC PNL TOTAL CA: CPT | Performed by: SURGERY

## 2024-11-24 RX ORDER — MAGNESIUM SULFATE 1 G/100ML
1 INJECTION INTRAVENOUS ONCE
Status: COMPLETED | OUTPATIENT
Start: 2024-11-24 | End: 2024-11-24

## 2024-11-24 RX ORDER — WARFARIN SODIUM 2.5 MG/1
5 TABLET ORAL
Status: COMPLETED | OUTPATIENT
Start: 2024-11-24 | End: 2024-11-24

## 2024-11-24 RX ORDER — POTASSIUM CHLORIDE 750 MG/1
40 CAPSULE, EXTENDED RELEASE ORAL ONCE
Status: COMPLETED | OUTPATIENT
Start: 2024-11-24 | End: 2024-11-24

## 2024-11-24 RX ADMIN — Medication 473 ML: at 22:34

## 2024-11-24 RX ADMIN — IPRATROPIUM BROMIDE AND ALBUTEROL SULFATE 3 ML: .5; 3 SOLUTION RESPIRATORY (INHALATION) at 06:28

## 2024-11-24 RX ADMIN — Medication 10 ML: at 22:33

## 2024-11-24 RX ADMIN — Medication 10 ML: at 09:42

## 2024-11-24 RX ADMIN — VANCOMYCIN HYDROCHLORIDE 1000 MG: 1 INJECTION, POWDER, LYOPHILIZED, FOR SOLUTION INTRAVENOUS at 16:26

## 2024-11-24 RX ADMIN — Medication 473 ML: at 09:42

## 2024-11-24 RX ADMIN — WARFARIN SODIUM 5 MG: 2.5 TABLET ORAL at 17:58

## 2024-11-24 RX ADMIN — ACETAMINOPHEN 650 MG: 325 TABLET ORAL at 22:48

## 2024-11-24 RX ADMIN — MAGNESIUM SULFATE HEPTAHYDRATE 1 G: 1 INJECTION, SOLUTION INTRAVENOUS at 09:41

## 2024-11-24 RX ADMIN — ENOXAPARIN SODIUM 135 MG: 150 INJECTION SUBCUTANEOUS at 09:41

## 2024-11-24 RX ADMIN — POTASSIUM CHLORIDE 40 MEQ: 750 CAPSULE, EXTENDED RELEASE ORAL at 09:42

## 2024-11-24 RX ADMIN — ARFORMOTEROL TARTRATE 15 MCG: 15 SOLUTION RESPIRATORY (INHALATION) at 06:28

## 2024-11-24 RX ADMIN — BUDESONIDE 0.5 MG: 0.5 INHALANT ORAL at 06:28

## 2024-11-24 NOTE — PLAN OF CARE
Goal Outcome Evaluation:  Plan of Care Reviewed With: patient        Progress: no change  Outcome Evaluation: pt oriented to self, pt asleep most of the shift, uncooperative at times.  no acute event this shift.  Wound vac intact, no drain.  ELENI drain with 10 ml serosanguineous output

## 2024-11-24 NOTE — PROGRESS NOTES
Pharmacy to Dose: Warfarin  Consulting provider: Bakari   Indication for warfarin: Afib   Goal INR range: 2 - 3  Home warfarin dose:  Restarting warfarin therapy. Patient previously on warfarin  in May, 2024. Recently on apixaban, per note apixaban makes patient feel funny, and would like to restart warfarin  Previous warfarin dose: 4 mg, then 5mg, then 5mg, rotating. Previous admission in April, 2024: average dose of 5.3 mg      Date INR Warfarin Dose Given   11/23 1.14 5 mg   11/24 1.32 5 mg                    Any Vitamin K given?: No  Drug interactions Reviewed: Yes.  Is patient on bridging therapy with another anticoagulant?: Yes; Enoxaparin 135 mg SQ Q12H    Lab Results   Component Value Date    PLT 82 (L) 11/24/2024    PLT 90 (L) 11/23/2024    HGB 7.6 (L) 11/24/2024    HGB 8.5 (L) 11/23/2024       Plan:  INR of 1.32, increase from 1.14. Will continue warfarin therapy with 5 mg. Will use two of the 2.5 mg tablets as patient has reported allergy to red dye.    Daily INRs ordered     Thank you for this consult. Pharmacy will continue to monitor.   Toño Harper RP

## 2024-11-24 NOTE — PROGRESS NOTES
[facility]   Hospitalist Progress Note    Date of admission: 11/11/2024  Patient Name: Inez Doyle  1950  Date: 11/24/2024      Subjective     Chief Complaint   Patient presents with    Altered Mental Status     Staff at [facility] said started yesterday and gotten worse. Not taking medicines and eating and drinking. Hx of diabetes, COPD, CHF, Afib    Nausea       Interval Followup: No acute events overnight.  Denies any acute pain.  Tolerating drain and wound VAC denies fevers    Objective     Vitals:   Temp:  [97.9 °F (36.6 °C)-98.2 °F (36.8 °C)] 98 °F (36.7 °C)  Heart Rate:  [] 84  Resp:  [16-20] 18  BP: ()/(55-68) 98/65    Physical Exam  Conversant nursing at bedside, appears older than stated age, morbidly obese, generalized weakness  CTAB on room air  RRR  Lower abdominal wound VAC in place no acute discharge and wound VAC container, left abdominal drain with some blood-tinged fluid  Calix catheter with yellow urine output        Result Review:  Vital signs, labs and recent relevant imaging reviewed.      CBC          11/22/2024    04:56 11/23/2024    04:34 11/24/2024    04:42   CBC   WBC 5.83  6.89  6.73    RBC 3.81  3.65  3.35    Hemoglobin 8.7  8.5  7.6    Hematocrit 29.1  29.0  25.5    MCV 76.4  79.5  76.1    MCH 22.8  23.3  22.7    MCHC 29.9  29.3  29.8    RDW 19.9  19.9  19.9    Platelets 85  90  82      CMP          11/22/2024    04:56 11/23/2024    04:34 11/24/2024    04:42   CMP   Glucose 126  174  121    BUN 10  11  12    Creatinine 0.57  0.59  0.64    EGFR 95.5  94.7  92.9    Sodium 138  135  139    Potassium 3.6  3.9  3.2    Chloride 108  103  107    Calcium 7.9  7.9  7.8    BUN/Creatinine Ratio 17.5  18.6  18.8    Anion Gap 11.3  13.0  10.0          acetaminophen **OR** acetaminophen **OR** acetaminophen    dextrose    dextrose    glucagon (human recombinant)    nitroglycerin    ondansetron    Pharmacy to Dose enoxaparin (LOVENOX)    Pharmacy to dose warfarin    sodium  chloride    sodium chloride    arformoterol, 15 mcg, Nebulization, BID - RT  budesonide, 0.5 mg, Nebulization, BID - RT  enoxaparin, 1 mg/kg, Subcutaneous, Q12H  [Held by provider] furosemide, 40 mg, Oral, Daily  insulin regular, 2-7 Units, Subcutaneous, TID AC  ipratropium-albuterol, 3 mL, Nebulization, Q6H  [Held by provider] losartan, 12.5 mg, Oral, Daily  multivitamin with minerals, 1 tablet, Oral, Daily  [Held by provider] sertraline, 25 mg, Oral, Daily  sodium bicarbonate, 650 mg, Oral, TID  sodium chloride, 10 mL, Intravenous, Q12H  Sodium Hypochlorite, 1 Application, Apply externally, 2 times per day  vancomycin, 1,000 mg, Intravenous, Q24H  warfarin, 5 mg, Oral, Once        No radiology results for the last 7 days    Assessment / Plan     Summary: 74 y.o. female with A-fib on warfarin, CHF, COPD on room air baseline, presented from nursing facility with AMS and decreased p.o. intake found to have large abdominal wound, general surgery and plastic surgery ultimately evaluated and treating for skin and soft tissue infection of her pannus.  Blood cultures with 2 of 2 for staph epi.  Plastic surgery planning for abdominal wall debridement possible closure and wound VAC placement for healing risk plans for surgery on Friday    Assessment/Plan (clinically significant if listed here)  Staph epi bacteremia secondary to severe SSTI likely from large abdominal and/or sacral  Multiple skin and soft tissue wounds less notable for sacral, large lower abdominal pannus exposed wound  Paroxysmal A-fib on coumadin outpatient  Acute toxic and metabolic encephalopathy  COPD on room air baseline  CHF not acutely exacerbated  Polypharmacy contributing to confusion as well  History of hypertension  DM2 on metformin outpatient  Morbid obesity BMI of 48.5    Continue IV vancomycin for bacteremia, day 10, monitor toxicity,   Hb down to 7.6 from 8.5, suspect from blood loss with surgery and is still having some blood loss/drainage  from the ELENI drain.  Monitor output/appreciate plastic surgery assistance with postoperative care/drain/wound VAC  Check iron studies; may need transfusion depending on BP  Will hold Lovenox continue warfarin goal 2-3 INR, slightly increased today 1.3 for A-fib.  Consider beta-blocker in the future if blood pressure can tolerate in place of home losartan  Give additional dose of bicarb, monitor, slightly better today  Replace potassium  Cont to hold BP medications, bp still low normal may need transfusion suspect this would help blood pressures as well  Monitor mental status, is on multiple sedating medications at baseline which have been held since admission including nightly gabapentin, twice daily as needed of lorazepam, tramadol 50 mg every 6 hours as needed, Reglan -seems to be doing better since holding these.  Not requiring narcotic pain medications currently/surprisingly despite surgery  brov, pulm, cont duonebs, bronchodilator protocol, BPH  Appreciate palliative care assistance, guarded long-term prognosis given patient's morbid obesity, multiple comorbidities.  Currently DNR/DNI POA has had to help out  Discontinued megestrol patient with severe morbid obesity will need to discontinue this/would not continue at discharge  Replace magnesium and potassium  PT/OT  Check a.m. CBC, BMP, magnesium, phosphorus, vancomycin levels , INR  Continue hospitalization at current level of care    Dispo: Has a bed hold at [place] once medically stable.  Hopefully the next couple days would like to see continued improvement/tolerance of wound VAC and ELENI drain and to make sure no blood transfusion needed   D/w SW    VTE Prophylaxis:  Pharmacologic & mechanical VTE prophylaxis orders are present.      Medical Intervention Limits: No intubation (DNI)  Code Status (Patient has no pulse and is not breathing): No CPR (Do Not Attempt to Resuscitate)  Medical Interventions (Patient has pulse or is breathing): Limited  Support

## 2024-11-25 LAB
ABO GROUP BLD: NORMAL
ABO GROUP BLD: NORMAL
ANION GAP SERPL CALCULATED.3IONS-SCNC: 9.8 MMOL/L (ref 5–15)
BASOPHILS # BLD AUTO: 0.04 10*3/MM3 (ref 0–0.2)
BASOPHILS NFR BLD AUTO: 0.6 % (ref 0–1.5)
BLD GP AB SCN SERPL QL: NEGATIVE
BUN SERPL-MCNC: 12 MG/DL (ref 8–23)
BUN/CREAT SERPL: 20.3 (ref 7–25)
CALCIUM SPEC-SCNC: 7.8 MG/DL (ref 8.6–10.5)
CHLORIDE SERPL-SCNC: 107 MMOL/L (ref 98–107)
CO2 SERPL-SCNC: 20.2 MMOL/L (ref 22–29)
CREAT SERPL-MCNC: 0.59 MG/DL (ref 0.57–1)
DEPRECATED RDW RBC AUTO: 55.4 FL (ref 37–54)
EGFRCR SERPLBLD CKD-EPI 2021: 94.7 ML/MIN/1.73
EOSINOPHIL # BLD AUTO: 0.09 10*3/MM3 (ref 0–0.4)
EOSINOPHIL NFR BLD AUTO: 1.4 % (ref 0.3–6.2)
ERYTHROCYTE [DISTWIDTH] IN BLOOD BY AUTOMATED COUNT: 20 % (ref 12.3–15.4)
GLUCOSE BLDC GLUCOMTR-MCNC: 107 MG/DL (ref 70–99)
GLUCOSE BLDC GLUCOMTR-MCNC: 109 MG/DL (ref 70–99)
GLUCOSE BLDC GLUCOMTR-MCNC: 118 MG/DL (ref 70–99)
GLUCOSE BLDC GLUCOMTR-MCNC: 119 MG/DL (ref 70–99)
GLUCOSE SERPL-MCNC: 105 MG/DL (ref 65–99)
HCT VFR BLD AUTO: 24.2 % (ref 34–46.6)
HCT VFR BLD AUTO: 28.8 % (ref 34–46.6)
HGB BLD-MCNC: 7.1 G/DL (ref 12–15.9)
HGB BLD-MCNC: 8.9 G/DL (ref 12–15.9)
IMM GRANULOCYTES # BLD AUTO: 0.05 10*3/MM3 (ref 0–0.05)
IMM GRANULOCYTES NFR BLD AUTO: 0.8 % (ref 0–0.5)
INR PPP: 1.52 (ref 0.86–1.15)
LYMPHOCYTES # BLD AUTO: 1.17 10*3/MM3 (ref 0.7–3.1)
LYMPHOCYTES NFR BLD AUTO: 18.2 % (ref 19.6–45.3)
MAGNESIUM SERPL-MCNC: 1.9 MG/DL (ref 1.6–2.4)
MCH RBC QN AUTO: 23.1 PG (ref 26.6–33)
MCHC RBC AUTO-ENTMCNC: 29.3 G/DL (ref 31.5–35.7)
MCV RBC AUTO: 78.6 FL (ref 79–97)
MONOCYTES # BLD AUTO: 0.52 10*3/MM3 (ref 0.1–0.9)
MONOCYTES NFR BLD AUTO: 8.1 % (ref 5–12)
NEUTROPHILS NFR BLD AUTO: 4.55 10*3/MM3 (ref 1.7–7)
NEUTROPHILS NFR BLD AUTO: 70.9 % (ref 42.7–76)
NRBC BLD AUTO-RTO: 0.3 /100 WBC (ref 0–0.2)
NT-PROBNP SERPL-MCNC: 3006 PG/ML (ref 0–900)
PHOSPHATE SERPL-MCNC: 2.7 MG/DL (ref 2.5–4.5)
PLATELET # BLD AUTO: 77 10*3/MM3 (ref 140–450)
PMV BLD AUTO: 10.7 FL (ref 6–12)
POTASSIUM SERPL-SCNC: 3.7 MMOL/L (ref 3.5–5.2)
PROTHROMBIN TIME: 18.6 SECONDS (ref 11.8–14.9)
RBC # BLD AUTO: 3.08 10*6/MM3 (ref 3.77–5.28)
RH BLD: POSITIVE
RH BLD: POSITIVE
SODIUM SERPL-SCNC: 137 MMOL/L (ref 136–145)
T&S EXPIRATION DATE: NORMAL
WBC NRBC COR # BLD AUTO: 6.42 10*3/MM3 (ref 3.4–10.8)

## 2024-11-25 PROCEDURE — P9016 RBC LEUKOCYTES REDUCED: HCPCS

## 2024-11-25 PROCEDURE — 86923 COMPATIBILITY TEST ELECTRIC: CPT

## 2024-11-25 PROCEDURE — 94799 UNLISTED PULMONARY SVC/PX: CPT

## 2024-11-25 PROCEDURE — 85014 HEMATOCRIT: CPT | Performed by: INTERNAL MEDICINE

## 2024-11-25 PROCEDURE — 86850 RBC ANTIBODY SCREEN: CPT | Performed by: INTERNAL MEDICINE

## 2024-11-25 PROCEDURE — 85610 PROTHROMBIN TIME: CPT | Performed by: SURGERY

## 2024-11-25 PROCEDURE — 80048 BASIC METABOLIC PNL TOTAL CA: CPT | Performed by: SURGERY

## 2024-11-25 PROCEDURE — 84100 ASSAY OF PHOSPHORUS: CPT | Performed by: SURGERY

## 2024-11-25 PROCEDURE — 85025 COMPLETE CBC W/AUTO DIFF WBC: CPT | Performed by: SURGERY

## 2024-11-25 PROCEDURE — 82948 REAGENT STRIP/BLOOD GLUCOSE: CPT | Performed by: SURGERY

## 2024-11-25 PROCEDURE — 85018 HEMOGLOBIN: CPT | Performed by: INTERNAL MEDICINE

## 2024-11-25 PROCEDURE — 83880 ASSAY OF NATRIURETIC PEPTIDE: CPT | Performed by: INTERNAL MEDICINE

## 2024-11-25 PROCEDURE — 86900 BLOOD TYPING SEROLOGIC ABO: CPT

## 2024-11-25 PROCEDURE — 86901 BLOOD TYPING SEROLOGIC RH(D): CPT | Performed by: INTERNAL MEDICINE

## 2024-11-25 PROCEDURE — 86900 BLOOD TYPING SEROLOGIC ABO: CPT | Performed by: INTERNAL MEDICINE

## 2024-11-25 PROCEDURE — 86901 BLOOD TYPING SEROLOGIC RH(D): CPT

## 2024-11-25 PROCEDURE — 97606 NEG PRS WND THER DME>50 SQCM: CPT

## 2024-11-25 PROCEDURE — 99233 SBSQ HOSP IP/OBS HIGH 50: CPT | Performed by: INTERNAL MEDICINE

## 2024-11-25 PROCEDURE — 83735 ASSAY OF MAGNESIUM: CPT | Performed by: SURGERY

## 2024-11-25 PROCEDURE — 36430 TRANSFUSION BLD/BLD COMPNT: CPT

## 2024-11-25 RX ORDER — MIDODRINE HYDROCHLORIDE 2.5 MG/1
5 TABLET ORAL
Status: DISCONTINUED | OUTPATIENT
Start: 2024-11-25 | End: 2024-12-10 | Stop reason: HOSPADM

## 2024-11-25 RX ORDER — FUROSEMIDE 40 MG/1
40 TABLET ORAL DAILY
Status: DISCONTINUED | OUTPATIENT
Start: 2024-11-25 | End: 2024-12-10 | Stop reason: HOSPADM

## 2024-11-25 RX ORDER — POTASSIUM CHLORIDE 750 MG/1
40 CAPSULE, EXTENDED RELEASE ORAL ONCE
Status: COMPLETED | OUTPATIENT
Start: 2024-11-25 | End: 2024-11-25

## 2024-11-25 RX ORDER — WARFARIN SODIUM 2.5 MG/1
7.5 TABLET ORAL
Status: COMPLETED | OUTPATIENT
Start: 2024-11-25 | End: 2024-11-25

## 2024-11-25 RX ORDER — SODIUM BICARBONATE 650 MG/1
650 TABLET ORAL 3 TIMES DAILY
Status: COMPLETED | OUTPATIENT
Start: 2024-11-25 | End: 2024-11-25

## 2024-11-25 RX ADMIN — Medication 10 ML: at 20:00

## 2024-11-25 RX ADMIN — FUROSEMIDE 40 MG: 40 TABLET ORAL at 15:11

## 2024-11-25 RX ADMIN — MIDODRINE HYDROCHLORIDE 5 MG: 2.5 TABLET ORAL at 15:11

## 2024-11-25 RX ADMIN — BUDESONIDE 0.5 MG: 0.5 INHALANT ORAL at 20:30

## 2024-11-25 RX ADMIN — Medication 1 TABLET: at 08:51

## 2024-11-25 RX ADMIN — SODIUM BICARBONATE 650 MG TABLET 650 MG: at 20:00

## 2024-11-25 RX ADMIN — POTASSIUM CHLORIDE 40 MEQ: 750 CAPSULE, EXTENDED RELEASE ORAL at 15:11

## 2024-11-25 RX ADMIN — MIDODRINE HYDROCHLORIDE 5 MG: 2.5 TABLET ORAL at 17:56

## 2024-11-25 RX ADMIN — ARFORMOTEROL TARTRATE 15 MCG: 15 SOLUTION RESPIRATORY (INHALATION) at 20:30

## 2024-11-25 RX ADMIN — Medication 10 ML: at 08:52

## 2024-11-25 RX ADMIN — Medication 473 ML: at 22:21

## 2024-11-25 RX ADMIN — WARFARIN SODIUM 7.5 MG: 2.5 TABLET ORAL at 17:56

## 2024-11-25 RX ADMIN — SODIUM BICARBONATE 650 MG TABLET 650 MG: at 17:56

## 2024-11-25 NOTE — SIGNIFICANT NOTE
Wound Eval / Progress Noted    [facility]     Patient Name: Inez Doyle  : 1950  MRN: [id number]  Today's Date: 2024                 Admit Date: 2024    Visit Dx:    ICD-10-CM ICD-9-CM   1. Chronic wound infection of abdomen, sequela  S31.109S 906.0    L08.9    2. Hypokalemia  E87.6 276.8   3. Anorexia  R63.0 783.0   4. Oropharyngeal dysphagia  R13.12 787.22   5. Difficulty walking  R26.2 719.7         AMS (altered mental status)    Severe protein-calorie malnutrition    Chronic wound infection of abdomen        Past Medical History:   Diagnosis Date    A-fib     Anemia     Anxiety     Asthma     Candidiasis of skin and nail     CHF (congestive heart failure)     COPD (chronic obstructive pulmonary disease)     Depression     Diabetes mellitus     GERD (gastroesophageal reflux disease)     Hyperlipidemia     Hypertension     Hypokalemia     Hypomagnesemia     Intervertebral disc disease     Obesity     Osteoarthritis     Panniculitis     Sleep apnea     Vitamin D deficiency         Past Surgical History:   Procedure Laterality Date    BACK SURGERY      STOMACH SURGERY      WOUND DEBRIDEMENT Bilateral 2024    Procedure: TRUNK DEBRIDEMENTabdominal wall debridement with  closure and wound vac placement;  Surgeon: Shalonda Brown MD;  Location: [facility];  Service: Plastics;  Laterality: Bilateral;     Physical Assessment:  Wound 24 1800 anterior abdomen (Active)   Dressing Appearance dry;intact 24 1347   Closure Unable to assess 24 1347   Base unable to visualize 24 1347   Periwound indurated;ecchymotic 24 1347   Periwound Temperature warm 24 1347   Periwound Skin Turgor soft;firm 24 1347   Drainage Amount none 24 1347   Care, Wound negative pressure wound therapy 24 1347   Periwound Care dry periwound area maintained 24 1347       NPWT (Negative Pressure Wound Therapy) 24 1717 Lower Abdomen (Active)   Therapy  Setting continuous therapy 11/25/24 1347   Dressing foam, black;transparent dressing 11/25/24 1347   Pressure Setting 125 mmHg 11/25/24 1347      Wound Check / Follow-up:  Patient seen today for wound follow-up for Prevena incisional wound VAC. Patient is awake, alert, and responding appropriately to staff with intermittent confusion noted. Patient status post abdominal wall debridement with closure and wound vac placement on 11/22/24.  Traditional wound VAC placed on Prevena mode and ELENI drain were placed at conclusion of surgery.    Wound VAC dressing to lower aspect of abdomen is clean, dry, and intact at time of visit with no signs of lifting or leaking. Intermittent areas of induration palpated within periwound tissue. Induration had been noted to abdomen during wound assessments prior to surgery as well. Notified Plastic Surgeon of findings. Wound VAC is functioning properly without alarms.  Education provided to patient regarding use of Prevena incisional wound VAC. Minimal sanguineous drainage noted within tubing. Wound VAC on Prevena mode is to remain in place for a total of seven days, unless patient is discharged prior to this date. ELENI drain noted to left aspect of abdomen with serosanguineous drainage within bulb.     Impression: Surgical incision to lower aspect of abdomen with wound VAC on Prevena mode in place.    Short term goals:  Regain skin integrity, negative pressure wound therapy.    Candi Mccauley RN    11/25/2024    16:27 EST

## 2024-11-25 NOTE — PLAN OF CARE
Goal Outcome Evaluation:  Plan of Care Reviewed With: patient        Progress: no change  Outcome Evaluation: Orientation changes throughout the day, X3 (self, time, place) in am, X1 (self) in pm. Declined wound care, did have consult for prevena care with Linsey today. C/O increased coccyx pain, repositioned the patient multiple times without relief. Extreme pain with the slightest movement. Increased foot intake today. Blood glucose WNL. No other complaints or concerns at this time.

## 2024-11-25 NOTE — PROGRESS NOTES
Pharmacy to Dose: Warfarin  Consulting provider: Bakari   Indication for warfarin: Afib   Goal INR range: 2 - 3  Home warfarin dose:  Restarting warfarin therapy. Patient previously on warfarin  in May, 2024. Recently on apixaban, per note apixaban makes patient feel funny, and would like to restart warfarin  Previous warfarin dose: 4 mg, then 5mg, then 5mg, rotating. Previous admission in April, 2024: average dose of 5.3 mg    Date INR Warfarin Dose Given   11/23 1.14 5 mg   11/24 1.32 5 mg   11/25 1.52 7.5 mg               Any Vitamin K given?: No  Drug interactions Reviewed: Yes.  Is patient on bridging therapy with another anticoagulant?: Yes; Enoxaparin 135 mg SQ Q12H    Lab Results   Component Value Date    PLT 77 (L) 11/25/2024    PLT 82 (L) 11/24/2024    HGB 7.1 (L) 11/25/2024    HGB 7.6 (L) 11/24/2024     Plan:  INR reported as 1.52 today.  Will provide 7.5 mg once one today while keeping in mind previous warfarin dose of ~5 mg daily.  Will use three of the 2.5 mg tablets as patient has reported allergy to red dye.    Daily INRs ordered     Thank you for this consult. Pharmacy will continue to monitor.   Cali Beltran

## 2024-11-25 NOTE — PROGRESS NOTES
Pharmacy to Dose: Warfarin  Consulting provider: Bakari   Indication for warfarin: Afib   Goal INR range: 2 - 3  Home warfarin dose:  Restarting warfarin therapy. Patient previously on warfarin  in May, 2024. Recently on apixaban, per note apixaban makes patient feel funny, and would like to restart warfarin  Previous warfarin dose: 4 mg, then 5mg, then 5mg, rotating. Previous admission in April, 2024: average dose of 5.3 mg    Date INR Warfarin Dose Given   11/23 1.14 5 mg   11/24 1.32 5 mg   11/25 1.52 7 mg               Any Vitamin K given?: No  Drug interactions Reviewed: Yes.  Is patient on bridging therapy with another anticoagulant?: Yes; Enoxaparin 135 mg SQ Q12H    Lab Results   Component Value Date    PLT 77 (L) 11/25/2024    PLT 82 (L) 11/24/2024    HGB 7.1 (L) 11/25/2024    HGB 7.6 (L) 11/24/2024     Plan:  INR reported as 1.52 today.  Will provide 7.5 mg once one today while keeping in mind previous warfarin dose of ~5 mg daily.  Will use three of the 2.5 mg tablets as patient has reported allergy to red dye.    Daily INRs ordered     Thank you for this consult. Pharmacy will continue to monitor.   Cali Beltran

## 2024-11-25 NOTE — PLAN OF CARE
Goal Outcome Evaluation:              Outcome Evaluation: Patient oriented x3 to self, time, and place. Wound care completed during shift. Patient complained of headache x1 during shift, treated per MAR. No further complaints at this time.

## 2024-11-25 NOTE — PROGRESS NOTES
"Nutrition Services    Patient Name: Inez Doyle  YOB: 1950  MRN: [id number]  Admission date: 11/11/2024    PROGRESS NOTE      Encounter Information: Follow Up       PO Diet: Diet: Regular/House; Texture: Soft to Chew (NDD 3); Soft to Chew: Whole Meat; Fluid Consistency: Thin (IDDSI 0)   PO Supplements: William BID and Boost GC daily   PO Intake:  Pt reports variable appetite. Intake of 0-50%. Pt did not eat breakfast this morning, due to \"not feeling well.\" No GI complaints offered, however pt declined to elaborate further. However, pt said she will try to eat lunch. Staff are also encouraging pt's po intake. Boost Glucose control 1x/day and William BID are in place for nutrition support and wound healing. Continue nutrition interventions and RD to follow per protocol.        Current nutrition support:        Estimated Needs: 2698-8783 kcal, 119-200 g pro, 8657-1202 mL fluid       Nutrition support review: At        Labs (reviewed below): reviewed        GI Function:  +BM, 11/21  Trace edema       Nutrition Intervention Updates: Continue Regular, Soft to Chew diet  Continue Boost Glucose Control 1x/day  Continue William BID for wound healing      Results from last 7 days   Lab Units 11/25/24  0334 11/24/24  0442 11/23/24  0434 11/22/24  0456 11/21/24  0526 11/20/24  0514   SODIUM mmol/L 137 139 135*   < > 137 136   POTASSIUM mmol/L 3.7 3.2* 3.9   < > 3.9 3.4*   CHLORIDE mmol/L 107 107 103   < > 104 105   CO2 mmol/L 20.2* 22.0 19.0*   < > 20.7* 18.7*   BUN mg/dL 12 12 11   < > 12 13   CREATININE mg/dL 0.59 0.64 0.59   < > 0.70 0.64   CALCIUM mg/dL 7.8* 7.8* 7.9*   < > 8.7 8.1*   BILIRUBIN mg/dL  --   --   --   --  0.4 0.3   ALK PHOS U/L  --   --   --   --  114 103   ALT (SGPT) U/L  --   --   --   --  10 9   AST (SGOT) U/L  --   --   --   --  7 6   GLUCOSE mg/dL 105* 121* 174*   < > 141* 131*    < > = values in this interval not displayed.     Results from last 7 days   Lab Units 11/25/24  0334 " "11/24/24  0442 11/23/24  0434   MAGNESIUM mg/dL 1.9 1.7 1.8   PHOSPHORUS mg/dL 2.7 3.1 3.9   HEMOGLOBIN g/dL 7.1* 7.6* 8.5*   HEMATOCRIT % 24.2* 25.5* 29.0*     SARS-CoV-2, ROB   Date Value Ref Range Status   08/23/2024 Negative Negative Final     No results found for: \"HGBA1C\"    RD to follow up per protocol.    Electronically signed by:  Guilherme Ansari RD  11/25/24 11:03 EST     "

## 2024-11-25 NOTE — NURSING NOTE
Palliative Care follow up with patient.  Patient reports wanting to not be bothered.  Sitting in bed eating lunch.  Plans are for patients discharge back to the facility at discharge.    Romana CROW RN, [facility]  Palliative Care

## 2024-11-25 NOTE — PROGRESS NOTES
[facility]   Hospitalist Progress Note    Date of admission: 11/11/2024  Patient Name: Inez Doyle  1950  Date: 11/25/2024      Subjective     Chief Complaint   Patient presents with    Altered Mental Status     Staff at [facility] said started yesterday and gotten worse. Not taking medicines and eating and drinking. Hx of diabetes, COPD, CHF, Afib    Nausea       Interval Followup: No acute events overnight.  Tired this morning and feels cold only had some thin sheet on her currently.  ELENI drain/bleeding seems to have decreased     No acute pain complaints    Objective     Vitals:   Temp:  [97.3 °F (36.3 °C)-98.4 °F (36.9 °C)] 97.8 °F (36.6 °C)  Heart Rate:  [] 106  Resp:  [16-20] 20  BP: ()/(42-66) 87/58    Physical Exam  Sleeping wakes easily conversant but tired morbidly obese generalized weakness  CTAB on room air  RRR seems to have some abdominal wall edema near her wound VAC, no acute discharge noted in wound VAC container  Abdominal ELENI drain without any acute drainage or bleeding noted    Result Review:  Vital signs, labs and recent relevant imaging reviewed.      CBC          11/23/2024    04:34 11/24/2024    04:42 11/25/2024    03:34   CBC   WBC 6.89  6.73  6.42    RBC 3.65  3.35  3.08    Hemoglobin 8.5  7.6  7.1    Hematocrit 29.0  25.5  24.2    MCV 79.5  76.1  78.6    MCH 23.3  22.7  23.1    MCHC 29.3  29.8  29.3    RDW 19.9  19.9  20.0    Platelets 90  82  77      CMP          11/23/2024    04:34 11/24/2024    04:42 11/25/2024    03:34   CMP   Glucose 174  121  105    BUN 11  12  12    Creatinine 0.59  0.64  0.59    EGFR 94.7  92.9  94.7    Sodium 135  139  137    Potassium 3.9  3.2  3.7    Chloride 103  107  107    Calcium 7.9  7.8  7.8    BUN/Creatinine Ratio 18.6  18.8  20.3    Anion Gap 13.0  10.0  9.8          acetaminophen **OR** acetaminophen **OR** acetaminophen    dextrose    dextrose    glucagon (human recombinant)    nitroglycerin    ondansetron    Pharmacy to dose  warfarin    sodium chloride    sodium chloride    arformoterol, 15 mcg, Nebulization, BID - RT  budesonide, 0.5 mg, Nebulization, BID - RT  [Held by provider] furosemide, 40 mg, Oral, Daily  insulin regular, 2-7 Units, Subcutaneous, TID AC  [Held by provider] losartan, 12.5 mg, Oral, Daily  midodrine, 5 mg, Oral, TID AC  multivitamin with minerals, 1 tablet, Oral, Daily  [Held by provider] sertraline, 25 mg, Oral, Daily  sodium chloride, 10 mL, Intravenous, Q12H  Sodium Hypochlorite, 1 Application, Apply externally, 2 times per day  warfarin, 7.5 mg, Oral, Once        No radiology results for the last 7 days    Assessment / Plan     Summary: 74 y.o. female with A-fib on warfarin, CHF, COPD on room air baseline, presented from nursing facility with AMS and decreased p.o. intake found to have large abdominal wound, general surgery and plastic surgery ultimately evaluated and treating for skin and soft tissue infection of her pannus.  Blood cultures with 2 of 2 for staph epi.  Plastic surgery planning for abdominal wall debridement possible closure and wound VAC placement that was completed on 11/22.  Wound VAC remains in place.  Had some postoperative blood loss, requiring 1 unit PRBC on 11/25    Assessment/Plan (clinically significant if listed here)  Staph epi bacteremia secondary to severe SSTI likely from large abdominal and/or sacral  Multiple skin and soft tissue wounds less notable for sacral, large lower abdominal pannus exposed wound  Paroxysmal A-fib on coumadin outpatient  Acute toxic and metabolic encephalopathy  COPD on room air baseline  CHF not acutely exacerbated  Polypharmacy contributing to confusion as well  History of hypertension  DM2 on metformin outpatient  Morbid obesity BMI of 48.5    Completed 10-day course of antibiotics for staph epi, afebrile white count within normal limits   Continues with wound VAC for abdominal wound and ELENI drain as per plastic surgery appreciate assistance  Bleeding  from ELENI seems to have decreased/stopped, hemoglobin is down to 7.1, given 1 unit PRBC for symptomatic anemia with hypotension, suspect should equilibrate and will monitor for additional transfusion and repeat H&H later to reassess if more than 1 unit of blood in setting needed may need to rescan but suspect should stabilize with a little more time.  Started on midodrine to monitor blood pressure continue to hold home losartan would evaluate for metoprolol instead likely if needed given paroxysmal A-fib  Will continue warfarin for now without Lovenox, goal 2-3 for A-fib  Cont with po bicarb and monitor, restart patients home lasix, may benefit from a few iv doses but will defer until bp improved   Monitor mental status, is on multiple sedating medications at baseline which have been held since admission including nightly gabapentin, twice daily as needed of lorazepam, tramadol 50 mg every 6 hours as needed, Reglan -seems to be doing better since holding these.  Not requiring narcotic pain medications currently/surprisingly despite surgery  brov, pulm, cont duonebs, bronchodilator protocol, BPH  Appreciate palliative care assistance, guarded long-term prognosis given patient's morbid obesity, multiple comorbidities.  Currently DNR/DNI POA has had to help out  Discontinued megestrol patient with severe morbid obesity will need to discontinue this/would not continue at discharge  PT/OT  Check a.m. CBC, BMP, magnesium, phosphorus, vancomycin levels , INR  Continue hospitalization at current level of care    Dispo: Has a bed hold at [place] once medically stable.  Hopefully the next couple days would like to see continued improvement/tolerance of wound VAC and ELENI drain as well as stability in hemoglobin  D/w SW    VTE Prophylaxis:  Pharmacologic & mechanical VTE prophylaxis orders are present.      Medical Intervention Limits: No intubation (DNI)  Code Status (Patient has no pulse and is not breathing): No CPR (Do Not  Attempt to Resuscitate)  Medical Interventions (Patient has pulse or is breathing): Limited Support

## 2024-11-25 NOTE — SIGNIFICANT NOTE
11/25/24 0956   OTHER   Discipline occupational therapist   Rehab Time/Intention   Session Not Performed patient/family declined, not feeling well  (Adamantly declined despite encouragement)

## 2024-11-26 ENCOUNTER — APPOINTMENT (OUTPATIENT)
Dept: CT IMAGING | Facility: HOSPITAL | Age: 74
End: 2024-11-26
Payer: MEDICARE

## 2024-11-26 LAB
ANION GAP SERPL CALCULATED.3IONS-SCNC: 10 MMOL/L (ref 5–15)
ARTERIAL PATENCY WRIST A: POSITIVE
ATMOSPHERIC PRESS: 747 MMHG
BACTERIA UR QL AUTO: ABNORMAL /HPF
BASE EXCESS BLDA CALC-SCNC: 0.6 MMOL/L (ref -2–2)
BASOPHILS # BLD AUTO: 0.05 10*3/MM3 (ref 0–0.2)
BASOPHILS NFR BLD AUTO: 0.5 % (ref 0–1.5)
BDY SITE: ABNORMAL
BH BB BLOOD EXPIRATION DATE: NORMAL
BH BB BLOOD TYPE BARCODE: 5100
BH BB DISPENSE STATUS: NORMAL
BH BB PRODUCT CODE: NORMAL
BH BB UNIT NUMBER: NORMAL
BILIRUB UR QL STRIP: NEGATIVE
BUN SERPL-MCNC: 12 MG/DL (ref 8–23)
BUN/CREAT SERPL: 14.5 (ref 7–25)
CALCIUM SPEC-SCNC: 8.1 MG/DL (ref 8.6–10.5)
CHLORIDE SERPL-SCNC: 104 MMOL/L (ref 98–107)
CLARITY UR: ABNORMAL
CO2 SERPL-SCNC: 23 MMOL/L (ref 22–29)
COLOR UR: YELLOW
CREAT SERPL-MCNC: 0.83 MG/DL (ref 0.57–1)
CROSSMATCH INTERPRETATION: NORMAL
CYTO UR: NORMAL
DEPRECATED RDW RBC AUTO: 53.8 FL (ref 37–54)
EGFRCR SERPLBLD CKD-EPI 2021: 74.1 ML/MIN/1.73
EOSINOPHIL # BLD AUTO: 0.08 10*3/MM3 (ref 0–0.4)
EOSINOPHIL NFR BLD AUTO: 0.8 % (ref 0.3–6.2)
ERYTHROCYTE [DISTWIDTH] IN BLOOD BY AUTOMATED COUNT: 19.8 % (ref 12.3–15.4)
GLUCOSE BLDC GLUCOMTR-MCNC: 117 MG/DL (ref 70–99)
GLUCOSE BLDC GLUCOMTR-MCNC: 125 MG/DL (ref 70–99)
GLUCOSE BLDC GLUCOMTR-MCNC: 126 MG/DL (ref 70–99)
GLUCOSE BLDC GLUCOMTR-MCNC: 132 MG/DL (ref 70–99)
GLUCOSE SERPL-MCNC: 127 MG/DL (ref 65–99)
GLUCOSE UR STRIP-MCNC: NEGATIVE MG/DL
HCO3 BLDA-SCNC: 23.7 MMOL/L (ref 22–26)
HCT VFR BLD AUTO: 30.2 % (ref 34–46.6)
HCT VFR BLD CALC: 27 % (ref 38–51)
HEMODILUTION: NO
HGB BLD-MCNC: 9.1 G/DL (ref 12–15.9)
HGB BLDA-MCNC: 9.1 G/DL (ref 12–18)
HGB UR QL STRIP.AUTO: ABNORMAL
HYALINE CASTS UR QL AUTO: ABNORMAL /LPF
IMM GRANULOCYTES # BLD AUTO: 0.13 10*3/MM3 (ref 0–0.05)
IMM GRANULOCYTES NFR BLD AUTO: 1.4 % (ref 0–0.5)
INR PPP: 1.94 (ref 0.86–1.15)
KETONES UR QL STRIP: NEGATIVE
LAB AP CASE REPORT: NORMAL
LAB AP CLINICAL INFORMATION: NORMAL
LEUKOCYTE ESTERASE UR QL STRIP.AUTO: ABNORMAL
LYMPHOCYTES # BLD AUTO: 1.34 10*3/MM3 (ref 0.7–3.1)
LYMPHOCYTES NFR BLD AUTO: 14 % (ref 19.6–45.3)
MAGNESIUM SERPL-MCNC: 1.8 MG/DL (ref 1.6–2.4)
MCH RBC QN AUTO: 23.3 PG (ref 26.6–33)
MCHC RBC AUTO-ENTMCNC: 30.1 G/DL (ref 31.5–35.7)
MCV RBC AUTO: 77.2 FL (ref 79–97)
MODALITY: ABNORMAL
MONOCYTES # BLD AUTO: 0.76 10*3/MM3 (ref 0.1–0.9)
MONOCYTES NFR BLD AUTO: 7.9 % (ref 5–12)
MUCOUS THREADS URNS QL MICRO: ABNORMAL /HPF
NEUTROPHILS NFR BLD AUTO: 7.22 10*3/MM3 (ref 1.7–7)
NEUTROPHILS NFR BLD AUTO: 75.4 % (ref 42.7–76)
NITRITE UR QL STRIP: NEGATIVE
NRBC BLD AUTO-RTO: 0.3 /100 WBC (ref 0–0.2)
PATH REPORT.FINAL DX SPEC: NORMAL
PATH REPORT.GROSS SPEC: NORMAL
PCO2 BLDA: 31.1 MM HG (ref 35–45)
PH BLDA: 7.49 PH UNITS (ref 7.35–7.45)
PH UR STRIP.AUTO: 6 [PH] (ref 5–8)
PHOSPHATE SERPL-MCNC: 2.9 MG/DL (ref 2.5–4.5)
PLATELET # BLD AUTO: 113 10*3/MM3 (ref 140–450)
PMV BLD AUTO: 11.3 FL (ref 6–12)
PO2 BLDA: 88.8 MM HG (ref 80–100)
POTASSIUM SERPL-SCNC: 4 MMOL/L (ref 3.5–5.2)
PROCALCITONIN SERPL-MCNC: 0.45 NG/ML (ref 0–0.25)
PROT UR QL STRIP: ABNORMAL
PROTHROMBIN TIME: 22.5 SECONDS (ref 11.8–14.9)
RBC # BLD AUTO: 3.91 10*6/MM3 (ref 3.77–5.28)
RBC # UR STRIP: ABNORMAL /HPF
REF LAB TEST METHOD: ABNORMAL
SAO2 % BLDCOA: 97.6 % (ref 95–99)
SODIUM SERPL-SCNC: 137 MMOL/L (ref 136–145)
SP GR UR STRIP: 1.02 (ref 1–1.03)
SQUAMOUS #/AREA URNS HPF: ABNORMAL /HPF
TRANS CELLS #/AREA URNS HPF: ABNORMAL /HPF
UNIT  ABO: NORMAL
UNIT  RH: NORMAL
UROBILINOGEN UR QL STRIP: ABNORMAL
WBC # UR STRIP: ABNORMAL /HPF
WBC NRBC COR # BLD AUTO: 9.58 10*3/MM3 (ref 3.4–10.8)
YEAST URNS QL MICRO: ABNORMAL /HPF

## 2024-11-26 PROCEDURE — 82948 REAGENT STRIP/BLOOD GLUCOSE: CPT | Performed by: SURGERY

## 2024-11-26 PROCEDURE — 82607 VITAMIN B-12: CPT | Performed by: PHYSICIAN ASSISTANT

## 2024-11-26 PROCEDURE — 94799 UNLISTED PULMONARY SVC/PX: CPT

## 2024-11-26 PROCEDURE — 36600 WITHDRAWAL OF ARTERIAL BLOOD: CPT | Performed by: FAMILY MEDICINE

## 2024-11-26 PROCEDURE — 70450 CT HEAD/BRAIN W/O DYE: CPT

## 2024-11-26 PROCEDURE — 82803 BLOOD GASES ANY COMBINATION: CPT | Performed by: FAMILY MEDICINE

## 2024-11-26 PROCEDURE — 81001 URINALYSIS AUTO W/SCOPE: CPT | Performed by: FAMILY MEDICINE

## 2024-11-26 PROCEDURE — 80048 BASIC METABOLIC PNL TOTAL CA: CPT | Performed by: SURGERY

## 2024-11-26 PROCEDURE — 85610 PROTHROMBIN TIME: CPT | Performed by: SURGERY

## 2024-11-26 PROCEDURE — 94664 DEMO&/EVAL PT USE INHALER: CPT

## 2024-11-26 PROCEDURE — 82746 ASSAY OF FOLIC ACID SERUM: CPT | Performed by: PHYSICIAN ASSISTANT

## 2024-11-26 PROCEDURE — 83735 ASSAY OF MAGNESIUM: CPT | Performed by: SURGERY

## 2024-11-26 PROCEDURE — 82948 REAGENT STRIP/BLOOD GLUCOSE: CPT

## 2024-11-26 PROCEDURE — 85025 COMPLETE CBC W/AUTO DIFF WBC: CPT | Performed by: SURGERY

## 2024-11-26 PROCEDURE — 99232 SBSQ HOSP IP/OBS MODERATE 35: CPT | Performed by: FAMILY MEDICINE

## 2024-11-26 PROCEDURE — 87086 URINE CULTURE/COLONY COUNT: CPT | Performed by: FAMILY MEDICINE

## 2024-11-26 PROCEDURE — 84145 PROCALCITONIN (PCT): CPT | Performed by: FAMILY MEDICINE

## 2024-11-26 PROCEDURE — 84100 ASSAY OF PHOSPHORUS: CPT | Performed by: SURGERY

## 2024-11-26 RX ORDER — POTASSIUM CHLORIDE 7.45 MG/ML
10 INJECTION INTRAVENOUS
Status: DISCONTINUED | OUTPATIENT
Start: 2024-11-26 | End: 2024-11-26

## 2024-11-26 RX ORDER — WARFARIN SODIUM 2.5 MG/1
5 TABLET ORAL
Status: DISCONTINUED | OUTPATIENT
Start: 2024-11-26 | End: 2024-12-04

## 2024-11-26 RX ADMIN — MIDODRINE HYDROCHLORIDE 5 MG: 2.5 TABLET ORAL at 17:18

## 2024-11-26 RX ADMIN — WARFARIN SODIUM 5 MG: 2.5 TABLET ORAL at 17:18

## 2024-11-26 RX ADMIN — Medication 473 ML: at 22:13

## 2024-11-26 RX ADMIN — Medication 10 ML: at 08:50

## 2024-11-26 RX ADMIN — BUDESONIDE 0.5 MG: 0.5 INHALANT ORAL at 07:04

## 2024-11-26 RX ADMIN — Medication 10 ML: at 22:13

## 2024-11-26 RX ADMIN — ARFORMOTEROL TARTRATE 15 MCG: 15 SOLUTION RESPIRATORY (INHALATION) at 07:04

## 2024-11-26 RX ADMIN — Medication 473 ML: at 13:36

## 2024-11-26 RX ADMIN — ARFORMOTEROL TARTRATE 15 MCG: 15 SOLUTION RESPIRATORY (INHALATION) at 19:08

## 2024-11-26 RX ADMIN — BUDESONIDE 0.5 MG: 0.5 INHALANT ORAL at 19:06

## 2024-11-26 NOTE — PLAN OF CARE
Goal Outcome Evaluation:           Progress: no change  Outcome Evaluation: Patient mostly confused, but has moment of lucidity. Wound and skin care performed. Max Ames RN

## 2024-11-26 NOTE — SIGNIFICANT NOTE
11/26/24 1015   OTHER   Discipline occupational therapist   Rehab Time/Intention   Session Not Performed patient/family declined, not feeling well

## 2024-11-26 NOTE — PROGRESS NOTES
[facility]   Hospitalist Progress Note  Date: 2024  Patient Name: Inez Doyle  : 1950  MRN: [id number]  Date of admission: 2024      Subjective   Subjective     Chief Complaint: Mental status    Summary: Patient 74-year-old male history of atrial fibrillation on warfarin, CHF, COPD, hypertension, hyperlipidemia, obesity, chronic immobility presents from nursing facility with altered mental status and decreased oral intake found to have large abdominal wound General Surgery plastic surgery evaluated plastic surgery eventually took the patient to the operating room did panniculectomy with wound VAC placement remains remains hospitalized long-term prognosis appears poor consulting palliative care    Interval Followup: Patient seen and examined resting comfortably patient removed wound VAC today wound team will replace.  INR subtherapeutic at 1.94.  Labs stable      Objective   Objective     Vitals:   Temp:  [97.3 °F (36.3 °C)-98.6 °F (37 °C)] 98.4 °F (36.9 °C)  Heart Rate:  [] 78  Resp:  [16-20] 18  BP: ()/(51-65) 90/61    Physical Exam   Constitutional: Awake confused  Respiratory: Clear  Cardiovascular: RRR  Abdomen wound VAC in place large pannus ELENI drain in place    Result Review    Result Review:  I have personally reviewed the results from the time of this admission to 2024 13:20 EST and agree with these findings:  [x]  Laboratory  []  Microbiology  []  Radiology  []  EKG/Telemetry   []  Cardiology/Vascular   []  Pathology  []  Old records  []  Other:    Assessment & Plan   Assessment / Plan     Assessment:    Staph epi bacteremia secondary to severe SSTI likely from large abdominal and/or sacral  Multiple skin and soft tissue wounds less notable for sacral, large lower abdominal pannus exposed wound  Paroxysmal A-fib on coumadin outpatient  Acute toxic and metabolic encephalopathy  COPD on room air baseline  CHF not acutely exacerbated  Polypharmacy contributing to  confusion as well  History of hypertension  DM2 on metformin outpatient  Morbid obesity BMI of 48.5    Plan:    Patient completed 10-day course of antibiotics for staph epi continue to monitor for signs of recurrent infection  Continue with wound VAC patient removed VAC today wound team to replace  Continue to monitor hemoglobin  Continue Coumadin monitor INR  Palliative care consultation long-term prognosis seems very poor  Repeat a.m. labs  PT OT consultation  Further treatment contingent upon her hospital course  Discussed with RN      VTE Prophylaxis:  Pharmacologic & mechanical VTE prophylaxis orders are present.        CODE STATUS:   Medical Intervention Limits: No intubation (DNI)  Code Status (Patient has no pulse and is not breathing): No CPR (Do Not Attempt to Resuscitate)  Medical Interventions (Patient has pulse or is breathing): Limited Support        Electronically signed by EMELY Yin, 11/26/24, 1:20 PM EST.    Attending documentation:  I reviewed the above documentation and independently reviewed and rounded and evaluated the patient and discussed the care plan with ARTURO Ambrocio PA-C, I agree with his findings and plan as documented, what I have added to the care plan and modified is as follows in my documentation and my medical decision making; 74-year-old female with history of atrial fibrillation, paroxysmal on long-term anticoagulation with Coumadin, COPD, polypharmacy, hypertension, hospitalized on 11/11/2024 for chief complaint of altered mental status, found to have Staphylococcus epi bacteremia secondary to severe SSTI likely secondary to large abdominal and sacral wounds, general surgery and plastics consulted, 20 abdominal wall debridement, wound VAC placement, acute blood loss anemia, blood replaced, correcting subtherapeutic INR, overall prognosis poor and guarded.,  Ongoing confusion.  Workup being pursued for ongoing confusion.  Interval follow-up: Patient seen and examined  this morning, remains confused, not taking oral medications this morning, not taking midodrine or multivitamin, picked off her wound VAC, staff replacing this, further workup being pursued for ongoing confusion, not sure what her baseline is.  INR subtherapeutic, 1.94.  Platelets 77,000 as of yesterday.  Unable to obtain review of systems due to altered mental status on physical exam vitals reviewed, elderly morbidly obese female laying in bed, comfortable, lethargic, diminished breath sounds, regular rate and rhythm, abdominal wound VAC in place with large pannus and ELENI drain on the left side of her abdomen.  Assessment as above with staph epi bacteremia, abdominal wall wound, subtherapeutic INR on Coumadin with other chronic medical conditions as listed above, plan, check urinalysis for possibility of UTI, she is not on antibiotics at this time, possibility of occult infection remains as she remains encephalopathic, check CT head, continue Coumadin if she is able to take, to be dosed after CT head returns, continue insulin size scale coverage, continue Lasix if she can take this, Brovana Pulmicort nebs twice daily, palliative care reconsulted for reevaluation of goals of care if they have changed, check ammonia and lactate, a.m. labs, DNR, DVT prophylaxis Coumadin, clinical course dictate further management, discussed with nurse at the bedside.  More than 80 % of the time of this patient's encounter was performed by me, this included face-to-face time, planning and coordinating, medical decision making and critical thinking personally done by me.      Electronically signed by Jas Huerta MD, 11/26/2024, 13:38 EST.    Portions of this documentation were transcribed electronically from a voice recognition software.  I confirm all data accurately represents the service(s) I performed at today's visit.

## 2024-11-26 NOTE — PROGRESS NOTES
Pharmacy to Dose: Warfarin  Consulting provider: Bakari   Indication for warfarin: Afib   Goal INR range: 2 - 3  Home warfarin dose:  Restarting warfarin therapy. Patient previously on warfarin  in May, 2024. Recently on apixaban, per note apixaban makes patient feel funny, and would like to restart warfarin  Previous warfarin dose: 4 mg, then 5mg, then 5mg, rotating. Previous admission in April, 2024: average dose of 5.3 mg    Date INR Warfarin Dose Given   11/23 1.14 5 mg   11/24 1.32 5 mg   11/25 1.52 7.5 mg   11/26 1.94 5 mg (per MD)          Any Vitamin K given?: No  Drug interactions Reviewed: Yes.  Is patient on bridging therapy with another anticoagulant?: No    Lab Results   Component Value Date    PLT 77 (L) 11/25/2024    PLT 82 (L) 11/24/2024    HGB 7.1 (L) 11/25/2024    HGB 7.6 (L) 11/24/2024     Plan:  INR reported as 1.94 today.    Will provide warfarin 5 mg today per MD.    INR ordered for tomorrow AM.    Thank you for this consult. Pharmacy will continue to monitor.   Cali Beltran

## 2024-11-26 NOTE — PLAN OF CARE
Goal Outcome Evaluation:  Plan of Care Reviewed With: patient        Progress: no change  Outcome Evaluation: Patient confused throughout shift, more alert this afternoon. Refused morning medications but took pills this afternoon, tolerated well. wound care done per order. patient pulled off wound vac, MD aware and not replacing at this time. gauze and paper tape in place over surgical incision.

## 2024-11-27 LAB
ALBUMIN SERPL-MCNC: 1.8 G/DL (ref 3.5–5.2)
ALP SERPL-CCNC: 118 U/L (ref 39–117)
ALT SERPL W P-5'-P-CCNC: 8 U/L (ref 1–33)
AMMONIA BLD-SCNC: 15 UMOL/L (ref 11–51)
ANION GAP SERPL CALCULATED.3IONS-SCNC: 9.6 MMOL/L (ref 5–15)
AST SERPL-CCNC: 6 U/L (ref 1–32)
BACTERIA UR QL AUTO: ABNORMAL /HPF
BASOPHILS # BLD AUTO: 0.04 10*3/MM3 (ref 0–0.2)
BASOPHILS NFR BLD AUTO: 0.6 % (ref 0–1.5)
BILIRUB CONJ SERPL-MCNC: 0.1 MG/DL (ref 0–0.3)
BILIRUB INDIRECT SERPL-MCNC: 0.2 MG/DL
BILIRUB SERPL-MCNC: 0.3 MG/DL (ref 0–1.2)
BILIRUB UR QL STRIP: NEGATIVE
BUN SERPL-MCNC: 14 MG/DL (ref 8–23)
BUN/CREAT SERPL: 23 (ref 7–25)
CALCIUM SPEC-SCNC: 8.1 MG/DL (ref 8.6–10.5)
CHLORIDE SERPL-SCNC: 103 MMOL/L (ref 98–107)
CLARITY UR: ABNORMAL
CO2 SERPL-SCNC: 22.4 MMOL/L (ref 22–29)
COLOR UR: ABNORMAL
CREAT SERPL-MCNC: 0.61 MG/DL (ref 0.57–1)
DEPRECATED RDW RBC AUTO: 55.8 FL (ref 37–54)
EGFRCR SERPLBLD CKD-EPI 2021: 93.9 ML/MIN/1.73
EOSINOPHIL # BLD AUTO: 0.1 10*3/MM3 (ref 0–0.4)
EOSINOPHIL NFR BLD AUTO: 1.4 % (ref 0.3–6.2)
ERYTHROCYTE [DISTWIDTH] IN BLOOD BY AUTOMATED COUNT: 20.1 % (ref 12.3–15.4)
FOLATE SERPL-MCNC: <2 NG/ML (ref 4.78–24.2)
GLUCOSE BLDC GLUCOMTR-MCNC: 109 MG/DL (ref 70–99)
GLUCOSE BLDC GLUCOMTR-MCNC: 120 MG/DL (ref 70–99)
GLUCOSE BLDC GLUCOMTR-MCNC: 129 MG/DL (ref 70–99)
GLUCOSE BLDC GLUCOMTR-MCNC: 149 MG/DL (ref 70–99)
GLUCOSE SERPL-MCNC: 133 MG/DL (ref 65–99)
GLUCOSE UR STRIP-MCNC: NEGATIVE MG/DL
HCT VFR BLD AUTO: 28 % (ref 34–46.6)
HGB BLD-MCNC: 8.5 G/DL (ref 12–15.9)
HGB UR QL STRIP.AUTO: ABNORMAL
HYALINE CASTS UR QL AUTO: ABNORMAL /LPF
IMM GRANULOCYTES # BLD AUTO: 0.1 10*3/MM3 (ref 0–0.05)
IMM GRANULOCYTES NFR BLD AUTO: 1.4 % (ref 0–0.5)
INR PPP: 2.3 (ref 0.86–1.15)
KETONES UR QL STRIP: ABNORMAL
LEUKOCYTE ESTERASE UR QL STRIP.AUTO: ABNORMAL
LYMPHOCYTES # BLD AUTO: 1.18 10*3/MM3 (ref 0.7–3.1)
LYMPHOCYTES NFR BLD AUTO: 17 % (ref 19.6–45.3)
MAGNESIUM SERPL-MCNC: 1.7 MG/DL (ref 1.6–2.4)
MCH RBC QN AUTO: 23.7 PG (ref 26.6–33)
MCHC RBC AUTO-ENTMCNC: 30.4 G/DL (ref 31.5–35.7)
MCV RBC AUTO: 78 FL (ref 79–97)
MONOCYTES # BLD AUTO: 0.68 10*3/MM3 (ref 0.1–0.9)
MONOCYTES NFR BLD AUTO: 9.8 % (ref 5–12)
NEUTROPHILS NFR BLD AUTO: 4.86 10*3/MM3 (ref 1.7–7)
NEUTROPHILS NFR BLD AUTO: 69.8 % (ref 42.7–76)
NITRITE UR QL STRIP: NEGATIVE
NRBC BLD AUTO-RTO: 0.3 /100 WBC (ref 0–0.2)
PH UR STRIP.AUTO: 6 [PH] (ref 5–8)
PHOSPHATE SERPL-MCNC: 3.3 MG/DL (ref 2.5–4.5)
PLATELET # BLD AUTO: 106 10*3/MM3 (ref 140–450)
PMV BLD AUTO: 10.6 FL (ref 6–12)
POTASSIUM SERPL-SCNC: 3.5 MMOL/L (ref 3.5–5.2)
PROT SERPL-MCNC: 4.5 G/DL (ref 6–8.5)
PROT UR QL STRIP: ABNORMAL
PROTHROMBIN TIME: 25.7 SECONDS (ref 11.8–14.9)
RBC # BLD AUTO: 3.59 10*6/MM3 (ref 3.77–5.28)
RBC # UR STRIP: ABNORMAL /HPF
REF LAB TEST METHOD: ABNORMAL
SODIUM SERPL-SCNC: 135 MMOL/L (ref 136–145)
SP GR UR STRIP: 1.03 (ref 1–1.03)
SQUAMOUS #/AREA URNS HPF: ABNORMAL /HPF
UROBILINOGEN UR QL STRIP: ABNORMAL
VIT B12 BLD-MCNC: >2000 PG/ML (ref 211–946)
WBC # UR STRIP: ABNORMAL /HPF
WBC NRBC COR # BLD AUTO: 6.96 10*3/MM3 (ref 3.4–10.8)
YEAST URNS QL MICRO: ABNORMAL /HPF

## 2024-11-27 PROCEDURE — 94799 UNLISTED PULMONARY SVC/PX: CPT

## 2024-11-27 PROCEDURE — 85610 PROTHROMBIN TIME: CPT | Performed by: SURGERY

## 2024-11-27 PROCEDURE — 81001 URINALYSIS AUTO W/SCOPE: CPT | Performed by: FAMILY MEDICINE

## 2024-11-27 PROCEDURE — 99232 SBSQ HOSP IP/OBS MODERATE 35: CPT | Performed by: FAMILY MEDICINE

## 2024-11-27 PROCEDURE — 84100 ASSAY OF PHOSPHORUS: CPT | Performed by: FAMILY MEDICINE

## 2024-11-27 PROCEDURE — 94664 DEMO&/EVAL PT USE INHALER: CPT

## 2024-11-27 PROCEDURE — 87086 URINE CULTURE/COLONY COUNT: CPT | Performed by: FAMILY MEDICINE

## 2024-11-27 PROCEDURE — 25010000002 CEFTRIAXONE PER 250 MG: Performed by: FAMILY MEDICINE

## 2024-11-27 PROCEDURE — 83735 ASSAY OF MAGNESIUM: CPT | Performed by: FAMILY MEDICINE

## 2024-11-27 PROCEDURE — 97530 THERAPEUTIC ACTIVITIES: CPT

## 2024-11-27 PROCEDURE — 80076 HEPATIC FUNCTION PANEL: CPT | Performed by: FAMILY MEDICINE

## 2024-11-27 PROCEDURE — 80048 BASIC METABOLIC PNL TOTAL CA: CPT | Performed by: FAMILY MEDICINE

## 2024-11-27 PROCEDURE — 82948 REAGENT STRIP/BLOOD GLUCOSE: CPT

## 2024-11-27 PROCEDURE — 92610 EVALUATE SWALLOWING FUNCTION: CPT

## 2024-11-27 PROCEDURE — 82948 REAGENT STRIP/BLOOD GLUCOSE: CPT | Performed by: SURGERY

## 2024-11-27 PROCEDURE — 82140 ASSAY OF AMMONIA: CPT | Performed by: FAMILY MEDICINE

## 2024-11-27 PROCEDURE — 94760 N-INVAS EAR/PLS OXIMETRY 1: CPT

## 2024-11-27 PROCEDURE — 85025 COMPLETE CBC W/AUTO DIFF WBC: CPT | Performed by: FAMILY MEDICINE

## 2024-11-27 RX ADMIN — Medication 10 ML: at 09:21

## 2024-11-27 RX ADMIN — ACETAMINOPHEN 650 MG: 325 TABLET ORAL at 09:29

## 2024-11-27 RX ADMIN — ACETAMINOPHEN 650 MG: 325 TABLET ORAL at 18:05

## 2024-11-27 RX ADMIN — FOLIC ACID 1 MG: 5 INJECTION, SOLUTION INTRAMUSCULAR; INTRAVENOUS; SUBCUTANEOUS at 09:20

## 2024-11-27 RX ADMIN — ARFORMOTEROL TARTRATE 15 MCG: 15 SOLUTION RESPIRATORY (INHALATION) at 08:33

## 2024-11-27 RX ADMIN — Medication 1 TABLET: at 09:20

## 2024-11-27 RX ADMIN — MIDODRINE HYDROCHLORIDE 5 MG: 2.5 TABLET ORAL at 09:19

## 2024-11-27 RX ADMIN — BUDESONIDE 0.5 MG: 0.5 INHALANT ORAL at 19:13

## 2024-11-27 RX ADMIN — Medication 473 ML: at 16:52

## 2024-11-27 RX ADMIN — MIDODRINE HYDROCHLORIDE 5 MG: 2.5 TABLET ORAL at 13:57

## 2024-11-27 RX ADMIN — MIDODRINE HYDROCHLORIDE 5 MG: 2.5 TABLET ORAL at 18:05

## 2024-11-27 RX ADMIN — ARFORMOTEROL TARTRATE 15 MCG: 15 SOLUTION RESPIRATORY (INHALATION) at 19:14

## 2024-11-27 RX ADMIN — WARFARIN SODIUM 5 MG: 2.5 TABLET ORAL at 18:05

## 2024-11-27 RX ADMIN — SODIUM CHLORIDE 2000 MG: 9 INJECTION INTRAMUSCULAR; INTRAVENOUS; SUBCUTANEOUS at 09:19

## 2024-11-27 RX ADMIN — BUDESONIDE 0.5 MG: 0.5 INHALANT ORAL at 08:33

## 2024-11-27 RX ADMIN — ACETAMINOPHEN 650 MG: 325 TABLET ORAL at 13:57

## 2024-11-27 NOTE — PROGRESS NOTES
Pharmacy to Dose: Warfarin  Consulting provider: Bakari   Indication for warfarin: Afib   Goal INR range: 2 - 3  Home warfarin dose:  Restarting warfarin therapy. Patient previously on warfarin  in May, 2024. Recently on apixaban, per note apixaban makes patient feel funny, and would like to restart warfarin  Previous warfarin dose: 4 mg, then 5mg, then 5mg, rotating. Previous admission in April, 2024: average dose of 5.3 mg    Date INR Warfarin Dose Given   11/23 1.14 5 mg   11/24 1.32 5 mg   11/25 1.52 7.5 mg   11/26 1.94 5 mg (per MD)   11/27 2.30 5 mg     Any Vitamin K given?: No  Drug interactions Reviewed: Yes.  Is patient on bridging therapy with another anticoagulant?: No    Lab Results   Component Value Date    PLT 77 (L) 11/25/2024    PLT 82 (L) 11/24/2024    HGB 7.1 (L) 11/25/2024    HGB 7.6 (L) 11/24/2024     Plan:  INR reported as 2.30 today.    Warfarin 5 mg daily continues.     INR ordered for tomorrow AM.    Thank you for this consult. Pharmacy will continue to monitor.   Cali Beltran

## 2024-11-27 NOTE — SIGNIFICANT NOTE
11/27/24 1108   OTHER   Discipline speech language pathologist   Rehab Time/Intention   Session Not Performed patient/family declined evaluation   Recommendations   SLP - Next Appointment 11/29/24

## 2024-11-27 NOTE — THERAPY TREATMENT NOTE
Patient Name: Inez Doyle  : 1950    MRN: [id number]                              Today's Date: 2024       Admit Date: 2024    Visit Dx:     ICD-10-CM ICD-9-CM   1. Chronic wound infection of abdomen, sequela  S31.109S 906.0    L08.9    2. Hypokalemia  E87.6 276.8   3. Anorexia  R63.0 783.0   4. Oropharyngeal dysphagia  R13.12 787.22   5. Difficulty walking  R26.2 719.7     Patient Active Problem List   Diagnosis    Posterior tibial tendon dysfunction    Charcot's joint of foot    Arthritis, lumbar spine    Atrial fibrillation    COVID-19 with multiple comorbidities    Hypoxic respiratory failure    Morbid obesity    Diabetes    AMS (altered mental status)    Severe protein-calorie malnutrition    Chronic wound infection of abdomen     Past Medical History:   Diagnosis Date    A-fib     Anemia     Anxiety     Asthma     Candidiasis of skin and nail     CHF (congestive heart failure)     COPD (chronic obstructive pulmonary disease)     Depression     Diabetes mellitus     GERD (gastroesophageal reflux disease)     Hyperlipidemia     Hypertension     Hypokalemia     Hypomagnesemia     Intervertebral disc disease     Obesity     Osteoarthritis     Panniculitis     Sleep apnea     Vitamin D deficiency      Past Surgical History:   Procedure Laterality Date    BACK SURGERY      STOMACH SURGERY      WOUND DEBRIDEMENT Bilateral 2024    Procedure: TRUNK DEBRIDEMENTabdominal wall debridement with  closure and wound vac placement;  Surgeon: Shalonda Brown MD;  Location: [facility];  Service: Plastics;  Laterality: Bilateral;      General Information       Row Name 24 1505          OT Time and Intention    Document Type therapy note (daily note)  -LF     Mode of Treatment individual therapy;occupational therapy  -LF               User Key  (r) = Recorded By, (t) = Taken By, (c) = Cosigned By      Initials Name Provider Type    LF Juju Bryant, OT Occupational Therapist                      Mobility/ADL's       Row Name 11/27/24 1505          Bed Mobility    Bed Mobility supine-sit;sit-supine  -LF     Supine-Sit [place] (Bed Mobility) maximum assist (25% patient effort);dependent (less than 25% patient effort)  -LF     Sit-Supine [place] (Bed Mobility) maximum assist (25% patient effort);dependent (less than 25% patient effort)  -LF     Bed Mobility, Safety Issues decreased use of legs for bridging/pushing;decreased use of arms for pushing/pulling  -LF     Assistive Device (Bed Mobility) bed rails;repositioning sheet;head of bed elevated  -LF     Comment, (Bed Mobility) Pt only able to come to partial seated position on EOB secondary to pain and fear of falling, despite reassurance provided.  -LF               User Key  (r) = Recorded By, (t) = Taken By, (c) = Cosigned By      Initials Name Provider Type    Juju Henriquez OT Occupational Therapist                   Obj/Interventions       Row Name 11/27/24 0277          Balance    Balance Assessment sitting static balance  -LF     Static Sitting Balance maximum assist;dependent  -LF     Position, Sitting Balance supported;sitting edge of bed  -LF     Balance Interventions sitting;supported;occupation based/functional task;weight shifting activity  -               User Key  (r) = Recorded By, (t) = Taken By, (c) = Cosigned By      Initials Name Provider Type    Juju Henriquez OT Occupational Therapist                   Goals/Plan    No documentation.                  Clinical Impression       Row Name 11/27/24 1505          Pain Assessment    Additional Documentation Pain Scale: FACES Pre/Post-Treatment (Group)  -[facility] Name 11/27/24 1503          Pain Scale: FACES Pre/Post-Treatment    Pain: FACES Scale, Pretreatment 2-->hurts little bit  -LF     Posttreatment Pain Rating 6-->hurts even more  -LF     Pre/Posttreatment Pain Comment nsg aware  -       Row Name 11/27/24 1501          Plan of Care Review     Plan of Care Reviewed With patient  -LF     Progress no change  -     Outcome Evaluation Pt initially agreeable to functional transfer training but only able to come to a partial seatd position on EOB secondary to pain and fear of falling, despite encouragement and reassurance that she would not fall. She would benefit from continued OT services to maximize independence.  -       Row Name 11/27/24 1508          Therapy Plan Review/Discharge Plan (OT)    Anticipated Discharge Disposition (OT) sub acute care setting  -       Row Name 11/27/24 1508          Vital Signs    O2 Delivery Pre Treatment room air  -LF     O2 Delivery Intra Treatment room air  -LF     O2 Delivery Post Treatment room air  -       Row Name 11/27/24 1508          Positioning and Restraints    Pre-Treatment Position in bed  -LF     Post Treatment Position bed  -LF     In Bed fowlers;call light within reach;encouraged to call for assist;exit alarm on  -LF               User Key  (r) = Recorded By, (t) = Taken By, (c) = Cosigned By      Initials Name Provider Type     Juju Bryant, OT Occupational Therapist                   Outcome Measures       Row Name 11/27/24 1510          How much help from another is currently needed...    Putting on and taking off regular lower body clothing? 1  -LF     Bathing (including washing, rinsing, and drying) 1  -LF     Toileting (which includes using toilet bed pan or urinal) 1  -LF     Putting on and taking off regular upper body clothing 2  -LF     Taking care of personal grooming (such as brushing teeth) 3  -LF     Eating meals 4  -LF     AM-PAC 6 Clicks Score (OT) 12  -LF       Row Name 11/27/24 1213          How much help from another person do you currently need...    Turning from your back to your side while in flat bed without using bedrails? 1  -KD     Moving from lying on back to sitting on the side of a flat bed without bedrails? 1  -KD     Moving to and from a bed to a chair (including a  wheelchair)? 1  -KD     Standing up from a chair using your arms (e.g., wheelchair, bedside chair)? 1  -KD     Climbing 3-5 steps with a railing? 1  -KD     To walk in hospital room? 1  -KD     AM-PAC 6 Clicks Score (PT) 6  -KD     Highest Level of Mobility Goal 2 --> Bed activities/dependent transfer  -KD       Row Name 11/27/24 1510          Functional Assessment    Outcome Measure Options AM-PAC 6 Clicks Daily Activity (OT);Optimal Instrument  -LF       Row Name 11/27/24 1510          Optimal Instrument    Optimal Instrument Optimal - 3  -LF     Bending/Stooping 5  -LF     Standing 5  -LF     Reaching 2  -LF     From the list, choose the 3 activities you would most like to be able to do without any difficulty Bending/stooping;Standing;Reaching  -LF     Total Score Optimal - 3 12  -LF               User Key  (r) = Recorded By, (t) = Taken By, (c) = Cosigned By      Initials Name Provider Type    LF Juju Bryant OT Occupational Therapist    Laina Galicia, RN Registered Nurse                    Occupational Therapy Education       Title: PT OT SLP Therapies (In Progress)       Topic: Occupational Therapy (In Progress)       Point: ADL training (In Progress)       Description:   Instruct learner(s) on proper safety adaptation and remediation techniques during self care or transfers.   Instruct in proper use of assistive devices.                  Learning Progress Summary            Patient Acceptance, E,D, NR by PG at 11/12/2024 0839                      Point: Home exercise program (In Progress)       Description:   Instruct learner(s) on appropriate technique for monitoring, assisting and/or progressing therapeutic exercises/activities.                  Learning Progress Summary            Patient Acceptance, E,D, NR by PG at 11/12/2024 0839                      Point: Precautions (In Progress)       Description:   Instruct learner(s) on prescribed precautions during self-care and functional transfers.                   Learning Progress Summary            Patient Acceptance, E,D, NR by PG at 11/12/2024 0839                      Point: Body mechanics (In Progress)       Description:   Instruct learner(s) on proper positioning and spine alignment during self-care, functional mobility activities and/or exercises.                  Learning Progress Summary            Patient Acceptance, E,D, NR by PG at 11/12/2024 0839                                      User Key       Initials Effective Dates Name Provider Type Discipline    PG 06/16/21 -  [place] OT Occupational Therapist OT                  OT Recommendation and Plan     Plan of Care Review  Plan of Care Reviewed With: patient  Progress: no change  Outcome Evaluation: Pt initially agreeable to functional transfer training but only able to come to a partial seatd position on EOB secondary to pain and fear of falling, despite encouragement and reassurance that she would not fall. She would benefit from continued OT services to maximize independence.     Time Calculation:         Time Calculation- OT       Row Name 11/27/24 1510             Time Calculation- OT    OT Received On 11/27/24  -LF         Timed Charges    44222 - OT Therapeutic Activity Minutes 10  -LF         Total Minutes    Timed Charges Total Minutes 10  -LF       Total Minutes 10  -LF                User Key  (r) = Recorded By, (t) = Taken By, (c) = Cosigned By      Initials Name Provider Type    LF Juju Bryant OT Occupational Therapist                  Therapy Charges for Today       Code Description Service Date Service Provider Modifiers Qty    [id number]  OT THERAPEUTIC ACT EA 15 MIN 11/27/2024 Juju Bryant OT GO 1                 Juju Bryant OT  11/27/2024

## 2024-11-27 NOTE — THERAPY EVALUATION
Acute Care - Speech Language Pathology   Swallow Re-Evaluation  Tammy     Patient Name: Inez Doyle  : 1950  MRN: [id number]  Today's Date: 2024               Admit Date: 2024    Visit Dx:     ICD-10-CM ICD-9-CM   1. Chronic wound infection of abdomen, sequela  S31.109S 906.0    L08.9    2. Hypokalemia  E87.6 276.8   3. Anorexia  R63.0 783.0   4. Oropharyngeal dysphagia  R13.12 787.22   5. Difficulty walking  R26.2 719.7     Patient Active Problem List   Diagnosis    Posterior tibial tendon dysfunction    Charcot's joint of foot    Arthritis, lumbar spine    Atrial fibrillation    COVID-19 with multiple comorbidities    Hypoxic respiratory failure    Morbid obesity    Diabetes    AMS (altered mental status)    Severe protein-calorie malnutrition    Chronic wound infection of abdomen     Past Medical History:   Diagnosis Date    A-fib     Anemia     Anxiety     Asthma     Candidiasis of skin and nail     CHF (congestive heart failure)     COPD (chronic obstructive pulmonary disease)     Depression     Diabetes mellitus     GERD (gastroesophageal reflux disease)     Hyperlipidemia     Hypertension     Hypokalemia     Hypomagnesemia     Intervertebral disc disease     Obesity     Osteoarthritis     Panniculitis     Sleep apnea     Vitamin D deficiency      Past Surgical History:   Procedure Laterality Date    BACK SURGERY      STOMACH SURGERY      WOUND DEBRIDEMENT Bilateral 2024    Procedure: TRUNK DEBRIDEMENTabdominal wall debridement with  closure and wound vac placement;  Surgeon: Shalonda Brown MD;  Location: [facility] ([facility]);  Service: Plastics;  Laterality: Bilateral;       npatient Speech Pathology Dysphagia Evaluation           PAIN SCALE: None indicated.     PRECAUTIONS/CONTRAINDICATIONS: Standard     SUSPECTED ABUSE/NEGLECT/EXPLOITATION: None indicated.     SOCIAL/PSYCHOLOGICAL NEEDS/BARRIERS: None indicated.     PAST SOCIAL HISTORY: 74-year-old female from assisted  living     PRIOR FUNCTION: Patient has been on mechanical ground diet.     PATIENT GOALS/EXPECTATIONS: Continue eating orally, patient agreeable to mechanical ground diet.     HISTORY: 74-year-old female the above diagnosis referred for speech therapy evaluation to assess for swallowing.  Patient was receiving speech pathology services earlier this hospitalization.  Therapy was discontinued with patient demonstrating functional swallow on altered diet.     CURRENT DIET LEVEL: Soft to chew, whole meats, thin liquid     OBJECTIVE:    TEST ADMINISTERED: Clinical dysphagia evaluation     COGNITION/SAFETY AWARENESS: Patient followed basic directions and responded to questions     BEHAVIORAL OBSERVATIONS: Alert and cooperative     ORAL MOTOR EXAM: Patient is edentulous     VOICE QUALITY: Adequate     REFLEX EXAM: Deferred     POSTURE: Assisted sitting upright in bed     FEEDING/SWALLOWING FUNCTION: Assessed with thin liquids, puréed solids, regular soft to chew solid.     CLINICAL OBSERVATIONS: Patient was evaluated while feeding self at noon meal.  Thin liquid by straw with minimal delay, vocal quality remaining clear to cervical auscultation. Purée solid with swallow completed with laryngeal elevation noted to palpation.  Regular soft solid with extended chewing followed by swallow completed clearing the oral cavity.  Patient at times taking large bites.     DYSPHAGIA CRITERIA: Patient demonstrates oral pharyngeal swallow which is grossly functional for nutritional needs with mechanical soft diet.     FUNCTIONAL ASSESSMENT INSTRUMENT: Patient currently scored a level 7 of 7 on Functional Communication Measures for swallowing indicating a 0% limitation in function.     ASSESSMENT/ PLAN OF CARE:  No direct speech therapy is recommended at this time. Recommend rereferral should patient demonstrate change in status.     RECOMMENDATIONS:   1.   DIET: Mechanical ground diet, thin liquid.  Note patient may upgrade to soft to  chew whole meats on request though patient will require additional assistance with set up.     2.  POSITION: Positioning fully upright for all p.o. intake and 30 minutes following.     3.  COMPENSATORY STRATEGIES: Patient may need assistance with set up, cut food items small.  Alternate small bites and small sips of solids and liquids at a slow rate.  Patient to feed self, may utilize straw drink.  Medications per patient preference.     Pt/responsible party agrees with plan of care and has been informed of all alternatives, risks and benefits.                                                                                             EDUCATION  The patient has been educated in the following areas:   Modified Diet Instruction.                Time Calculation:    Time Calculation- SLP       Row Name 11/27/24 1232 11/27/24 1108          Time Calculation- SLP    SLP Start Time 1100  -TB --     SLP Stop Time 1200  -TB --     SLP Time Calculation (min) 60 min  -TB --     SLP Received On 11/27/24  -TB --     SLP - Next Appointment -- 11/29/24  -TB        Untimed Charges    SLP Eval/Re-eval  ST Eval Oral Pharyng Swallow - 92610  -TB --     44163-IT Eval Oral Pharyng Swallow Minutes 60  -TB --        Total Minutes    Untimed Charges Total Minutes 60  -TB --      Total Minutes 60  -TB --               User Key  (r) = Recorded By, (t) = Taken By, (c) = Cosigned By      Initials Name Provider Type    TB Jacque Reddy SLP Speech and Language Pathologist                    Therapy Charges for Today       Code Description Service Date Service Provider Modifiers Qty    [id number] HC ST EVAL ORAL PHARYNG SWALLOW 4 11/27/2024 Jacque Reddy SLP GN 1                 RICHARDSON Mcnamara  11/27/2024

## 2024-11-27 NOTE — NURSING NOTE
Follow up with Anastasiia MATHIS.  Spoke with POA concerning [place] MOST form.  POA reports she will not be able to come in today due to working.  POA is agreeable to complete a [place] MOST form.  Will follow up with POA when she is able to make visit.  Will continue to monitor.    Romana CROW RN, [facility]  Palliative Care

## 2024-11-27 NOTE — PLAN OF CARE
Goal Outcome Evaluation:           Progress: no change  Outcome Evaluation: Patient remains confused and disoriented. Patient rested much of the night, mostly only waking up during turns and wound care. Max Ames RN

## 2024-11-27 NOTE — PROGRESS NOTES
[facility]   Hospitalist Progress Note  Date: 2024  Patient Name: Inez Doyle  : 1950  MRN: [id number]  Date of admission: 2024      Subjective   Subjective     Chief Complaint: Mental status    Summary: Patient 74-year-old male history of atrial fibrillation on warfarin, CHF, COPD, hypertension, hyperlipidemia, obesity, chronic immobility presents from nursing facility with altered mental status and decreased oral intake found to have large abdominal wound General Surgery plastic surgery evaluated plastic surgery eventually took the patient to the operating room did panniculectomy with wound VAC placement remains remains hospitalized long-term prognosis appears poor consulting palliative care    Interval Followup: 2024    Pleasant.  Comfortable.  Not oriented.  Thinks it is March.  Wrong age.    Afebrile with normal white count  BP trend: 92/48  -  101/55  INR 2.3   Hgb stable 8.5   POD #5 panniculectomy/Prevena wound vac/ELENI drain      Objective   Objective     Vitals:   Temp:  [97.5 °F (36.4 °C)-98.2 °F (36.8 °C)] 97.7 °F (36.5 °C)  Heart Rate:  [72-89] 72  Resp:  [16-20] 16  BP: ()/(48-78) 92/48    Physical Exam   Constitutional: Awake but not oriented.  Respiratory: Clear without wheeze  Cardiovascular: Irregular.  No murmur.  Abdomen wound VAC in place large pannus ELENI drain in place    Result Review    Result Review:  I have personally reviewed the results from the time of this admission to 2024 12:36 EST and agree with these findings:  [x]  Laboratory  []  Microbiology  []  Radiology  []  EKG/Telemetry   []  Cardiology/Vascular   []  Pathology  []  Old records  []  Other:    Assessment & Plan   Assessment / Plan     Assessment:    Staph epi bacteremia secondary to severe SSTI cellulitis, likely from large abdominal and/or sacral wounds  Multiple skin and soft tissue wounds less notable for sacral, large lower abdominal pannus exposed wound  Paroxysmal A-fib on  coumadin outpatient  Acute toxic and metabolic encephalopathy  COPD on room air baseline  CHF not acutely exacerbated  Polypharmacy contributing to confusion as well  History of hypertension  DM2 on metformin outpatient  Morbid obesity BMI of 48.5  Nursing home resident (Soraya Troy)    Plan:    CT head: No acute intracranial abnormality noted.  Return to [facility] when medically stable   10-day course of antibiotics for staph epi completed   Continue to monitor for signs of recurrent infection  Continue with Prevena wound VAC to prevent dehiscence  Continue to monitor hemoglobin  Continue Coumadin monitor INR  Palliative care consultation long-term prognosis seems very poor  Repeat a.m. labs  PT OT consultation  Further treatment contingent upon her hospital course  Discussed with RN      VTE Prophylaxis:  Pharmacologic & mechanical VTE prophylaxis orders are present.        CODE STATUS:   Medical Intervention Limits: No intubation (DNI)  Code Status (Patient has no pulse and is not breathing): No CPR (Do Not Attempt to Resuscitate)  Medical Interventions (Patient has pulse or is breathing): Limited Support           Attending documentation:  I reviewed the above documentation and independently reviewed and rounded and evaluated the patient and discussed the care plan with MARINO Huber PA-C, I agree with his findings and plan as documented, what I have added to the care plan and modified is as follows in my documentation and my medical decision making; 74-year-old female with history of atrial fibrillation, paroxysmal on long-term anticoagulation with Coumadin, COPD, polypharmacy, hypertension, hospitalized on 11/11/2024 for chief complaint of altered mental status, found to have Staphylococcus epi bacteremia secondary to severe SSTI likely secondary to large abdominal and sacral wounds, general surgery and plastics consulted, status post abdominal wall debridement, wound VAC placement, acute blood loss anemia,  blood replaced, correcting subtherapeutic INR, overall prognosis poor and guarded,  Ongoing confusion.  Workup being pursued for ongoing confusion.  Calix catheter exchange, urinalysis suggestive of UTI.  Interval follow-up: Patient seen and examined this morning, remains confused but a little more awake and talkative, very slow to respond, was able to work with speech therapy to clear mechanical ground diet with thin liquids, MOST form signed.  Unable to obtain review of systems due to altered mental status on physical exam vitals reviewed, elderly morbidly obese female laying in bed, comfortable, sleepy, diminished breath sounds, regular rate and rhythm, abdominal wound VAC in place with large pannus and ELENI drain on the left side of her abdomen.  Assessment as above with staph epi bacteremia, abdominal wall wound, subtherapeutic INR on Coumadin with other chronic medical conditions as listed above, plan, start ceftriaxone while we await urine cultures, replace Calix catheter, continue Coumadin if she is able to take to achieve therapeutic INR, which she is at at 2.3 today, continue insulin size scale coverage, continue Lasix if she can take this, Brovana Pulmicort nebs twice daily, a.m. labs, DNR, DVT prophylaxis Coumadin, clinical course dictate further management, discussed with nurse at the bedside.  More than 80 % of the time of this patient's encounter was performed by me, this included face-to-face time, planning and coordinating, medical decision making and critical thinking personally done by me.       Electronically signed by Jas Huerta MD, 11/27/2024, 15:08 EST.    Portions of this documentation were transcribed electronically from a voice recognition software.  I confirm all data accurately represents the service(s) I performed at today's visit.

## 2024-11-27 NOTE — PROGRESS NOTES
PLASTIC SURGERY PROGRESS NOTE    Patient Identification:  Name: Inez Doyle    Age: 74 y.o.    Sex: female   :  1950  MRN: [id number]               Subjective:  No acute events.    Objective:    Continuous Infusions:Pharmacy to dose warfarin,         Scheduled Meds:arformoterol, 15 mcg, Nebulization, BID - RT  budesonide, 0.5 mg, Nebulization, BID - RT  cefTRIAXone, 2,000 mg, Intravenous, Q24H  folic acid 1 mg in sodium chloride 0.9 % 50 mL IVPB, 1 mg, Intravenous, Daily  furosemide, 40 mg, Oral, Daily  insulin regular, 2-7 Units, Subcutaneous, TID AC  [Held by provider] losartan, 12.5 mg, Oral, Daily  midodrine, 5 mg, Oral, TID AC  multivitamin with minerals, 1 tablet, Oral, Daily  [Held by provider] sertraline, 25 mg, Oral, Daily  sodium chloride, 10 mL, Intravenous, Q12H  Sodium Hypochlorite, 1 Application, Apply externally, 2 times per day  warfarin, 5 mg, Oral, Daily      PRN Meds:  acetaminophen **OR** acetaminophen **OR** acetaminophen    dextrose    dextrose    glucagon (human recombinant)    nitroglycerin    ondansetron    Pharmacy to dose warfarin    sodium chloride    sodium chloride    Vital signs in last 24 hours:  Vitals:    24 2254 24 0342 24 0748 24 0813   BP: 92/62 101/55 92/49 98/50   BP Location:   Right arm Right arm   Patient Position:   Lying Lying   Pulse: 78 73 84    Resp: 16 16 20    Temp: 98.2 °F (36.8 °C) 98.2 °F (36.8 °C) 97.7 °F (36.5 °C)    TempSrc: Oral Oral Oral    SpO2: 96% 97% 96%    Weight:       Height:           Intake/Output:I/O last 3 completed shifts:  In: 435 [P.O.:435]  Out:  [Urine:; Drains:30]    Exam:  GEN: no acute distress, resting quietly   Incision closed.       Recent Results (from the past 24 hours)   POC Glucose Finger 4x Daily Before Meals & at Bedtime    Collection Time: 24 11:51 AM    Specimen: Finger; Blood   Result Value Ref Range    Glucose 117 (H) 70 - 99 mg/dL   Blood Gas, Arterial -    Collection Time:  11/26/24  2:09 PM    Specimen: Arterial Blood   Result Value Ref Range    Site Right Radial     Zeke's Test Positive     pH, Arterial 7.489 (H) 7.350 - 7.450 pH units    pCO2, Arterial 31.1 (L) 35.0 - 45.0 mm Hg    pO2, Arterial 88.8 80.0 - 100.0 mm Hg    HCO3, Arterial 23.7 22.0 - 26.0 mmol/L    Base Excess, Arterial 0.6 -2.0 - 2.0 mmol/L    O2 Saturation, Arterial 97.6 95.0 - 99.0 %    Hemoglobin, Blood Gas 9.1 (L) 12 - 18 g/dL    Hematocrit, Blood Gas 27.0 (L) 38.0 - 51.0 %    Barometric Pressure for Blood Gas 747.0000 mmHg    Modality Room Air     Hemodilution No    Vitamin B12    Collection Time: 11/26/24  2:59 PM    Specimen: Arm, Left; Blood   Result Value Ref Range    Vitamin B-12 >2,000 (H) 211 - 946 pg/mL   Folate    Collection Time: 11/26/24  2:59 PM    Specimen: Arm, Left; Blood   Result Value Ref Range    Folate <2.00 (L) 4.78 - 24.20 ng/mL   Urinalysis With Culture If Indicated - Indwelling Urethral Catheter    Collection Time: 11/26/24  4:23 PM    Specimen: Indwelling Urethral Catheter; Urine   Result Value Ref Range    Color, UA Yellow Yellow, Straw    Appearance, UA Turbid (A) Clear    pH, UA 6.0 5.0 - 8.0    Specific Gravity, UA 1.018 1.005 - 1.030    Glucose, UA Negative Negative    Ketones, UA Negative Negative    Bilirubin, UA Negative Negative    Blood, UA Small (1+) (A) Negative    Protein, UA Trace (A) Negative    Leuk Esterase, UA Large (3+) (A) Negative    Nitrite, UA Negative Negative    Urobilinogen, UA 0.2 E.U./dL 0.2 - 1.0 E.U./dL   Urinalysis, Microscopic Only - Indwelling Urethral Catheter    Collection Time: 11/26/24  4:23 PM    Specimen: Indwelling Urethral Catheter; Urine   Result Value Ref Range    RBC, UA 6-10 (A) None Seen, 0-2 /HPF    WBC, UA Too Numerous to Count (A) None Seen, 0-2 /HPF    Bacteria, UA 2+ (A) None Seen /HPF    Squamous Epithelial Cells, UA 0-2 None Seen, 0-2 /HPF    Transitional Epithelial Cells, UA 3-6 (A) 0 - 2 /HPF    Yeast, UA Large/3+ Budding Yeast (A)  None Seen /HPF    Hyaline Casts, UA None Seen None Seen /LPF    Mucus, UA Moderate/2+ (A) None Seen, Trace /HPF    Methodology Manual Light Microscopy    POC Glucose Once    Collection Time: 11/26/24  4:44 PM    Specimen: Blood   Result Value Ref Range    Glucose 126 (H) 70 - 99 mg/dL   POC Glucose Once    Collection Time: 11/26/24 10:10 PM    Specimen: Blood   Result Value Ref Range    Glucose 132 (H) 70 - 99 mg/dL   Protime-INR    Collection Time: 11/27/24  4:30 AM    Specimen: Blood   Result Value Ref Range    Protime 25.7 (H) 11.8 - 14.9 Seconds    INR 2.30 (H) 0.86 - 1.15   Basic Metabolic Panel    Collection Time: 11/27/24  4:30 AM    Specimen: Blood   Result Value Ref Range    Glucose 133 (H) 65 - 99 mg/dL    BUN 14 8 - 23 mg/dL    Creatinine 0.61 0.57 - 1.00 mg/dL    Sodium 135 (L) 136 - 145 mmol/L    Potassium 3.5 3.5 - 5.2 mmol/L    Chloride 103 98 - 107 mmol/L    CO2 22.4 22.0 - 29.0 mmol/L    Calcium 8.1 (L) 8.6 - 10.5 mg/dL    BUN/Creatinine Ratio 23.0 7.0 - 25.0    Anion Gap 9.6 5.0 - 15.0 mmol/L    eGFR 93.9 >60.0 mL/min/1.73   Magnesium    Collection Time: 11/27/24  4:30 AM    Specimen: Blood   Result Value Ref Range    Magnesium 1.7 1.6 - 2.4 mg/dL   Phosphorus    Collection Time: 11/27/24  4:30 AM    Specimen: Blood   Result Value Ref Range    Phosphorus 3.3 2.5 - 4.5 mg/dL   Hepatic Function Panel    Collection Time: 11/27/24  4:30 AM    Specimen: Blood   Result Value Ref Range    Total Protein 4.5 (L) 6.0 - 8.5 g/dL    Albumin 1.8 (L) 3.5 - 5.2 g/dL    ALT (SGPT) 8 1 - 33 U/L    AST (SGOT) 6 1 - 32 U/L    Alkaline Phosphatase 118 (H) 39 - 117 U/L    Total Bilirubin 0.3 0.0 - 1.2 mg/dL    Bilirubin, Direct 0.1 0.0 - 0.3 mg/dL    Bilirubin, Indirect 0.2 mg/dL   Ammonia    Collection Time: 11/27/24  4:30 AM    Specimen: Blood   Result Value Ref Range    Ammonia 15 11 - 51 umol/L   CBC Auto Differential    Collection Time: 11/27/24  4:30 AM    Specimen: Blood   Result Value Ref Range    WBC 6.96 3.40  - 10.80 10*3/mm3    RBC 3.59 (L) 3.77 - 5.28 10*6/mm3    Hemoglobin 8.5 (L) 12.0 - 15.9 g/dL    Hematocrit 28.0 (L) 34.0 - 46.6 %    MCV 78.0 (L) 79.0 - 97.0 fL    MCH 23.7 (L) 26.6 - 33.0 pg    MCHC 30.4 (L) 31.5 - 35.7 g/dL    RDW 20.1 (H) 12.3 - 15.4 %    RDW-SD 55.8 (H) 37.0 - 54.0 fl    MPV 10.6 6.0 - 12.0 fL    Platelets 106 (L) 140 - 450 10*3/mm3    Neutrophil % 69.8 42.7 - 76.0 %    Lymphocyte % 17.0 (L) 19.6 - 45.3 %    Monocyte % 9.8 5.0 - 12.0 %    Eosinophil % 1.4 0.3 - 6.2 %    Basophil % 0.6 0.0 - 1.5 %    Immature Grans % 1.4 (H) 0.0 - 0.5 %    Neutrophils, Absolute 4.86 1.70 - 7.00 10*3/mm3    Lymphocytes, Absolute 1.18 0.70 - 3.10 10*3/mm3    Monocytes, Absolute 0.68 0.10 - 0.90 10*3/mm3    Eosinophils, Absolute 0.10 0.00 - 0.40 10*3/mm3    Basophils, Absolute 0.04 0.00 - 0.20 10*3/mm3    Immature Grans, Absolute 0.10 (H) 0.00 - 0.05 10*3/mm3    nRBC 0.3 (H) 0.0 - 0.2 /100 WBC   POC Glucose Finger 4x Daily Before Meals & at Bedtime    Collection Time: 11/27/24  8:04 AM    Specimen: Finger; Blood   Result Value Ref Range    Glucose 109 (H) 70 - 99 mg/dL         Assessment:    AMS (altered mental status)    Severe protein-calorie malnutrition    Chronic wound infection of abdomen      5 Days Post-Op Procedure(s) (LRB):  TRUNK DEBRIDEMENTabdominal wall debridement with  closure and wound vac placement (Bilateral)    Plan:  Keep drain for now. Incision to be closed daily with gauze and tape. Ok to bath from my standpoint. The staples to stay for 2 weeks. Drain will be removed probably soon.   Shalonda Brown MD    11/27/2024

## 2024-11-27 NOTE — PROGRESS NOTES
Respiratory Therapist Broncho-Pulmonary Hygiene Progress Note      Patient Name:  Inez Doyle  YOB: 1950    Inez Doyle meets the qualification for Level 2 of the Bronco-Pulmonary Hygiene Protocol. This was based on my daily patient assessment and includes review of chest x-ray results, cough ability and quality, oxygenation, secretions or risk for secretion development and patient mobility.     Broncho-Pulmonary Hygiene Assessment:    Level of Movement: Actively changing positions-requires assistance  Disoriented/Follows Commands    Breath Sounds: Clear to slightly diminished    Cough: Strong, effective    Chest X-Ray: Possible signs of consolidation and/or atelectasis or clear.     Sputum Productions: None or small amount of thin or watery secretions with effective cough    History and Physical: None    SpO2 to Oxygen Need: greater than 92% on room air or  less than 3L nasal canula    Current SpO2 is: 95% on RA    Based on this information, I have completed the following interventions: Teach/Instruct patient on cough and deep breathe      Electronically signed by Ana M Kaur, Respiratory Tech Student, 11/27/24, 8:38 AM EST.

## 2024-11-28 LAB
ALBUMIN SERPL-MCNC: 1.9 G/DL (ref 3.5–5.2)
ALP SERPL-CCNC: 139 U/L (ref 39–117)
ALT SERPL W P-5'-P-CCNC: 10 U/L (ref 1–33)
ANION GAP SERPL CALCULATED.3IONS-SCNC: 12.1 MMOL/L (ref 5–15)
AST SERPL-CCNC: 9 U/L (ref 1–32)
BACTERIA SPEC AEROBE CULT: ABNORMAL
BASOPHILS # BLD AUTO: 0.05 10*3/MM3 (ref 0–0.2)
BASOPHILS NFR BLD AUTO: 0.6 % (ref 0–1.5)
BILIRUB CONJ SERPL-MCNC: 0.1 MG/DL (ref 0–0.3)
BILIRUB INDIRECT SERPL-MCNC: 0.2 MG/DL
BILIRUB SERPL-MCNC: 0.3 MG/DL (ref 0–1.2)
BUN SERPL-MCNC: 15 MG/DL (ref 8–23)
BUN/CREAT SERPL: 19.5 (ref 7–25)
CALCIUM SPEC-SCNC: 8.4 MG/DL (ref 8.6–10.5)
CHLORIDE SERPL-SCNC: 100 MMOL/L (ref 98–107)
CO2 SERPL-SCNC: 21.9 MMOL/L (ref 22–29)
CREAT SERPL-MCNC: 0.77 MG/DL (ref 0.57–1)
DEPRECATED RDW RBC AUTO: 57.5 FL (ref 37–54)
EGFRCR SERPLBLD CKD-EPI 2021: 81.1 ML/MIN/1.73
EOSINOPHIL # BLD AUTO: 0.13 10*3/MM3 (ref 0–0.4)
EOSINOPHIL NFR BLD AUTO: 1.5 % (ref 0.3–6.2)
ERYTHROCYTE [DISTWIDTH] IN BLOOD BY AUTOMATED COUNT: 20.9 % (ref 12.3–15.4)
GLUCOSE BLDC GLUCOMTR-MCNC: 104 MG/DL (ref 70–99)
GLUCOSE BLDC GLUCOMTR-MCNC: 110 MG/DL (ref 70–99)
GLUCOSE BLDC GLUCOMTR-MCNC: 112 MG/DL (ref 70–99)
GLUCOSE SERPL-MCNC: 112 MG/DL (ref 65–99)
HCT VFR BLD AUTO: 31.2 % (ref 34–46.6)
HGB BLD-MCNC: 9.4 G/DL (ref 12–15.9)
IMM GRANULOCYTES # BLD AUTO: 0.11 10*3/MM3 (ref 0–0.05)
IMM GRANULOCYTES NFR BLD AUTO: 1.3 % (ref 0–0.5)
INR PPP: 2.65 (ref 0.86–1.15)
LYMPHOCYTES # BLD AUTO: 1.42 10*3/MM3 (ref 0.7–3.1)
LYMPHOCYTES NFR BLD AUTO: 16.6 % (ref 19.6–45.3)
MAGNESIUM SERPL-MCNC: 1.8 MG/DL (ref 1.6–2.4)
MCH RBC QN AUTO: 23.8 PG (ref 26.6–33)
MCHC RBC AUTO-ENTMCNC: 30.1 G/DL (ref 31.5–35.7)
MCV RBC AUTO: 79 FL (ref 79–97)
MONOCYTES # BLD AUTO: 0.81 10*3/MM3 (ref 0.1–0.9)
MONOCYTES NFR BLD AUTO: 9.5 % (ref 5–12)
NEUTROPHILS NFR BLD AUTO: 6.02 10*3/MM3 (ref 1.7–7)
NEUTROPHILS NFR BLD AUTO: 70.5 % (ref 42.7–76)
NRBC BLD AUTO-RTO: 0.2 /100 WBC (ref 0–0.2)
PHOSPHATE SERPL-MCNC: 3.7 MG/DL (ref 2.5–4.5)
PLATELET # BLD AUTO: 155 10*3/MM3 (ref 140–450)
PMV BLD AUTO: 11.2 FL (ref 6–12)
POTASSIUM SERPL-SCNC: 3.7 MMOL/L (ref 3.5–5.2)
PROT SERPL-MCNC: 5.1 G/DL (ref 6–8.5)
PROTHROMBIN TIME: 28.7 SECONDS (ref 11.8–14.9)
RBC # BLD AUTO: 3.95 10*6/MM3 (ref 3.77–5.28)
SODIUM SERPL-SCNC: 134 MMOL/L (ref 136–145)
WBC NRBC COR # BLD AUTO: 8.54 10*3/MM3 (ref 3.4–10.8)

## 2024-11-28 PROCEDURE — 80076 HEPATIC FUNCTION PANEL: CPT | Performed by: FAMILY MEDICINE

## 2024-11-28 PROCEDURE — 82948 REAGENT STRIP/BLOOD GLUCOSE: CPT | Performed by: SURGERY

## 2024-11-28 PROCEDURE — 94760 N-INVAS EAR/PLS OXIMETRY 1: CPT

## 2024-11-28 PROCEDURE — 80048 BASIC METABOLIC PNL TOTAL CA: CPT | Performed by: FAMILY MEDICINE

## 2024-11-28 PROCEDURE — 84100 ASSAY OF PHOSPHORUS: CPT | Performed by: FAMILY MEDICINE

## 2024-11-28 PROCEDURE — 85610 PROTHROMBIN TIME: CPT | Performed by: SURGERY

## 2024-11-28 PROCEDURE — 83735 ASSAY OF MAGNESIUM: CPT | Performed by: FAMILY MEDICINE

## 2024-11-28 PROCEDURE — 94799 UNLISTED PULMONARY SVC/PX: CPT

## 2024-11-28 PROCEDURE — 25010000002 CEFTRIAXONE PER 250 MG: Performed by: FAMILY MEDICINE

## 2024-11-28 PROCEDURE — 99232 SBSQ HOSP IP/OBS MODERATE 35: CPT | Performed by: FAMILY MEDICINE

## 2024-11-28 PROCEDURE — 94664 DEMO&/EVAL PT USE INHALER: CPT

## 2024-11-28 PROCEDURE — 85025 COMPLETE CBC W/AUTO DIFF WBC: CPT | Performed by: FAMILY MEDICINE

## 2024-11-28 PROCEDURE — 82948 REAGENT STRIP/BLOOD GLUCOSE: CPT

## 2024-11-28 RX ADMIN — BUDESONIDE 0.5 MG: 0.5 INHALANT ORAL at 10:57

## 2024-11-28 RX ADMIN — ARFORMOTEROL TARTRATE 15 MCG: 15 SOLUTION RESPIRATORY (INHALATION) at 19:18

## 2024-11-28 RX ADMIN — FOLIC ACID 1 MG: 5 INJECTION, SOLUTION INTRAMUSCULAR; INTRAVENOUS; SUBCUTANEOUS at 08:47

## 2024-11-28 RX ADMIN — WARFARIN SODIUM 5 MG: 2.5 TABLET ORAL at 17:14

## 2024-11-28 RX ADMIN — FUROSEMIDE 40 MG: 40 TABLET ORAL at 08:06

## 2024-11-28 RX ADMIN — Medication 1 TABLET: at 08:07

## 2024-11-28 RX ADMIN — ARFORMOTEROL TARTRATE 15 MCG: 15 SOLUTION RESPIRATORY (INHALATION) at 10:57

## 2024-11-28 RX ADMIN — MIDODRINE HYDROCHLORIDE 5 MG: 2.5 TABLET ORAL at 17:14

## 2024-11-28 RX ADMIN — Medication 473 ML: at 10:28

## 2024-11-28 RX ADMIN — SODIUM CHLORIDE 2000 MG: 9 INJECTION INTRAMUSCULAR; INTRAVENOUS; SUBCUTANEOUS at 08:08

## 2024-11-28 RX ADMIN — Medication 473 ML: at 21:45

## 2024-11-28 RX ADMIN — MIDODRINE HYDROCHLORIDE 5 MG: 2.5 TABLET ORAL at 08:07

## 2024-11-28 RX ADMIN — BUDESONIDE 0.5 MG: 0.5 INHALANT ORAL at 19:19

## 2024-11-28 RX ADMIN — MIDODRINE HYDROCHLORIDE 5 MG: 2.5 TABLET ORAL at 11:58

## 2024-11-28 RX ADMIN — Medication 473 ML: at 04:26

## 2024-11-28 NOTE — PROGRESS NOTES
[facility]   Hospitalist Progress Note  Date: 2024  Patient Name: Inez Doyle  : 1950  MRN: [id number]  Date of admission: 2024      Subjective   Subjective     Chief Complaint: Mental status    Summary: Patient 74-year-old male history of atrial fibrillation on warfarin, CHF, COPD, hypertension, hyperlipidemia, obesity, chronic immobility presents from nursing facility with altered mental status and decreased oral intake found to have large abdominal wound General Surgery plastic surgery evaluated plastic surgery eventually took the patient to the operating room did panniculectomy with wound VAC placement remains remains hospitalized long-term prognosis appears poor consulting palliative care    Interval Followup: 2024    Resting in bed.  No distress.  Not very interactive today this morning.  Answers briefly  Platelets normalized.  White count normal.  Denzel afebrile.  Recent Pro-Jonn 0.45  On room air  /85 to 136/76 range  POD #6 panniculectomy/Prevena wound vac/ELENI drain      Objective   Objective     Vitals:   Temp:  [97.2 °F (36.2 °C)-98.1 °F (36.7 °C)] 97.3 °F (36.3 °C)  Heart Rate:  [] 96  Resp:  [16-18] 16  BP: (100-136)/(69-85) 136/76  Flow (L/min) (Oxygen Therapy):  [0] 0    Physical Exam   Constitutional: Awakens easily, but not oriented.   Respiratory: Clear without wheeze  Cardiovascular: Irregular.  No murmur.  Abdomen wound dressing in place large pannus     Result Review    Result Review:  I have personally reviewed the results from the time of this admission to 2024 12:16 EST and agree with these findings:  [x]  Laboratory  []  Microbiology  []  Radiology  []  EKG/Telemetry   []  Cardiology/Vascular   []  Pathology  []  Old records  []  Other:    Assessment & Plan   Assessment / Plan     Assessment:    Staph epi bacteremia secondary to severe SSTI cellulitis, likely from large abdominal and/or sacral wounds  Multiple skin and soft tissue wounds  less notable for sacral, large lower abdominal pannus exposed wound  Paroxysmal A-fib on coumadin outpatient  Acute toxic and metabolic encephalopathy  COPD on room air baseline  CHF not acutely exacerbated  Polypharmacy may be contributing to confusion as well  History of hypertension  DM2 on metformin outpatient  Morbid obesity BMI of 48.5  Nursing home resident (Soraya Troy)    Plan:    Continue ceftriaxone.  Urine culture in progress  Daily pro time.  INR 2.65.  Pharmacy dosing.  CT head: No acute intracranial abnormality noted.  Return to [facility] when medically stable   Continue to monitor for signs of recurrent infection  Continue with Prevena wound VAC to prevent dehiscence  Continue to monitor hemoglobin  Palliative care consultation long-term prognosis seems very poor  Repeat a.m. labs  PT OT consultation  Further treatment contingent upon her hospital course  Discussed with RN      VTE Prophylaxis:  Pharmacologic & mechanical VTE prophylaxis orders are present.        CODE STATUS:   Medical Intervention Limits: No intubation (DNI)  Code Status (Patient has no pulse and is not breathing): No CPR (Do Not Attempt to Resuscitate)  Medical Interventions (Patient has pulse or is breathing): Limited Support             Attending documentation:  I reviewed the above documentation and independently reviewed and rounded and evaluated the patient and discussed the care plan with MARINO Huber PA-C, I agree with his findings and plan as documented, what I have added to the care plan and modified is as follows in my documentation and my medical decision making; 74-year-old female with history of atrial fibrillation, paroxysmal on long-term anticoagulation with Coumadin, COPD, polypharmacy, hypertension, hospitalized on 11/11/2024 for chief complaint of altered mental status, found to have Staphylococcus epi bacteremia secondary to severe SSTI likely secondary to large abdominal and sacral wounds, general surgery  and plastics consulted, status post abdominal wall debridement, wound VAC placement, acute blood loss anemia, blood replaced, correcting subtherapeutic INR, overall prognosis poor and guarded,  Ongoing confusion.  Workup being pursued for ongoing confusion.  Calix catheter exchange, urinalysis suggestive of UTI.  Interval follow-up: Patient seen and examined this morning, remains confused but mentating better, initially refused IV placement after it fell out, counseled patient to have IV replaced.  She was amenable after our discussion.  Unable to obtain review of systems due to altered mental status on physical exam vitals reviewed, elderly morbidly obese female laying in bed, comfortable, sleepy, diminished breath sounds, regular rate and rhythm, abdominal wound VAC in place with large pannus and ELENI drain on the left side of her abdomen.  Assessment as above with staph epi bacteremia, abdominal wall wound, therapeutic INR on Coumadin with other chronic medical conditions as listed above, plan, continue ceftriaxone while we await urine cultures, replace IV, continue Coumadin based on INR, continue insulin size scale coverage, continue Lasix, Brovana Pulmicort nebs twice daily, a.m. labs, DNR, DVT prophylaxis Coumadin, clinical course dictate further management, discussed with nurse at the bedside.  More than 70 % of the time of this patient's encounter was performed by me, this included face-to-face time, planning and coordinating, medical decision making and critical thinking personally done by me.    Electronically signed by Jas Huerta MD, 11/28/2024, 13:45 EST.    Portions of this documentation were transcribed electronically from a voice recognition software.  I confirm all data accurately represents the service(s) I performed at today's visit.

## 2024-11-28 NOTE — PLAN OF CARE
Goal Outcome Evaluation:  Plan of Care Reviewed With: patient           Outcome Evaluation: Patient alert but confused. On room air. Pain treated per MAR. Calix catheter replaced and urine sample obtained. Wound and skin care completed per orders. ELENI drain site clean, intact. ELENI drain stripped and emptied on shift. Continuing with plan of care

## 2024-11-28 NOTE — PROGRESS NOTES
Pharmacy to Dose: Warfarin  Consulting provider: Bakari   Indication for warfarin: Afib   Goal INR range: 2 - 3  Home warfarin dose:  Restarting warfarin therapy. Patient previously on warfarin  in May, 2024. Recently on apixaban, per note apixaban makes patient feel funny, and would like to restart warfarin  Previous warfarin dose: 4 mg, then 5mg, then 5mg, rotating. Previous admission in April, 2024: average dose of 5.3 mg    Date INR Warfarin Dose Given   11/23 1.14 5 mg   11/24 1.32 5 mg   11/25 1.52 7.5 mg   11/26 1.94 5 mg (per MD)   11/27 2.30 5 mg     11/28 2.65 5 mg                         Any Vitamin K given?: No  Drug interactions Reviewed: Yes.  Is patient on bridging therapy with another anticoagulant?: No    Lab Results   Component Value Date    PLT 77 (L) 11/25/2024    PLT 82 (L) 11/24/2024    HGB 7.1 (L) 11/25/2024    HGB 7.6 (L) 11/24/2024     Plan:  INR reported as 2.65 today.    Warfarin 5 mg daily continues.     INR ordered for tomorrow AM.    Thank you for this consult. Pharmacy will continue to monitor.   Cali Beltran

## 2024-11-28 NOTE — PLAN OF CARE
Goal Outcome Evaluation:              Outcome Evaluation: VSS, Alert to self. ELENI tello to LLQ intact, no output this shift. Calix catheter in place. Patient removed IV and refused new IV at this time.

## 2024-11-28 NOTE — PLAN OF CARE
Goal Outcome Evaluation:              Outcome Evaluation: Patient alert with confusion. ELENI drain to LLQ intact, reinforced, no drainage this shift. Patient poorly eating and drinking. Wound care done per MD order, tolerated well. New IV was placed today. Denies pain or discomfort. Denies wants or needs at this time. Frequently used items with in easy reach.

## 2024-11-28 NOTE — NURSING NOTE
Primary RN messaged RRT nurse to come and assist with IV placement.     Upon entry into the room patient was found to be confused. Patient was not being cooperative with having the IV placed. RRT nurse went and found primary RN to see if she could convince patient into letting us attempt a IV. Patient didn't even want us in the room.     Departed patient room without being able to attempt IV at this time.

## 2024-11-29 LAB
ALBUMIN SERPL-MCNC: 1.8 G/DL (ref 3.5–5.2)
ALP SERPL-CCNC: 137 U/L (ref 39–117)
ALT SERPL W P-5'-P-CCNC: 9 U/L (ref 1–33)
ANION GAP SERPL CALCULATED.3IONS-SCNC: 10.3 MMOL/L (ref 5–15)
AST SERPL-CCNC: 9 U/L (ref 1–32)
BACTERIA SPEC AEROBE CULT: NORMAL
BASOPHILS # BLD AUTO: 0.06 10*3/MM3 (ref 0–0.2)
BASOPHILS NFR BLD AUTO: 0.8 % (ref 0–1.5)
BILIRUB CONJ SERPL-MCNC: 0.1 MG/DL (ref 0–0.3)
BILIRUB INDIRECT SERPL-MCNC: 0.2 MG/DL
BILIRUB SERPL-MCNC: 0.3 MG/DL (ref 0–1.2)
BUN SERPL-MCNC: 16 MG/DL (ref 8–23)
BUN/CREAT SERPL: 22.5 (ref 7–25)
CALCIUM SPEC-SCNC: 8.1 MG/DL (ref 8.6–10.5)
CHLORIDE SERPL-SCNC: 104 MMOL/L (ref 98–107)
CO2 SERPL-SCNC: 21.7 MMOL/L (ref 22–29)
CREAT SERPL-MCNC: 0.71 MG/DL (ref 0.57–1)
DEPRECATED RDW RBC AUTO: 59.1 FL (ref 37–54)
EGFRCR SERPLBLD CKD-EPI 2021: 89.4 ML/MIN/1.73
EOSINOPHIL # BLD AUTO: 0.14 10*3/MM3 (ref 0–0.4)
EOSINOPHIL NFR BLD AUTO: 2 % (ref 0.3–6.2)
ERYTHROCYTE [DISTWIDTH] IN BLOOD BY AUTOMATED COUNT: 21.2 % (ref 12.3–15.4)
GLUCOSE BLDC GLUCOMTR-MCNC: 110 MG/DL (ref 70–99)
GLUCOSE BLDC GLUCOMTR-MCNC: 118 MG/DL (ref 70–99)
GLUCOSE BLDC GLUCOMTR-MCNC: 121 MG/DL (ref 70–99)
GLUCOSE BLDC GLUCOMTR-MCNC: 129 MG/DL (ref 70–99)
GLUCOSE SERPL-MCNC: 108 MG/DL (ref 65–99)
HCT VFR BLD AUTO: 29.8 % (ref 34–46.6)
HGB BLD-MCNC: 8.9 G/DL (ref 12–15.9)
IMM GRANULOCYTES # BLD AUTO: 0.09 10*3/MM3 (ref 0–0.05)
IMM GRANULOCYTES NFR BLD AUTO: 1.3 % (ref 0–0.5)
INR PPP: 3.27 (ref 0.86–1.15)
LYMPHOCYTES # BLD AUTO: 1.31 10*3/MM3 (ref 0.7–3.1)
LYMPHOCYTES NFR BLD AUTO: 18.4 % (ref 19.6–45.3)
MAGNESIUM SERPL-MCNC: 1.8 MG/DL (ref 1.6–2.4)
MCH RBC QN AUTO: 24 PG (ref 26.6–33)
MCHC RBC AUTO-ENTMCNC: 29.9 G/DL (ref 31.5–35.7)
MCV RBC AUTO: 80.3 FL (ref 79–97)
MONOCYTES # BLD AUTO: 0.73 10*3/MM3 (ref 0.1–0.9)
MONOCYTES NFR BLD AUTO: 10.3 % (ref 5–12)
NEUTROPHILS NFR BLD AUTO: 4.79 10*3/MM3 (ref 1.7–7)
NEUTROPHILS NFR BLD AUTO: 67.2 % (ref 42.7–76)
NRBC BLD AUTO-RTO: 0 /100 WBC (ref 0–0.2)
PHOSPHATE SERPL-MCNC: 3.5 MG/DL (ref 2.5–4.5)
PLATELET # BLD AUTO: 157 10*3/MM3 (ref 140–450)
PMV BLD AUTO: 10.7 FL (ref 6–12)
POTASSIUM SERPL-SCNC: 3.5 MMOL/L (ref 3.5–5.2)
PROT SERPL-MCNC: 4.9 G/DL (ref 6–8.5)
PROTHROMBIN TIME: 33.8 SECONDS (ref 11.8–14.9)
RBC # BLD AUTO: 3.71 10*6/MM3 (ref 3.77–5.28)
SODIUM SERPL-SCNC: 136 MMOL/L (ref 136–145)
WBC NRBC COR # BLD AUTO: 7.12 10*3/MM3 (ref 3.4–10.8)

## 2024-11-29 PROCEDURE — 94799 UNLISTED PULMONARY SVC/PX: CPT

## 2024-11-29 PROCEDURE — 84100 ASSAY OF PHOSPHORUS: CPT | Performed by: FAMILY MEDICINE

## 2024-11-29 PROCEDURE — 83735 ASSAY OF MAGNESIUM: CPT | Performed by: FAMILY MEDICINE

## 2024-11-29 PROCEDURE — 80048 BASIC METABOLIC PNL TOTAL CA: CPT | Performed by: FAMILY MEDICINE

## 2024-11-29 PROCEDURE — 99232 SBSQ HOSP IP/OBS MODERATE 35: CPT | Performed by: FAMILY MEDICINE

## 2024-11-29 PROCEDURE — 85610 PROTHROMBIN TIME: CPT | Performed by: SURGERY

## 2024-11-29 PROCEDURE — 82948 REAGENT STRIP/BLOOD GLUCOSE: CPT | Performed by: SURGERY

## 2024-11-29 PROCEDURE — 94664 DEMO&/EVAL PT USE INHALER: CPT

## 2024-11-29 PROCEDURE — 80076 HEPATIC FUNCTION PANEL: CPT | Performed by: FAMILY MEDICINE

## 2024-11-29 PROCEDURE — 82948 REAGENT STRIP/BLOOD GLUCOSE: CPT

## 2024-11-29 PROCEDURE — 85025 COMPLETE CBC W/AUTO DIFF WBC: CPT | Performed by: FAMILY MEDICINE

## 2024-11-29 PROCEDURE — 25010000002 CEFTRIAXONE PER 250 MG: Performed by: FAMILY MEDICINE

## 2024-11-29 RX ADMIN — SERTRALINE HYDROCHLORIDE 25 MG: 25 TABLET ORAL at 08:39

## 2024-11-29 RX ADMIN — MIDODRINE HYDROCHLORIDE 5 MG: 2.5 TABLET ORAL at 11:02

## 2024-11-29 RX ADMIN — ARFORMOTEROL TARTRATE 15 MCG: 15 SOLUTION RESPIRATORY (INHALATION) at 10:00

## 2024-11-29 RX ADMIN — FUROSEMIDE 40 MG: 40 TABLET ORAL at 08:39

## 2024-11-29 RX ADMIN — SODIUM CHLORIDE 2000 MG: 9 INJECTION INTRAMUSCULAR; INTRAVENOUS; SUBCUTANEOUS at 08:39

## 2024-11-29 RX ADMIN — FOLIC ACID 1 MG: 5 INJECTION, SOLUTION INTRAMUSCULAR; INTRAVENOUS; SUBCUTANEOUS at 11:02

## 2024-11-29 RX ADMIN — Medication 473 ML: at 21:05

## 2024-11-29 RX ADMIN — Medication 473 ML: at 11:03

## 2024-11-29 RX ADMIN — Medication 1 TABLET: at 08:40

## 2024-11-29 RX ADMIN — ARFORMOTEROL TARTRATE 15 MCG: 15 SOLUTION RESPIRATORY (INHALATION) at 19:11

## 2024-11-29 RX ADMIN — BUDESONIDE 0.5 MG: 0.5 INHALANT ORAL at 19:11

## 2024-11-29 RX ADMIN — Medication 10 ML: at 08:42

## 2024-11-29 RX ADMIN — MIDODRINE HYDROCHLORIDE 5 MG: 2.5 TABLET ORAL at 08:39

## 2024-11-29 RX ADMIN — ACETAMINOPHEN 650 MG: 325 TABLET ORAL at 20:16

## 2024-11-29 RX ADMIN — MIDODRINE HYDROCHLORIDE 5 MG: 2.5 TABLET ORAL at 17:36

## 2024-11-29 RX ADMIN — BUDESONIDE 0.5 MG: 0.5 INHALANT ORAL at 10:00

## 2024-11-29 NOTE — PLAN OF CARE
Goal Outcome Evaluation:  Plan of Care Reviewed With: patient        Progress: no change  Outcome Evaluation: pt alert to self. no complaints of pain or nasuea; tolerating diet well.

## 2024-11-29 NOTE — PROGRESS NOTES
[facility]   Hospitalist Progress Note  Date: 2024  Patient Name: Inez Doyle  : 1950  MRN: [id number]  Date of admission: 2024      Subjective   Subjective     Chief Complaint: Mental status    Summary: Patient 74-year-old male history of atrial fibrillation on warfarin, CHF, COPD, hypertension, hyperlipidemia, obesity, chronic immobility presents from nursing facility with altered mental status and decreased oral intake found to have large abdominal wound General Surgery plastic surgery evaluated plastic surgery eventually took the patient to the operating room did panniculectomy with wound VAC placement remains remains hospitalized long-term prognosis appears poor consulting palliative care    Interval Followup: 2024    Watching TV.  Much more alert and conversational today.  Having appropriate discussion.  She reports abdominal discomfort is reasonably managed at this time  On room air  INR 3.27  POD #7 panniculectomy/Prevena wound vac/ELENI drain      Objective   Objective     Vitals:   Temp:  [97.5 °F (36.4 °C)-98.6 °F (37 °C)] 98.6 °F (37 °C)  Heart Rate:  [] 134  Resp:  [16] 16  BP: ()/(59-84) 132/74    Physical Exam   Constitutional: Pleasant, polite.  Calm mood.  Interactive.  Respiratory: Clear without wheeze  Cardiovascular: Irregular.  No murmur.  Abdomen wound dressing in place.  Large pannus     Result Review    Result Review:  I have personally reviewed the results from the time of this admission to 2024 11:16 EST and agree with these findings:  [x]  Laboratory  []  Microbiology  []  Radiology  []  EKG/Telemetry   []  Cardiology/Vascular   []  Pathology  []  Old records  []  Other:    Assessment & Plan   Assessment / Plan     Assessment:    Staph epi bacteremia secondary to severe SSTI cellulitis, likely from large abdominal and/or sacral wounds  Multiple skin and soft tissue wounds less notable for sacral, large lower abdominal pannus exposed  wound  Paroxysmal A-fib (on coumadin)  Acute toxic and metabolic encephalopathy, improving waxing/waning  COPD (on room air baseline)  CHF, stable  Polypharmacy, likely contributing factor to intermittent confusion  HTN  DM2 (on metformin)  Morbid obesity BMI of 48.5  Nursing home resident ([place])    Plan:    Continue ceftriaxone.  Urine culture in progress ---> mixed nicanor isolated  Daily pro time.  Target INR 2-3.  Pharmacy dosing.  CT head: No acute intracranial abnormality noted.  Return to [place] when medically stable   Continue to monitor for signs of recurrent infection  Wound care per surgeon.  High risk for dehiscence.  Post op Prevena  Continue to monitor hemoglobin  Palliative care consultation long-term prognosis seems very poor  PT OT consultation  Further treatment contingent upon her hospital course  Discussed with RN      VTE Prophylaxis:  Pharmacologic & mechanical VTE prophylaxis orders are present.        CODE STATUS:   Medical Intervention Limits: No intubation (DNI)  Code Status (Patient has no pulse and is not breathing): No CPR (Do Not Attempt to Resuscitate)  Medical Interventions (Patient has pulse or is breathing): Limited Support         Attending documentation:  I reviewed the above documentation and independently reviewed and rounded and evaluated the patient and discussed the care plan with MARINO Huber PA-C, I agree with his findings and plan as documented, what I have added to the care plan and modified is as follows in my documentation and my medical decision making; 74-year-old female with history of atrial fibrillation, paroxysmal on long-term anticoagulation with Coumadin, COPD, polypharmacy, hypertension, hospitalized on 11/11/2024 for chief complaint of altered mental status, found to have Staphylococcus epi bacteremia secondary to severe SSTI likely secondary to large abdominal and sacral wounds, general surgery and plastics consulted, status post abdominal wall  debridement, wound VAC placement, acute blood loss anemia, blood replaced, correcting subtherapeutic INR, overall prognosis poor and guarded,  Ongoing confusion.  Workup being pursued for ongoing confusion.  Calix catheter exchange, urinalysis suggestive of UTI.  Interval follow-up: Patient seen and examined this morning, remains confused, mentation waxes and wanes which seems to be her baseline.  White blood cell count 6000, creatinine 0.99, potassium 4.2, sodium 140 unable to obtain review of systems due to altered mental status on physical exam vitals reviewed, elderly morbidly obese female laying in bed, comfortable, alert, diminished breath sounds, regular rate and rhythm, abdominal wound VAC in place with large pannus and ELENI drain on the left side of her abdomen.  Assessment as above with staph epi bacteremia, abdominal wall wound, therapeutic INR on Coumadin with other chronic medical conditions as listed above, plan, to finish 5 days of ceftriaxone, hold Coumadin based on INR, will resume Coumadin tomorrow if INR permits, continue insulin size scale coverage, continue Lasix, Brovana Pulmicort nebs twice daily, a.m. labs, DNR, DVT prophylaxis Coumadin, clinical course dictate further management, discussed with nurse at the bedside.  More than 65 % of the time of this patient's encounter was performed by me, this included face-to-face time, planning and coordinating, medical decision making and critical thinking personally done by me.  Electronically signed by Jas Huerta MD, 11/29/2024, 12:21 EST.    Portions of this documentation were transcribed electronically from a voice recognition software.  I confirm all data accurately represents the service(s) I performed at today's visit.

## 2024-11-29 NOTE — PROGRESS NOTES
Pharmacy to Dose: Warfarin  Consulting provider: Bakari   Indication for warfarin: Afib   Goal INR range: 2 - 3  Home warfarin dose:  Restarting warfarin therapy. Patient previously on warfarin  in May, 2024. Recently on apixaban, per note apixaban makes patient feel funny, and would like to restart warfarin  Previous warfarin dose: 4 mg, then 5mg, then 5mg, rotating. Previous admission in April, 2024: average dose of 5.3 mg    Date INR Warfarin Dose Given   11/23 1.14 5 mg   11/24 1.32 5 mg   11/25 1.52 7.5 mg   11/26 1.94 5 mg (per MD)   11/27 2.30 5 mg     11/28 2.65 5 mg   11/29 3.27 Held by MD                    Any Vitamin K given?: No  Drug interactions Reviewed: Yes.  Is patient on bridging therapy with another anticoagulant?: No    Lab Results   Component Value Date    PLT 77 (L) 11/25/2024    PLT 82 (L) 11/24/2024    HGB 7.1 (L) 11/25/2024    HGB 7.6 (L) 11/24/2024     Plan:  INR reported as 3.27 today.    Warfarin placed on hold by MD.    INR ordered for tomorrow AM.    Thank you for this consult. Pharmacy will continue to monitor.   Cali Beltran

## 2024-11-29 NOTE — SIGNIFICANT NOTE
Primary RN reported concerns regarding presentation of abdominal incision. Abdominal incision assessed and noted to have intermittent area of necrosis with a moderate amount seropurulent drainage expressed while abdomen was lifted to assess. A majority of her pannus above the incision line is firm. No odor was noted to drainage. Primary RN reported patient removed dressings prior to check-in. Plastic surgeon notified of findings.     Right aspect of abdomen     Medial aspect of abdomen     Left aspect of abdomen

## 2024-11-29 NOTE — PLAN OF CARE
Goal Outcome Evaluation:   Pt slept most of the night. Sacral dressing was changed no other changes noted.

## 2024-11-30 LAB
ALBUMIN SERPL-MCNC: 1.9 G/DL (ref 3.5–5.2)
ALP SERPL-CCNC: 145 U/L (ref 39–117)
ALT SERPL W P-5'-P-CCNC: 9 U/L (ref 1–33)
ANION GAP SERPL CALCULATED.3IONS-SCNC: 14.2 MMOL/L (ref 5–15)
AST SERPL-CCNC: 10 U/L (ref 1–32)
BASOPHILS # BLD AUTO: 0.04 10*3/MM3 (ref 0–0.2)
BASOPHILS NFR BLD AUTO: 0.6 % (ref 0–1.5)
BILIRUB CONJ SERPL-MCNC: 0.1 MG/DL (ref 0–0.3)
BILIRUB INDIRECT SERPL-MCNC: 0.2 MG/DL
BILIRUB SERPL-MCNC: 0.3 MG/DL (ref 0–1.2)
BUN SERPL-MCNC: 18 MG/DL (ref 8–23)
BUN/CREAT SERPL: 25 (ref 7–25)
CALCIUM SPEC-SCNC: 8.1 MG/DL (ref 8.6–10.5)
CHLORIDE SERPL-SCNC: 101 MMOL/L (ref 98–107)
CO2 SERPL-SCNC: 21.8 MMOL/L (ref 22–29)
CREAT SERPL-MCNC: 0.72 MG/DL (ref 0.57–1)
DEPRECATED RDW RBC AUTO: 58.1 FL (ref 37–54)
EGFRCR SERPLBLD CKD-EPI 2021: 87.9 ML/MIN/1.73
EOSINOPHIL # BLD AUTO: 0.11 10*3/MM3 (ref 0–0.4)
EOSINOPHIL NFR BLD AUTO: 1.7 % (ref 0.3–6.2)
ERYTHROCYTE [DISTWIDTH] IN BLOOD BY AUTOMATED COUNT: 20.9 % (ref 12.3–15.4)
GLUCOSE BLDC GLUCOMTR-MCNC: 108 MG/DL (ref 70–99)
GLUCOSE BLDC GLUCOMTR-MCNC: 118 MG/DL (ref 70–99)
GLUCOSE SERPL-MCNC: 113 MG/DL (ref 65–99)
HCT VFR BLD AUTO: 29.3 % (ref 34–46.6)
HGB BLD-MCNC: 8.7 G/DL (ref 12–15.9)
IMM GRANULOCYTES # BLD AUTO: 0.1 10*3/MM3 (ref 0–0.05)
IMM GRANULOCYTES NFR BLD AUTO: 1.6 % (ref 0–0.5)
INR PPP: 3.35 (ref 0.86–1.15)
LYMPHOCYTES # BLD AUTO: 1.25 10*3/MM3 (ref 0.7–3.1)
LYMPHOCYTES NFR BLD AUTO: 19.7 % (ref 19.6–45.3)
MAGNESIUM SERPL-MCNC: 1.7 MG/DL (ref 1.6–2.4)
MCH RBC QN AUTO: 23.5 PG (ref 26.6–33)
MCHC RBC AUTO-ENTMCNC: 29.7 G/DL (ref 31.5–35.7)
MCV RBC AUTO: 79 FL (ref 79–97)
MONOCYTES # BLD AUTO: 0.59 10*3/MM3 (ref 0.1–0.9)
MONOCYTES NFR BLD AUTO: 9.3 % (ref 5–12)
NEUTROPHILS NFR BLD AUTO: 4.27 10*3/MM3 (ref 1.7–7)
NEUTROPHILS NFR BLD AUTO: 67.1 % (ref 42.7–76)
NRBC BLD AUTO-RTO: 0 /100 WBC (ref 0–0.2)
PHOSPHATE SERPL-MCNC: 2.9 MG/DL (ref 2.5–4.5)
PLATELET # BLD AUTO: 150 10*3/MM3 (ref 140–450)
PMV BLD AUTO: 10.5 FL (ref 6–12)
POTASSIUM SERPL-SCNC: 3.3 MMOL/L (ref 3.5–5.2)
PROT SERPL-MCNC: 5.1 G/DL (ref 6–8.5)
PROTHROMBIN TIME: 34.4 SECONDS (ref 11.8–14.9)
RBC # BLD AUTO: 3.71 10*6/MM3 (ref 3.77–5.28)
SODIUM SERPL-SCNC: 137 MMOL/L (ref 136–145)
WBC NRBC COR # BLD AUTO: 6.36 10*3/MM3 (ref 3.4–10.8)

## 2024-11-30 PROCEDURE — 94799 UNLISTED PULMONARY SVC/PX: CPT

## 2024-11-30 PROCEDURE — 25010000002 CEFTRIAXONE PER 250 MG: Performed by: PHYSICIAN ASSISTANT

## 2024-11-30 PROCEDURE — 99232 SBSQ HOSP IP/OBS MODERATE 35: CPT | Performed by: FAMILY MEDICINE

## 2024-11-30 PROCEDURE — 94664 DEMO&/EVAL PT USE INHALER: CPT

## 2024-11-30 PROCEDURE — 85610 PROTHROMBIN TIME: CPT | Performed by: SURGERY

## 2024-11-30 PROCEDURE — 25010000002 CEFTRIAXONE PER 250 MG: Performed by: FAMILY MEDICINE

## 2024-11-30 PROCEDURE — 85025 COMPLETE CBC W/AUTO DIFF WBC: CPT | Performed by: FAMILY MEDICINE

## 2024-11-30 PROCEDURE — 84100 ASSAY OF PHOSPHORUS: CPT | Performed by: FAMILY MEDICINE

## 2024-11-30 PROCEDURE — 80048 BASIC METABOLIC PNL TOTAL CA: CPT | Performed by: FAMILY MEDICINE

## 2024-11-30 PROCEDURE — 83735 ASSAY OF MAGNESIUM: CPT | Performed by: FAMILY MEDICINE

## 2024-11-30 PROCEDURE — 80076 HEPATIC FUNCTION PANEL: CPT | Performed by: FAMILY MEDICINE

## 2024-11-30 PROCEDURE — 82948 REAGENT STRIP/BLOOD GLUCOSE: CPT | Performed by: SURGERY

## 2024-11-30 RX ORDER — DOCUSATE SODIUM 100 MG/1
100 CAPSULE, LIQUID FILLED ORAL 2 TIMES DAILY
Status: DISCONTINUED | OUTPATIENT
Start: 2024-11-30 | End: 2024-12-10 | Stop reason: HOSPADM

## 2024-11-30 RX ADMIN — DOCUSATE SODIUM 100 MG: 100 CAPSULE, LIQUID FILLED ORAL at 20:55

## 2024-11-30 RX ADMIN — Medication 473 ML: at 23:54

## 2024-11-30 RX ADMIN — FOLIC ACID 1 MG: 5 INJECTION, SOLUTION INTRAMUSCULAR; INTRAVENOUS; SUBCUTANEOUS at 09:24

## 2024-11-30 RX ADMIN — BUDESONIDE 0.5 MG: 0.5 INHALANT ORAL at 06:39

## 2024-11-30 RX ADMIN — ARFORMOTEROL TARTRATE 15 MCG: 15 SOLUTION RESPIRATORY (INHALATION) at 06:39

## 2024-11-30 RX ADMIN — Medication 473 ML: at 09:25

## 2024-11-30 RX ADMIN — MIDODRINE HYDROCHLORIDE 5 MG: 2.5 TABLET ORAL at 17:15

## 2024-11-30 RX ADMIN — Medication 10 ML: at 09:24

## 2024-11-30 RX ADMIN — BUDESONIDE 0.5 MG: 0.5 INHALANT ORAL at 20:25

## 2024-11-30 RX ADMIN — SODIUM CHLORIDE 2000 MG: 9 INJECTION INTRAMUSCULAR; INTRAVENOUS; SUBCUTANEOUS at 14:13

## 2024-11-30 RX ADMIN — ARFORMOTEROL TARTRATE 15 MCG: 15 SOLUTION RESPIRATORY (INHALATION) at 20:25

## 2024-11-30 RX ADMIN — MIDODRINE HYDROCHLORIDE 5 MG: 2.5 TABLET ORAL at 12:06

## 2024-11-30 RX ADMIN — Medication 10 ML: at 20:55

## 2024-11-30 RX ADMIN — DOCUSATE SODIUM 100 MG: 100 CAPSULE, LIQUID FILLED ORAL at 12:06

## 2024-11-30 NOTE — PROGRESS NOTES
[facility]   Hospitalist Progress Note  Date: 2024  Patient Name: Inez Doyle  : 1950  MRN: [id number]  Date of admission: 2024      Subjective   Subjective     Chief Complaint: Mental status    Summary: Patient 74-year-old male history of atrial fibrillation on warfarin, CHF, COPD, hypertension, hyperlipidemia, obesity, chronic immobility presents from nursing facility with altered mental status and decreased oral intake found to have large abdominal wound General Surgery plastic surgery evaluated plastic surgery eventually took the patient to the operating room did panniculectomy with wound VAC placement remains remains hospitalized long-term prognosis appears poor consulting palliative care    Interval Followup: 2024    Alert.  Normal conversation.  Patient smiling and joking.  Very interactive.  Apparently was agitated with staff this morning.  Refused Rocephin.  Patient would like less vital signs/interruptions.  Will reduce vital sign frequency.  Discontinue ACHS Accu-Cheks since she has not been using any insulin for days.  No fevers  Tolerating antibiotics  Postop day #8  Pharmacy dosing warfarin.  INR 3.3      Objective   Objective     Vitals:   Temp:  [97.3 °F (36.3 °C)-98.1 °F (36.7 °C)] 97.9 °F (36.6 °C)  Heart Rate:  [] 92  Resp:  [16-22] 20  BP: (100-132)/(53-75) 117/53    Physical Exam   Constitutional: Pleasant, polite.  Calm mood.  Interactive.  Respiratory: Clear without wheeze  Cardiovascular: Irregular.  No murmur.  Abdomen wound dressing in place.  Large pannus     Result Review    Result Review:  I have personally reviewed the results from the time of this admission to 2024 10:21 EST and agree with these findings:  [x]  Laboratory  []  Microbiology  []  Radiology  []  EKG/Telemetry   []  Cardiology/Vascular   []  Pathology  []  Old records  []  Other:    Assessment & Plan   Assessment / Plan     Assessment:    Staph epi bacteremia secondary to  severe SSTI cellulitis, likely from large abdominal and/or sacral wounds  Multiple skin and soft tissue wounds less notable for sacral, large lower abdominal pannus exposed wound  Paroxysmal A-fib (on coumadin)  Acute toxic and metabolic encephalopathy, improving waxing/waning  COPD (on room air baseline)  CHF, stable  Polypharmacy, likely contributing factor to intermittent confusion  HTN  DM2 (on metformin)  Morbid obesity BMI of 48.5  Nursing home resident ([place])    Plan:    Has not  required sliding scale insulin in 1 week.  Will DC SSI including Accu-Cheks.  Also minimize interruptions, reduce frequency of vital signs from q 4 to every shift.  Continue ceftriaxone.  Urine culture in progress ---> mixed nicanor isolated  Daily pro time.  Target INR 2-3.  Pharmacy dosing.  CT head: No acute intracranial abnormality noted.  Return to [place] when medically stable   Continue to monitor for signs of recurrent infection  Wound care per surgeon.  High risk for dehiscence.  Post op Prevena  Continue to monitor hemoglobin  Palliative care consultation long-term prognosis seems very poor  PT OT consultation  Further treatment contingent upon her hospital course  Discussed with RN      VTE Prophylaxis:  Pharmacologic & mechanical VTE prophylaxis orders are present.        CODE STATUS:   Medical Intervention Limits: No intubation (DNI)  Code Status (Patient has no pulse and is not breathing): No CPR (Do Not Attempt to Resuscitate)  Medical Interventions (Patient has pulse or is breathing): Limited Support         Attending documentation:  I reviewed the above documentation and independently reviewed and rounded and evaluated the patient and discussed the care plan with MARINO Huber PA-C, I agree with his findings and plan as documented, what I have added to the care plan and modified is as follows in my documentation and my medical decision making; 74-year-old female with history of atrial fibrillation,  paroxysmal on long-term anticoagulation with Coumadin, COPD, polypharmacy, hypertension, hospitalized on 11/11/2024 for chief complaint of altered mental status, found to have Staphylococcus epi bacteremia secondary to severe SSTI likely secondary to large abdominal and sacral wounds, general surgery and plastics consulted, status post abdominal wall debridement, wound VAC placement, acute blood loss anemia, blood replaced, correcting subtherapeutic INR, overall prognosis poor and guarded,  Ongoing confusion.  Workup being pursued for ongoing confusion.  Calix catheter exchange, urinalysis suggestive of UTI.  Interval follow-up: Patient seen and examined this morning, remains confused, snoring, mentation continues to wax and wane, needs stool softener.  White blood cell count 6000, hemoglobin 8.7, INR remains supratherapeutic at 3.3, no signs of bleeding, creatinine 0.72, potassium 3.3, sodium 137.  Unable to obtain review of systems due to altered mental status on physical exam vitals reviewed, elderly morbidly obese female laying in bed, comfortable, alert, diminished breath sounds, regular rate and rhythm, abdominal wound VAC in place with large pannus and ELENI drain on the left side of her abdomen.  Assessment as above with staph epi bacteremia, abdominal wall wound, therapeutic INR on Coumadin with other chronic medical conditions as listed above, plan, to finish 5 days of ceftriaxone, hold Coumadin based on INR still elevated, will resume Coumadin tomorrow if INR permits, continue insulin size scale coverage, continue Lasix, Brovana Pulmicort nebs twice daily, a.m. labs, DNR, DVT prophylaxis Coumadin, clinical course dictate further management, discussed with nurse at the bedside.  More than 70 % of the time of this patient's encounter was performed by me, this included face-to-face time, planning and coordinating, medical decision making and critical thinking personally done by me.    Electronically signed by  Jas Huerta MD, 11/30/2024, 10:21 EST.    Portions of this documentation were transcribed electronically from a voice recognition software.  I confirm all data accurately represents the service(s) I performed at today's visit.

## 2024-11-30 NOTE — PROGRESS NOTES
"Nutrition Services    Patient Name: Inez Doyle  YOB: 1950  MRN: [id number]  Admission date: 11/11/2024      CLINICAL NUTRITION ASSESSMENT      Reason for Assessment  Follow Up     H&P:  Past Medical History:   Diagnosis Date    A-fib     Anemia     Anxiety     Asthma     Candidiasis of skin and nail     CHF (congestive heart failure)     COPD (chronic obstructive pulmonary disease)     Depression     Diabetes mellitus     GERD (gastroesophageal reflux disease)     Hyperlipidemia     Hypertension     Hypokalemia     Hypomagnesemia     Intervertebral disc disease     Obesity     Osteoarthritis     Panniculitis     Sleep apnea     Vitamin D deficiency         Current Problems:   Active Hospital Problems    Diagnosis     **AMS (altered mental status)     Severe protein-calorie malnutrition     Chronic wound infection of abdomen         Nutrition/Diet History         Narrative   Documented intake varies, most meals past few day 75%. Pt reports that she does not like Boost, wants it discontinued. Will continue William in hopes of helping with wound healing.       Anthropometrics        Current Height, Weight Height: 167.6 cm (66\")  Weight: (!) 136 kg (300 lb 4.3 oz)   Current BMI Body mass index is 48.46 kg/m².   BMI Classification Obese Class III   % %, IBW 59 kg   Adjusted Body Weight (ABW) 79 kg   Weight Hx  Wt Readings from Last 30 Encounters:   11/21/24 0632 (!) 136 kg (300 lb 4.3 oz)   11/11/24 1513 (!) 137 kg (301 lb 2.4 oz)   11/11/24 1054 123 kg (270 lb 8.1 oz)   10/21/24 1551 131 kg (289 lb 11 oz)   04/22/24 1525 (!) 179 kg (394 lb 6.5 oz)   07/18/23 1446 (!) 151 kg (333 lb)   06/19/23 0500 (!) 159 kg (350 lb 8.5 oz)   06/18/23 0315 (!) 163 kg (360 lb 3.7 oz)   06/14/23 1935 (!) 177 kg (389 lb 8.9 oz)   06/14/23 1321 (!) 177 kg (389 lb 8.9 oz)          Wt Change Observation Per EMR, wt down 93# (24%) in < 1 yr; significant     Estimated/Assessed Needs  Estimated Needs based on: Adjusted " "Body Weight 79 kg       Energy Requirements 25-35 kcal/kg   EST Needs (kcal/day) 8547-0798 kcal        Protein Requirements 1.5-2.5 g/kg   EST Daily Needs (g/day) 119-200 g       Fluid Requirements 25-30 mL/kg    Estimated Needs (mL/day) 3100-7227 mL     Labs/Medications Reviewed         Pertinent Labs Decrease noted in K+ and phos, Hgb also trending down. Others reviewed.   Results from last 7 days   Lab Units 11/30/24  0532 11/29/24 0437 11/28/24  0446   SODIUM mmol/L 137 136 134*   POTASSIUM mmol/L 3.3* 3.5 3.7   CHLORIDE mmol/L 101 104 100   CO2 mmol/L 21.8* 21.7* 21.9*   BUN mg/dL 18 16 15   CREATININE mg/dL 0.72 0.71 0.77   CALCIUM mg/dL 8.1* 8.1* 8.4*   BILIRUBIN mg/dL 0.3 0.3 0.3   ALK PHOS U/L 145* 137* 139*   ALT (SGPT) U/L 9 9 10   AST (SGOT) U/L 10 9 9   GLUCOSE mg/dL 113* 108* 112*     Results from last 7 days   Lab Units 11/30/24  0532 11/29/24 0437 11/28/24  0446   MAGNESIUM mg/dL 1.7 1.8 1.8   PHOSPHORUS mg/dL 2.9 3.5 3.7   HEMOGLOBIN g/dL 8.7* 8.9* 9.4*   HEMATOCRIT % 29.3* 29.8* 31.2*     SARS-CoV-2, ROB   Date Value Ref Range Status   08/23/2024 Negative Negative Final     No results found for: \"HGBA1C\"      Pertinent Medications Lasix- may deplete potassium, pt getting an MVM. Others reviewed.     Malnutrition Severity Assessment      Patient meets criteria for : Severe Malnutrition         Nutrition Diagnosis         Nutrition Dx Problem 1 Severe malnutrition related to decreased ability to consume sufficient energy, wound healing as evidenced by inadequate energy intake., decreased appetite., unintended weight change., and impaired skin integrity.     Nutrition Intervention           Current Nutrition Orders & Evaluation of Intake       Current PO Diet Diet: Regular/House; Texture: Mechanical Ground (NDD 2); Fluid Consistency: Thin (IDDSI 0)   Supplement Orders Placed This Encounter      Dietary Nutrition Supplements William      Dietary Nutrition Supplements Boost Glucose Control (Glucerna " Mike           Nutrition Intervention/Prescription        Continue current House diet as ordered  Continue Mechanical Ground texture per SLP  Continue William BID  Discontinue Boost GC        Medical Nutrition Therapy/Nutrition Education          Learner     Readiness Patient  Accepted     Method     Response Explanation  Verbalized understanding, needs reinforcement     Monitor/Evaluation        Monitor PO intake, Supplement intake, Pertinent labs, Skin status, POC/GOC     Nutrition Discharge Plan         To be determined     Electronically signed by:  Nasima Guy RD  11/30/24 14:25 EST

## 2024-11-30 NOTE — PLAN OF CARE
Goal Outcome Evaluation:  Plan of Care Reviewed With: patient        Progress: no change  Outcome Evaluation: pt alert to self this AM, but alert x3 intermittently. complaints of pain while turning. wound care provided per order.

## 2024-11-30 NOTE — SIGNIFICANT NOTE
2 staples were already loose on borders removed along left side, opening 3cm deep and tunnels at 10oclock 7cm, mid incision has discoloration present but no drainage when palpated, staples intact but appears under tension and may slough/open later, right incisional area with active drainage, 3 staples removed with depth 5.5cm and tunnels 10cm at 2oclock thick yellow drainage present but no odor detected. ELENI drain was not compressed attempted to compress but does not maintain suction, thick brown seropurulent drainage present in drain approximately 15ml at time of staple removal .  Dakins moist plain packing strips suggested to RN as they were in room and prepping to do rest of wound care and per prior discussion with MD.

## 2024-11-30 NOTE — PROGRESS NOTES
Pharmacy to Dose: Warfarin  Consulting provider: Bakari   Indication for warfarin: Afib   Goal INR range: 2 - 3  Home warfarin dose:  Restarting warfarin therapy. Patient previously on warfarin  in May, 2024. Recently on apixaban, per note apixaban makes patient feel funny, and would like to restart warfarin  Previous warfarin dose: 4 mg, then 5mg, then 5mg, rotating. Previous admission in April, 2024: average dose of 5.3 mg    Date INR Warfarin Dose Given   11/23 1.14 5 mg   11/24 1.32 5 mg   11/25 1.52 7.5 mg   11/26 1.94 5 mg (per MD)   11/27 2.30 5 mg     11/28 2.65 5 mg   11/29 3.27 Held by MD   11/30 3.35 Held by MD               Any Vitamin K given?: No  Drug interactions Reviewed: Yes.  Is patient on bridging therapy with another anticoagulant?: No    Lab Results   Component Value Date     11/30/2024     11/29/2024    HGB 8.7 (L) 11/30/2024    HGB 8.9 (L) 11/29/2024     Plan:  INR 3.35, currently supratherapeutic.  Warfarin on hold per MD.   INR ordered daily with AM labs.     Thank you for this consult. Pharmacy will continue to monitor.   Megan Valderrama RPH

## 2024-11-30 NOTE — PLAN OF CARE
Goal Outcome Evaluation:   Wound care made changes to abd incision site adding two sites of pack after removing the mahamed.  Pt slept well and other dressings were changes. No other changes noted during shift.

## 2024-12-01 LAB
ALBUMIN SERPL-MCNC: 2.1 G/DL (ref 3.5–5.2)
ALP SERPL-CCNC: 150 U/L (ref 39–117)
ALT SERPL W P-5'-P-CCNC: 10 U/L (ref 1–33)
ANION GAP SERPL CALCULATED.3IONS-SCNC: 10.5 MMOL/L (ref 5–15)
AST SERPL-CCNC: 11 U/L (ref 1–32)
BASOPHILS # BLD AUTO: 0.05 10*3/MM3 (ref 0–0.2)
BASOPHILS NFR BLD AUTO: 0.7 % (ref 0–1.5)
BILIRUB CONJ SERPL-MCNC: 0.1 MG/DL (ref 0–0.3)
BILIRUB INDIRECT SERPL-MCNC: 0.1 MG/DL
BILIRUB SERPL-MCNC: 0.2 MG/DL (ref 0–1.2)
BUN SERPL-MCNC: 16 MG/DL (ref 8–23)
BUN/CREAT SERPL: 22.9 (ref 7–25)
CALCIUM SPEC-SCNC: 8.3 MG/DL (ref 8.6–10.5)
CHLORIDE SERPL-SCNC: 103 MMOL/L (ref 98–107)
CO2 SERPL-SCNC: 23.5 MMOL/L (ref 22–29)
CREAT SERPL-MCNC: 0.7 MG/DL (ref 0.57–1)
DEPRECATED RDW RBC AUTO: 60.8 FL (ref 37–54)
EGFRCR SERPLBLD CKD-EPI 2021: 90.9 ML/MIN/1.73
EOSINOPHIL # BLD AUTO: 0.1 10*3/MM3 (ref 0–0.4)
EOSINOPHIL NFR BLD AUTO: 1.4 % (ref 0.3–6.2)
ERYTHROCYTE [DISTWIDTH] IN BLOOD BY AUTOMATED COUNT: 21.2 % (ref 12.3–15.4)
GLUCOSE SERPL-MCNC: 115 MG/DL (ref 65–99)
HCT VFR BLD AUTO: 30.8 % (ref 34–46.6)
HGB BLD-MCNC: 9 G/DL (ref 12–15.9)
IMM GRANULOCYTES # BLD AUTO: 0.1 10*3/MM3 (ref 0–0.05)
IMM GRANULOCYTES NFR BLD AUTO: 1.4 % (ref 0–0.5)
INR PPP: 3.44 (ref 0.86–1.15)
LYMPHOCYTES # BLD AUTO: 1.32 10*3/MM3 (ref 0.7–3.1)
LYMPHOCYTES NFR BLD AUTO: 18.3 % (ref 19.6–45.3)
MAGNESIUM SERPL-MCNC: 1.8 MG/DL (ref 1.6–2.4)
MCH RBC QN AUTO: 23.7 PG (ref 26.6–33)
MCHC RBC AUTO-ENTMCNC: 29.2 G/DL (ref 31.5–35.7)
MCV RBC AUTO: 81.3 FL (ref 79–97)
MONOCYTES # BLD AUTO: 0.64 10*3/MM3 (ref 0.1–0.9)
MONOCYTES NFR BLD AUTO: 8.9 % (ref 5–12)
NEUTROPHILS NFR BLD AUTO: 4.99 10*3/MM3 (ref 1.7–7)
NEUTROPHILS NFR BLD AUTO: 69.3 % (ref 42.7–76)
NRBC BLD AUTO-RTO: 0 /100 WBC (ref 0–0.2)
PHOSPHATE SERPL-MCNC: 2.7 MG/DL (ref 2.5–4.5)
PLATELET # BLD AUTO: 177 10*3/MM3 (ref 140–450)
PMV BLD AUTO: 10.7 FL (ref 6–12)
POTASSIUM SERPL-SCNC: 3.5 MMOL/L (ref 3.5–5.2)
PROT SERPL-MCNC: 5.1 G/DL (ref 6–8.5)
PROTHROMBIN TIME: 35.2 SECONDS (ref 11.8–14.9)
RBC # BLD AUTO: 3.79 10*6/MM3 (ref 3.77–5.28)
SODIUM SERPL-SCNC: 137 MMOL/L (ref 136–145)
WBC NRBC COR # BLD AUTO: 7.2 10*3/MM3 (ref 3.4–10.8)

## 2024-12-01 PROCEDURE — 94799 UNLISTED PULMONARY SVC/PX: CPT

## 2024-12-01 PROCEDURE — 99232 SBSQ HOSP IP/OBS MODERATE 35: CPT | Performed by: FAMILY MEDICINE

## 2024-12-01 PROCEDURE — 80076 HEPATIC FUNCTION PANEL: CPT | Performed by: FAMILY MEDICINE

## 2024-12-01 PROCEDURE — 83735 ASSAY OF MAGNESIUM: CPT | Performed by: FAMILY MEDICINE

## 2024-12-01 PROCEDURE — 85610 PROTHROMBIN TIME: CPT | Performed by: SURGERY

## 2024-12-01 PROCEDURE — 94664 DEMO&/EVAL PT USE INHALER: CPT

## 2024-12-01 PROCEDURE — 94760 N-INVAS EAR/PLS OXIMETRY 1: CPT

## 2024-12-01 PROCEDURE — 80048 BASIC METABOLIC PNL TOTAL CA: CPT | Performed by: FAMILY MEDICINE

## 2024-12-01 PROCEDURE — 25010000002 CEFTRIAXONE PER 250 MG: Performed by: FAMILY MEDICINE

## 2024-12-01 PROCEDURE — 85025 COMPLETE CBC W/AUTO DIFF WBC: CPT | Performed by: FAMILY MEDICINE

## 2024-12-01 PROCEDURE — 84100 ASSAY OF PHOSPHORUS: CPT | Performed by: FAMILY MEDICINE

## 2024-12-01 RX ADMIN — FUROSEMIDE 40 MG: 40 TABLET ORAL at 09:49

## 2024-12-01 RX ADMIN — SODIUM CHLORIDE 2000 MG: 9 INJECTION INTRAMUSCULAR; INTRAVENOUS; SUBCUTANEOUS at 09:49

## 2024-12-01 RX ADMIN — ARFORMOTEROL TARTRATE 15 MCG: 15 SOLUTION RESPIRATORY (INHALATION) at 10:32

## 2024-12-01 RX ADMIN — BUDESONIDE 0.5 MG: 0.5 INHALANT ORAL at 10:32

## 2024-12-01 RX ADMIN — MIDODRINE HYDROCHLORIDE 5 MG: 2.5 TABLET ORAL at 18:06

## 2024-12-01 RX ADMIN — BUDESONIDE 0.5 MG: 0.5 INHALANT ORAL at 21:22

## 2024-12-01 RX ADMIN — MIDODRINE HYDROCHLORIDE 5 MG: 2.5 TABLET ORAL at 09:49

## 2024-12-01 RX ADMIN — DOCUSATE SODIUM 100 MG: 100 CAPSULE, LIQUID FILLED ORAL at 22:45

## 2024-12-01 RX ADMIN — SERTRALINE HYDROCHLORIDE 25 MG: 25 TABLET ORAL at 09:49

## 2024-12-01 RX ADMIN — FOLIC ACID 1 MG: 5 INJECTION, SOLUTION INTRAMUSCULAR; INTRAVENOUS; SUBCUTANEOUS at 09:50

## 2024-12-01 RX ADMIN — Medication 10 ML: at 22:45

## 2024-12-01 RX ADMIN — ACETAMINOPHEN 650 MG: 325 TABLET ORAL at 22:45

## 2024-12-01 RX ADMIN — Medication 10 ML: at 10:02

## 2024-12-01 RX ADMIN — Medication 473 ML: at 22:48

## 2024-12-01 RX ADMIN — DOCUSATE SODIUM 100 MG: 100 CAPSULE, LIQUID FILLED ORAL at 09:49

## 2024-12-01 RX ADMIN — Medication 473 ML: at 10:02

## 2024-12-01 RX ADMIN — Medication 1 TABLET: at 09:48

## 2024-12-01 RX ADMIN — ARFORMOTEROL TARTRATE 15 MCG: 15 SOLUTION RESPIRATORY (INHALATION) at 21:22

## 2024-12-01 NOTE — PROGRESS NOTES
Pharmacy to Dose: Warfarin  Consulting provider: Bakari   Indication for warfarin: Afib   Goal INR range: 2 - 3  Home warfarin dose:  Restarting warfarin therapy. Patient previously on warfarin  in May, 2024. Recently on apixaban, per note apixaban makes patient feel funny, and would like to restart warfarin  Previous warfarin dose: 4 mg, then 5mg, then 5mg, rotating. Previous admission in April, 2024: average dose of 5.3 mg    Date INR Warfarin Dose Given   11/23 1.14 5 mg   11/24 1.32 5 mg   11/25 1.52 7.5 mg   11/26 1.94 5 mg (per MD)   11/27 2.30 5 mg   11/28 2.65 5 mg   11/29 3.27 Held by MD   11/30 3.35 Held by MD   12/1 3.44 Held by MD               Any Vitamin K given?: No  Drug interactions Reviewed: Yes.  Is patient on bridging therapy with another anticoagulant?: No    Lab Results   Component Value Date     11/30/2024     11/29/2024    HGB 8.7 (L) 11/30/2024    HGB 8.9 (L) 11/29/2024     Plan:  INR 3.44, currently supratherapeutic.  Warfarin remains on MAR hold.    INR ordered daily with AM labs.     Thank you for this consult. Pharmacy will continue to monitor.   Megan Valderrama RPH

## 2024-12-01 NOTE — PLAN OF CARE
Goal Outcome Evaluation:  Plan of Care Reviewed With: patient        Progress: no change  Outcome Evaluation: wound care performed per order. no complaints of pain or nausea. pt has had decreased appitite this shift.

## 2024-12-01 NOTE — PROGRESS NOTES
[facility]   Hospitalist Progress Note  Date: 2024  Patient Name: Inez Doyle  : 1950  MRN: [id number]  Date of admission: 2024      Subjective   Subjective     Chief Complaint: Mental status    Summary: Patient 74-year-old male history of atrial fibrillation on warfarin, CHF, COPD, hypertension, hyperlipidemia, obesity, chronic immobility presents from nursing facility with altered mental status and decreased oral intake found to have large abdominal wound General Surgery plastic surgery evaluated plastic surgery eventually took the patient to the operating room did panniculectomy with wound VAC placement remains remains hospitalized long-term prognosis appears poor consulting palliative care    Interval Followup: 2024    Alert.  Conversational.  Not fully oriented.  No fevers  Tolerating antibiotics  Postop day #9 (2 ulcers abdomen removed en bloc/panniculectomy)  Pharmacy dosing warfarin.  INR 3.4  Hemoglobin stable 9.0      Objective   Objective     Vitals:   Temp:  [97.3 °F (36.3 °C)-97.5 °F (36.4 °C)] 97.3 °F (36.3 °C)  Heart Rate:  [] 79  Resp:  [16-20] 16  BP: ()/(56-78) 126/56    Physical Exam   Constitutional: Pleasant, polite.  Calm mood.  Interactive.  Respiratory: Clear without wheeze  Cardiovascular: Irregular.  No murmur.  Abdomen wound dressing in place.  Large pannus     Result Review    Result Review:  I have personally reviewed the results from the time of this admission to 2024 12:37 EST and agree with these findings:  [x]  Laboratory  []  Microbiology  []  Radiology  []  EKG/Telemetry   []  Cardiology/Vascular   []  Pathology  []  Old records  []  Other:    Assessment & Plan   Assessment / Plan     Assessment:    Staph epi bacteremia secondary to severe SSTI cellulitis, likely from large abdominal and/or sacral wounds  Multiple skin and soft tissue wounds less notable for sacral, large lower abdominal pannus exposed wound  Paroxysmal A-fib (on  coumadin)  Acute toxic and metabolic encephalopathy, improving waxing/waning  COPD (on room air baseline)  CHF, stable  Polypharmacy, likely contributing factor to intermittent confusion  HTN  DM2 (on metformin)  Morbid obesity BMI of 48.5  Nursing home resident ([place])    Plan:    Continue wound care/postop care.  Continue ceftriaxone.  Bowel regimen as needed  DC SSI including Accu-Cheks.    Continue ceftriaxone.  Urine culture in progress ---> mixed nicanor isolated  Daily pro time.  Target INR 2-3.  Pharmacy dosing.  CT head: No acute intracranial abnormality noted.  Return to [place] when medically stable   Continue to monitor for signs of recurrent infection  Wound care per surgeon.  High risk for dehiscence.  Post op Prevena  Continue to monitor hemoglobin  Palliative care consultation, long-term prognosis seems very poor  PT OT consultation  Further treatment contingent upon her hospital course  Discussed with RN      VTE Prophylaxis:  Pharmacologic & mechanical VTE prophylaxis orders are present.        CODE STATUS:   Medical Intervention Limits: No intubation (DNI)  Code Status (Patient has no pulse and is not breathing): No CPR (Do Not Attempt to Resuscitate)  Medical Interventions (Patient has pulse or is breathing): Limited Support           Attending documentation:  I reviewed the above documentation and independently reviewed and rounded and evaluated the patient and discussed the care plan with MARINO Huber PA-C, I agree with his findings and plan as documented, what I have added to the care plan and modified is as follows in my documentation and my medical decision making; 74-year-old female with history of atrial fibrillation, paroxysmal on long-term anticoagulation with Coumadin, COPD, polypharmacy, hypertension, hospitalized on 11/11/2024 for chief complaint of altered mental status, found to have Staphylococcus epi bacteremia secondary to severe SSTI likely secondary to large abdominal  and sacral wounds, general surgery and plastics consulted, status post abdominal wall debridement, wound VAC placement, acute blood loss anemia, blood replaced, followed INR, overall prognosis poor and guarded,  Ongoing confusion.  Workup being pursued for ongoing confusion.  Calix catheter exchange, urinalysis suggestive of UTI.  Treated.  Interval follow-up: Patient seen and examined this morning, remains confused, spilled her breakfast all over, essentially dependent for all ADLs.  Continues to pick at wound areas.  White blood cell count 6000, hemoglobin 8.7, INR remains supratherapeutic at 3.44, no signs of bleeding, creatinine 0.7, BUN 16, sodium 137, potassium 3.5.  Unable to obtain review of systems due to altered mental status on physical exam vitals reviewed, elderly morbidly obese female laying in bed, comfortable, alert, diminished breath sounds, regular rate and rhythm, abdominal wound VAC in place with large pannus and ELENI drain on the left side of her abdomen.  Assessment as above with staph epi bacteremia, abdominal wall wound, therapeutic INR on Coumadin with other chronic medical conditions as listed above, plan, to finish 5 days of ceftriaxone, hold Coumadin based on INR still elevated and going up, will resume Coumadin tomorrow if INR permits, continue insulin size scale coverage, continue Lasix, Brovana Pulmicort nebs twice daily, a.m. labs, DNR, DVT prophylaxis Coumadin, clinical course dictate further management, discussed with nurse at the bedside.  More than 65 % of the time of this patient's encounter was performed by me, this included face-to-face time, planning and coordinating, medical decision making and critical thinking personally done by me.    Electronically signed by Jas Huerta MD, 12/1/2024, 13:46 EST.    Portions of this documentation were transcribed electronically from a voice recognition software.  I confirm all data accurately represents the service(s) I performed at  today's visit.

## 2024-12-01 NOTE — PLAN OF CARE
Goal Outcome Evaluation:           Progress: no change  Outcome Evaluation: VSS, alert to self, asks for help but screams with any care given wanting to be left alone, wound/skin care done per order, ELENI drain pulled out per pt, staples and wound picked at by patient continuously, abd red, edematous and firm around incision/ELENI site, MD informed, FC to BSD with yellow output, turned Q2H, continue plan of care. Lucero Chatterjee, RN

## 2024-12-02 ENCOUNTER — ANESTHESIA (OUTPATIENT)
Dept: PERIOP | Facility: HOSPITAL | Age: 74
End: 2024-12-02
Payer: MEDICARE

## 2024-12-02 ENCOUNTER — ANESTHESIA EVENT (OUTPATIENT)
Dept: PERIOP | Facility: HOSPITAL | Age: 74
End: 2024-12-02
Payer: MEDICARE

## 2024-12-02 LAB
ALBUMIN SERPL-MCNC: 2 G/DL (ref 3.5–5.2)
ALP SERPL-CCNC: 141 U/L (ref 39–117)
ALT SERPL W P-5'-P-CCNC: 10 U/L (ref 1–33)
ANION GAP SERPL CALCULATED.3IONS-SCNC: 9.7 MMOL/L (ref 5–15)
AST SERPL-CCNC: 12 U/L (ref 1–32)
BASOPHILS # BLD AUTO: 0.05 10*3/MM3 (ref 0–0.2)
BASOPHILS NFR BLD AUTO: 0.8 % (ref 0–1.5)
BILIRUB CONJ SERPL-MCNC: 0.1 MG/DL (ref 0–0.3)
BILIRUB INDIRECT SERPL-MCNC: 0.2 MG/DL
BILIRUB SERPL-MCNC: 0.3 MG/DL (ref 0–1.2)
BUN SERPL-MCNC: 15 MG/DL (ref 8–23)
BUN/CREAT SERPL: 25.9 (ref 7–25)
CALCIUM SPEC-SCNC: 8.3 MG/DL (ref 8.6–10.5)
CHLORIDE SERPL-SCNC: 103 MMOL/L (ref 98–107)
CO2 SERPL-SCNC: 24.3 MMOL/L (ref 22–29)
CREAT SERPL-MCNC: 0.58 MG/DL (ref 0.57–1)
DEPRECATED RDW RBC AUTO: 60.6 FL (ref 37–54)
EGFRCR SERPLBLD CKD-EPI 2021: 95.1 ML/MIN/1.73
EOSINOPHIL # BLD AUTO: 0.11 10*3/MM3 (ref 0–0.4)
EOSINOPHIL NFR BLD AUTO: 1.7 % (ref 0.3–6.2)
ERYTHROCYTE [DISTWIDTH] IN BLOOD BY AUTOMATED COUNT: 20.9 % (ref 12.3–15.4)
GLUCOSE BLDC GLUCOMTR-MCNC: 113 MG/DL (ref 70–99)
GLUCOSE SERPL-MCNC: 99 MG/DL (ref 65–99)
HCT VFR BLD AUTO: 29.4 % (ref 34–46.6)
HGB BLD-MCNC: 8.6 G/DL (ref 12–15.9)
IMM GRANULOCYTES # BLD AUTO: 0.06 10*3/MM3 (ref 0–0.05)
IMM GRANULOCYTES NFR BLD AUTO: 0.9 % (ref 0–0.5)
INR PPP: 3.1 (ref 0.86–1.15)
LYMPHOCYTES # BLD AUTO: 1.16 10*3/MM3 (ref 0.7–3.1)
LYMPHOCYTES NFR BLD AUTO: 17.8 % (ref 19.6–45.3)
MAGNESIUM SERPL-MCNC: 1.8 MG/DL (ref 1.6–2.4)
MCH RBC QN AUTO: 23.9 PG (ref 26.6–33)
MCHC RBC AUTO-ENTMCNC: 29.3 G/DL (ref 31.5–35.7)
MCV RBC AUTO: 81.7 FL (ref 79–97)
MONOCYTES # BLD AUTO: 0.59 10*3/MM3 (ref 0.1–0.9)
MONOCYTES NFR BLD AUTO: 9 % (ref 5–12)
NEUTROPHILS NFR BLD AUTO: 4.55 10*3/MM3 (ref 1.7–7)
NEUTROPHILS NFR BLD AUTO: 69.8 % (ref 42.7–76)
NRBC BLD AUTO-RTO: 0.3 /100 WBC (ref 0–0.2)
PHOSPHATE SERPL-MCNC: 2.9 MG/DL (ref 2.5–4.5)
PLATELET # BLD AUTO: 146 10*3/MM3 (ref 140–450)
PMV BLD AUTO: 10.2 FL (ref 6–12)
POTASSIUM SERPL-SCNC: 3.1 MMOL/L (ref 3.5–5.2)
PROT SERPL-MCNC: 4.9 G/DL (ref 6–8.5)
PROTHROMBIN TIME: 32.4 SECONDS (ref 11.8–14.9)
RBC # BLD AUTO: 3.6 10*6/MM3 (ref 3.77–5.28)
SODIUM SERPL-SCNC: 137 MMOL/L (ref 136–145)
WBC NRBC COR # BLD AUTO: 6.52 10*3/MM3 (ref 3.4–10.8)

## 2024-12-02 PROCEDURE — 84100 ASSAY OF PHOSPHORUS: CPT | Performed by: FAMILY MEDICINE

## 2024-12-02 PROCEDURE — 85025 COMPLETE CBC W/AUTO DIFF WBC: CPT | Performed by: FAMILY MEDICINE

## 2024-12-02 PROCEDURE — 25010000002 FENTANYL CITRATE (PF) 50 MCG/ML SOLUTION: Performed by: NURSE ANESTHETIST, CERTIFIED REGISTERED

## 2024-12-02 PROCEDURE — 25010000002 POTASSIUM CHLORIDE 10 MEQ/100ML SOLUTION: Performed by: FAMILY MEDICINE

## 2024-12-02 PROCEDURE — 80076 HEPATIC FUNCTION PANEL: CPT | Performed by: FAMILY MEDICINE

## 2024-12-02 PROCEDURE — 25810000003 LACTATED RINGERS PER 1000 ML: Performed by: ANESTHESIOLOGY

## 2024-12-02 PROCEDURE — 25010000002 LIDOCAINE PF 2% 2 % SOLUTION: Performed by: NURSE ANESTHETIST, CERTIFIED REGISTERED

## 2024-12-02 PROCEDURE — 82948 REAGENT STRIP/BLOOD GLUCOSE: CPT

## 2024-12-02 PROCEDURE — 94664 DEMO&/EVAL PT USE INHALER: CPT

## 2024-12-02 PROCEDURE — 85610 PROTHROMBIN TIME: CPT | Performed by: SURGERY

## 2024-12-02 PROCEDURE — 94761 N-INVAS EAR/PLS OXIMETRY MLT: CPT

## 2024-12-02 PROCEDURE — 25010000002 ONDANSETRON PER 1 MG: Performed by: NURSE ANESTHETIST, CERTIFIED REGISTERED

## 2024-12-02 PROCEDURE — 83735 ASSAY OF MAGNESIUM: CPT | Performed by: FAMILY MEDICINE

## 2024-12-02 PROCEDURE — 25010000002 PROPOFOL 10 MG/ML EMULSION: Performed by: NURSE ANESTHETIST, CERTIFIED REGISTERED

## 2024-12-02 PROCEDURE — 94799 UNLISTED PULMONARY SVC/PX: CPT

## 2024-12-02 PROCEDURE — 80048 BASIC METABOLIC PNL TOTAL CA: CPT | Performed by: FAMILY MEDICINE

## 2024-12-02 PROCEDURE — 0JB80ZZ EXCISION OF ABDOMEN SUBCUTANEOUS TISSUE AND FASCIA, OPEN APPROACH: ICD-10-PCS | Performed by: SURGERY

## 2024-12-02 PROCEDURE — 99232 SBSQ HOSP IP/OBS MODERATE 35: CPT | Performed by: FAMILY MEDICINE

## 2024-12-02 PROCEDURE — 94760 N-INVAS EAR/PLS OXIMETRY 1: CPT

## 2024-12-02 RX ORDER — HYDROMORPHONE HYDROCHLORIDE 1 MG/ML
0.5 INJECTION, SOLUTION INTRAMUSCULAR; INTRAVENOUS; SUBCUTANEOUS
Status: DISCONTINUED | OUTPATIENT
Start: 2024-12-02 | End: 2024-12-02 | Stop reason: HOSPADM

## 2024-12-02 RX ORDER — PROPOFOL 10 MG/ML
VIAL (ML) INTRAVENOUS AS NEEDED
Status: DISCONTINUED | OUTPATIENT
Start: 2024-12-02 | End: 2024-12-02 | Stop reason: SURG

## 2024-12-02 RX ORDER — PROMETHAZINE HYDROCHLORIDE 25 MG/1
25 SUPPOSITORY RECTAL ONCE AS NEEDED
Status: DISCONTINUED | OUTPATIENT
Start: 2024-12-02 | End: 2024-12-02 | Stop reason: HOSPADM

## 2024-12-02 RX ORDER — PROMETHAZINE HYDROCHLORIDE 12.5 MG/1
25 TABLET ORAL ONCE AS NEEDED
Status: DISCONTINUED | OUTPATIENT
Start: 2024-12-02 | End: 2024-12-02 | Stop reason: HOSPADM

## 2024-12-02 RX ORDER — ONDANSETRON 2 MG/ML
INJECTION INTRAMUSCULAR; INTRAVENOUS AS NEEDED
Status: DISCONTINUED | OUTPATIENT
Start: 2024-12-02 | End: 2024-12-02 | Stop reason: SURG

## 2024-12-02 RX ORDER — HYDROMORPHONE HYDROCHLORIDE 1 MG/ML
0.25 INJECTION, SOLUTION INTRAMUSCULAR; INTRAVENOUS; SUBCUTANEOUS
Status: DISCONTINUED | OUTPATIENT
Start: 2024-12-02 | End: 2024-12-02 | Stop reason: HOSPADM

## 2024-12-02 RX ORDER — OXYCODONE HYDROCHLORIDE 5 MG/1
5 TABLET ORAL
Status: DISCONTINUED | OUTPATIENT
Start: 2024-12-02 | End: 2024-12-02 | Stop reason: HOSPADM

## 2024-12-02 RX ORDER — FENTANYL CITRATE 50 UG/ML
INJECTION, SOLUTION INTRAMUSCULAR; INTRAVENOUS AS NEEDED
Status: DISCONTINUED | OUTPATIENT
Start: 2024-12-02 | End: 2024-12-02 | Stop reason: SURG

## 2024-12-02 RX ORDER — SODIUM CHLORIDE, SODIUM LACTATE, POTASSIUM CHLORIDE, CALCIUM CHLORIDE 600; 310; 30; 20 MG/100ML; MG/100ML; MG/100ML; MG/100ML
9 INJECTION, SOLUTION INTRAVENOUS CONTINUOUS PRN
Status: DISCONTINUED | OUTPATIENT
Start: 2024-12-02 | End: 2024-12-02 | Stop reason: HOSPADM

## 2024-12-02 RX ORDER — LIDOCAINE HYDROCHLORIDE 20 MG/ML
INJECTION, SOLUTION EPIDURAL; INFILTRATION; INTRACAUDAL; PERINEURAL AS NEEDED
Status: DISCONTINUED | OUTPATIENT
Start: 2024-12-02 | End: 2024-12-02 | Stop reason: SURG

## 2024-12-02 RX ORDER — ONDANSETRON 2 MG/ML
4 INJECTION INTRAMUSCULAR; INTRAVENOUS ONCE AS NEEDED
Status: DISCONTINUED | OUTPATIENT
Start: 2024-12-02 | End: 2024-12-02 | Stop reason: HOSPADM

## 2024-12-02 RX ORDER — POTASSIUM CHLORIDE 7.45 MG/ML
10 INJECTION INTRAVENOUS
Status: COMPLETED | OUTPATIENT
Start: 2024-12-02 | End: 2024-12-02

## 2024-12-02 RX ADMIN — ARFORMOTEROL TARTRATE 15 MCG: 15 SOLUTION RESPIRATORY (INHALATION) at 09:40

## 2024-12-02 RX ADMIN — POTASSIUM CHLORIDE 10 MEQ: 7.46 INJECTION, SOLUTION INTRAVENOUS at 13:50

## 2024-12-02 RX ADMIN — PROPOFOL 150 MCG/KG/MIN: 10 INJECTION, EMULSION INTRAVENOUS at 15:42

## 2024-12-02 RX ADMIN — POTASSIUM CHLORIDE: 7.46 INJECTION, SOLUTION INTRAVENOUS at 16:13

## 2024-12-02 RX ADMIN — FENTANYL CITRATE 25 MCG: 50 INJECTION, SOLUTION INTRAMUSCULAR; INTRAVENOUS at 15:49

## 2024-12-02 RX ADMIN — FENTANYL CITRATE 25 MCG: 50 INJECTION, SOLUTION INTRAMUSCULAR; INTRAVENOUS at 15:38

## 2024-12-02 RX ADMIN — FOLIC ACID 1 MG: 5 INJECTION, SOLUTION INTRAMUSCULAR; INTRAVENOUS; SUBCUTANEOUS at 09:19

## 2024-12-02 RX ADMIN — SODIUM CHLORIDE, POTASSIUM CHLORIDE, SODIUM LACTATE AND CALCIUM CHLORIDE: 600; 310; 30; 20 INJECTION, SOLUTION INTRAVENOUS at 15:35

## 2024-12-02 RX ADMIN — Medication 473 ML: at 09:19

## 2024-12-02 RX ADMIN — POTASSIUM CHLORIDE 10 MEQ: 7.46 INJECTION, SOLUTION INTRAVENOUS at 14:51

## 2024-12-02 RX ADMIN — ONDANSETRON HYDROCHLORIDE 4 MG: 2 SOLUTION INTRAMUSCULAR; INTRAVENOUS at 15:36

## 2024-12-02 RX ADMIN — LIDOCAINE HYDROCHLORIDE 60 MG: 20 INJECTION, SOLUTION INTRAVENOUS at 15:40

## 2024-12-02 RX ADMIN — PROPOFOL 50 MG: 10 INJECTION, EMULSION INTRAVENOUS at 15:40

## 2024-12-02 RX ADMIN — FENTANYL CITRATE 25 MCG: 50 INJECTION, SOLUTION INTRAMUSCULAR; INTRAVENOUS at 15:42

## 2024-12-02 RX ADMIN — POTASSIUM CHLORIDE 10 MEQ: 7.46 INJECTION, SOLUTION INTRAVENOUS at 19:24

## 2024-12-02 RX ADMIN — FENTANYL CITRATE 25 MCG: 50 INJECTION, SOLUTION INTRAMUSCULAR; INTRAVENOUS at 15:35

## 2024-12-02 RX ADMIN — Medication 10 ML: at 09:19

## 2024-12-02 RX ADMIN — BUDESONIDE 0.5 MG: 0.5 INHALANT ORAL at 09:40

## 2024-12-02 NOTE — PLAN OF CARE
Goal Outcome Evaluation:           Progress: no change  Outcome Evaluation: Wound care performed per order. Patient slept much of the night and only awoke during turns/wound care. Max Ames RN

## 2024-12-02 NOTE — PROGRESS NOTES
Pharmacy to Dose: Warfarin  Consulting provider: Bakari   Indication for warfarin: Afib   Goal INR range: 2 - 3  Home warfarin dose:  Restarting warfarin therapy. Patient previously on warfarin  in May, 2024. Recently on apixaban, per note apixaban makes patient feel funny, and would like to restart warfarin  Previous warfarin dose: 4 mg, then 5mg, then 5mg, rotating. Previous admission in April, 2024: average dose of 5.3 mg    Date INR Warfarin Dose Given   11/23 1.14 5 mg   11/24 1.32 5 mg   11/25 1.52 7.5 mg   11/26 1.94 5 mg (per MD)   11/27 2.30 5 mg   11/28 2.65 5 mg   11/29 3.27 Held by MD   11/30 3.35 Held by MD   12/1 3.44 Held by MD   12/2 3.10 Held by MD          Any Vitamin K given?: No  Drug interactions Reviewed: Yes.  Is patient on bridging therapy with another anticoagulant?: No    Lab Results   Component Value Date     12/02/2024     12/01/2024    HGB 8.6 (L) 12/02/2024    HGB 9.0 (L) 12/01/2024     Plan:  INR 3.10, currently supratherapeutic.  Warfarin remains on MAR hold.    INR ordered daily with AM labs.     Thank you for this consult. Pharmacy will continue to monitor.   Cali Beltran

## 2024-12-02 NOTE — ANESTHESIA POSTPROCEDURE EVALUATION
Patient: Inez Doyle    Procedure Summary       Date: 12/02/24 Room / Location: [facility] OR 03 / [facility] MAIN OR    Anesthesia Start: 1535 Anesthesia Stop: 1653    Procedure: TRUNK DEBRIDEMENT (Abdomen) Diagnosis:       Chronic wound infection of abdomen, sequela      (Chronic wound infection of abdomen, sequela [S31.109S, L08.9])    Surgeons: Shalonda Brown MD Provider: Arpita Frances MD    Anesthesia Type: general ASA Status: 4            Anesthesia Type: general    Vitals  Vitals Value Taken Time   /84 12/02/24 1737   Temp 35.7 °C (96.2 °F) 12/02/24 1700   Pulse 101 12/02/24 1739   Resp 12 12/02/24 1718   SpO2 94 % 12/02/24 1739   Vitals shown include unfiled device data.        Post Anesthesia Care and Evaluation    Patient location during evaluation: bedside  Patient participation: complete - patient participated  Level of consciousness: awake  Pain management: adequate    Airway patency: patent  PONV Status: none  Cardiovascular status: acceptable and stable  Respiratory status: acceptable  Hydration status: acceptable

## 2024-12-02 NOTE — NURSING NOTE
Met with POA and patient at bedside.  [place] MOST completed, faxed to medical records and placed in patient chart.  Will continue to follow.    Romana CROW RN, CCM  Palliative Care

## 2024-12-02 NOTE — SIGNIFICANT NOTE
12/02/24 1609   OTHER   Discipline occupational therapist   Rehab Time/Intention   Session Not Performed patient unavailable for treatment  (in OR for trunk debridement)

## 2024-12-02 NOTE — ANESTHESIA PREPROCEDURE EVALUATION
Anesthesia Evaluation     Patient summary reviewed and Nursing notes reviewed   no history of anesthetic complications:   NPO Solid Status: > 8 hours  NPO Liquid Status: > 2 hours           Airway   Mallampati: III  TM distance: >3 FB  Neck ROM: full  No difficulty expected  Dental    (+) edentulous    Pulmonary - normal exam    breath sounds clear to auscultation  (+) COPD, asthma,sleep apnea  Cardiovascular - normal exam  Exercise tolerance: poor (<4 METS)    ECG reviewed  Rhythm: regular  Rate: normal    (+) hypertension, dysrhythmias (warfarin) Atrial Fib, CHF Diastolic >=55%, hyperlipidemia    ROS comment: Echo (6/15/23, dysrhythmia):  Normal left ventricular systolic function.  No significant valve abnormalities noted.     EK bpm  Atrial fibrillation  Nonspecific T abnormalities, diffuse leads  Date and Time of Study:2024 11:48:10      Neuro/Psych  (+) psychiatric history Anxiety and Depression  GI/Hepatic/Renal/Endo    (+) obesity, morbid obesity, GERD, diabetes mellitus type 2    Musculoskeletal     Abdominal   (+) obese   Substance History - negative use     OB/GYN negative ob/gyn ROS         Other   arthritis,     ROS/Med Hx Other: 24 04:46  Protime: 32.4 (H)  INR: 3.10 (H)    24 04:46  Sodium: 137  Potassium: 3.1 (L)  Chloride: 103  CO2: 24.3  Anion Gap: 9.7  BUN: 15  Creatinine: 0.58  BUN/Creatinine Ratio: 25.9 (H)  eGFR: 95.1  Glucose: 99  Calcium: 8.3 (L)  Magnesium: 1.8  Phosphorus: 2.9  Alkaline Phosphatase: 141 (H)  Total Protein: 4.9 (L)  Albumin: 2.0 (L)  AST (SGOT): 12  ALT (SGPT): 10  Total Bilirubin: 0.3  Bilirubin, Direct: 0.1  Bilirubin, Indirect: 0.2    WBC: 6.52  RBC: 3.60 (L)  Hemoglobin: 8.6 (L)  Hematocrit: 29.4 (L)  Platelets: 146  RDW: 20.9 (H)  MCV: 81.7  MCH: 23.9 (L)  MCHC: 29.3 (L)  MPV: 10.2  RDW-SD: 60.6 (H)                  Anesthesia Plan    ASA 4     general and MAC     (DNR status rescinded intra-op; discussed with patient.)  intravenous induction      Anesthetic plan, risks, benefits, and alternatives have been provided, discussed and informed consent has been obtained with: patient.    Use of blood products discussed with patient .    Plan discussed with CRNA.      CODE STATUS:    Medical Intervention Limits: No intubation (DNI)  Code Status (Patient has no pulse and is not breathing): No CPR (Do Not Attempt to Resuscitate)  Medical Interventions (Patient has pulse or is breathing): Limited Support

## 2024-12-02 NOTE — PROGRESS NOTES
PLASTIC SURGERY PROGRESS NOTE    Patient Identification:  Name: Inez Doyle    Age: 74 y.o.    Sex: female   :  1950  MRN: [id number]               Subjective:  No acute events.    Objective:    Continuous Infusions:Pharmacy to dose warfarin,         Scheduled Meds:arformoterol, 15 mcg, Nebulization, BID - RT  budesonide, 0.5 mg, Nebulization, BID - RT  cefTRIAXone, 2,000 mg, Intravenous, Q24H  docusate sodium, 100 mg, Oral, BID  folic acid 1 mg in sodium chloride 0.9 % 50 mL IVPB, 1 mg, Intravenous, Daily  furosemide, 40 mg, Oral, Daily  [Held by provider] losartan, 12.5 mg, Oral, Daily  midodrine, 5 mg, Oral, TID AC  multivitamin with minerals, 1 tablet, Oral, Daily  sertraline, 25 mg, Oral, Daily  sodium chloride, 10 mL, Intravenous, Q12H  Sodium Hypochlorite, 1 Application, Apply externally, 2 times per day  [Held by provider] warfarin, 5 mg, Oral, Daily      PRN Meds:  acetaminophen **OR** acetaminophen **OR** acetaminophen    nitroglycerin    ondansetron    Pharmacy to dose warfarin    sodium chloride    sodium chloride    Vital signs in last 24 hours:  Vitals:    24 1032 24 1938 248 24   BP:  114/63     BP Location:  Right arm     Patient Position:  Lying     Pulse: 79 100 104 102   Resp: 16 18 20 20   Temp:  97.3 °F (36.3 °C)     TempSrc:  Oral     SpO2: 97% 100% 100% 100%   Weight:       Height:           Intake/Output:I/O last 3 completed shifts:  In: 480 [P.O.:480]  Out: 950 [Urine:950]    Exam:  GEN: no acute distress, resting quietly   Wound with smaller dehiscence on the left, larger dehiscence on the right.       Recent Results (from the past 24 hours)   Protime-INR    Collection Time: 24  4:46 AM    Specimen: Blood   Result Value Ref Range    Protime 32.4 (H) 11.8 - 14.9 Seconds    INR 3.10 (H) 0.86 - 1.15   Basic Metabolic Panel    Collection Time: 24  4:46 AM    Specimen: Blood   Result Value Ref Range    Glucose 99 65 - 99 mg/dL    BUN 15 8  - 23 mg/dL    Creatinine 0.58 0.57 - 1.00 mg/dL    Sodium 137 136 - 145 mmol/L    Potassium 3.1 (L) 3.5 - 5.2 mmol/L    Chloride 103 98 - 107 mmol/L    CO2 24.3 22.0 - 29.0 mmol/L    Calcium 8.3 (L) 8.6 - 10.5 mg/dL    BUN/Creatinine Ratio 25.9 (H) 7.0 - 25.0    Anion Gap 9.7 5.0 - 15.0 mmol/L    eGFR 95.1 >60.0 mL/min/1.73   Magnesium    Collection Time: 12/02/24  4:46 AM    Specimen: Blood   Result Value Ref Range    Magnesium 1.8 1.6 - 2.4 mg/dL   Phosphorus    Collection Time: 12/02/24  4:46 AM    Specimen: Blood   Result Value Ref Range    Phosphorus 2.9 2.5 - 4.5 mg/dL   Hepatic Function Panel    Collection Time: 12/02/24  4:46 AM    Specimen: Blood   Result Value Ref Range    Total Protein 4.9 (L) 6.0 - 8.5 g/dL    Albumin 2.0 (L) 3.5 - 5.2 g/dL    ALT (SGPT) 10 1 - 33 U/L    AST (SGOT) 12 1 - 32 U/L    Alkaline Phosphatase 141 (H) 39 - 117 U/L    Total Bilirubin 0.3 0.0 - 1.2 mg/dL    Bilirubin, Direct 0.1 0.0 - 0.3 mg/dL    Bilirubin, Indirect 0.2 mg/dL   CBC Auto Differential    Collection Time: 12/02/24  4:46 AM    Specimen: Blood   Result Value Ref Range    WBC 6.52 3.40 - 10.80 10*3/mm3    RBC 3.60 (L) 3.77 - 5.28 10*6/mm3    Hemoglobin 8.6 (L) 12.0 - 15.9 g/dL    Hematocrit 29.4 (L) 34.0 - 46.6 %    MCV 81.7 79.0 - 97.0 fL    MCH 23.9 (L) 26.6 - 33.0 pg    MCHC 29.3 (L) 31.5 - 35.7 g/dL    RDW 20.9 (H) 12.3 - 15.4 %    RDW-SD 60.6 (H) 37.0 - 54.0 fl    MPV 10.2 6.0 - 12.0 fL    Platelets 146 140 - 450 10*3/mm3    Neutrophil % 69.8 42.7 - 76.0 %    Lymphocyte % 17.8 (L) 19.6 - 45.3 %    Monocyte % 9.0 5.0 - 12.0 %    Eosinophil % 1.7 0.3 - 6.2 %    Basophil % 0.8 0.0 - 1.5 %    Immature Grans % 0.9 (H) 0.0 - 0.5 %    Neutrophils, Absolute 4.55 1.70 - 7.00 10*3/mm3    Lymphocytes, Absolute 1.16 0.70 - 3.10 10*3/mm3    Monocytes, Absolute 0.59 0.10 - 0.90 10*3/mm3    Eosinophils, Absolute 0.11 0.00 - 0.40 10*3/mm3    Basophils, Absolute 0.05 0.00 - 0.20 10*3/mm3    Immature Grans, Absolute 0.06 (H) 0.00 -  0.05 10*3/mm3    nRBC 0.3 (H) 0.0 - 0.2 /100 WBC         Assessment:    AMS (altered mental status)    Severe protein-calorie malnutrition    Chronic wound infection of abdomen      10 Days Post-Op Procedure(s) (LRB):  TRUNK DEBRIDEMENTabdominal wall debridement with  closure and wound vac placement (Bilateral)    Plan:  He dehiscence is expected due to her nutrition status. I tried to prevent that placing a wound vac as previna however she pulled it. She is having local wound care with [facility] but maybe it would be a better idea to switch to wound vac. I will discuss with the wound care . We can do it in the OR tomorrow if that is more convenient. I will finalize the plan today.   Shalonda Brown MD    12/2/2024

## 2024-12-02 NOTE — PROGRESS NOTES
[facility]   Hospitalist Progress Note  Date: 2024  Patient Name: Inez Doyle  : 1950  MRN: [id number]  Date of admission: 2024      Subjective   Subjective     Chief Complaint: Mental status    Summary: Patient 74-year-old male history of atrial fibrillation on warfarin, CHF, COPD, hypertension, hyperlipidemia, obesity, chronic immobility presents from nursing facility with altered mental status and decreased oral intake found to have large abdominal wound General Surgery plastic surgery evaluated plastic surgery eventually took the patient to the operating room did panniculectomy with wound VAC placement remains remains hospitalized long-term prognosis appears poor consulting palliative care    Interval Followup: 2024    No new complaints.  Alertness waxes/wanes.  No fevers  Some dehiscence of wound.  Postop day #10 (2 ulcers abdomen removed en bloc/panniculectomy)  Postop day #0 (repeat wound debridement/wound VAC placement)  Pharmacy dosing warfarin.  INR 3.1  Hemoglobin stable 8.6      Objective   Objective     Vitals:   Temp:  [97.3 °F (36.3 °C)-97.9 °F (36.6 °C)] 97.9 °F (36.6 °C)  Heart Rate:  [] 96  Resp:  [16-20] 16  BP: (114-133)/(63-91) 133/91    Physical Exam   Constitutional: Pleasant, polite.  Calm mood.  Interactive.  Respiratory: Clear without wheeze  Cardiovascular: Irregular.  No murmur.  Abdomen wound dressing in place.  Large pannus     Result Review    Result Review:  I have personally reviewed the results from the time of this admission to 2024 12:08 EST and agree with these findings:  [x]  Laboratory  []  Microbiology  []  Radiology  []  EKG/Telemetry   []  Cardiology/Vascular   []  Pathology  []  Old records  []  Other:    Assessment & Plan   Assessment / Plan     Assessment:    Staph epi bacteremia secondary to severe SSTI cellulitis, likely from large abdominal and/or sacral wounds  Multiple skin and soft tissue wounds less notable for sacral,  large lower abdominal pannus exposed wound  Paroxysmal A-fib (on coumadin)  Acute toxic and metabolic encephalopathy, improving waxing/waning  COPD (on room air baseline)  CHF, stable  Polypharmacy, likely contributing factor to intermittent confusion  HTN  DM2 (on metformin)  Morbid obesity BMI of 48.5  Nursing home resident ([place])  Status post panniculectomy 11/22  Status post wound debridement 12/02    Plan:    Surgeon taking patient back to the OR for abdominal wound debridement and application of wound VAC again  Continue wound care/postop care.  Continue ceftriaxone.  Bowel regimen as needed  DC SSI including Accu-Cheks.    Continue ceftriaxone.  Urine culture in progress ---> mixed nicanor isolated  Daily pro time.  Target INR 2-3.  Pharmacy dosing.  CT head: No acute intracranial abnormality noted.  Return to [place] when medically stable   Continue to monitor for signs of recurrent infection  Wound care per surgeon.  High risk for dehiscence.  Post op Prevena  Continue to monitor hemoglobin  Palliative care consultation, long-term prognosis seems very poor  PT OT consultation  Further treatment contingent upon her hospital course  Discussed with RN      VTE Prophylaxis:  Pharmacologic & mechanical VTE prophylaxis orders are present.        CODE STATUS:   Medical Intervention Limits: No intubation (DNI)  Code Status (Patient has no pulse and is not breathing): No CPR (Do Not Attempt to Resuscitate)  Medical Interventions (Patient has pulse or is breathing): Limited Support         Attending documentation:  I reviewed the above documentation and independently reviewed and rounded and evaluated the patient and discussed the care plan with MARINO Huber PA-C, I agree with his findings and plan as documented, what I have added to the care plan and modified is as follows in my documentation and my medical decision making; 74-year-old female with history of atrial fibrillation, paroxysmal on long-term  anticoagulation with Coumadin, COPD, polypharmacy, hypertension, hospitalized on 11/11/2024 for chief complaint of altered mental status, found to have Staphylococcus epi bacteremia secondary to severe SSTI likely secondary to large abdominal and sacral wounds, general surgery and plastics consulted, status post abdominal wall debridement, wound VAC placement, acute blood loss anemia, blood replaced, followed INR, overall prognosis poor and guarded,  Ongoing confusion.  Workup being pursued for ongoing confusion.  Calix catheter exchange, urinalysis suggestive of UTI.  Treated.  Return to the OR for wound cleanout with plastics 12/2/2024.  Interval follow-up: Patient seen and examined this morning, remains confused, POA at bedside, MOST form signed, essentially dependent for all ADLs.  Continues to pick at wound areas.  Going back to the OR today with plastics for wound cleanout.  White blood cell count 6000, hemoglobin 8.6, potassium 3.1, sodium 137, creatinine 0.58.  INR 3.1, still holding Coumadin.  Potassium replacement ordered.  Unable to obtain review of systems due to altered mental status on physical exam vitals reviewed, elderly morbidly obese female laying in bed, comfortable, alert, diminished breath sounds, regular rate and rhythm, abdominal wound VAC in place with large pannus and ELENI drain on the left side of her abdomen.  Assessment as above with staph epi bacteremia, abdominal wall wound, therapeutic INR on Coumadin with other chronic medical conditions as listed above, plan, replace potassium IV 40 mEq, return to the OR today with plastics, hold Coumadin based on INR still elevated and going up, will resume Coumadin tomorrow if INR permits, MOST form signed, continue insulin size scale coverage, continue Lasix, Brovana Pulmicort nebs twice daily, a.m. labs, DNR, DVT prophylaxis Coumadin, clinical course dictate further management, discussed with nurse at the bedside.  More than 65 % of the time of  this patient's encounter was performed by me, this included face-to-face time, planning and coordinating, medical decision making and critical thinking personally done by me.    Electronically signed by Jas Huerta MD, 12/2/2024, 13:36 EST.    Portions of this documentation were transcribed electronically from a voice recognition software.  I confirm all data accurately represents the service(s) I performed at today's visit.

## 2024-12-02 NOTE — PROGRESS NOTES
I talked to her POA about the benefits of taking her to the OR and clean her wound again. We will apply the wound vac again. Plan to proceed to the OR later today   09953 debridement abdominal wall   66615- complex wound closure, trunk  79704- wound vac placement    .Shalonda Snow MD, PhD  Plastic and reconstructive surgery

## 2024-12-02 NOTE — OP NOTE
Plastic Surgery Op Note    TRUNK DEBRIDEMENT, WOUND VAC PLACEMENT     Inez Doyle  12/2/2024    Pre-op Diagnosis:   Chronic wound infection of abdomen, sequela [S31.109S, L08.9]    Post-Op Diagnosis Codes:     * Chronic wound infection of abdomen, sequela [S31.109S, L08.9]      Anesthesia: General    Staff:   Circulator: Yajaira Calvert RN  Scrub Person: Andrew Jaime  Assistant: Chaya Peterson RN CSA    Surgeon: Dr Brown  First Assistant: MAJO Sandoval who was instrumental in helping with hemostasis, visualization and retraction structures as well as surgical closure.  Her skilled assistance was necessary for the success of this case.      Estimated Blood Loss: 50 mL    Specimens: * No orders in the log *      Drains:   Urethral Catheter Coude 16 Fr. (Active)   Daily Indications Sacral or perineal wound management (stage 3/4) for Urinary Incontinence when alternative devices are not appropriate 12/02/24 0725   Site Assessment Clean;Skin intact 12/02/24 0725   Collection Container Standard drainage bag 12/02/24 0725   Securement Method Securing device 12/02/24 0725   Catheter care complete Yes 12/02/24 1136   Output (mL) 450 mL 12/01/24 1808       [REMOVED] Closed/Suction Drain 1 LLQ Bulb 15 Fr. (Removed)   Site Description Healing 11/30/24 0700   Dressing Status Clean;Dry;Intact 11/30/24 0700   Drainage Appearance None 11/30/24 0700   Status To bulb suction 11/30/24 0700   Output (mL) 20 mL 11/30/24 0509       [REMOVED] Urethral Catheter (Removed)   Daily Indications Sacral or perineal wound management (stage 3/4) for Urinary Incontinence when alternative devices are not appropriate 11/27/24 0929   Site Assessment Clean;Skin intact 11/27/24 0929   Collection Container Standard drainage bag 11/27/24 0929   Securement Method Securing device 11/27/24 0929   Catheter care complete Yes 11/27/24 1800   Irrigation/Instillation Indication patency maintenance 11/26/24 0750   Output (mL) 400 mL 11/27/24  1800       Findings:     Fat necrosis extending 40 cm horizontally and 20 cm vertically on the left abdomen. That was not excised due to patient's comorbidities.   On the right side of the wound, complex closure with subtotal retention sutures of 20 cm  On the left, veraflo wound irrigation and suction with vashe of 40x 10 cm  Non excisional debridement of the wound of 60x 20 cm to the subcutaneous           Complications: none     After risks and benefits were discussed with patient and informed consent was signed, patient was taken to the OR and positioned supine. The surgical site was then prepped in a sterile fashion way and a time out was performed confirming patient's name and procedure to be performed. All surgical team was introduced by name.   I started inspecting the wound.  Her induration with fat necrosis extended too cranially to be removed. I then decided to perform sharp debridement of the borders with the bovie( 40x 1 cm) followed by debridement with a curette of the remaining of the whole wound including the area that was not opened. Then, I noted on the right, the pannus was soft and had good perfusion. I decided to close it with subtotal sutures in two rows to allow drainage. On the left, after debridement, the wound was washed with vashe and a veraflo dressing was placed and seal.     Patient tolerated procedure well, there were no complications, all instruments were checked and patient was awakened and taken to PACU in stable condition.  I was present for the entire procedure.     Date: 12/2/2024  Time: 16:58 CLARE Brown MD  12/02/24  16:58 CLARE

## 2024-12-03 LAB
ALBUMIN SERPL-MCNC: 2 G/DL (ref 3.5–5.2)
ALP SERPL-CCNC: 157 U/L (ref 39–117)
ALT SERPL W P-5'-P-CCNC: 11 U/L (ref 1–33)
ANION GAP SERPL CALCULATED.3IONS-SCNC: 12 MMOL/L (ref 5–15)
AST SERPL-CCNC: 14 U/L (ref 1–32)
BASOPHILS # BLD AUTO: 0.05 10*3/MM3 (ref 0–0.2)
BASOPHILS NFR BLD AUTO: 0.5 % (ref 0–1.5)
BILIRUB CONJ SERPL-MCNC: 0.1 MG/DL (ref 0–0.3)
BILIRUB INDIRECT SERPL-MCNC: 0.3 MG/DL
BILIRUB SERPL-MCNC: 0.4 MG/DL (ref 0–1.2)
BUN SERPL-MCNC: 14 MG/DL (ref 8–23)
BUN/CREAT SERPL: 21.2 (ref 7–25)
CALCIUM SPEC-SCNC: 8.6 MG/DL (ref 8.6–10.5)
CHLORIDE SERPL-SCNC: 103 MMOL/L (ref 98–107)
CO2 SERPL-SCNC: 20 MMOL/L (ref 22–29)
CREAT SERPL-MCNC: 0.66 MG/DL (ref 0.57–1)
DEPRECATED RDW RBC AUTO: 59.6 FL (ref 37–54)
EGFRCR SERPLBLD CKD-EPI 2021: 92.2 ML/MIN/1.73
EOSINOPHIL # BLD AUTO: 0.1 10*3/MM3 (ref 0–0.4)
EOSINOPHIL NFR BLD AUTO: 1.1 % (ref 0.3–6.2)
ERYTHROCYTE [DISTWIDTH] IN BLOOD BY AUTOMATED COUNT: 21.2 % (ref 12.3–15.4)
GLUCOSE SERPL-MCNC: 111 MG/DL (ref 65–99)
HCT VFR BLD AUTO: 31.4 % (ref 34–46.6)
HGB BLD-MCNC: 9.4 G/DL (ref 12–15.9)
IMM GRANULOCYTES # BLD AUTO: 0.1 10*3/MM3 (ref 0–0.05)
IMM GRANULOCYTES NFR BLD AUTO: 1.1 % (ref 0–0.5)
INR PPP: 2.8 (ref 0.86–1.15)
LYMPHOCYTES # BLD AUTO: 1.26 10*3/MM3 (ref 0.7–3.1)
LYMPHOCYTES NFR BLD AUTO: 13.5 % (ref 19.6–45.3)
MAGNESIUM SERPL-MCNC: 1.8 MG/DL (ref 1.6–2.4)
MCH RBC QN AUTO: 23.9 PG (ref 26.6–33)
MCHC RBC AUTO-ENTMCNC: 29.9 G/DL (ref 31.5–35.7)
MCV RBC AUTO: 79.7 FL (ref 79–97)
MONOCYTES # BLD AUTO: 0.78 10*3/MM3 (ref 0.1–0.9)
MONOCYTES NFR BLD AUTO: 8.4 % (ref 5–12)
NEUTROPHILS NFR BLD AUTO: 7.05 10*3/MM3 (ref 1.7–7)
NEUTROPHILS NFR BLD AUTO: 75.4 % (ref 42.7–76)
NRBC BLD AUTO-RTO: 0 /100 WBC (ref 0–0.2)
PHOSPHATE SERPL-MCNC: 3.1 MG/DL (ref 2.5–4.5)
PLATELET # BLD AUTO: 173 10*3/MM3 (ref 140–450)
PMV BLD AUTO: 10.6 FL (ref 6–12)
POTASSIUM SERPL-SCNC: 4.1 MMOL/L (ref 3.5–5.2)
PROT SERPL-MCNC: 5.4 G/DL (ref 6–8.5)
PROTHROMBIN TIME: 29.9 SECONDS (ref 11.8–14.9)
QT INTERVAL: 299 MS
QTC INTERVAL: 380 MS
RBC # BLD AUTO: 3.94 10*6/MM3 (ref 3.77–5.28)
SODIUM SERPL-SCNC: 135 MMOL/L (ref 136–145)
WBC NRBC COR # BLD AUTO: 9.34 10*3/MM3 (ref 3.4–10.8)

## 2024-12-03 PROCEDURE — 83735 ASSAY OF MAGNESIUM: CPT | Performed by: FAMILY MEDICINE

## 2024-12-03 PROCEDURE — 85025 COMPLETE CBC W/AUTO DIFF WBC: CPT | Performed by: FAMILY MEDICINE

## 2024-12-03 PROCEDURE — 84100 ASSAY OF PHOSPHORUS: CPT | Performed by: FAMILY MEDICINE

## 2024-12-03 PROCEDURE — 94799 UNLISTED PULMONARY SVC/PX: CPT

## 2024-12-03 PROCEDURE — 85610 PROTHROMBIN TIME: CPT | Performed by: SURGERY

## 2024-12-03 PROCEDURE — 80048 BASIC METABOLIC PNL TOTAL CA: CPT | Performed by: FAMILY MEDICINE

## 2024-12-03 PROCEDURE — 99232 SBSQ HOSP IP/OBS MODERATE 35: CPT | Performed by: FAMILY MEDICINE

## 2024-12-03 PROCEDURE — 94664 DEMO&/EVAL PT USE INHALER: CPT

## 2024-12-03 PROCEDURE — 80076 HEPATIC FUNCTION PANEL: CPT | Performed by: FAMILY MEDICINE

## 2024-12-03 RX ADMIN — BUDESONIDE 0.5 MG: 0.5 INHALANT ORAL at 20:01

## 2024-12-03 RX ADMIN — ARFORMOTEROL TARTRATE 15 MCG: 15 SOLUTION RESPIRATORY (INHALATION) at 20:01

## 2024-12-03 RX ADMIN — Medication 10 ML: at 08:51

## 2024-12-03 RX ADMIN — WARFARIN SODIUM 5 MG: 2.5 TABLET ORAL at 17:11

## 2024-12-03 RX ADMIN — MIDODRINE HYDROCHLORIDE 5 MG: 2.5 TABLET ORAL at 08:51

## 2024-12-03 RX ADMIN — Medication 1 TABLET: at 08:50

## 2024-12-03 RX ADMIN — DOCUSATE SODIUM 100 MG: 100 CAPSULE, LIQUID FILLED ORAL at 08:50

## 2024-12-03 RX ADMIN — Medication 473 ML: at 02:57

## 2024-12-03 RX ADMIN — ARFORMOTEROL TARTRATE 15 MCG: 15 SOLUTION RESPIRATORY (INHALATION) at 09:29

## 2024-12-03 RX ADMIN — FOLIC ACID 1 MG: 5 INJECTION, SOLUTION INTRAMUSCULAR; INTRAVENOUS; SUBCUTANEOUS at 08:50

## 2024-12-03 RX ADMIN — LOSARTAN POTASSIUM 12.5 MG: 25 TABLET, FILM COATED ORAL at 08:50

## 2024-12-03 RX ADMIN — SERTRALINE HYDROCHLORIDE 25 MG: 25 TABLET ORAL at 08:51

## 2024-12-03 RX ADMIN — ACETAMINOPHEN 650 MG: 325 TABLET ORAL at 03:02

## 2024-12-03 RX ADMIN — BUDESONIDE 0.5 MG: 0.5 INHALANT ORAL at 09:29

## 2024-12-03 RX ADMIN — FUROSEMIDE 40 MG: 40 TABLET ORAL at 08:50

## 2024-12-03 RX ADMIN — Medication 473 ML: at 23:25

## 2024-12-03 RX ADMIN — Medication 10 ML: at 23:25

## 2024-12-03 RX ADMIN — ACETAMINOPHEN 650 MG: 325 TABLET ORAL at 09:49

## 2024-12-03 RX ADMIN — MIDODRINE HYDROCHLORIDE 5 MG: 2.5 TABLET ORAL at 17:11

## 2024-12-03 RX ADMIN — Medication 473 ML: at 09:50

## 2024-12-03 NOTE — NURSING NOTE
At this time, no further needs assessed.  Palliative care will sign off.  If new needs arise, please place a new Palliative care consult.    Romana CROW RN, [facility]  Palliative Care

## 2024-12-03 NOTE — PLAN OF CARE
Goal Outcome Evaluation:              Outcome Evaluation: Patient is alert to self. Becomes agitated with Q 2 turns and any care. Wound vac in place to left lower abd. Calix cath in place.

## 2024-12-03 NOTE — SIGNIFICANT NOTE
Wound Eval / Progress Noted    [facility]     Patient Name: Inez Doyle  : 1950  MRN: [id number]  Today's Date: 2024                 Admit Date: 2024    Visit Dx:    ICD-10-CM ICD-9-CM   1. Chronic wound infection of abdomen, sequela  S31.109S 906.0    L08.9    2. Hypokalemia  E87.6 276.8   3. Anorexia  R63.0 783.0   4. Oropharyngeal dysphagia  R13.12 787.22   5. Difficulty walking  R26.2 719.7         AMS (altered mental status)    Severe protein-calorie malnutrition    Chronic wound infection of abdomen        Past Medical History:   Diagnosis Date    A-fib     Anemia     Anxiety     Asthma     Candidiasis of skin and nail     CHF (congestive heart failure)     COPD (chronic obstructive pulmonary disease)     Depression     Diabetes mellitus     GERD (gastroesophageal reflux disease)     Hyperlipidemia     Hypertension     Hypokalemia     Hypomagnesemia     Intervertebral disc disease     Obesity     Osteoarthritis     Panniculitis     Sleep apnea     Vitamin D deficiency         Past Surgical History:   Procedure Laterality Date    BACK SURGERY      STOMACH SURGERY      WOUND DEBRIDEMENT Bilateral 2024    Procedure: TRUNK DEBRIDEMENTabdominal wall debridement with  closure and wound vac placement;  Surgeon: Shalonda Brown MD;  Location: [facility];  Service: Plastics;  Laterality: Bilateral;         Physical Assessment:  Wound 24 1751 sacral spine (Active)   Wound Image     24 1237   Pressure Injury Stage 3 24 1237   Dressing Appearance dry;intact 24 1237   Closure None 24 1237   Base moist;yellow;slough;red 24 1237   Red (%), Wound Tissue Color 75 24 1237   Yellow (%), Wound Tissue Color 25 24 1237   Periwound dry;pink;redness 24 1237   Periwound Temperature warm 24 1237   Periwound Skin Turgor soft 24 1237   Edges open 24 1237   Wound Length (cm) 2.8 cm 24 1237   Wound Width (cm) 3 cm 24  1237   Wound Depth (cm) 1.9 cm 12/02/24 1237   Wound Surface Area (cm^2) 8.4 cm^2 12/02/24 1237   Wound Volume (cm^3) 15.96 cm^3 12/02/24 1237   Undermining [Depth (cm)/Location] 1.6 cm / 9 o'clock to 12 o'clock with greatest depth at 12 o'clock 12/02/24 1237   Drainage Characteristics/Odor serosanguineous 12/02/24 1237   Drainage Amount scant 12/02/24 1237   Care, Wound cleansed with;irrigated with;sterile normal saline 12/02/24 1237   Dressing Care dressing removed;dressing applied;gauze, wet-to-moist;silver impregnated;hydrofiber;silicone border foam 12/02/24 1237   Periwound Care absorptive dressing applied 12/02/24 1237   Wound 11/12/24 1650 Left posterior heel pressure injury (Active)   Wound Image   12/02/24 1237   Pressure Injury Stage 1 12/02/24 1237   Dressing Appearance dry;intact 12/02/24 1237   Closure None 12/02/24 1237   Base dry;blanchable;pink;red 12/02/24 1237   Red (%), Wound Tissue Color 50 12/02/24 1237   Periwound dry;other (see comments) 12/02/24 1237   Periwound Temperature warm 12/02/24 1237   Periwound Skin Turgor soft 12/02/24 1237   Edges rolled/closed 12/02/24 1237   Drainage Amount none 12/02/24 1237   Care, Wound cleansed with;sterile normal saline 12/02/24 1237   Dressing Care dressing removed;dressing applied;non-adherent;petroleum-based;gauze;silicone border foam 12/02/24 1237   Periwound Care dry periwound area maintained 12/02/24 1237       Wound 11/12/24 1650 Left breast MASD (moisture associated skin damage) (Active)   Wound Image   12/02/24 1237   Dressing Appearance open to air 12/02/24 1237   Closure None 12/02/24 1237   Base moist;red 12/02/24 1237   Red (%), Wound Tissue Color 100 12/02/24 1237   Periwound dry;pink 12/02/24 1237   Periwound Temperature warm 12/02/24 1237   Periwound Skin Turgor soft 12/02/24 1237   Edges open 12/02/24 1237   Drainage Characteristics/Odor serosanguineous 12/02/24 1237   Drainage Amount scant 12/02/24 1237   Care, Wound cleansed with;sterile  normal saline 12/02/24 1237   Dressing Care open to air;other (see comments) 12/02/24 1237   Periwound Care dry periwound area maintained 12/02/24 1237       Wound 11/17/24 0250 Left arm skin tear (Active)   Wound Image    12/02/24 1237   Dressing Appearance dry;moist drainage 12/02/24 1237   Closure None 12/02/24 1237   Base moist;pink;red 12/02/24 1237   Periwound dry;ecchymotic 12/02/24 1237   Periwound Temperature warm 12/02/24 1237   Periwound Skin Turgor soft 12/02/24 1237   Edges open 12/02/24 1237   Drainage Characteristics/Odor serosanguineous 12/02/24 1237   Drainage Amount scant 12/02/24 1237   Care, Wound cleansed with;sterile normal saline 12/02/24 1237   Dressing Care dressing removed;dressing applied;non-adherent;petroleum-based;gauze;silicone border foam 12/02/24 1237   Periwound Care absorptive dressing applied 12/02/24 1237   Wound 11/21/24 1239 Right proximal arm skin tear (Active)   Wound Image   12/02/24 1237   Dressing Appearance open to air 12/02/24 1237   Closure Approximated 12/02/24 1237   Base moist;red 12/02/24 1237   Red (%), Wound Tissue Color 100 12/02/24 1237   Periwound dry;ecchymotic 12/02/24 1237   Periwound Temperature warm 12/02/24 1237   Periwound Skin Turgor soft 12/02/24 1237   Edges open 12/02/24 1237   Drainage Characteristics/Odor serosanguineous 12/02/24 1237   Drainage Amount scant 12/02/24 1237   Care, Wound cleansed with;sterile normal saline 12/02/24 1237   Dressing Care dressing removed;dressing applied;non-adherent;petroleum-based;gauze;silicone border foam 12/02/24 1237   Periwound Care absorptive dressing applied 12/02/24 1237     Stage III pressure injury to L gluteal aspect: 3.4cm x 2.5cm x 0.2cm  Stage II pressure injury to L medial gluteal aspect: 1.8cm x 0.4cm x 0.1cm  Stage III pressure injury to R gluteal aspect: 0.7cm x 0.3cm x 0.1cm    Wound Check / Follow-up:  Patient seen today for wound follow-up. Patient is awake, alert, and oriented to person and  place with intermittent confusion noted. Discussed abdominal wound with Plastic Surgeon whom rounded with patient earlier in the day. Patient has been made NPO for surgical debridement with possible Veraflo wound VAC placement upon conclusion later today. Per Surgeon, abdominal wounds were not dressed at this time. Plan is for wound care RN to perform wound VAC dressing change on Wednesday if placed.    Two category one skin tears to right arm.  One category 3 skin tear to left lower arm and one category 3 skin tear to left hand.  Wound bases present with moist red tissue.  Periwound tissue is dry with ecchymosis.  Cleansed with normal saline and gauze, blotted dry.  Recommending to follow skin tear protocol as in place.    Moisture associated skin damage with erosion noted to left breast crease.  Areas of erosion present with moist red tissue.  Periwound tissue is dry with pink tissue noted.  Cleansed with normal saline and gauze, blotted dry.  Recommending daily skin care with application of a light dusting of cornstarch powder followed by application of dry sheets/pillowcases for added moisture management.    Resolving stage I pressure injury to left heel.  Wound base presents with dry, blanchable pink/red tissue.  Periwound tissue is dry with crusted tan tissue.  Cleansed with normal saline and gauze, blotted dry.  Recommending every 3-day dressing changes with nonadherent petroleum-based gauze and silicone border dressing securement.  Keep heels elevated at all times.    Stage III pressure injury to sacrum.  Wound base presents with moist red tissue, along with tightly adhered yellow slough.  Stage III pressure injury to left gluteal aspect.  Wound base presents with moist red and yellow tissue.  Stage II pressure injury to left medial gluteal aspect.  Wound base presents with moist red tissue.  Stage III pressure injury to right gluteal aspect.  Wound base presents with moist red tissue.  Periwound tissue is  dry with blanchable pink/redness.  Cleanse all ulcerations with normal saline and gauze, blotted dry.  Recommend to maintain twice a day dressing changes with sacral wound base being lightly filled with 1/8 strength Dakin's moistened fluffed gauze roll, and surrounding wound bases being covered with silver impregnated Hydrofiber. All wound bases to be secured with silicone border dressings. Recommending quality skin care and hygiene with application of blue top moisture barrier four times a day, and as needed for incontinence.  Implement every 2 hour turns, and offload at all times.  Keep patient clean, dry, and free from all moisture.    Impression: Scattered category 2 and category 3 skin tears to bilateral arms.  Moisture associated skin damage with erosion to left breast crease.  Resolving stage I pressure injury left heel.  Stage II pressure injury to sacrum.  Stage III pressure injury to left gluteal aspect.  Stage II pressure injury to left medial gluteal aspect.  Stage III pressure injury to right gluteal aspect.  Abdominal wound postdebridement.    Short term goals:  Regain skin integrity, skin protection, moisture prevention, pressure reduction, quality skin care and hygiene, twice a day dressing change, every 3-day dressing change, skin tear protocol.    Candi Mccauley RN    12/2/2024    19:14 EST

## 2024-12-03 NOTE — PLAN OF CARE
Goal Outcome Evaluation:           Progress: no change  Outcome Evaluation: Attempted to place mitten restraints. Patient became increasingly agitated with mitten application, and even removed them independently. Provider notified. Restraints off. Wound vac in place. Max Ames RN

## 2024-12-03 NOTE — SIGNIFICANT NOTE
Notified per primary RN that wound VAC is alerting a blockage within the instillation port. Primary RN unable to resolve alert and wound RN rounded on patient to assess. Wound VAC dressing is noted to be sealed with no signs of leaks. Wound VAC suction port was disconnected and irrigated with normal saline. Wound VAC suction port was reconnected and blockage alert was resolved. Prior to exiting room, wound VAC is functioning properly with no alerts and no signs of leaks.

## 2024-12-03 NOTE — PLAN OF CARE
Goal Outcome Evaluation:              Outcome Evaluation: VSS, Patient had procedure in OR today, wound vacc placed. MD gave order for non violent mitten restraints, DEL House aware, notified at 1806.

## 2024-12-03 NOTE — SIGNIFICANT NOTE
Primary RN notified the WON that the patients wound vac was alarming blockage within the installation. Wound vac dressing is clean, dry and intact; cannister with 450 mL of serosanguineous drainage present. Cannister was changed and irrigation performed to the tubing. This RN watched the NS instillation process twice to ensure blockage was resolved. No other alarms noted. Casi Villanueva RN

## 2024-12-03 NOTE — PROGRESS NOTES
Pharmacy to Dose: Warfarin  Consulting provider: Bakari   Indication for warfarin: Afib   Goal INR range: 2 - 3  Home warfarin dose:  Restarting warfarin therapy. Patient previously on warfarin  in May, 2024. Recently on apixaban, per note apixaban makes patient feel funny, and would like to restart warfarin  Previous warfarin dose: 4 mg, then 5mg, then 5mg, rotating. Previous admission in April, 2024: average dose of 5.3 mg    Date INR Warfarin Dose Given   11/23 1.14 5 mg   11/24 1.32 5 mg   11/25 1.52 7.5 mg   11/26 1.94 5 mg (per MD)   11/27 2.30 5 mg   11/28 2.65 5 mg   11/29 3.27 Held by MD   11/30 3.35 Held by MD   12/1 3.44 Held by MD   12/2 3.10 Held by MD   12/3 2.80 5 mg     Any Vitamin K given?: No  Drug interactions Reviewed: Yes.  Is patient on bridging therapy with another anticoagulant?: No    Lab Results   Component Value Date     12/02/2024     12/01/2024    HGB 8.6 (L) 12/02/2024    HGB 9.0 (L) 12/01/2024     Plan:  INR 2.80.    Warfarin 5 mg daily unheld by MD.    INR ordered daily with AM labs.     Thank you for this consult. Pharmacy will continue to monitor.   Cali Beltran

## 2024-12-04 LAB
ALBUMIN SERPL-MCNC: 1.7 G/DL (ref 3.5–5.2)
ALBUMIN/GLOB SERPL: 0.6 G/DL
ALP SERPL-CCNC: 132 U/L (ref 39–117)
ALT SERPL W P-5'-P-CCNC: 6 U/L (ref 1–33)
ANION GAP SERPL CALCULATED.3IONS-SCNC: 8.5 MMOL/L (ref 5–15)
AST SERPL-CCNC: 11 U/L (ref 1–32)
BASOPHILS # BLD AUTO: 0.04 10*3/MM3 (ref 0–0.2)
BASOPHILS NFR BLD AUTO: 0.5 % (ref 0–1.5)
BILIRUB SERPL-MCNC: 0.3 MG/DL (ref 0–1.2)
BUN SERPL-MCNC: 12 MG/DL (ref 8–23)
BUN/CREAT SERPL: 19 (ref 7–25)
CALCIUM SPEC-SCNC: 7.7 MG/DL (ref 8.6–10.5)
CHLORIDE SERPL-SCNC: 104 MMOL/L (ref 98–107)
CO2 SERPL-SCNC: 24.5 MMOL/L (ref 22–29)
CREAT SERPL-MCNC: 0.63 MG/DL (ref 0.57–1)
DEPRECATED RDW RBC AUTO: 60 FL (ref 37–54)
EGFRCR SERPLBLD CKD-EPI 2021: 93.2 ML/MIN/1.73
EOSINOPHIL # BLD AUTO: 0.11 10*3/MM3 (ref 0–0.4)
EOSINOPHIL NFR BLD AUTO: 1.5 % (ref 0.3–6.2)
ERYTHROCYTE [DISTWIDTH] IN BLOOD BY AUTOMATED COUNT: 21.2 % (ref 12.3–15.4)
GLOBULIN UR ELPH-MCNC: 2.7 GM/DL
GLUCOSE SERPL-MCNC: 93 MG/DL (ref 65–99)
HCT VFR BLD AUTO: 27.6 % (ref 34–46.6)
HGB BLD-MCNC: 8.1 G/DL (ref 12–15.9)
IMM GRANULOCYTES # BLD AUTO: 0.09 10*3/MM3 (ref 0–0.05)
IMM GRANULOCYTES NFR BLD AUTO: 1.2 % (ref 0–0.5)
INR PPP: 3.15 (ref 0.86–1.15)
LYMPHOCYTES # BLD AUTO: 1.34 10*3/MM3 (ref 0.7–3.1)
LYMPHOCYTES NFR BLD AUTO: 17.9 % (ref 19.6–45.3)
MCH RBC QN AUTO: 23.5 PG (ref 26.6–33)
MCHC RBC AUTO-ENTMCNC: 29.3 G/DL (ref 31.5–35.7)
MCV RBC AUTO: 80.2 FL (ref 79–97)
MONOCYTES # BLD AUTO: 0.64 10*3/MM3 (ref 0.1–0.9)
MONOCYTES NFR BLD AUTO: 8.5 % (ref 5–12)
NEUTROPHILS NFR BLD AUTO: 5.27 10*3/MM3 (ref 1.7–7)
NEUTROPHILS NFR BLD AUTO: 70.4 % (ref 42.7–76)
NRBC BLD AUTO-RTO: 0 /100 WBC (ref 0–0.2)
PLATELET # BLD AUTO: 159 10*3/MM3 (ref 140–450)
PMV BLD AUTO: 9.9 FL (ref 6–12)
POTASSIUM SERPL-SCNC: 3.2 MMOL/L (ref 3.5–5.2)
PROT SERPL-MCNC: 4.4 G/DL (ref 6–8.5)
PROTHROMBIN TIME: 32.8 SECONDS (ref 11.8–14.9)
RBC # BLD AUTO: 3.44 10*6/MM3 (ref 3.77–5.28)
SODIUM SERPL-SCNC: 137 MMOL/L (ref 136–145)
WBC NRBC COR # BLD AUTO: 7.49 10*3/MM3 (ref 3.4–10.8)

## 2024-12-04 PROCEDURE — 99232 SBSQ HOSP IP/OBS MODERATE 35: CPT | Performed by: FAMILY MEDICINE

## 2024-12-04 PROCEDURE — 94799 UNLISTED PULMONARY SVC/PX: CPT

## 2024-12-04 PROCEDURE — 85610 PROTHROMBIN TIME: CPT | Performed by: SURGERY

## 2024-12-04 PROCEDURE — 97606 NEG PRS WND THER DME>50 SQCM: CPT

## 2024-12-04 PROCEDURE — 85025 COMPLETE CBC W/AUTO DIFF WBC: CPT | Performed by: FAMILY MEDICINE

## 2024-12-04 PROCEDURE — 80053 COMPREHEN METABOLIC PANEL: CPT | Performed by: FAMILY MEDICINE

## 2024-12-04 PROCEDURE — 94664 DEMO&/EVAL PT USE INHALER: CPT

## 2024-12-04 RX ADMIN — FUROSEMIDE 40 MG: 40 TABLET ORAL at 10:37

## 2024-12-04 RX ADMIN — BUDESONIDE 0.5 MG: 0.5 INHALANT ORAL at 06:33

## 2024-12-04 RX ADMIN — FOLIC ACID 1 MG: 5 INJECTION, SOLUTION INTRAMUSCULAR; INTRAVENOUS; SUBCUTANEOUS at 11:57

## 2024-12-04 RX ADMIN — Medication 473 ML: at 22:03

## 2024-12-04 RX ADMIN — MIDODRINE HYDROCHLORIDE 5 MG: 2.5 TABLET ORAL at 10:36

## 2024-12-04 RX ADMIN — Medication 10 ML: at 22:03

## 2024-12-04 RX ADMIN — BUDESONIDE 0.5 MG: 0.5 INHALANT ORAL at 19:32

## 2024-12-04 RX ADMIN — ARFORMOTEROL TARTRATE 15 MCG: 15 SOLUTION RESPIRATORY (INHALATION) at 06:33

## 2024-12-04 RX ADMIN — Medication 473 ML: at 10:36

## 2024-12-04 RX ADMIN — Medication 10 ML: at 10:37

## 2024-12-04 RX ADMIN — MIDODRINE HYDROCHLORIDE 5 MG: 2.5 TABLET ORAL at 17:39

## 2024-12-04 RX ADMIN — LOSARTAN POTASSIUM 12.5 MG: 25 TABLET, FILM COATED ORAL at 10:37

## 2024-12-04 RX ADMIN — SERTRALINE HYDROCHLORIDE 25 MG: 25 TABLET ORAL at 10:37

## 2024-12-04 RX ADMIN — Medication 1 TABLET: at 10:36

## 2024-12-04 RX ADMIN — ARFORMOTEROL TARTRATE 15 MCG: 15 SOLUTION RESPIRATORY (INHALATION) at 19:32

## 2024-12-04 NOTE — PROGRESS NOTES
Pharmacy to Dose: Warfarin  Consulting provider: Bakari   Indication for warfarin: Afib [id number]  Goal INR range: 2 - 3  Home warfarin dose:  Restarting warfarin therapy. Patient previously on warfarin  in May, 2024. Recently on apixaban, per note apixaban makes patient feel funny, and would like to restart warfarin  Previous warfarin dose: 4 mg, then 5mg, then 5mg, rotating. Previous admission in April, 2024: average dose of 5.3 mg    Date INR Warfarin Dose Given   11/23 1.14 5 mg   11/24 1.32 5 mg   11/25 1.52 7.5 mg   11/26 1.94 5 mg (per MD)   11/27 2.30 5 mg   11/28 2.65 5 mg   11/29 3.27 Held by MD   11/30 3.35 Held by MD   12/1 3.44 Held by MD   12/2 3.10 Held by MD   12/3 2.80 5 mg   12/4 3.15 HOLD     Any Vitamin K given?: No  Drug interactions Reviewed: Yes.  Is patient on bridging therapy with another anticoagulant?: No    Lab Results   Component Value Date     12/04/2024     12/03/2024    HGB 8.1 (L) 12/04/2024    HGB 9.4 (L) 12/03/2024     Plan:  INR supratherapeutic at 3.15 today.    With [id number] of 5, would typically maintain warfarin but at a reduced dose at this INR. However, given her surgery yesterday, increase of 0.35, and a drop in hemoglobin from 9.4 to 8.1 today, will hold dosing today and reassess H/H and INR tomorrow.   INR ordered daily with AM labs. Repeat CBC with morning labs ordered for tomorrow.    Thank you for this consult. Pharmacy will continue to monitor.   Jayshree Figueroa RPH

## 2024-12-04 NOTE — PROGRESS NOTES
[facility]   Hospitalist Progress Note  Date: 12/3/2024  Patient Name: Inez Doyle  : 1950  MRN: [id number]  Date of admission: 2024      Subjective   Subjective     Chief Complaint: Mental status    Summary: Patient 74-year-old male history of atrial fibrillation on warfarin, CHF, COPD, hypertension, hyperlipidemia, obesity, chronic immobility presents from nursing facility with altered mental status and decreased oral intake found to have large abdominal wound General Surgery plastic surgery evaluated plastic surgery eventually took the patient to the operating room did panniculectomy with wound VAC placement remains remains hospitalized long-term prognosis appears poor consulting palliative care    Interval Followup: 12/3/2024    No new complaints.  Alertness waxes/wanes.  No fevers  Postop day #11 (2 ulcers abdomen removed en bloc/panniculectomy)  Postop day #1 (repeat wound debridement/wound VAC placement)  Pharmacy dosing warfarin.  INR 2.8  Hemoglobin stable 9.4      Objective   Objective     Vitals:   Temp:  [97.5 °F (36.4 °C)] 97.5 °F (36.4 °C)  Heart Rate:  [] 101  Resp:  [18] 18  BP: (100-120)/(53-72) 100/53    Physical Exam   Constitutional: Pleasant, polite.  Calm mood.  Interactive.  Respiratory: Clear without wheeze  Cardiovascular: Irregular.  No murmur.  Abdomen wound dressing in place.  Large pannus     Result Review    Result Review:  I have personally reviewed the results from the time of this admission to 12/3/2024 20:31 EST and agree with these findings:  [x]  Laboratory  []  Microbiology  []  Radiology  []  EKG/Telemetry   []  Cardiology/Vascular   []  Pathology  []  Old records  []  Other:    Assessment & Plan   Assessment / Plan     Assessment:    Staph epi bacteremia secondary to severe SSTI cellulitis, likely from large abdominal and/or sacral wounds  Multiple skin and soft tissue wounds less notable for sacral, large lower abdominal pannus exposed  wound  Paroxysmal A-fib (on coumadin)  Acute toxic and metabolic encephalopathy, improving waxing/waning  COPD (on room air baseline)  CHF, stable  Polypharmacy, likely contributing factor to intermittent confusion  HTN  DM2 (on metformin)  Morbid obesity BMI of 48.5  Nursing home resident (Soraya [facility])  Status post panniculectomy 11/22  Status post wound debridement 12/02    Plan:    Surgeon managing wound vac    Continue wound care/postop care.    Ceftriaxone was discontinued yesterday.  Bowel regimen as needed  DC SSI including Accu-Cheks.    Daily pro time.  Target INR 2-3.  Pharmacy dosing.  CT head: No acute intracranial abnormality noted.  Return to [facility] when medically stable   Continue to monitor for signs of recurrent infection  Wound care per surgeon.  High risk for dehiscence.  Post op Prevena  Continue to monitor hemoglobin  Palliative care consultation, long-term prognosis seems very poor  PT OT consultation  Further treatment contingent upon her hospital course  Discussed with RN      VTE Prophylaxis:  Pharmacologic & mechanical VTE prophylaxis orders are present.        CODE STATUS:   Medical Intervention Limits: No intubation (DNI)  Code Status (Patient has no pulse and is not breathing): No CPR (Do Not Attempt to Resuscitate)  Medical Interventions (Patient has pulse or is breathing): Limited Support

## 2024-12-04 NOTE — PLAN OF CARE
Goal Outcome Evaluation:  Plan of Care Reviewed With: patient        Progress: no change  Outcome Evaluation: Patient alert only to self. Calix remains in place with clear yellow output. Wound vac remains in place. Dressing changes done per orders. No signs of pain however, patient becomes irritable during turns and while providing care. BM x1. No acute changes overnight.

## 2024-12-05 LAB
ALBUMIN SERPL-MCNC: 1.6 G/DL (ref 3.5–5.2)
ALBUMIN/GLOB SERPL: 0.6 G/DL
ALP SERPL-CCNC: 119 U/L (ref 39–117)
ALT SERPL W P-5'-P-CCNC: 7 U/L (ref 1–33)
ANION GAP SERPL CALCULATED.3IONS-SCNC: 10 MMOL/L (ref 5–15)
AST SERPL-CCNC: 9 U/L (ref 1–32)
BASOPHILS # BLD AUTO: 0.04 10*3/MM3 (ref 0–0.2)
BASOPHILS NFR BLD AUTO: 0.5 % (ref 0–1.5)
BILIRUB SERPL-MCNC: 0.3 MG/DL (ref 0–1.2)
BUN SERPL-MCNC: 13 MG/DL (ref 8–23)
BUN/CREAT SERPL: 23.2 (ref 7–25)
CALCIUM SPEC-SCNC: 7.7 MG/DL (ref 8.6–10.5)
CHLORIDE SERPL-SCNC: 103 MMOL/L (ref 98–107)
CO2 SERPL-SCNC: 24 MMOL/L (ref 22–29)
CREAT SERPL-MCNC: 0.56 MG/DL (ref 0.57–1)
DEPRECATED RDW RBC AUTO: 59.5 FL (ref 37–54)
EGFRCR SERPLBLD CKD-EPI 2021: 95.9 ML/MIN/1.73
EOSINOPHIL # BLD AUTO: 0.12 10*3/MM3 (ref 0–0.4)
EOSINOPHIL NFR BLD AUTO: 1.5 % (ref 0.3–6.2)
ERYTHROCYTE [DISTWIDTH] IN BLOOD BY AUTOMATED COUNT: 21.3 % (ref 12.3–15.4)
GLOBULIN UR ELPH-MCNC: 2.7 GM/DL
GLUCOSE SERPL-MCNC: 101 MG/DL (ref 65–99)
HCT VFR BLD AUTO: 26.8 % (ref 34–46.6)
HGB BLD-MCNC: 7.9 G/DL (ref 12–15.9)
IMM GRANULOCYTES # BLD AUTO: 0.07 10*3/MM3 (ref 0–0.05)
IMM GRANULOCYTES NFR BLD AUTO: 0.9 % (ref 0–0.5)
INR PPP: 3.04 (ref 0.86–1.15)
LYMPHOCYTES # BLD AUTO: 1.42 10*3/MM3 (ref 0.7–3.1)
LYMPHOCYTES NFR BLD AUTO: 18.2 % (ref 19.6–45.3)
MCH RBC QN AUTO: 23.5 PG (ref 26.6–33)
MCHC RBC AUTO-ENTMCNC: 29.5 G/DL (ref 31.5–35.7)
MCV RBC AUTO: 79.8 FL (ref 79–97)
MONOCYTES # BLD AUTO: 0.67 10*3/MM3 (ref 0.1–0.9)
MONOCYTES NFR BLD AUTO: 8.6 % (ref 5–12)
NEUTROPHILS NFR BLD AUTO: 5.5 10*3/MM3 (ref 1.7–7)
NEUTROPHILS NFR BLD AUTO: 70.3 % (ref 42.7–76)
NRBC BLD AUTO-RTO: 0 /100 WBC (ref 0–0.2)
PLATELET # BLD AUTO: 162 10*3/MM3 (ref 140–450)
PMV BLD AUTO: 9.8 FL (ref 6–12)
POTASSIUM SERPL-SCNC: 2.9 MMOL/L (ref 3.5–5.2)
PROT SERPL-MCNC: 4.3 G/DL (ref 6–8.5)
PROTHROMBIN TIME: 31.9 SECONDS (ref 11.8–14.9)
RBC # BLD AUTO: 3.36 10*6/MM3 (ref 3.77–5.28)
SODIUM SERPL-SCNC: 137 MMOL/L (ref 136–145)
WBC NRBC COR # BLD AUTO: 7.82 10*3/MM3 (ref 3.4–10.8)

## 2024-12-05 PROCEDURE — 94799 UNLISTED PULMONARY SVC/PX: CPT

## 2024-12-05 PROCEDURE — 80053 COMPREHEN METABOLIC PANEL: CPT | Performed by: FAMILY MEDICINE

## 2024-12-05 PROCEDURE — 94664 DEMO&/EVAL PT USE INHALER: CPT

## 2024-12-05 PROCEDURE — 85025 COMPLETE CBC W/AUTO DIFF WBC: CPT | Performed by: FAMILY MEDICINE

## 2024-12-05 PROCEDURE — 85610 PROTHROMBIN TIME: CPT | Performed by: SURGERY

## 2024-12-05 PROCEDURE — 99232 SBSQ HOSP IP/OBS MODERATE 35: CPT | Performed by: FAMILY MEDICINE

## 2024-12-05 RX ADMIN — Medication 473 ML: at 21:24

## 2024-12-05 RX ADMIN — Medication 473 ML: at 14:31

## 2024-12-05 RX ADMIN — BUDESONIDE 0.5 MG: 0.5 INHALANT ORAL at 10:03

## 2024-12-05 RX ADMIN — MIDODRINE HYDROCHLORIDE 5 MG: 2.5 TABLET ORAL at 12:17

## 2024-12-05 RX ADMIN — Medication 1 TABLET: at 10:01

## 2024-12-05 RX ADMIN — FOLIC ACID 1 MG: 5 INJECTION, SOLUTION INTRAMUSCULAR; INTRAVENOUS; SUBCUTANEOUS at 10:10

## 2024-12-05 RX ADMIN — ACETAMINOPHEN 650 MG: 325 TABLET ORAL at 10:10

## 2024-12-05 RX ADMIN — MIDODRINE HYDROCHLORIDE 5 MG: 2.5 TABLET ORAL at 10:01

## 2024-12-05 RX ADMIN — SERTRALINE HYDROCHLORIDE 25 MG: 25 TABLET ORAL at 10:00

## 2024-12-05 RX ADMIN — Medication 10 ML: at 10:01

## 2024-12-05 RX ADMIN — MIDODRINE HYDROCHLORIDE 5 MG: 2.5 TABLET ORAL at 18:08

## 2024-12-05 RX ADMIN — ARFORMOTEROL TARTRATE 15 MCG: 15 SOLUTION RESPIRATORY (INHALATION) at 10:03

## 2024-12-05 RX ADMIN — LOSARTAN POTASSIUM 12.5 MG: 25 TABLET, FILM COATED ORAL at 10:01

## 2024-12-05 RX ADMIN — ARFORMOTEROL TARTRATE 15 MCG: 15 SOLUTION RESPIRATORY (INHALATION) at 20:19

## 2024-12-05 RX ADMIN — BUDESONIDE 0.5 MG: 0.5 INHALANT ORAL at 20:19

## 2024-12-05 RX ADMIN — FUROSEMIDE 40 MG: 40 TABLET ORAL at 10:00

## 2024-12-05 NOTE — SIGNIFICANT NOTE
Wound Eval / Progress Noted    [facility]     Patient Name: Inez Doyle  : 1950  MRN: [id number]  Today's Date: 2024                 Admit Date: 2024    Visit Dx:    ICD-10-CM ICD-9-CM   1. Chronic wound infection of abdomen, sequela  S31.109S 906.0    L08.9    2. Hypokalemia  E87.6 276.8   3. Anorexia  R63.0 783.0   4. Oropharyngeal dysphagia  R13.12 787.22   5. Difficulty walking  R26.2 719.7         AMS (altered mental status)    Severe protein-calorie malnutrition    Chronic wound infection of abdomen        Past Medical History:   Diagnosis Date    A-fib     Anemia     Anxiety     Asthma     Candidiasis of skin and nail     CHF (congestive heart failure)     COPD (chronic obstructive pulmonary disease)     Depression     Diabetes mellitus     GERD (gastroesophageal reflux disease)     Hyperlipidemia     Hypertension     Hypokalemia     Hypomagnesemia     Intervertebral disc disease     Obesity     Osteoarthritis     Panniculitis     Sleep apnea     Vitamin D deficiency         Past Surgical History:   Procedure Laterality Date    BACK SURGERY      STOMACH SURGERY      TRUNK DEBRIDEMENT N/A 2024    Procedure: TRUNK DEBRIDEMENT;  Surgeon: Shalonda Brown MD;  Location: [facility] (formerly [facility]);  Service: Plastics;  Laterality: N/A;    WOUND DEBRIDEMENT Bilateral 2024    Procedure: TRUNK DEBRIDEMENTabdominal wall debridement with  closure and wound vac placement;  Surgeon: Shalonda Brown MD;  Location: [facility] (formerly [facility]);  Service: Plastics;  Laterality: Bilateral;         Physical Assessment:  Wound 24 1800 anterior abdomen (Active)   Wound Image    24 1527   Dressing Appearance dry;intact 24 1527   Closure None 24 1527   Base moist;red;yellow;slough;subcutaneous 24 1527   Red (%), Wound Tissue Color 60 24 1527   Yellow (%), Wound Tissue Color 40 24 1527   Periwound dry;redness;ecchymotic 24 1527   Periwound Temperature warm  12/04/24 1527   Periwound Skin Turgor firm 12/04/24 1527   Edges open 12/04/24 1527   Wound Length (cm) 3 cm 12/04/24 1527   Wound Width (cm) 22.5 cm 12/04/24 1527   Wound Depth (cm) 4 cm 12/04/24 1527   Wound Surface Area (cm^2) 67.5 cm^2 12/04/24 1527   Wound Volume (cm^3) 270 cm^3 12/04/24 1527   Tunneling [Depth (cm)/Location] Greater than 15cm / 9 o'clock 12/04/24 1527   Drainage Characteristics/Odor serosanguineous 12/04/24 1527   Drainage Amount small 12/04/24 1527   Care, Wound cleansed with;irrigated with;sterile normal saline;negative pressure wound therapy 12/04/24 1527   Dressing Care dressing removed;dressing applied;foam;transparent film 12/04/24 1527   Periwound Care absorptive dressing applied 12/04/24 1527       Wound 12/02/24 1601 Right lower abdomen surgical (Active)   Wound Image   12/04/24 1527   Dressing Appearance dry;intact 12/04/24 1527   Closure Staples;Sutures 12/04/24 1527   Base moist;yellow;red 12/04/24 1527   Red (%), Wound Tissue Color 25 12/04/24 1527   Yellow (%), Wound Tissue Color 75 12/04/24 1527   Periwound dry;ecchymotic;redness 12/04/24 1527   Periwound Temperature warm 12/04/24 1527   Periwound Skin Turgor firm 12/04/24 1527   Edges open;rolled/closed 12/04/24 1527   Wound Length (cm) 1 cm 12/04/24 1527   Wound Width (cm) 14 cm 12/04/24 1527   Wound Depth (cm) 4.2 cm 12/04/24 1527   Wound Surface Area (cm^2) 24 cm^2 12/04/24 1527   Wound Volume (cm^3) 58.8 cm^3 12/04/24 1527   Tunneling [Depth (cm)/Location] 9.8cm / 3 o'clock 12/04/24 1527   Drainage Characteristics/Odor serosanguineous 12/04/24 1527   Drainage Amount scant 12/04/24 1527   Care, Wound cleansed with;sterile normal saline;negative pressure wound therapy 12/04/24 1527   Dressing Care dressing removed;dressing applied;foam;non-adherent;petroleum-based;gauze;transparent film 12/04/24 1527   Periwound Care absorptive dressing applied 12/04/24 1527       NPWT (Negative Pressure Wound Therapy) 12/02/24 1614 abdomen  (Active)   Therapy Setting continuous therapy 12/04/24 1527   Dressing other (see comments) 12/04/24 1527   Instillation normal saline 12/04/24 1527   Pressure Setting 125 mmHg 12/04/24 1527   Sponges Inserted 2;other (see comments) 12/04/24 1527   Sponges Removed 1 12/04/24 1527   Finger sweep complete Yes 12/04/24 1527       NPWT (Negative Pressure Wound Therapy) 12/04/24 1527 Right Lower Aspect of Abdomen (Active)   Therapy Setting continuous therapy 12/04/24 1527   Dressing foam, black;foam, white 12/04/24 1527   Contact Layer Vaseline-embedded gauze 12/04/24 1527   Pressure Setting 125 mmHg 12/04/24 1527   Sponges Inserted 2;other (see comments) 12/04/24 1527   Finger sweep complete Yes 12/04/24 1527      Wound Check / Follow-up:  Patient seen today for wound VAC dressing change. Patient is awake and alert; however, she is noted to be confused. Patient's wound VAC is alarming instillation port blockage upon entry into room. Wound VAC dressing is noted to be sealed with no signs of leaks. Upon completion of wound VAC dressing change, patient was noted to have been incontinent of stool. Incontinence care provided and bed pad changed. Patient was turned to her left side upon completion of assessment.    Abdominal incision spans across a majority of lower abdominal tissue.  Periwound tissue spanning over anterior aspect of abdomen is noted to be firm.  Firmness of abdominal tissue is not a new finding and already known to plastic surgeon.      Veraflo wound VAC is in place to left aspect of wound base which is open. Dressing removed with adhesive remover spray.  One grey foam removed from wound base.  Finger sweep performed and no additional foam detected in wound base.  Wound base presents with moist red tissue, tightly adhered yellow slough, and yellow adipose tissue.  Periwound tissue is dry with areas of redness and ecchymosis.  A tunnel is located at 9 o'clock with greater than 15 cm of depth, which spans  beneath incisional line which is closed with staples.  Cleansed with normal saline and gauze, blotted dry.  Skin-Prep was applied to periwound tissue.  One grey foam was placed within open wound base and lightly tucked into area of tunneling.  A second piece of gray foam was then applied over open wound base to fill area of open tissue.  Transparent drape was applied over gray foams.  A track pad was made with an additional piece of gray foam. Dressing was connected to wound VAC suction with Veraflo saline instillation port at 125 mmHg.  Seal obtained with no signs of lifting or leaking. Veraflo installation was cycled and color change demonstrating contact with saline was noted to foam within track pad and wound base. Veraflo normal saline installation was set to instill 6 mLs\ every 2 hours, with a 10-minute soak time.  Recommending to continue Veraflo wound VAC therapy with Monday, Wednesday, and Friday dressing changes.      Right aspect of abdominal incision presents with area of staple closure to medial aspect, and an area closed with retention sutures to lateral aspect. Tissue between staple closure presents with crusted tan drainage. Tissue within area of retention suture closure presents with moist yellow and red tissue. Periwound tissue is dry with redness and ecchymosis. A tunnel is present to most lateral aspect of wound base with 9.8cm of depth noted at 3 o'clock which runs beneath incision line. Cleansed with normal saline and gauze, blotted dry. Skin prep applied to periwound and periwound skin was draped with transparent film.  One piece of white foam was tucked into tunnel noted to lateral aspect. Nonadherent petroleum-based gauze was placed over incisional line. One piece of black foam was applied on top of nonadherent petroleum-based gauze over incisional line, as well as over white foam. Foams were secured with transparent drape. A track pad was made with a second piece of black foam. Dressing  was connected to wound VAC suction at 125 mmHg utilizing a y-connector. Seal obtained with no signs of lifting or leaking. Color change noted to black foam, demonstrating connection to white foam within tunnel. Recommending to continue wound VAC therapy with Monday, Wednesday, and Friday dressing changes.  Discussed findings with plastic Surgeon and orders obtained.    Impression: Abdominal incision post debridement, Veraflo wound VAC to open wound base to left aspect of abdomen, Wound VAC to right aspect of abdomen with staple and retention suture closure.    Short term goals:  Regain skin integrity, skin protection, negative pressure wound therapy, every three day dressing changes     Candi Mccauley RN    12/4/2024    22:06 EST

## 2024-12-05 NOTE — SIGNIFICANT NOTE
12/05/24 1041   OTHER   Discipline occupational therapist   Rehab Time/Intention   Session Not Performed   (restraints)

## 2024-12-05 NOTE — PLAN OF CARE
Goal Outcome Evaluation:  Plan of Care Reviewed With: patient        Progress: no change  Outcome Evaluation: Mitt restraints remain in place, patient removed multiple times throughout the night. Able to get them placed again and reorient to keep on for awhile each time. Patient remains alert only to self. Bilateral wound vacs in place on abdomen. Dressings changed per orders. Calix in place with clear yellow output. Q2 turns done, patient becomes agitated during them. No acute changes overnight.

## 2024-12-05 NOTE — PROGRESS NOTES
"Nutrition Services    Patient Name: Inez Doyle  YOB: 1950  MRN: [id number]  Admission date: 11/11/2024      CLINICAL NUTRITION ASSESSMENT      Reason for Assessment  Follow Up     H&P:  Past Medical History:   Diagnosis Date    A-fib     Anemia     Anxiety     Asthma     Candidiasis of skin and nail     CHF (congestive heart failure)     COPD (chronic obstructive pulmonary disease)     Depression     Diabetes mellitus     GERD (gastroesophageal reflux disease)     Hyperlipidemia     Hypertension     Hypokalemia     Hypomagnesemia     Intervertebral disc disease     Obesity     Osteoarthritis     Panniculitis     Sleep apnea     Vitamin D deficiency         Current Problems:   Active Hospital Problems    Diagnosis     **AMS (altered mental status)     Severe protein-calorie malnutrition     Chronic wound infection of abdomen         Nutrition/Diet History         Narrative   Good intake documented (~75%). Boost supplement was discontinued per pt request. William BID is in place for wound healing. Continue nutrition intervention and RD to follow per protocol.      Anthropometrics        Current Height, Weight Height: 167.6 cm (66\")  Weight: (!) 136 kg (300 lb 4.3 oz)   Current BMI Body mass index is 48.46 kg/m².   BMI Classification Obese Class III   % %, IBW 59 kg   Adjusted Body Weight (ABW) 79 kg   Weight Hx  Wt Readings from Last 30 Encounters:   11/21/24 0632 (!) 136 kg (300 lb 4.3 oz)   11/11/24 1513 (!) 137 kg (301 lb 2.4 oz)   11/11/24 1054 123 kg (270 lb 8.1 oz)   10/21/24 1551 131 kg (289 lb 11 oz)   04/22/24 1525 (!) 179 kg (394 lb 6.5 oz)   07/18/23 1446 (!) 151 kg (333 lb)   06/19/23 0500 (!) 159 kg (350 lb 8.5 oz)   06/18/23 0315 (!) 163 kg (360 lb 3.7 oz)   06/14/23 1935 (!) 177 kg (389 lb 8.9 oz)   06/14/23 1321 (!) 177 kg (389 lb 8.9 oz)          Wt Change Observation Per EMR, wt down 93# (24%) in < 1 yr; significant     Estimated/Assessed Needs  Estimated Needs based on: " "Adjusted Body Weight 79 kg       Energy Requirements 25-35 kcal/kg   EST Needs (kcal/day) 5703-2952 kcal        Protein Requirements 1.5-2.5 g/kg   EST Daily Needs (g/day) 119-200 g       Fluid Requirements 25-30 mL/kg    Estimated Needs (mL/day) 3964-7335 mL     Labs/Medications Reviewed         Pertinent Labs Decrease noted in K+ and phos, Hgb also trending down. Others reviewed.   Results from last 7 days   Lab Units 12/05/24  0546 12/04/24  0348 12/03/24  0440   SODIUM mmol/L 137 137 135*   POTASSIUM mmol/L 2.9* 3.2* 4.1   CHLORIDE mmol/L 103 104 103   CO2 mmol/L 24.0 24.5 20.0*   BUN mg/dL 13 12 14   CREATININE mg/dL 0.56* 0.63 0.66   CALCIUM mg/dL 7.7* 7.7* 8.6   BILIRUBIN mg/dL 0.3 0.3 0.4   ALK PHOS U/L 119* 132* 157*   ALT (SGPT) U/L 7 6 11   AST (SGOT) U/L 9 11 14   GLUCOSE mg/dL 101* 93 111*     Results from last 7 days   Lab Units 12/05/24  0546 12/03/24  0528 12/03/24  0440 12/02/24  0446 12/01/24  0511   MAGNESIUM mg/dL  --   --  1.8 1.8 1.8   PHOSPHORUS mg/dL  --   --  3.1 2.9 2.7   HEMOGLOBIN g/dL 7.9*   < >  --  8.6* 9.0*   HEMATOCRIT % 26.8*   < >  --  29.4* 30.8*    < > = values in this interval not displayed.     SARS-CoV-2, ROB   Date Value Ref Range Status   08/23/2024 Negative Negative Final     No results found for: \"HGBA1C\"      Pertinent Medications Lasix- may deplete potassium, pt getting an MVM. Others reviewed.     Malnutrition Severity Assessment      Patient meets criteria for : Severe Malnutrition         Nutrition Diagnosis         Nutrition Dx Problem 1 Severe malnutrition related to decreased ability to consume sufficient energy, wound healing as evidenced by inadequate energy intake., decreased appetite., unintended weight change., and impaired skin integrity.     Nutrition Intervention           Current Nutrition Orders & Evaluation of Intake       Current PO Diet Diet: Regular/House; Texture: Mechanical Ground (NDD 2); Fluid Consistency: Thin (IDDSI 0)   Supplement Orders Placed " This Encounter      Dietary Nutrition Supplements William           Nutrition Intervention/Prescription        Continue Regular, NDD2 diet as tolerated  Continue William BID        Medical Nutrition Therapy/Nutrition Education          Learner     Readiness Patient  Accepted     Method     Response Explanation  Verbalized understanding, needs reinforcement     Monitor/Evaluation        Monitor PO intake, Supplement intake, Pertinent labs, Skin status, POC/GOC     Nutrition Discharge Plan         To be determined     Electronically signed by:  Guilherme Ansari RD  12/05/24 13:52 EST

## 2024-12-05 NOTE — PROGRESS NOTES
Pharmacy to Dose: Warfarin  Consulting provider: Bakari   Indication for warfarin: Afib [id number] 5  Goal INR range: 2 - 3  Home warfarin dose:  Restarting warfarin therapy. Patient previously on warfarin  in May, 2024. Recently on apixaban, per note apixaban makes patient feel funny, and would like to restart warfarin  Previous warfarin dose: 4 mg, then 5mg, then 5mg, rotating. Previous admission in April, 2024: average dose of 5.3 mg    Date INR Warfarin Dose Given   11/23 1.14 5 mg   11/24 1.32 5 mg   11/25 1.52 7.5 mg   11/26 1.94 5 mg (per MD)   11/27 2.30 5 mg   11/28 2.65 5 mg   11/29 3.27 Held by MD   11/30 3.35 Held by MD   12/1 3.44 Held by MD   12/2 3.10 Held by MD   12/3 2.80 5 mg   12/4 3.15 Hold   12/5 3.04 HOLD               Any Vitamin K given?: No  Drug interactions Reviewed: Yes.  Is patient on bridging therapy with another anticoagulant?: No    Plan:   Hgb down to 7.9 from 8.1.   Will hold dose again today.   INR ordered daily with AM labs. Repeat CBC with morning labs ordered for tomorrow.    Thank you for this consult. Pharmacy will continue to monitor.   Doretha Coronado

## 2024-12-05 NOTE — PROGRESS NOTES
[facility]   Hospitalist Progress Note  Date: 2024  Patient Name: Inez Doyle  : 1950  MRN: [id number]  Date of admission: 2024      Subjective   Subjective     Chief Complaint: Mental status    Summary: Patient 74-year-old male history of atrial fibrillation on warfarin, CHF, COPD, hypertension, hyperlipidemia, obesity, chronic immobility presents from nursing facility with altered mental status and decreased oral intake found to have large abdominal wound General Surgery plastic surgery evaluated plastic surgery eventually took the patient to the operating room did panniculectomy with wound VAC placement remains remains hospitalized long-term prognosis appears poor consulting palliative care    Interval Followup: 2024    No new complaints.  Alertness waxes/wanes.  No fevers  Postop day #12 (2 ulcers abdomen removed en bloc/panniculectomy)  Postop day #2 (repeat wound debridement/wound VAC placement)  Pharmacy dosing warfarin.  INR 3.15      Objective   Objective     Vitals:   Temp:  [97.3 °F (36.3 °C)-99.1 °F (37.3 °C)] 97.3 °F (36.3 °C)  Heart Rate:  [] 104  Resp:  [16-20] 18  BP: (100-101)/(43-57) 100/57    Physical Exam   Constitutional: Pleasant, polite.  Calm mood.  Interactive.  Respiratory: Clear without wheeze  Cardiovascular: Irregular.  No murmur.  Abdomen wound dressing in place.  Large pannus     Result Review    Result Review:  I have personally reviewed the results from the time of this admission to 2024 19:48 EST and agree with these findings:  [x]  Laboratory  []  Microbiology  []  Radiology  []  EKG/Telemetry   []  Cardiology/Vascular   []  Pathology  []  Old records  []  Other:    Assessment & Plan   Assessment / Plan     Assessment:    Staph epi bacteremia secondary to severe SSTI cellulitis, likely from large abdominal and/or sacral wounds  Multiple skin and soft tissue wounds less notable for sacral, large lower abdominal pannus exposed  wound  Paroxysmal A-fib (on coumadin)  Acute toxic and metabolic encephalopathy, improving waxing/waning  COPD (on room air baseline)  CHF, stable  Polypharmacy, likely contributing factor to intermittent confusion  HTN  DM2 (on metformin)  Morbid obesity BMI of 48.5  Nursing home resident ([facility])  Status post panniculectomy 11/22  Status post wound debridement 12/02    Plan:    Surgeon managing wound vac    Continue wound care/postop care.    Ceftriaxone was discontinued   Bowel regimen as needed  DC SSI including Accu-Cheks.    Daily pro time.  Target INR 2-3.  Pharmacy dosing.  CT head: No acute intracranial abnormality noted.  Return to [place] when medically stable   Continue to monitor for signs of recurrent infection  Wound care per surgeon.  High risk for dehiscence.  Post op Prevena  Continue to monitor hemoglobin  Palliative care consultation, long-term prognosis seems very poor  PT OT consultation  Further treatment contingent upon her hospital course  Discussed with RN  Possible discharge tomorrow      VTE Prophylaxis:  Pharmacologic & mechanical VTE prophylaxis orders are present.    CODE STATUS:   Medical Intervention Limits: No intubation (DNI)  Code Status (Patient has no pulse and is not breathing): No CPR (Do Not Attempt to Resuscitate)  Medical Interventions (Patient has pulse or is breathing): Limited Support

## 2024-12-06 LAB
ALBUMIN SERPL-MCNC: 1.8 G/DL (ref 3.5–5.2)
ALBUMIN/GLOB SERPL: 0.6 G/DL
ALP SERPL-CCNC: 121 U/L (ref 39–117)
ALT SERPL W P-5'-P-CCNC: 8 U/L (ref 1–33)
ANION GAP SERPL CALCULATED.3IONS-SCNC: 9.7 MMOL/L (ref 5–15)
AST SERPL-CCNC: 9 U/L (ref 1–32)
BASOPHILS # BLD AUTO: 0.05 10*3/MM3 (ref 0–0.2)
BASOPHILS NFR BLD AUTO: 0.5 % (ref 0–1.5)
BILIRUB SERPL-MCNC: 0.4 MG/DL (ref 0–1.2)
BUN SERPL-MCNC: 12 MG/DL (ref 8–23)
BUN/CREAT SERPL: 18.5 (ref 7–25)
CALCIUM SPEC-SCNC: 7.9 MG/DL (ref 8.6–10.5)
CHLORIDE SERPL-SCNC: 101 MMOL/L (ref 98–107)
CO2 SERPL-SCNC: 25.3 MMOL/L (ref 22–29)
CREAT SERPL-MCNC: 0.65 MG/DL (ref 0.57–1)
DEPRECATED RDW RBC AUTO: 61.5 FL (ref 37–54)
EGFRCR SERPLBLD CKD-EPI 2021: 92.5 ML/MIN/1.73
EOSINOPHIL # BLD AUTO: 0.17 10*3/MM3 (ref 0–0.4)
EOSINOPHIL NFR BLD AUTO: 1.8 % (ref 0.3–6.2)
ERYTHROCYTE [DISTWIDTH] IN BLOOD BY AUTOMATED COUNT: 21.6 % (ref 12.3–15.4)
GLOBULIN UR ELPH-MCNC: 2.9 GM/DL
GLUCOSE SERPL-MCNC: 107 MG/DL (ref 65–99)
HCT VFR BLD AUTO: 27.8 % (ref 34–46.6)
HGB BLD-MCNC: 8.2 G/DL (ref 12–15.9)
IMM GRANULOCYTES # BLD AUTO: 0.08 10*3/MM3 (ref 0–0.05)
IMM GRANULOCYTES NFR BLD AUTO: 0.8 % (ref 0–0.5)
INR PPP: 2.52 (ref 0.86–1.15)
LYMPHOCYTES # BLD AUTO: 1.55 10*3/MM3 (ref 0.7–3.1)
LYMPHOCYTES NFR BLD AUTO: 16.4 % (ref 19.6–45.3)
MCH RBC QN AUTO: 23.6 PG (ref 26.6–33)
MCHC RBC AUTO-ENTMCNC: 29.5 G/DL (ref 31.5–35.7)
MCV RBC AUTO: 80.1 FL (ref 79–97)
MONOCYTES # BLD AUTO: 0.73 10*3/MM3 (ref 0.1–0.9)
MONOCYTES NFR BLD AUTO: 7.7 % (ref 5–12)
NEUTROPHILS NFR BLD AUTO: 6.85 10*3/MM3 (ref 1.7–7)
NEUTROPHILS NFR BLD AUTO: 72.8 % (ref 42.7–76)
NRBC BLD AUTO-RTO: 0 /100 WBC (ref 0–0.2)
PLATELET # BLD AUTO: 165 10*3/MM3 (ref 140–450)
PMV BLD AUTO: 9.9 FL (ref 6–12)
POTASSIUM SERPL-SCNC: 2.9 MMOL/L (ref 3.5–5.2)
PROT SERPL-MCNC: 4.7 G/DL (ref 6–8.5)
PROTHROMBIN TIME: 27.6 SECONDS (ref 11.8–14.9)
RBC # BLD AUTO: 3.47 10*6/MM3 (ref 3.77–5.28)
SODIUM SERPL-SCNC: 136 MMOL/L (ref 136–145)
WBC NRBC COR # BLD AUTO: 9.43 10*3/MM3 (ref 3.4–10.8)

## 2024-12-06 PROCEDURE — 94664 DEMO&/EVAL PT USE INHALER: CPT

## 2024-12-06 PROCEDURE — 97606 NEG PRS WND THER DME>50 SQCM: CPT

## 2024-12-06 PROCEDURE — 97161 PT EVAL LOW COMPLEX 20 MIN: CPT

## 2024-12-06 PROCEDURE — 99232 SBSQ HOSP IP/OBS MODERATE 35: CPT | Performed by: FAMILY MEDICINE

## 2024-12-06 PROCEDURE — 94799 UNLISTED PULMONARY SVC/PX: CPT

## 2024-12-06 PROCEDURE — 85025 COMPLETE CBC W/AUTO DIFF WBC: CPT | Performed by: FAMILY MEDICINE

## 2024-12-06 PROCEDURE — 80053 COMPREHEN METABOLIC PANEL: CPT | Performed by: FAMILY MEDICINE

## 2024-12-06 PROCEDURE — 85610 PROTHROMBIN TIME: CPT | Performed by: SURGERY

## 2024-12-06 RX ORDER — WARFARIN SODIUM 4 MG/1
4 TABLET ORAL
Status: COMPLETED | OUTPATIENT
Start: 2024-12-06 | End: 2024-12-06

## 2024-12-06 RX ADMIN — Medication 473 ML: at 10:13

## 2024-12-06 RX ADMIN — BUDESONIDE 0.5 MG: 0.5 INHALANT ORAL at 09:31

## 2024-12-06 RX ADMIN — WARFARIN SODIUM 4 MG: 4 TABLET ORAL at 17:31

## 2024-12-06 RX ADMIN — BUDESONIDE 0.5 MG: 0.5 INHALANT ORAL at 19:39

## 2024-12-06 RX ADMIN — DOCUSATE SODIUM 100 MG: 100 CAPSULE, LIQUID FILLED ORAL at 22:04

## 2024-12-06 RX ADMIN — MIDODRINE HYDROCHLORIDE 5 MG: 2.5 TABLET ORAL at 17:31

## 2024-12-06 RX ADMIN — ARFORMOTEROL TARTRATE 15 MCG: 15 SOLUTION RESPIRATORY (INHALATION) at 09:31

## 2024-12-06 RX ADMIN — ARFORMOTEROL TARTRATE 15 MCG: 15 SOLUTION RESPIRATORY (INHALATION) at 19:39

## 2024-12-06 NOTE — PLAN OF CARE
Goal Outcome Evaluation:  Plan of Care Reviewed With: patient        Progress: no change  Outcome Evaluation: Patient not appropriate for skilled PT services as is currently dependent with all ADLs and mobility. Per EMR, she was a long-term resident at [facility] where she likely required assist. Recommend return to extended care facility once medically able.    Anticipated Discharge Disposition (PT): extended care facility

## 2024-12-06 NOTE — PROGRESS NOTES
[facility]   Hospitalist Progress Note  Date: 2024  Patient Name: Inez Doyle  : 1950  MRN: [id number]  Date of admission: 2024      Subjective   Subjective     Chief Complaint: Mental status    Summary: Patient 74-year-old male history of atrial fibrillation on warfarin, CHF, COPD, hypertension, hyperlipidemia, obesity, chronic immobility presents from nursing facility with altered mental status and decreased oral intake found to have large abdominal wound General Surgery plastic surgery evaluated plastic surgery eventually took the patient to the operating room did panniculectomy with wound VAC placement remains remains hospitalized long-term prognosis appears poor consulting palliative care    Interval Followup: 2024    No new complaints.  Alertness waxes/wanes.  No fevers  Postop day #13 (2 ulcers abdomen removed en bloc/panniculectomy)  Postop day #3 (repeat wound debridement/wound VAC placement)  Pharmacy dosing warfarin.    Patient had mits placed recently which has delayed her transfer to rehab      Objective   Objective     Vitals:   Temp:  [97.3 °F (36.3 °C)-99 °F (37.2 °C)] 97.3 °F (36.3 °C)  Heart Rate:  [] 90  Resp:  [16-20] 16  BP: ()/(51-61) 94/51    Physical Exam   Constitutional: Pleasant, polite.  Calm mood.  Interactive.  Respiratory: Clear without wheeze  Cardiovascular: Irregular.  No murmur.  Abdomen wound dressing in place.  Large pannus     Result Review    Result Review:  I have personally reviewed the results from the time of this admission to 2024 22:11 EST and agree with these findings:  [x]  Laboratory  []  Microbiology  []  Radiology  []  EKG/Telemetry   []  Cardiology/Vascular   []  Pathology  []  Old records  []  Other:    Assessment & Plan   Assessment / Plan     Assessment:    Staph epi bacteremia secondary to severe SSTI cellulitis, likely from large abdominal and/or sacral wounds  Multiple skin and soft tissue wounds less notable  for sacral, large lower abdominal pannus exposed wound  Paroxysmal A-fib (on coumadin)  Acute toxic and metabolic encephalopathy, improving waxing/waning  COPD (on room air baseline)  CHF, stable  Polypharmacy, likely contributing factor to intermittent confusion  HTN  DM2 (on metformin)  Morbid obesity BMI of 48.5  Nursing home resident ([place])  Status post panniculectomy 11/22  Status post wound debridement 12/02    Plan:    Surgeon managing wound vac    Continue wound care/postop care.    Ceftriaxone was discontinued   Bowel regimen as needed  DC SSI including Accu-Cheks.    Daily pro time.  Target INR 2-3.  Pharmacy dosing.  CT head: No acute intracranial abnormality noted.  Return to [place] when medically stable   Continue to monitor for signs of recurrent infection  Wound care per surgeon.  High risk for dehiscence.  Post op Prevena  Continue to monitor hemoglobin  Palliative care consultation, long-term prognosis seems very poor  PT OT consultation  Further treatment contingent upon her hospital course  Discussed with RN  Possible discharge tomorrow      VTE Prophylaxis:  Pharmacologic & mechanical VTE prophylaxis orders are present.    CODE STATUS:   Medical Intervention Limits: No intubation (DNI)  Code Status (Patient has no pulse and is not breathing): No CPR (Do Not Attempt to Resuscitate)  Medical Interventions (Patient has pulse or is breathing): Limited Support

## 2024-12-06 NOTE — PROGRESS NOTES
Pharmacy to Dose: Warfarin  Consulting provider: Bakari   Indication for warfarin: Afib [id number] 5  Goal INR range: 2 - 3  Home warfarin dose:  Restarting warfarin therapy. Patient previously on warfarin  in May, 2024. Recently on apixaban, per note apixaban makes patient feel funny, and would like to restart warfarin  Previous warfarin dose: 4 mg, then 5mg, then 5mg, rotating. Previous admission in April, 2024: average dose of 5.3 mg    Date INR Warfarin Dose Given   11/23 1.14 5 mg   11/24 1.32 5 mg   11/25 1.52 7.5 mg   11/26 1.94 5 mg (per MD)   11/27 2.30 5 mg   11/28 2.65 5 mg   11/29 3.27 Held by MD   11/30 3.35 Held by MD   12/1 3.44 Held by MD   12/2 3.10 Held by MD   12/3 2.80 5 mg   12/4 3.15 Hold   12/5 3.04 HOLD   12/6 2.52 4 mg          Any Vitamin K given?: No  Drug interactions Reviewed: Yes.  Is patient on bridging therapy with another anticoagulant?: No    Plan:   Will give 4mg x1 today. Continue daily INR.   Monitor Hgb   INR ordered daily with AM labs. Repeat CBC with morning labs ordered for tomorrow.    Thank you for this consult. Pharmacy will continue to monitor.   Doretha Coronado

## 2024-12-06 NOTE — THERAPY EVALUATION
Acute Care - Physical Therapy Initial Evaluation   [place]     Patient Name: Inez Doyle  : 1950  MRN: [id number]  Today's Date: 2024      Visit Dx:     ICD-10-CM ICD-9-CM   1. Chronic wound infection of abdomen, sequela  S31.109S 906.0    L08.9    2. Hypokalemia  E87.6 276.8   3. Anorexia  R63.0 783.0   4. Oropharyngeal dysphagia  R13.12 787.22   5. Difficulty walking  R26.2 719.7     Patient Active Problem List   Diagnosis    Posterior tibial tendon dysfunction    Charcot's joint of foot    Arthritis, lumbar spine    Atrial fibrillation    COVID-19 with multiple comorbidities    Hypoxic respiratory failure    Morbid obesity    Diabetes    AMS (altered mental status)    Severe protein-calorie malnutrition    Chronic wound infection of abdomen     Past Medical History:   Diagnosis Date    A-fib     Anemia     Anxiety     Asthma     Candidiasis of skin and nail     CHF (congestive heart failure)     COPD (chronic obstructive pulmonary disease)     Depression     Diabetes mellitus     GERD (gastroesophageal reflux disease)     Hyperlipidemia     Hypertension     Hypokalemia     Hypomagnesemia     Intervertebral disc disease     Obesity     Osteoarthritis     Panniculitis     Sleep apnea     Vitamin D deficiency      Past Surgical History:   Procedure Laterality Date    BACK SURGERY      STOMACH SURGERY      TRUNK DEBRIDEMENT N/A 2024    Procedure: TRUNK DEBRIDEMENT;  Surgeon: Shalonda Brown MD;  Location: [facility];  Service: Plastics;  Laterality: N/A;    WOUND DEBRIDEMENT Bilateral 2024    Procedure: TRUNK DEBRIDEMENTabdominal wall debridement with  closure and wound vac placement;  Surgeon: Shalonda Brown MD;  Location: [facility] OR;  Service: Plastics;  Laterality: Bilateral;     PT Assessment (Last 12 Hours)       PT Evaluation and Treatment       Row Name 24 1500          Physical Therapy Time and Intention    Document Type evaluation  -AV     Mode of  Treatment individual therapy;physical therapy  -AV       Row Name 12/06/24 1500          General Information    Patient Profile Reviewed yes  -AV     Prior Level of Function --  Patient unable to provide information regarding prior level. Per EMR, she was a LTC resident at [facility]. [place] specifics unknown  -AV     Existing Precautions/Restrictions fall  -AV     Barriers to Rehab previous functional deficit  -AV       Row Name 12/06/24 1500          Living Environment    Current Living Arrangements extended care facility  -AV     People in Home facility resident  LTC resident at [facility]  -AV       Row Name 12/06/24 1500          Cognition    Affect/Mental Status (Cognition) confused  -AV     Orientation Status (Cognition) oriented to;person  -AV       Row Name 12/06/24 1500          Range of Motion (ROM)    Range of Motion --  Bilateral ankle plantar flexion and knee extension contractures. increased rigidity throughout BLE  -AV       Row Name 12/06/24 1500          Strength (Manual Muscle Testing)    Strength (Manual Muscle Testing) bilateral lower extremities  No active or spontaneous movement throughout BLE during evaluation  -AV       Row Name 12/06/24 1500          Bed Mobility    Bed Mobility bed mobility (all) activities  -AV     All Activities, [place] (Bed Mobility) dependent (less than 25% patient effort)  -AV       Row Name 12/06/24 1500          Safety Issues/Impairments Affecting Functional Mobility    Impairments Affecting Function (Mobility) balance;cognition;endurance/activity tolerance;pain;range of motion (ROM);strength  -AV       Row Name             Wound 11/11/24 1751 sacral spine    Wound - Properties Group Placement Date: 11/11/24 -AL Placement Time: 1751 -AL Location: sacral spine  -AL    Retired Wound - Properties Group Placement Date: 11/11/24 -AL Placement Time: 1751 -AL Location: sacral spine  -AL    Retired Wound - Properties Group Placement Date: 11/11/24  -AL  Placement Time: 1751 -AL Location: sacral spine  -AL    Retired Wound - Properties Group Date first assessed: 11/11/24  -AL Time first assessed: 1751  -AL Location: sacral spine  -AL      Row Name             Wound 11/11/24 1757 Right breast    Wound - Properties Group Placement Date: 11/11/24  -AL Placement Time: 1757  -AL Side: Right  -AL Location: breast  -AL    Retired Wound - Properties Group Placement Date: 11/11/24  -AL Placement Time: 1757  -AL Side: Right  -AL Location: breast  -AL    Retired Wound - Properties Group Placement Date: 11/11/24  -AL Placement Time: 1757  -AL Side: Right  -AL Location: breast  -AL    Retired Wound - Properties Group Date first assessed: 11/11/24  -AL Time first assessed: 1757  -AL Side: Right  -AL Location: breast  -AL      Row Name             Wound 11/12/24 1650 Left posterior heel pressure injury    Wound - Properties Group Placement Date: 11/12/24  -KE Placement Time: 1650  -KE Side: Left  -KE Orientation: posterior  -KE Location: heel  -KE Primary Wound Type: Pressure inj  -KE Type: pressure injury  -KE    Retired Wound - Properties Group Placement Date: 11/12/24  -KE Placement Time: 1650  -KE Side: Left  -KE Orientation: posterior  -KE Location: heel  -KE Primary Wound Type: Pressure inj  -KE Type: pressure injury  -KE    Retired Wound - Properties Group Placement Date: 11/12/24  -KE Placement Time: 1650  -KE Side: Left  -KE Orientation: posterior  -KE Location: heel  -KE Primary Wound Type: Pressure inj  -KE Type: pressure injury  -KE    Retired Wound - Properties Group Date first assessed: 11/12/24  -KE Time first assessed: 1650  -KE Side: Left  -KE Location: heel  -KE Primary Wound Type: Pressure inj  -KE Type: pressure injury  -KE      Row Name             Wound 11/12/24 1650 Left breast MASD (moisture associated skin damage)    Wound - Properties Group Placement Date: 11/12/24  -KE Placement Time: 1650  -KE Side: Left  -KE Location: breast  -KE Primary Wound Type:  MASD  -KE Type: MASD (moisture associated skin damage)  -KE    Retired Wound - Properties Group Placement Date: 11/12/24  -KE Placement Time: 1650  -KE Side: Left  -KE Location: breast  -KE Primary Wound Type: MASD  -KE Type: MASD (moisture associated skin damage)  -KE    Retired Wound - Properties Group Placement Date: 11/12/24  -KE Placement Time: 1650  -KE Side: Left  -KE Location: breast  -KE Primary Wound Type: MASD  -KE Type: MASD (moisture associated skin damage)  -KE    Retired Wound - Properties Group Date first assessed: 11/12/24  -KE Time first assessed: 1650  -KE Side: Left  -KE Location: breast  -KE Primary Wound Type: MASD  -KE Type: MASD (moisture associated skin damage)  -KE      Row Name             Wound 11/17/24 0250 Left arm skin tear    Wound - Properties Group Placement Date: 11/17/24  -MP Placement Time: 0250  -MP Side: Left  -MP Location: arm  -MP Primary Wound Type: Skin tear  -MP Type: skin tear  -MP Present on Original Admission: N  -MP    Retired Wound - Properties Group Placement Date: 11/17/24  -MP Placement Time: 0250  -MP Present on Original Admission: N  -MP Side: Left  -MP Location: arm  -MP Primary Wound Type: Skin tear  -MP Type: skin tear  -MP    Retired Wound - Properties Group Placement Date: 11/17/24  -MP Placement Time: 0250  -MP Present on Original Admission: N  -MP Side: Left  -MP Location: arm  -MP Primary Wound Type: Skin tear  -MP Type: skin tear  -MP    Retired Wound - Properties Group Date first assessed: 11/17/24  -MP Time first assessed: 0250  -MP Present on Original Admission: N  -MP Side: Left  -MP Location: arm  -MP Primary Wound Type: Skin tear  -MP Type: skin tear  -MP      Row Name             Wound 11/17/24 1428 Right anterior greater trochanter    Wound - Properties Group Placement Date: 11/17/24  -SS Placement Time: 1428  -SS Side: Right  -SS Orientation: anterior  -SS Location: greater trochanter  -SS    Retired Wound - Properties Group Placement Date:  11/17/24  -SS Placement Time: 1428  -SS Side: Right  -SS Orientation: anterior  -SS Location: greater trochanter  -SS    Retired Wound - Properties Group Placement Date: 11/17/24  -SS Placement Time: 1428  -SS Side: Right  -SS Orientation: anterior  -SS Location: greater trochanter  -SS    Retired Wound - Properties Group Date first assessed: 11/17/24  -SS Time first assessed: 1428  -SS Side: Right  -SS Location: greater trochanter  -SS      Row Name             Wound 11/21/24 1234 Left anterior groin MASD (moisture associated skin damage)    Wound - Properties Group Placement Date: 11/21/24  -ELENI Placement Time: 1234  -ELENI Side: Left  -ELENI Orientation: anterior  -ELENI Location: groin  -ELENI Primary Wound Type: MASD  -ELENI Type: MASD (moisture associated skin damage)  -ELENI    Retired Wound - Properties Group Placement Date: 11/21/24  -ELENI Placement Time: 1234  -ELENI Side: Left  -ELENI Orientation: anterior  -ELENI Location: groin  -ELENI Primary Wound Type: MASD  -ELENI Type: MASD (moisture associated skin damage)  -ELENI    Retired Wound - Properties Group Placement Date: 11/21/24  -ELENI Placement Time: 1234  -ELENI Side: Left  -ELENI Orientation: anterior  -ELENI Location: groin  -ELENI Primary Wound Type: MASD  -ELENI Type: MASD (moisture associated skin damage)  -ELENI    Retired Wound - Properties Group Date first assessed: 11/21/24  -ELENI Time first assessed: 1234  -ELENI Side: Left  -ELENI Location: groin  -ELENI Primary Wound Type: MASD  -ELENI Type: MASD (moisture associated skin damage)  -ELENI      Row Name             Wound 11/21/24 1239 Right proximal arm skin tear    Wound - Properties Group Placement Date: 11/21/24  -ELENI Placement Time: 1239  -ELENI Side: Right  -ELENI Orientation: proximal  -ELENI Location: arm  -ELENI Primary Wound Type: Skin tear  -ELENI Type: skin tear  -ELENI    Retired Wound - Properties Group Placement Date: 11/21/24  -ELENI Placement Time: 1239  -ELENI Side: Right  -ELENI Orientation: proximal  -ELENI Location: arm  -ELENI Primary Wound Type: Skin tear  -ELENI Type: skin tear   -ELENI    Retired Wound - Properties Group Placement Date: 11/21/24  -ELENI Placement Time: 1239  -ELENI Side: Right  -ELENI Orientation: proximal  -ELENI Location: arm  -ELENI Primary Wound Type: Skin tear  -ELENI Type: skin tear  -ELENI    Retired Wound - Properties Group Date first assessed: 11/21/24  -ELENI Time first assessed: 1239  -ELENI Side: Right  -ELENI Location: arm  -ELENI Primary Wound Type: Skin tear  -ELENI Type: skin tear  -ELENI      Row Name             Wound 11/22/24 1800 anterior abdomen    Wound - Properties Group Placement Date: 11/22/24  -LD Placement Time: 1800  -LD Orientation: anterior  -LD Location: abdomen  -LD Primary Wound Type: Incision  -LD    Retired Wound - Properties Group Placement Date: 11/22/24  -LD Placement Time: 1800  -LD Orientation: anterior  -LD Location: abdomen  -LD Primary Wound Type: Incision  -LD    Retired Wound - Properties Group Placement Date: 11/22/24  -LD Placement Time: 1800  -LD Orientation: anterior  -LD Location: abdomen  -LD Primary Wound Type: Incision  -LD    Retired Wound - Properties Group Date first assessed: 11/22/24  -LD Time first assessed: 1800  -LD Location: abdomen  -LD Primary Wound Type: Incision  -LD      Row Name             Wound 12/02/24 1601 Right lower abdomen surgical    Wound - Properties Group Placement Date: 12/02/24  -KE Placement Time: 1601  -KE Side: Right  -KE Orientation: lower  -KE Location: abdomen  -KE Primary Wound Type: Incision  -KE Type: surgical  -KE    Retired Wound - Properties Group Placement Date: 12/02/24  -KE Placement Time: 1601  -KE Side: Right  -KE Orientation: lower  -KE Location: abdomen  -KE Primary Wound Type: Incision  -KE Type: surgical  -KE    Retired Wound - Properties Group Placement Date: 12/02/24  -KE Placement Time: 1601  -KE Side: Right  -KE Orientation: lower  -KE Location: abdomen  -KE Primary Wound Type: Incision  -KE Type: surgical  -KE    Retired Wound - Properties Group Date first assessed: 12/02/24  -KE Time first assessed: 1601  -KE  Side: Right  -KE Location: abdomen  -KE Primary Wound Type: Incision  -KE Type: surgical  -KE      Row Name             NPWT (Negative Pressure Wound Therapy) 12/02/24 1614 abdomen    NPWT (Negative Pressure Wound Therapy) - Properties Group Placement Date: 12/02/24  -KH Placement Time: 1614 -KH Location: abdomen  -KH    Retired NPWT (Negative Pressure Wound Therapy) - Properties Group Placement Date: 12/02/24  -KH Placement Time: 1614 -KH Location: abdomen  -KH    Retired NPWT (Negative Pressure Wound Therapy) - Properties Group Placement Date: 12/02/24  -KH Placement Time: 1614 -KH Location: abdomen  -KH    Retired NPWT (Negative Pressure Wound Therapy) - Properties Group Placement Date: 12/02/24  -KH Placement Time: 1614 -KH Location: abdomen  -KH      Row Name             NPWT (Negative Pressure Wound Therapy) 12/04/24 1527 Right Lower Aspect of Abdomen    NPWT (Negative Pressure Wound Therapy) - Properties Group Placement Date: 12/04/24  -KE Placement Time: 1527  -KE Location: Right Lower Aspect of Abdomen  -KE    Retired NPWT (Negative Pressure Wound Therapy) - Properties Group Placement Date: 12/04/24  -KE Placement Time: 1527  -KE Location: Right Lower Aspect of Abdomen  -KE    Retired NPWT (Negative Pressure Wound Therapy) - Properties Group Placement Date: 12/04/24  -KE Placement Time: 1527  -KE Location: Right Lower Aspect of Abdomen  -KE    Retired NPWT (Negative Pressure Wound Therapy) - Properties Group Placement Date: 12/04/24  -KE Placement Time: 1527  -KE Location: Right Lower Aspect of Abdomen  -KE      Row Name 12/06/24 1500          Plan of Care Review    Plan of Care Reviewed With patient  -AV     Progress no change  -AV     Outcome Evaluation Patient not appropriate for skilled PT services as is currently dependent with all ADLs and mobility. Per EMR, she was a long-term resident at [facility] where she likely required assist. Recommend return to extended care facility once medically  able.  -AV       Row Name 12/06/24 1500          Therapy Assessment/Plan (PT)    Criteria for Skilled Interventions Met (PT) does not meet criteria for skilled intervention  -AV     Therapy Frequency (PT) evaluation only  -AV       Row Name 12/06/24 1500          PT Evaluation Complexity    History, PT Evaluation Complexity 1-2 personal factors and/or comorbidities  -AV     Examination of Body Systems (PT Eval Complexity) total of 4 or more elements  -AV     Clinical Presentation (PT Evaluation Complexity) stable  -AV     Clinical Decision Making (PT Evaluation Complexity) low complexity  -AV     Overall Complexity (PT Evaluation Complexity) low complexity  -AV       Row Name 12/06/24 1500          Therapy Plan Review/Discharge Plan (PT)    Therapy Plan Review (PT) evaluation/treatment results reviewed;patient  -AV       Row Name 12/06/24 1500          Physical Therapy Goals    Problem Specific Goal Selection (PT) problem specific goal 1, PT  -AV       Row Name 12/06/24 1500          Problem Specific Goal 1 (PT)    Problem Specific Goal 1 (PT) Complete PT evaluation  -AV     Time Frame (Problem Specific Goal 1, PT) 1 day  -AV     Progress/Outcome (Problem Specific Goal 1, PT) goal met  -AV               User Key  (r) = Recorded By, (t) = Taken By, (c) = Cosigned By      Initials Name Provider Type    Candi Recio, RN Registered Nurse    Casi Taylor, RN Registered Nurse    Daisha Escalante, RN Registered Nurse    Zaid Cedeno, PT Physical Therapist    Carola Sawant, RN Registered Nurse    Verónica Duggan, RN Registered Nurse    Radhika Corona, RN Registered Nurse    Yajaira Ramirez, RN Registered Nurse                    Physical Therapy Education       Title: PT OT SLP Therapies (In Progress)       Topic: Physical Therapy (In Progress)       Point: Mobility training (In Progress)       Learning Progress Summary            Patient Acceptance, E,D, NL by AV at 12/6/2024 1761                       Point: Home exercise program (Not Started)       Learner Progress:  Not documented in this visit.              Point: Body mechanics (Not Started)       Learner Progress:  Not documented in this visit.              Point: Precautions (Not Started)       Learner Progress:  Not documented in this visit.                              User Key       Initials Effective Dates Name Provider Type Discipline    AV 06/11/21 -  Zaid Graham, PT Physical Therapist PT                  PT Recommendation and Plan  Anticipated Discharge Disposition (PT): extended care facility  Therapy Frequency (PT): evaluation only  Plan of Care Reviewed With: patient  Progress: no change  Outcome Evaluation: Patient not appropriate for skilled PT services as is currently dependent with all ADLs and mobility. Per EMR, she was a long-term resident at [facility] where she likely required assist. Recommend return to extended care facility once medically able.   Outcome Measures       Row Name 12/06/24 1500             How much help from another person do you currently need...    Turning from your back to your side while in flat bed without using bedrails? 1  -AV      Moving from lying on back to sitting on the side of a flat bed without bedrails? 1  -AV      Moving to and from a bed to a chair (including a wheelchair)? 1  -AV      Standing up from a chair using your arms (e.g., wheelchair, bedside chair)? 1  -AV      Climbing 3-5 steps with a railing? 1  -AV      To walk in hospital room? 1  -AV      AM-PAC 6 Clicks Score (PT) 6  -AV         Functional Assessment    Outcome Measure Options AM-PAC 6 Clicks Basic Mobility (PT)  -AV                User Key  (r) = Recorded By, (t) = Taken By, (c) = Cosigned By      Initials Name Provider Type    AV Zaid Graham, SARAH Physical Therapist                     Time Calculation:    PT Charges       Row Name 12/06/24 1527             Time Calculation    PT Received On 12/06/24  -AV          Untimed Charges    PT Eval/Re-eval Minutes 34  -AV         Total Minutes    Untimed Charges Total Minutes 34  -AV       Total Minutes 34  -AV                User Key  (r) = Recorded By, (t) = Taken By, (c) = Cosigned By      Initials Name Provider Type    Zaid Cedeno, PT Physical Therapist                  Therapy Charges for Today       Code Description Service Date Service Provider Modifiers Qty    [id number] HC PT EVAL LOW COMPLEXITY 3 12/6/2024 Zaid Graham, PT GP 1            PT G-Codes  Outcome Measure Options: AM-PAC 6 Clicks Basic Mobility (PT)  AM-PAC 6 Clicks Score (PT): 6  AM-PAC 6 Clicks Score (OT): 12    Zaid Graham PT  12/6/2024

## 2024-12-06 NOTE — PLAN OF CARE
Goal Outcome Evaluation:  Plan of Care Reviewed With: patient              VSS during shift. Patient BP reading soft, patient refused all meds in AM. Patient aroused to voice only and became more alert by end of shift. Patient oriented to self only in AM but aware she was going back to [place] in AM by end of shift. Wound care completed by wound care nurse. Continue plan of care.

## 2024-12-07 LAB
ALBUMIN SERPL-MCNC: 1.7 G/DL (ref 3.5–5.2)
ALBUMIN/GLOB SERPL: 0.6 G/DL
ALP SERPL-CCNC: 106 U/L (ref 39–117)
ALT SERPL W P-5'-P-CCNC: 7 U/L (ref 1–33)
ANION GAP SERPL CALCULATED.3IONS-SCNC: 10.6 MMOL/L (ref 5–15)
AST SERPL-CCNC: 9 U/L (ref 1–32)
BASOPHILS # BLD AUTO: 0.04 10*3/MM3 (ref 0–0.2)
BASOPHILS NFR BLD AUTO: 0.5 % (ref 0–1.5)
BILIRUB SERPL-MCNC: 0.4 MG/DL (ref 0–1.2)
BUN SERPL-MCNC: 12 MG/DL (ref 8–23)
BUN/CREAT SERPL: 24.5 (ref 7–25)
CALCIUM SPEC-SCNC: 8 MG/DL (ref 8.6–10.5)
CHLORIDE SERPL-SCNC: 102 MMOL/L (ref 98–107)
CO2 SERPL-SCNC: 24.4 MMOL/L (ref 22–29)
CREAT SERPL-MCNC: 0.49 MG/DL (ref 0.57–1)
DEPRECATED RDW RBC AUTO: 60.9 FL (ref 37–54)
EGFRCR SERPLBLD CKD-EPI 2021: 99 ML/MIN/1.73
EOSINOPHIL # BLD AUTO: 0.18 10*3/MM3 (ref 0–0.4)
EOSINOPHIL NFR BLD AUTO: 2.4 % (ref 0.3–6.2)
ERYTHROCYTE [DISTWIDTH] IN BLOOD BY AUTOMATED COUNT: 21.5 % (ref 12.3–15.4)
GLOBULIN UR ELPH-MCNC: 2.8 GM/DL
GLUCOSE SERPL-MCNC: 99 MG/DL (ref 65–99)
HCT VFR BLD AUTO: 28 % (ref 34–46.6)
HGB BLD-MCNC: 8.2 G/DL (ref 12–15.9)
IMM GRANULOCYTES # BLD AUTO: 0.07 10*3/MM3 (ref 0–0.05)
IMM GRANULOCYTES NFR BLD AUTO: 0.9 % (ref 0–0.5)
INR PPP: 2.46 (ref 0.86–1.15)
LYMPHOCYTES # BLD AUTO: 1.36 10*3/MM3 (ref 0.7–3.1)
LYMPHOCYTES NFR BLD AUTO: 18.1 % (ref 19.6–45.3)
MAGNESIUM SERPL-MCNC: 1.7 MG/DL (ref 1.6–2.4)
MCH RBC QN AUTO: 23.6 PG (ref 26.6–33)
MCHC RBC AUTO-ENTMCNC: 29.3 G/DL (ref 31.5–35.7)
MCV RBC AUTO: 80.7 FL (ref 79–97)
MONOCYTES # BLD AUTO: 0.55 10*3/MM3 (ref 0.1–0.9)
MONOCYTES NFR BLD AUTO: 7.3 % (ref 5–12)
NEUTROPHILS NFR BLD AUTO: 5.3 10*3/MM3 (ref 1.7–7)
NEUTROPHILS NFR BLD AUTO: 70.8 % (ref 42.7–76)
NRBC BLD AUTO-RTO: 0 /100 WBC (ref 0–0.2)
PLATELET # BLD AUTO: 147 10*3/MM3 (ref 140–450)
PMV BLD AUTO: 10.2 FL (ref 6–12)
POTASSIUM SERPL-SCNC: 2.8 MMOL/L (ref 3.5–5.2)
PROT SERPL-MCNC: 4.5 G/DL (ref 6–8.5)
PROTHROMBIN TIME: 27 SECONDS (ref 11.8–14.9)
RBC # BLD AUTO: 3.47 10*6/MM3 (ref 3.77–5.28)
SODIUM SERPL-SCNC: 137 MMOL/L (ref 136–145)
WBC NRBC COR # BLD AUTO: 7.5 10*3/MM3 (ref 3.4–10.8)

## 2024-12-07 PROCEDURE — 94799 UNLISTED PULMONARY SVC/PX: CPT

## 2024-12-07 PROCEDURE — 80053 COMPREHEN METABOLIC PANEL: CPT | Performed by: FAMILY MEDICINE

## 2024-12-07 PROCEDURE — 99232 SBSQ HOSP IP/OBS MODERATE 35: CPT | Performed by: STUDENT IN AN ORGANIZED HEALTH CARE EDUCATION/TRAINING PROGRAM

## 2024-12-07 PROCEDURE — 85025 COMPLETE CBC W/AUTO DIFF WBC: CPT | Performed by: FAMILY MEDICINE

## 2024-12-07 PROCEDURE — 85610 PROTHROMBIN TIME: CPT | Performed by: SURGERY

## 2024-12-07 PROCEDURE — 94664 DEMO&/EVAL PT USE INHALER: CPT

## 2024-12-07 PROCEDURE — 83735 ASSAY OF MAGNESIUM: CPT | Performed by: FAMILY MEDICINE

## 2024-12-07 RX ORDER — POTASSIUM CHLORIDE 7.45 MG/ML
10 INJECTION INTRAVENOUS
Status: ACTIVE | OUTPATIENT
Start: 2024-12-07 | End: 2024-12-07

## 2024-12-07 RX ORDER — FENTANYL/ROPIVACAINE/NS/PF 2-625MCG/1
15 PLASTIC BAG, INJECTION (ML) EPIDURAL ONCE
Status: DISCONTINUED | OUTPATIENT
Start: 2024-12-07 | End: 2024-12-07

## 2024-12-07 RX ORDER — POTASSIUM CHLORIDE 1.5 G/1.58G
40 POWDER, FOR SOLUTION ORAL DAILY
Status: DISCONTINUED | OUTPATIENT
Start: 2024-12-07 | End: 2024-12-10 | Stop reason: HOSPADM

## 2024-12-07 RX ORDER — WARFARIN SODIUM 4 MG/1
4 TABLET ORAL
Status: COMPLETED | OUTPATIENT
Start: 2024-12-07 | End: 2024-12-07

## 2024-12-07 RX ADMIN — BUDESONIDE 0.5 MG: 0.5 INHALANT ORAL at 08:56

## 2024-12-07 RX ADMIN — WARFARIN SODIUM 4 MG: 4 TABLET ORAL at 18:10

## 2024-12-07 RX ADMIN — BUDESONIDE 0.5 MG: 0.5 INHALANT ORAL at 18:36

## 2024-12-07 RX ADMIN — MIDODRINE HYDROCHLORIDE 5 MG: 2.5 TABLET ORAL at 12:26

## 2024-12-07 RX ADMIN — Medication 473 ML: at 22:36

## 2024-12-07 RX ADMIN — DOCUSATE SODIUM 100 MG: 100 CAPSULE, LIQUID FILLED ORAL at 20:34

## 2024-12-07 RX ADMIN — ARFORMOTEROL TARTRATE 15 MCG: 15 SOLUTION RESPIRATORY (INHALATION) at 18:36

## 2024-12-07 RX ADMIN — ACETAMINOPHEN 650 MG: 325 TABLET ORAL at 20:34

## 2024-12-07 RX ADMIN — ARFORMOTEROL TARTRATE 15 MCG: 15 SOLUTION RESPIRATORY (INHALATION) at 08:56

## 2024-12-07 RX ADMIN — Medication 473 ML: at 01:50

## 2024-12-07 NOTE — SIGNIFICANT NOTE
Wound Eval / Progress Noted    [facility]     Patient Name: Inez Doyle  : 1950  MRN: [id number]  Today's Date: 2024                 Admit Date: 2024    Visit Dx:    ICD-10-CM ICD-9-CM   1. Chronic wound infection of abdomen, sequela  S31.109S 906.0    L08.9    2. Hypokalemia  E87.6 276.8   3. Anorexia  R63.0 783.0   4. Oropharyngeal dysphagia  R13.12 787.22   5. Difficulty walking  R26.2 719.7         AMS (altered mental status)    Severe protein-calorie malnutrition    Chronic wound infection of abdomen        Past Medical History:   Diagnosis Date    A-fib     Anemia     Anxiety     Asthma     Candidiasis of skin and nail     CHF (congestive heart failure)     COPD (chronic obstructive pulmonary disease)     Depression     Diabetes mellitus     GERD (gastroesophageal reflux disease)     Hyperlipidemia     Hypertension     Hypokalemia     Hypomagnesemia     Intervertebral disc disease     Obesity     Osteoarthritis     Panniculitis     Sleep apnea     Vitamin D deficiency         Past Surgical History:   Procedure Laterality Date    BACK SURGERY      STOMACH SURGERY      TRUNK DEBRIDEMENT N/A 2024    Procedure: TRUNK DEBRIDEMENT;  Surgeon: Shalonda Brown MD;  Location: [facility];  Service: Plastics;  Laterality: N/A;    WOUND DEBRIDEMENT Bilateral 2024    Procedure: TRUNK DEBRIDEMENTabdominal wall debridement with  closure and wound vac placement;  Surgeon: Shalonda Brown MD;  Location: [facility];  Service: Plastics;  Laterality: Bilateral;         Physical Assessment:  Wound 24 1800 anterior abdomen (Active)   Wound Image    24 1425   Dressing Appearance dry;intact 24 142   Closure None 24 142   Base moist;red;yellow;subcutaneous;slough 24 1425   Red (%), Wound Tissue Color 65 24 1425   Yellow (%), Wound Tissue Color 35 24 1425   Periwound dry;maroon/purple;redness 24 1425   Periwound Temperature warm  12/06/24 1425   Periwound Skin Turgor firm 12/06/24 1425   Edges open 12/06/24 1425   Wound Length (cm) 3.4 cm 12/06/24 1425   Wound Width (cm) 23.4 cm 12/06/24 1425   Wound Depth (cm) 4.6 cm 12/06/24 1425   Wound Surface Area (cm^2) 79.56 cm^2 12/06/24 1425   Wound Volume (cm^3) 365.976 cm^3 12/06/24 1425   Tunneling [Depth (cm)/Location] Greater than 15cm / 9 o'clock 12/06/24 1425   Drainage Characteristics/Odor serosanguineous 12/06/24 1425   Drainage Amount small 12/06/24 1425   Care, Wound cleansed with;sterile normal saline;negative pressure wound therapy 12/06/24 1425   Dressing Care dressing removed;dressing applied;foam;transparent film 12/06/24 1425   Periwound Care absorptive dressing applied 12/06/24 1425   Wound Output (mL) 250 12/06/24 1425       Wound 12/02/24 1601 Right lower abdomen surgical (Active)   Wound Image    12/06/24 1425   Dressing Appearance dry;intact 12/06/24 1425   Closure [place];Sutures 12/06/24 1425   Base moist;red;yellow 12/06/24 1425   Red (%), Wound Tissue Color 50 12/06/24 1425   Yellow (%), Wound Tissue Color 50 12/06/24 1425   Periwound dry;ecchymotic 12/06/24 1425   Periwound Temperature warm 12/06/24 1425   Periwound Skin Turgor firm 12/06/24 1425   Edges open;rolled/closed 12/06/24 1425   Wound Length (cm) 1 cm 12/06/24 1425   Wound Width (cm) 23 cm 12/06/24 1425   Wound Depth (cm) 4.4 cm 12/06/24 1425   Wound Surface Area (cm^2) 23 cm^2 12/06/24 1425   Wound Volume (cm^3) 101.2 cm^3 12/06/24 1425   Tunneling [Depth (cm)/Location] greater then 15 cm / 3 o'clock 12/06/24 1425   Drainage Characteristics/Odor serosanguineous 12/06/24 1425   Drainage Amount scant 12/06/24 1425   Care, Wound cleansed with;sterile normal saline;negative pressure wound therapy 12/06/24 1425   Dressing Care dressing removed;dressing applied;non-adherent;petroleum-based;gauze;transparent film;foam 12/06/24 1425   Periwound Care absorptive dressing applied 12/06/24 1425       Wound 12/06/24 1425  Right lower arm skin tear (Active)   Wound Image   12/06/24 1425   Dressing Appearance open to air 12/06/24 1425   Closure None 12/06/24 1425   Base moist;red 12/06/24 1425   Red (%), Wound Tissue Color 100 12/06/24 1425   Periwound dry;ecchymotic 12/06/24 1425   Periwound Temperature warm 12/06/24 1425   Periwound Skin Turgor soft 12/06/24 1425   Edges open 12/06/24 1425   Drainage Characteristics/Odor serosanguineous 12/06/24 1425   Drainage Amount scant 12/06/24 1425   Care, Wound cleansed with;sterile normal saline 12/06/24 1425   Dressing Care dressing applied;non-adherent;petroleum-based;gauze;silicone border foam 12/06/24 1425   Periwound Care absorptive dressing applied 12/06/24 1425       NPWT (Negative Pressure Wound Therapy) 12/02/24 1614 abdomen (Active)   Therapy Setting continuous therapy 12/06/24 1425   Dressing foam, black;foam, white;transparent dressing 12/06/24 1425   Contact Layer Vaseline-embedded gauze 12/06/24 1425   Pressure Setting 125 mmHg 12/06/24 1425   Sponges Inserted 5;other (see comments) 12/06/24 1425   Sponges Removed 4;other (see comments) 12/06/24 1425   Finger sweep complete Yes 12/06/24 1425     Wound Check / Follow-up:  Patient seen today for wound VAC dressing change. Patient is awake and alert; however, she is noted to be confused and unable to answer questions correctly. Wound VAC is functioning properly with no signs of leaks, or alarms. Upon completion of wound VAC dressing change, patient was noted to have been incontinent of stool. Incontinence care provided and bed pad changed with assistance from PCA.  A message was left at [place], with a request for wound RN to contact [facility] wound RN to discuss current wound VAC therapy.    Abdominal incision spans across a majority of lower abdominal tissue.  Periwound tissue spanning over anterior aspect of abdomen is noted to be firm.  Firmness of abdominal tissue is not a new finding and already known to  plastic surgeon.       Veraflo wound VAC is in place to left aspect of wound base which is open. Dressing removed with adhesive remover spray.  Two grey foams removed from wound base.  Wound base presents with moist red tissue with visible granulation, tightly adhered yellow slough, and yellow adipose tissue.  Periwound tissue is dry with areas of redness and purple coloration.  A small amount of serosanguineous drainage was noted, which was controlled by cleansing with normal saline and gauze.  A tunnel is located at 9 o'clock with greater than 15 cm of depth, which spans beneath incisional line which is closed with staples.  Cleansed with normal saline and gauze, blotted dry.  Skin-Prep was applied to periwound tissue.  Periwound skin was prepped with transparent film. One piece of white foam was lightly tucked into tunnel at 9 o'clock.  Open wound base was then lightly filled with three pieces of black foam, with the last piece also being utilized to cover incision to right aspect of abdomen, merging the two dressings into one. A track pad was made on right aspect of wound base with an additional piece of black foam, for a total of four pieces. Dressing was connected to wound VAC suction at 125 mmHg. Seal obtained with no signs of lifting or leaking. Color change noted to black foam, demonstrating connection to white foam within tunnel. Recommending to continue wound VAC therapy with Monday, Wednesday, and Friday dressing changes. Plastic surgeon was agreeable to transitioning patient to traditional wound VAC given discharge planning.     Wound VAC is in place to right aspect of abdominal incision, with area of staple closure to medial aspect, and an area with retention suture closure to the lateral aspect. Dressing removed with adhesive remover spray.  Staple closure presents with intermittent areas of closed tissue, along with intermittent areas of yellow slough.  Tissue within area of retention sutures present  with moist red and yellow tissue. Periwound tissue is dry with redness and ecchymosis. A tunnel is present to the lateral aspect of wound base with greater than 15 cm of depth noted at 3 o'clock which runs beneath incision line. Cleansed with normal saline and gauze, blotted dry. Skin prep applied to periwound and periwound skin was draped with transparent film.  One piece of white foam was lightly tucked into tunnel noted to lateral aspect. Nonadherent petroleum-based gauze was placed over incisional line. One piece of black foam was applied on top of nonadherent petroleum-based gauze over incisional line, as well as over white foam. This black foam spans the entirety of the abdominal incision and connects the left and right aspect wound bases. Foams were secured with transparent drape. A track pad was made with an additional piece of black foam, for a total of four pieces of black foam for the entire abdominal wound. Dressing was connected to wound VAC suction at 125 mmHg. Seal obtained with no signs of lifting or leaking. Color change noted to black foam, demonstrating connection to white foam within tunnel. Recommending to continue wound VAC therapy with Monday, Wednesday, and Friday dressing changes.      Category 2 skin tear noted to right lower arm.  Wound base presents with moist red tissue.  Periwound tissue is dry with ecchymosis.  Cleansed with normal saline and gauze.  Skin flap was reapproximated as able.  Recommending to follow skin tear protocol for dressing changes.     Impression: Abdominal incision post debridement, Wound VAC to open wound base to left aspect of abdomen and right aspect of abdomen with staple and retention suture closure.     Short term goals:  Regain skin integrity, skin protection, negative pressure wound therapy, every three day dressing changes, skin tear protocol.    Candi Mccauley RN    12/6/2024    19:01 EST

## 2024-12-07 NOTE — PROGRESS NOTES
[facility]   Hospitalist Progress Note  Date: 2024  Patient Name: Inez Doyle  : 1950  MRN: [id number]  Date of admission: 2024      Subjective   Subjective     Chief Complaint: Mental status    Summary: Patient 74-year-old male history of atrial fibrillation on warfarin, CHF, COPD, hypertension, hyperlipidemia, obesity, chronic immobility presents from nursing facility with altered mental status and decreased oral intake found to have large abdominal wound General Surgery plastic surgery evaluated plastic surgery eventually took the patient to the operating room did panniculectomy with wound VAC placement remains remains hospitalized long-term prognosis appears poor.  Palliative care was consulted and signed off.      Patient taken back to surgery for trunk debridement and wound vac placement on       Interval Followup: 2024    No new complaints.  Alertness waxes/wanes.    Has been oriented mostly the last few days  No fevers  Postop day #144 (2 ulcers abdomen removed en bloc/panniculectomy)  Postop day #4 (repeat wound debridement/wound VAC placement)  Pharmacy dosing warfarin.  INR therapeutic  Patient had mits placed recently which has delayed her transfer to rehab. Planning DC 24      Objective   Objective     Vitals:   Temp:  [97.2 °F (36.2 °C)-98.4 °F (36.9 °C)] 97.5 °F (36.4 °C)  Heart Rate:  [] 85  Resp:  [16-18] 16  BP: ()/(52-70) 96/57    Physical Exam   Constitutional: Pleasant, polite.  Calm mood.  Interactive.  Respiratory: Clear without wheeze  Cardiovascular: Irregular.  No murmur.  Abdomen wound dressing in place.  Large pannus     Result Review    Result Review:  I have personally reviewed the results from the time of this admission to 2024 23:25 EST and agree with these findings:  [x]  Laboratory  []  Microbiology  []  Radiology  []  EKG/Telemetry   []  Cardiology/Vascular   []  Pathology  []  Old records  []  Other:    Assessment & Plan    Assessment / Plan     Assessment:    Staph epi bacteremia secondary to severe SSTI cellulitis, likely from large abdominal and/or sacral wounds  Multiple skin and soft tissue wounds less notable for sacral, large lower abdominal pannus exposed wound  Paroxysmal A-fib (on coumadin)  Acute toxic and metabolic encephalopathy, improving waxing/waning  COPD (on room air baseline)  CHF, stable  Polypharmacy, likely contributing factor to intermittent confusion  HTN  DM2 (on metformin)  Morbid obesity BMI of 48.5  Nursing home resident ([facility])  Status post panniculectomy 11/22  Status post wound debridement 12/02    Plan:    Surgeon managing wound vac    Continue wound care/postop care.    Ceftriaxone was discontinued.  Discussed with surgery  Bowel regimen as needed  DC SSI including Accu-Cheks.    Daily pro time.  Target INR 2-3.  Pharmacy dosing.  CT head: No acute intracranial abnormality noted.  Return to [facility] when medically stable   Continue to monitor for signs of recurrent infection  Wound care per surgeon.  High risk for dehiscence.  Post op Prevena  Continue to monitor hemoglobin  Palliative care consultation, long-term prognosis seems very poor  PT OT consultation  Further treatment contingent upon her hospital course  Discussed with RN  Possible discharge tomorrow      VTE Prophylaxis:  Pharmacologic & mechanical VTE prophylaxis orders are present.    CODE STATUS:   Medical Intervention Limits: No intubation (DNI)  Code Status (Patient has no pulse and is not breathing): No CPR (Do Not Attempt to Resuscitate)  Medical Interventions (Patient has pulse or is breathing): Limited Support

## 2024-12-07 NOTE — PROGRESS NOTES
[facility]   Hospitalist Progress Note  Date: 2024  Patient Name: Inez Doyle  : 1950  MRN: [id number]  Date of admission: 2024      Subjective   Subjective     Chief Complaint: Mental status    Summary: Patient 74-year-old male history of atrial fibrillation on warfarin, CHF, COPD, hypertension, hyperlipidemia, obesity, chronic immobility presents from nursing facility with altered mental status and decreased oral intake found to have large abdominal wound General Surgery plastic surgery evaluated plastic surgery eventually took the patient to the operating room did panniculectomy with wound VAC placement remains remains hospitalized long-term prognosis appears poor.  Palliative care was consulted and signed off.      Patient taken back to surgery for trunk debridement and wound vac placement on       Interval Followup: 2024    No new complaints.  Alertness waxes/wanes.    Has been oriented mostly the last few days  No fevers  Postop day #15 (2 ulcers abdomen removed en bloc/panniculectomy)  Postop day #5 (repeat wound debridement/wound VAC placement)  Pharmacy dosing warfarin.  INR therapeutic  Patient had mits placed recently which has delayed her transfer to rehab.  Avoid restraints if possible  Patient hypokalemic today.  Electrolytes replaced      Objective   Objective     Vitals:   Temp:  [97.3 °F (36.3 °C)-98.2 °F (36.8 °C)] 98.2 °F (36.8 °C)  Heart Rate:  [68-86] 68  Resp:  [16-18] 18  BP: ()/(52-85) 116/85    Physical Exam   Constitutional: Pleasant, polite.  Cooperative.  Respiratory: Clear without wheeze  Cardiovascular: Irregular.  No murmur.  Abdomen wound dressing in place.  Large pannus     Result Review    Result Review:  I have personally reviewed the results from the time of this admission to 2024 13:44 EST and agree with these findings:  [x]  Laboratory  []  Microbiology  []  Radiology  []  EKG/Telemetry   []  Cardiology/Vascular   []  Pathology  []   Old records  []  Other:    Assessment & Plan   Assessment / Plan     Assessment:    Staph epi bacteremia secondary to severe SSTI cellulitis, likely from large abdominal and/or sacral wounds  Multiple skin and soft tissue wounds less notable for sacral, large lower abdominal pannus exposed wound  Paroxysmal A-fib (on coumadin)  Acute toxic and metabolic encephalopathy, improving waxing/waning  COPD (on room air baseline)  CHF, stable  Polypharmacy, likely contributing factor to intermittent confusion  HTN  DM2 (on metformin)  Morbid obesity BMI of 48.5  Nursing home resident ([facility])  Status post panniculectomy 11/22  Status post wound debridement 12/02    Plan:    Patient treated for panniculitis/necrotic pannus now status post debridement  Surgeon managing wound vac, patient will need to follow-up with surgeon outpatient  Continue wound care/postop care, follow-up wound care clinic as well.    Ceftriaxone was previously discontinued, monitor for signs of recurrent infection.    Bowel regimen as needed  Daily pro time.  Target INR 2-3.  Pharmacy dosing.  She is encephalopathic, and overall poor health and is apparently at baseline mentation, palliative care previously consulted, poor long-term prognosis  She is will pull out IVs, refuse medications frequently, DC SSI including Accu-Cheks.    Return to [facility] when medically stable   Patient hypokalemic.  Replace electrolytes.  Possible DC tomorrow      VTE Prophylaxis:  Pharmacologic & mechanical VTE prophylaxis orders are present.    CODE STATUS:   Medical Intervention Limits: No intubation (DNI)  Code Status (Patient has no pulse and is not breathing): No CPR (Do Not Attempt to Resuscitate)  Medical Interventions (Patient has pulse or is breathing): Limited Support           Electronically signed by Dilshad Del Rosario MD, 12/07/24, 1:44 PM EST.

## 2024-12-07 NOTE — PROGRESS NOTES
Pharmacy to Dose: Warfarin  Consulting provider: Bakari   Indication for warfarin: Afib [id number] 5  Goal INR range: 2 - 3  Home warfarin dose:  Restarting warfarin therapy. Patient previously on warfarin  in May, 2024. Recently on apixaban, per note apixaban makes patient feel funny, and would like to restart warfarin  Previous warfarin dose: 4 mg, then 5mg, then 5mg, rotating. Previous admission in April, 2024: average dose of 5.3 mg    Date INR Warfarin Dose Given   11/23 1.14 5 mg   11/24 1.32 5 mg   11/25 1.52 7.5 mg   11/26 1.94 5 mg (per MD)   11/27 2.30 5 mg   11/28 2.65 5 mg   11/29 3.27 Held by MD   11/30 3.35 Held by MD   12/1 3.44 Held by MD   12/2 3.10 Held by MD   12/3 2.80 5 mg   12/4 3.15 Hold   12/5 3.04 HOLD   12/6 2.52 4 mg   12/7 2.46 4 mg per MD     Any Vitamin K given?: No  Drug interactions Reviewed: Yes.  Is patient on bridging therapy with another anticoagulant?: No    Plan:   INR reported as 2.46.  Warfarin 4 mg once one today  INR ordered for tomorrow AM.    Thank you for this consult. Pharmacy will continue to monitor.   Cali Beltran

## 2024-12-08 LAB
INR PPP: 2.61 (ref 0.86–1.15)
POTASSIUM SERPL-SCNC: 2.8 MMOL/L (ref 3.5–5.2)
POTASSIUM SERPL-SCNC: 2.9 MMOL/L (ref 3.5–5.2)
PROTHROMBIN TIME: 28.4 SECONDS (ref 11.8–14.9)
WHOLE BLOOD HOLD SPECIMEN: NORMAL

## 2024-12-08 PROCEDURE — 94664 DEMO&/EVAL PT USE INHALER: CPT

## 2024-12-08 PROCEDURE — 85610 PROTHROMBIN TIME: CPT | Performed by: SURGERY

## 2024-12-08 PROCEDURE — 99232 SBSQ HOSP IP/OBS MODERATE 35: CPT | Performed by: STUDENT IN AN ORGANIZED HEALTH CARE EDUCATION/TRAINING PROGRAM

## 2024-12-08 PROCEDURE — 84132 ASSAY OF SERUM POTASSIUM: CPT | Performed by: STUDENT IN AN ORGANIZED HEALTH CARE EDUCATION/TRAINING PROGRAM

## 2024-12-08 PROCEDURE — 94799 UNLISTED PULMONARY SVC/PX: CPT

## 2024-12-08 RX ORDER — WARFARIN SODIUM 3 MG/1
3 TABLET ORAL
Status: COMPLETED | OUTPATIENT
Start: 2024-12-08 | End: 2024-12-08

## 2024-12-08 RX ADMIN — WARFARIN SODIUM 3 MG: 3 TABLET ORAL at 18:38

## 2024-12-08 RX ADMIN — Medication 1 TABLET: at 09:06

## 2024-12-08 RX ADMIN — MIDODRINE HYDROCHLORIDE 5 MG: 2.5 TABLET ORAL at 09:05

## 2024-12-08 RX ADMIN — MIDODRINE HYDROCHLORIDE 5 MG: 2.5 TABLET ORAL at 18:38

## 2024-12-08 RX ADMIN — SERTRALINE HYDROCHLORIDE 25 MG: 25 TABLET ORAL at 09:05

## 2024-12-08 RX ADMIN — POTASSIUM & SODIUM PHOSPHATES POWDER PACK 280-160-250 MG 2 PACKET: 280-160-250 PACK at 18:39

## 2024-12-08 RX ADMIN — Medication 473 ML: at 20:13

## 2024-12-08 RX ADMIN — FUROSEMIDE 40 MG: 40 TABLET ORAL at 09:05

## 2024-12-08 RX ADMIN — POTASSIUM CHLORIDE 40 MEQ: 1.5 POWDER, FOR SOLUTION ORAL at 09:07

## 2024-12-08 RX ADMIN — DOCUSATE SODIUM 100 MG: 100 CAPSULE, LIQUID FILLED ORAL at 20:12

## 2024-12-08 RX ADMIN — Medication 10 ML: at 20:12

## 2024-12-08 RX ADMIN — ARFORMOTEROL TARTRATE 15 MCG: 15 SOLUTION RESPIRATORY (INHALATION) at 10:00

## 2024-12-08 RX ADMIN — POTASSIUM & SODIUM PHOSPHATES POWDER PACK 280-160-250 MG 2 PACKET: 280-160-250 PACK at 20:12

## 2024-12-08 RX ADMIN — ARFORMOTEROL TARTRATE 15 MCG: 15 SOLUTION RESPIRATORY (INHALATION) at 20:34

## 2024-12-08 RX ADMIN — BUDESONIDE 0.5 MG: 0.5 INHALANT ORAL at 20:34

## 2024-12-08 RX ADMIN — BUDESONIDE 0.5 MG: 0.5 INHALANT ORAL at 10:00

## 2024-12-08 RX ADMIN — LOSARTAN POTASSIUM 12.5 MG: 25 TABLET, FILM COATED ORAL at 09:06

## 2024-12-08 NOTE — PROGRESS NOTES
Pharmacy to Dose: Warfarin  Consulting provider: Bakari   Indication for warfarin: Afib [id number] 5  Goal INR range: 2 - 3  Home warfarin dose:  Restarting warfarin therapy. Patient previously on warfarin  in May, 2024. Recently on apixaban, per note apixaban makes patient feel funny, and would like to restart warfarin  Previous warfarin dose: 4 mg, then 5mg, then 5mg, rotating. Previous admission in April, 2024: average dose of 5.3 mg    Date INR Warfarin Dose Given   11/23 1.14 5 mg   11/24 1.32 5 mg   11/25 1.52 7.5 mg   11/26 1.94 5 mg (per MD)   11/27 2.30 5 mg   11/28 2.65 5 mg   11/29 3.27 Held by MD   11/30 3.35 Held by MD   12/1 3.44 Held by MD   12/2 3.10 Held by MD   12/3 2.80 5 mg   12/4 3.15 HOLD   12/5 3.04 HOLD   12/6 2.52 4 mg   12/7 2.46 4 mg per MD   12/8 2.61 3 mg     Any Vitamin K given?: No  Drug interactions Reviewed: Yes.  Is patient on bridging therapy with another anticoagulant?: No    Plan:   INR reported as 2.61  Warfarin 3 mg once one today  INR ordered for tomorrow AM.    Thank you for this consult. Pharmacy will continue to monitor.   Rocky Guy [facility]

## 2024-12-08 NOTE — PROGRESS NOTES
[facility]   Hospitalist Progress Note  Date: 2024  Patient Name: Inez Doyle  : 1950  MRN: [id number]  Date of admission: 2024      Subjective   Subjective     Chief Complaint: Mental status    Summary:     74-year-old male history of atrial fibrillation on warfarin, CHF, COPD, hypertension, hyperlipidemia, obesity, chronic immobility presents from nursing facility with altered mental status and decreased oral intake found to have large abdominal wound General Surgery and plastic surgery evaluated, plastic surgery eventually took the patient to the operating room and did perform a panniculectomy with wound VAC placement.  The patient remained hospitalized for postoperative monitoring.  Due to morbidities and poor long-term prognosis,  palliative care was consulted and no CPR status updated.       Patient taken back to surgery for trunk debridement and wound vac placement on  by plastic surgeon.  She has had some hypokalemia but refused IV access and intermittently refused oral potassium replacement. She is doing well postoperative setting and is likely stable for transfer to rehab at this time. Patient has had prolonged hospital course, as significant comorbidities impairing wound healing and which will likely lead to a poor eventual outcome.  She has a high risk of readmission, morbidity and/or mortality regardless of treatment.    Interval Followup: 2024    No new complaints.  Alertness waxes/wanes.    Has been oriented mostly the last few days  No fevers  Postop day #16 (2 ulcers abdomen removed en bloc/panniculectomy)  Postop day #6 (repeat wound debridement/wound VAC placement)  Pharmacy dosing warfarin.  INR therapeutic  Avoid restraints if possible  Patient hypokalemic today.  Electrolytes replaced  Discharge delayed due to insurance recert pending      Objective   Objective     Vitals:   Temp:  [97.4 °F (36.3 °C)-97.7 °F (36.5 °C)] 97.4 °F (36.3 °C)  Heart Rate:   [] 99  Resp:  [16-18] 18  BP: (109-130)/(50-68) 121/62    Physical Exam   General: Obese female, alert, cooperative with exam  Respiratory: Clear to auscultation without wheezing, no accessory use of muscles of respiration  Cardiovascular: Irregular heart rate with no murmur  Abdomen: Large pannus noted, multiple wound vacs noted    Result Review    Result Review:  I have personally reviewed the results from the time of this admission to 12/8/2024 15:40 EST and agree with these findings:  [x]  Laboratory  []  Microbiology  []  Radiology  []  EKG/Telemetry   []  Cardiology/Vascular   []  Pathology  []  Old records  []  Other:    Assessment & Plan   Assessment / Plan     Assessment:    Staph epi bacteremia secondary to severe SSTI cellulitis, likely from large abdominal and/or sacral wounds  Multiple skin and soft tissue wounds less notable for sacral, large lower abdominal pannus exposed wound  Paroxysmal A-fib (on coumadin)  Acute toxic and metabolic encephalopathy, improving waxing/waning  COPD (on room air baseline)  CHF, stable  Polypharmacy, likely contributing factor to intermittent confusion  HTN  DM2 (on metformin)  Morbid obesity BMI of 48.5  Nursing home resident ([facility])  Status post panniculectomy 11/22  Status post wound debridement 12/02    Plan:    Patient treated for panniculitis/necrotic pannus now status post debridement  Surgeon and wound care managing wound vac, patient will need to follow-up with surgeon outpatient  Continue wound care/postop care, follow-up wound care clinic as well.    Ceftriaxone was previously discontinued, monitor for signs of recurrent infection.    Bowel regimen as needed  Daily pro time.  Target INR 2-3.  Pharmacy dosing.  She is encephalopathic, and overall poor health and is apparently at baseline mentation, palliative care previously consulted, poor long-term prognosis  She is will pull out IVs, refuse medications frequently, DC SSI including Accu-Cheks.     Return to [facility] when medically stable   Patient hypokalemic.  Replace electrolytes.  Discharge when return to SNF arranged      VTE Prophylaxis:  Pharmacologic & mechanical VTE prophylaxis orders are present.    CODE STATUS:   Medical Intervention Limits: No intubation (DNI)  Code Status (Patient has no pulse and is not breathing): No CPR (Do Not Attempt to Resuscitate)  Medical Interventions (Patient has pulse or is breathing): Limited Support           Electronically signed by Dilshad Del Rosario MD, 12/08/24, 3:44 PM EST.

## 2024-12-08 NOTE — PLAN OF CARE
Goal Outcome Evaluation:           Progress: no change  Outcome Evaluation: patient alert to self. wound vac intacted. khan noted with concentrated urine. q2 turn.

## 2024-12-08 NOTE — DISCHARGE SUMMARY
Addendum: Discharge delayed due to need for insurance re-pre-certification                   [facility]         HOSPITALIST  DISCHARGE SUMMARY    Patient Name: Inez Doyle  : 1950  MRN: [id number]    Date of Admission: 2024  Date of Discharge: 2024  Primary Care Physician: Christopher Hanson MD    Consults       Date and Time Order Name Status Description    11/15/2024  1:38 PM Inpatient Plastic Surgery Consult Completed     11/15/2024  7:49 AM Inpatient General Surgery Consult Completed     2024  3:14 PM Hospitalist (on-call MD unless specified)              Active and Resolved Hospital Problems:  Active Hospital Problems    Diagnosis POA    **AMS (altered mental status) [R41.82] Yes    Severe protein-calorie malnutrition [E43] Yes    Chronic wound infection of abdomen [S31.109A, L08.9] Unknown      Resolved Hospital Problems   No resolved problems to display.       Hospital Course     Hospital Course:  Patient 74-year-old female history of atrial fibrillation on warfarin, CHF, COPD, hypertension, hyperlipidemia, obesity, chronic immobility presents from nursing facility with altered mental status and decreased oral intake found to have large abdominal wound General Surgery and plastic surgery evaluated, plastic surgery eventually took the patient to the operating room and did perform a panniculectomy with wound VAC placement.  The patient remained hospitalized for postoperative monitoring.  Due to morbidities and poor long-term prognosis,  palliative care was consulted and no CPR status updated.       Patient taken back to surgery for trunk debridement and wound vac placement on  by plastic surgeon.  She has had some hypokalemia but refused IV access and intermittently refused oral potassium replacement. She is doing well postoperative setting and will be transferred to rehab at this time.  She will be referred for close outpatient follow-up with her plastic surgeon, primary care  provider and wound care clinic.  Her wound VAC will remain.  Patient has had prolonged hospital course, as significant comorbidities impairing wound healing and which will likely lead to a poor eventual outcome.  She has a high risk of readmission, morbidity and/or mortality regardless of treatment.        Day of Discharge     Vital Signs:  Temp:  [97.4 °F (36.3 °C)-97.7 °F (36.5 °C)] 97.4 °F (36.3 °C)  Heart Rate:  [] 99  Resp:  [16-18] 18  BP: (109-130)/(50-68) 121/62  Physical Exam:   General: Obese female, alert, cooperative with exam  Respiratory: Clear to auscultation without wheezing, no accessory use of muscles of respiration  Cardiovascular: Irregular heart rate with no murmur  Abdomen: Large pannus noted, multiple wound vacs noted      Discharge Details        Discharge Medications        Continue These Medications        Instructions Start Date   acetaminophen 325 MG tablet  Commonly known as: TYLENOL   650 mg, Every 4 Hours PRN      ferrous sulfate 325 (65 FE) MG tablet   325 mg, 2 Times Daily      furosemide 40 MG tablet  Commonly known as: LASIX   40 mg, Daily      gabapentin 800 MG tablet  Commonly known as: NEURONTIN   1 tablet, Every Night at Bedtime      ipratropium-albuterol 0.5-2.5 mg/3 ml nebulizer  Commonly known as: DUO-NEB   3 mL, Every 6 Hours      Liraglutide 18 MG/3ML solution pen-injector injection  Commonly known as: VICTOZA   0.6 mg, Daily      loperamide 2 MG capsule  Commonly known as: IMODIUM   2 mg, Oral, As Needed      LORazepam 0.5 MG tablet  Commonly known as: ATIVAN   0.5 mg, Every 12 Hours PRN      losartan 25 MG tablet  Commonly known as: COZAAR   12.5 mg, Daily      magnesium hydroxide 400 MG/5ML suspension  Commonly known as: MILK OF MAGNESIA   30 mL, Daily PRN      megestrol 40 MG/ML suspension  Commonly known as: MEGACE   400 mg, Daily      melatonin 3 MG tablet   3 mg, Daily      metFORMIN 500 MG tablet  Commonly known as: GLUCOPHAGE   500 mg, 2 Times Daily With  Meals      metoclopramide 5 MG tablet  Commonly known as: REGLAN   5 mg, 4 Times Daily Before Meals & Nightly      miconazole 2 % powder  Commonly known as: MICOTIN   1 Application, 2 Times Daily      ondansetron ODT 4 MG disintegrating tablet  Commonly known as: ZOFRAN-ODT   4 mg, Every 4 Hours PRN      potassium chloride 10 MEQ CR tablet   10 mEq, Daily      Santyl 250 UNIT/GM ointment  Generic drug: collagenase   1 Application, Daily      sertraline 25 MG tablet  Commonly known as: ZOLOFT   25 mg, Daily      sodium hypochlorite 0.25 % solution topical solution  Commonly known as: HYSEPT   1 Application, Every 24 Hours Scheduled      traMADol 50 MG tablet  Commonly known as: ULTRAM   50 mg, Every 6 Hours PRN      vitamin B-12 1000 MCG tablet  Commonly known as: CYANOCOBALAMIN   1,000 mcg, Daily             Stop These Medications      levoFLOXacin 750 MG tablet  Commonly known as: LEVAQUIN            ASK your doctor about these medications        Instructions Start Date   saccharomyces boulardii 250 MG capsule  Commonly known as: FLORASTOR  Ask about: Should I take this medication?   250 mg, 2 Times Daily               Allergies   Allergen Reactions    Codeine     Dye Fdc Red [Red Dye #40 (Allura Red)] Other (See Comments)          Penicillins     Sulfa Antibiotics     Iodinated Contrast Media Nausea Only       Discharge Disposition:  Skilled Nursing Facility (DC - External)    Diet:  Hospital:  Diet Order   Procedures    Diet: Regular/House; Texture: Mechanical Ground (NDD 2); Fluid Consistency: Thin (IDDSI 0)       Discharge Activity:       CODE STATUS:  Code Status and Medical Interventions: No CPR (Do Not Attempt to Resuscitate); Limited Support; No intubation (DNI)   Ordered at: 11/11/24 1549     Medical Intervention Limits:    No intubation (DNI)     Code Status (Patient has no pulse and is not breathing):    No CPR (Do Not Attempt to Resuscitate)     Medical Interventions (Patient has pulse or is breathing):     Limited Support         No future appointments.    Additional Instructions for the Follow-ups that You Need to Schedule       Ambulatory Referral to Wound Clinic   As directed      Discharge Follow-up with PCP   As directed       Currently Documented PCP:    Christopher Hanson MD    PCP Phone Number:    [phone]     Follow Up Details: 3 to 5 days        Discharge Follow-up with Specified Provider: Dr. Shalonda Brown; 1 Week   As directed      To: Dr. Shalonda Brown   Follow Up: 1 Week                Pertinent  and/or Most Recent Results     PROCEDURES:   Surgical Procedures Since Admission:  Procedure(s):  TRUNK DEBRIDEMENTabdominal wall debridement with  closure and wound vac placement  Surgeon:  Shalonda Brown MD  Status:  16 Days Post-Op  -------------------    Procedure(s):  TRUNK DEBRIDEMENT  Surgeon:  Shalonda Brown MD  Status:  6 Days Post-Op  -------------------      LAB RESULTS:      Lab 12/08/24  0333 12/07/24  0545 12/06/24  0452 12/05/24  0546 12/04/24  0348 12/03/24  0528   WBC  --  7.50 9.43 7.82 7.49 9.34   HEMOGLOBIN  --  8.2* 8.2* 7.9* 8.1* 9.4*   HEMATOCRIT  --  28.0* 27.8* 26.8* 27.6* 31.4*   PLATELETS  --  147 165 162 159 173   NEUTROS ABS  --  5.30 6.85 5.50 5.27 7.05*   IMMATURE GRANS (ABS)  --  0.07* 0.08* 0.07* 0.09* 0.10*   LYMPHS ABS  --  1.36 1.55 1.42 1.34 1.26   MONOS ABS  --  0.55 0.73 0.67 0.64 0.78   EOS ABS  --  0.18 0.17 0.12 0.11 0.10   MCV  --  80.7 80.1 79.8 80.2 79.7   PROTIME 28.4* 27.0* 27.6* 31.9* 32.8*  --          Lab 12/08/24  1329 12/08/24  0333 12/07/24  0545 12/06/24  0452 12/05/24  0546 12/04/24  0348 12/03/24  0440 12/02/24 0446   SODIUM  --   --  137 136 137 137 135* 137   POTASSIUM 2.8* 2.9* 2.8* 2.9* 2.9* 3.2* 4.1 3.1*   CHLORIDE  --   --  102 101 103 104 103 103   CO2  --   --  24.4 25.3 24.0 24.5 20.0* 24.3   ANION GAP  --   --  10.6 9.7 10.0 8.5 12.0 9.7   BUN  --   --  12 12 13 12 14 15   CREATININE  --   --  0.49* 0.65 0.56* 0.63  0.66 0.58   EGFR  --   --  99.0 92.5 95.9 93.2 92.2 95.1   GLUCOSE  --   --  99 107* 101* 93 111* 99   CALCIUM  --   --  8.0* 7.9* 7.7* 7.7* 8.6 8.3*   MAGNESIUM  --   --  1.7  --   --   --  1.8 1.8   PHOSPHORUS  --   --   --   --   --   --  3.1 2.9         Lab 12/07/24  0545 12/06/24  0452 12/05/24  0546 12/04/24  0348 12/03/24  0440 12/02/24  0446   TOTAL PROTEIN 4.5* 4.7* 4.3* 4.4* 5.4* 4.9*   ALBUMIN 1.7* 1.8* 1.6* 1.7* 2.0* 2.0*   GLOBULIN 2.8 2.9 2.7 2.7  --   --    ALT (SGPT) 7 8 7 6 11 10   AST (SGOT) 9 9 9 11 14 12   BILIRUBIN 0.4 0.4 0.3 0.3 0.4 0.3   INDIRECT BILIRUBIN  --   --   --   --  0.3 0.2   BILIRUBIN DIRECT  --   --   --   --  0.1 0.1   ALK PHOS 106 121* 119* 132* 157* 141*         Lab 12/08/24  0333 12/07/24  0545 12/06/24  0452 12/05/24  0546 12/04/24  0348   PROTIME 28.4* 27.0* 27.6* 31.9* 32.8*   INR 2.61* 2.46* 2.52* 3.04* 3.15*                 Brief Urine Lab Results  (Last result in the past 365 days)        Color   Clarity   Blood   Leuk Est   Nitrite   Protein   CREAT   Urine HCG        11/27/24 1832 Dark Yellow   Turbid   Large (3+)   Moderate (2+)   Negative   >=300 mg/dL (3+)                 Microbiology Results (last 10 days)       ** No results found for the last 240 hours. **            No radiology results for the last 7 days             Results for orders placed during the hospital encounter of 06/14/23    Adult Transthoracic Echo Complete W/ Cont if Necessary Per Protocol    Interpretation Summary  Technically limited study.  Normal left ventricular systolic function.  No significant valve abnormalities noted.      Labs Pending at Discharge:        Time spent on Discharge including face to face service: Less than 30 minutes    Electronically signed by Dilshad Del Rosario MD, 12/08/24, 2:22 PM EST.

## 2024-12-09 LAB
ANION GAP SERPL CALCULATED.3IONS-SCNC: 9 MMOL/L (ref 5–15)
BUN SERPL-MCNC: 12 MG/DL (ref 8–23)
BUN/CREAT SERPL: 21.4 (ref 7–25)
CALCIUM SPEC-SCNC: 8.2 MG/DL (ref 8.6–10.5)
CHLORIDE SERPL-SCNC: 102 MMOL/L (ref 98–107)
CO2 SERPL-SCNC: 25 MMOL/L (ref 22–29)
CREAT SERPL-MCNC: 0.56 MG/DL (ref 0.57–1)
EGFRCR SERPLBLD CKD-EPI 2021: 95.9 ML/MIN/1.73
GLUCOSE SERPL-MCNC: 95 MG/DL (ref 65–99)
INR PPP: 2.55 (ref 0.86–1.15)
POTASSIUM SERPL-SCNC: 3.1 MMOL/L (ref 3.5–5.2)
PROTHROMBIN TIME: 27.9 SECONDS (ref 11.8–14.9)
SODIUM SERPL-SCNC: 136 MMOL/L (ref 136–145)
WHOLE BLOOD HOLD SPECIMEN: NORMAL

## 2024-12-09 PROCEDURE — 85610 PROTHROMBIN TIME: CPT | Performed by: SURGERY

## 2024-12-09 PROCEDURE — 80048 BASIC METABOLIC PNL TOTAL CA: CPT | Performed by: STUDENT IN AN ORGANIZED HEALTH CARE EDUCATION/TRAINING PROGRAM

## 2024-12-09 PROCEDURE — 94799 UNLISTED PULMONARY SVC/PX: CPT

## 2024-12-09 PROCEDURE — 94664 DEMO&/EVAL PT USE INHALER: CPT

## 2024-12-09 PROCEDURE — 99232 SBSQ HOSP IP/OBS MODERATE 35: CPT | Performed by: STUDENT IN AN ORGANIZED HEALTH CARE EDUCATION/TRAINING PROGRAM

## 2024-12-09 RX ORDER — WARFARIN SODIUM 3 MG/1
3 TABLET ORAL
Status: DISCONTINUED | OUTPATIENT
Start: 2024-12-09 | End: 2024-12-10

## 2024-12-09 RX ADMIN — Medication 473 ML: at 22:08

## 2024-12-09 RX ADMIN — BUDESONIDE 0.5 MG: 0.5 INHALANT ORAL at 20:35

## 2024-12-09 RX ADMIN — DOCUSATE SODIUM 100 MG: 100 CAPSULE, LIQUID FILLED ORAL at 20:11

## 2024-12-09 RX ADMIN — BUDESONIDE 0.5 MG: 0.5 INHALANT ORAL at 06:31

## 2024-12-09 RX ADMIN — POTASSIUM & SODIUM PHOSPHATES POWDER PACK 280-160-250 MG 2 PACKET: 280-160-250 PACK at 20:10

## 2024-12-09 RX ADMIN — Medication 473 ML: at 16:28

## 2024-12-09 RX ADMIN — ARFORMOTEROL TARTRATE 15 MCG: 15 SOLUTION RESPIRATORY (INHALATION) at 06:30

## 2024-12-09 RX ADMIN — MIDODRINE HYDROCHLORIDE 5 MG: 2.5 TABLET ORAL at 06:37

## 2024-12-09 RX ADMIN — ARFORMOTEROL TARTRATE 15 MCG: 15 SOLUTION RESPIRATORY (INHALATION) at 20:35

## 2024-12-09 NOTE — PROGRESS NOTES
Psychiatric   Hospitalist Progress Note  Date: 2024  Patient Name: Inez Doyle  : 1950  MRN: [id number]  Date of admission: 2024      Subjective   Subjective     Chief Complaint: Mental status    Summary:     74-year-old male history of atrial fibrillation on warfarin, CHF, COPD, hypertension, hyperlipidemia, obesity, chronic immobility presents from nursing facility with altered mental status and decreased oral intake found to have large abdominal wound General Surgery and plastic surgery evaluated, plastic surgery eventually took the patient to the operating room and did perform a panniculectomy with wound VAC placement.  The patient remained hospitalized for postoperative monitoring.  Due to morbidities and poor long-term prognosis,  palliative care was consulted and no CPR status updated.       Patient taken back to surgery for trunk debridement and wound vac placement on  by plastic surgeon.  She has had some hypokalemia but refused IV access and intermittently refused oral potassium replacement. She is doing well postoperative setting and is likely stable for transfer to rehab at this time. Patient has had prolonged hospital course, as significant comorbidities impairing wound healing and which will likely lead to a poor eventual outcome.  She has a high risk of readmission, morbidity and/or mortality regardless of treatment.    Interval Followup: 2024    No new complaints.  Patient awake and alert today  Has been oriented mostly the last few days but occasionally is disoriented  No fevers  Postop day #17 (2 ulcers abdomen removed en bloc/panniculectomy)  Postop day #6 (repeat wound debridement/wound VAC placement)  Pharmacy dosing warfarin.   Avoid restraints if possible  Patient hypokalemic and hypomagnesemic today.  Electrolytes replaced  Discharge delayed due to insurance recert pending      Objective   Objective     Vitals:   Temp:  [97.2 °F (36.2 °C)-97.7 °F (36.5 °C)]  97.7 °F (36.5 °C)  Heart Rate:  [] 79  Resp:  [16-22] 22  BP: (106-121)/(51-76) 121/76    Physical Exam   General: Obese female, alert, cooperative with exam  Respiratory: Clear to auscultation without wheezing, no accessory use of muscles of respiration  Cardiovascular: Irregular heart rate with no murmur  Abdomen: Large pannus noted, wound vacs noted    Result Review    Result Review:  I have personally reviewed the results from the time of this admission to 12/9/2024 14:33 EST and agree with these findings:  [x]  Laboratory  []  Microbiology  []  Radiology  []  EKG/Telemetry   []  Cardiology/Vascular   []  Pathology  []  Old records  []  Other:    Assessment & Plan   Assessment / Plan     Assessment:    Staph epi bacteremia secondary to severe SSTI cellulitis, likely from large abdominal and/or sacral wounds  Multiple skin and soft tissue wounds less notable for sacral, large lower abdominal pannus exposed wound  Paroxysmal A-fib (on coumadin)  Acute toxic and metabolic encephalopathy, improving waxing/waning  COPD (on room air baseline)  CHF, stable  Polypharmacy, likely contributing factor to intermittent confusion  HTN  DM2 (on metformin)  Morbid obesity BMI of 48.5  Nursing home resident (Sor[facility])  Status post panniculectomy 11/22  Status post wound debridement 12/02    Plan:  Patient treated for panniculitis/necrotic pannus now status post panniculectomy  Surgeon and wound care managing wound vac, patient will need to follow-up with surgeon outpatient  Continue wound care/postop care, follow-up wound care clinic as well.    Ceftriaxone was previously discontinued, monitor for signs of recurrent infection.    Bowel regimen as needed  Daily PT and INR.  Target INR 2-3.  Pharmacy dosing.  She is encephalopathic, and overall poor health and is apparently at baseline mentation, palliative care previously consulted, poor long-term prognosis  She is will pull out IVs, refuse medications frequently, DC  SSI including Accu-Cheks.    Return to [facility] when medically stable     12/09/24  Patient persistent hypokalemic.  Replace electrolytes.  Check a.m. magnesium.  Discharge when return to SNF arranged      VTE Prophylaxis:  Pharmacologic & mechanical VTE prophylaxis orders are present.    CODE STATUS:   Medical Intervention Limits: No intubation (DNI)  Code Status (Patient has no pulse and is not breathing): No CPR (Do Not Attempt to Resuscitate)  Medical Interventions (Patient has pulse or is breathing): Limited Support         Electronically signed by Dilshad Del Rosario MD, 12/09/24, 2:36 PM EST.

## 2024-12-09 NOTE — PLAN OF CARE
Goal Outcome Evaluation:   Pt slept well woundvac still intact.  No worsening changes overnight.

## 2024-12-09 NOTE — PROGRESS NOTES
Pharmacy to Dose: Warfarin  Consulting provider: Bakari   Indication for warfarin: Afib [id number]  Goal INR range: 2 - 3  Home warfarin dose:  Restarting warfarin therapy. Patient previously on warfarin  in May, 2024. Recently on apixaban, per note apixaban makes patient feel funny, and would like to restart warfarin  Previous warfarin dose: 4 mg, then 5mg, then 5mg, rotating. Previous admission in April, 2024: average dose of 5.3 mg    Date INR Warfarin Dose Given   11/23 1.14 5 mg   11/24 1.32 5 mg   11/25 1.52 7.5 mg   11/26 1.94 5 mg (per MD)   11/27 2.30 5 mg   11/28 2.65 5 mg   11/29 3.27 Held by MD   11/30 3.35 Held by MD   12/1 3.44 Held by MD   12/2 3.10 Held by MD   12/3 2.80 5 mg   12/4 3.15 HOLD   12/5 3.04 HOLD   12/6 2.52 4 mg   12/7 2.46 4 mg per MD   12/8 2.61 3 mg   12/9 2.55 3 mg     Any Vitamin K given?: No  Drug interactions Reviewed: Yes.  Is patient on bridging therapy with another anticoagulant?: No    Plan:   INR reported as 2.55.  Warfarin 3 mg once one today  INR ordered for tomorrow AM.    Thank you for this consult. Pharmacy will continue to monitor.   Chuyita Davidson RPH

## 2024-12-10 VITALS
HEART RATE: 103 BPM | DIASTOLIC BLOOD PRESSURE: 73 MMHG | SYSTOLIC BLOOD PRESSURE: 112 MMHG | TEMPERATURE: 97.7 F | OXYGEN SATURATION: 94 % | HEIGHT: 66 IN | WEIGHT: 293 LBS | RESPIRATION RATE: 20 BRPM | BODY MASS INDEX: 47.09 KG/M2

## 2024-12-10 LAB
INR PPP: 2.47 (ref 0.86–1.15)
MAGNESIUM SERPL-MCNC: 1.7 MG/DL (ref 1.6–2.4)
POTASSIUM SERPL-SCNC: 3.2 MMOL/L (ref 3.5–5.2)
PROTHROMBIN TIME: 27.1 SECONDS (ref 11.8–14.9)
WHOLE BLOOD HOLD SPECIMEN: NORMAL

## 2024-12-10 PROCEDURE — 94799 UNLISTED PULMONARY SVC/PX: CPT

## 2024-12-10 PROCEDURE — 85610 PROTHROMBIN TIME: CPT | Performed by: SURGERY

## 2024-12-10 PROCEDURE — 84132 ASSAY OF SERUM POTASSIUM: CPT | Performed by: STUDENT IN AN ORGANIZED HEALTH CARE EDUCATION/TRAINING PROGRAM

## 2024-12-10 PROCEDURE — 94664 DEMO&/EVAL PT USE INHALER: CPT

## 2024-12-10 PROCEDURE — 83735 ASSAY OF MAGNESIUM: CPT | Performed by: STUDENT IN AN ORGANIZED HEALTH CARE EDUCATION/TRAINING PROGRAM

## 2024-12-10 PROCEDURE — 99239 HOSP IP/OBS DSCHRG MGMT >30: CPT | Performed by: INTERNAL MEDICINE

## 2024-12-10 RX ORDER — TRAMADOL HYDROCHLORIDE 50 MG/1
50 TABLET ORAL EVERY 6 HOURS PRN
Qty: 10 TABLET | Refills: 0 | Status: SHIPPED | OUTPATIENT
Start: 2024-12-10 | End: 2024-12-13

## 2024-12-10 RX ORDER — WARFARIN SODIUM 4 MG/1
TABLET ORAL
Qty: 3 TABLET | Refills: 0 | Status: SHIPPED | OUTPATIENT
Start: 2024-12-10

## 2024-12-10 RX ORDER — WARFARIN SODIUM 4 MG/1
4 TABLET ORAL
Status: COMPLETED | OUTPATIENT
Start: 2024-12-10 | End: 2024-12-10

## 2024-12-10 RX ORDER — SACCHAROMYCES BOULARDII 250 MG
250 CAPSULE ORAL 2 TIMES DAILY
Qty: 28 CAPSULE | Refills: 0 | Status: SHIPPED | OUTPATIENT
Start: 2024-12-10 | End: 2024-12-24

## 2024-12-10 RX ADMIN — Medication 473 ML: at 16:53

## 2024-12-10 RX ADMIN — LOSARTAN POTASSIUM 12.5 MG: 25 TABLET, FILM COATED ORAL at 09:09

## 2024-12-10 RX ADMIN — FUROSEMIDE 40 MG: 40 TABLET ORAL at 09:09

## 2024-12-10 RX ADMIN — WARFARIN SODIUM 4 MG: 4 TABLET ORAL at 17:28

## 2024-12-10 RX ADMIN — Medication 500 MG: at 09:09

## 2024-12-10 RX ADMIN — MIDODRINE HYDROCHLORIDE 5 MG: 2.5 TABLET ORAL at 09:09

## 2024-12-10 RX ADMIN — MIDODRINE HYDROCHLORIDE 5 MG: 2.5 TABLET ORAL at 17:28

## 2024-12-10 RX ADMIN — POTASSIUM CHLORIDE 40 MEQ: 1.5 POWDER, FOR SOLUTION ORAL at 09:08

## 2024-12-10 RX ADMIN — ARFORMOTEROL TARTRATE 15 MCG: 15 SOLUTION RESPIRATORY (INHALATION) at 07:24

## 2024-12-10 RX ADMIN — SERTRALINE HYDROCHLORIDE 25 MG: 25 TABLET ORAL at 09:09

## 2024-12-10 RX ADMIN — DOCUSATE SODIUM 100 MG: 100 CAPSULE, LIQUID FILLED ORAL at 09:09

## 2024-12-10 RX ADMIN — Medication 1 TABLET: at 09:09

## 2024-12-10 RX ADMIN — BUDESONIDE 0.5 MG: 0.5 INHALANT ORAL at 07:24

## 2024-12-10 RX ADMIN — POTASSIUM & SODIUM PHOSPHATES POWDER PACK 280-160-250 MG 2 PACKET: 280-160-250 PACK at 17:28

## 2024-12-10 RX ADMIN — POTASSIUM & SODIUM PHOSPHATES POWDER PACK 280-160-250 MG 2 PACKET: 280-160-250 PACK at 09:20

## 2024-12-10 NOTE — PLAN OF CARE
Goal Outcome Evaluation:           Progress: no change  Outcome Evaluation: Patient confused overnight. Dressing changes and skin care complete during shift. Patient compliant with medications during this shift. Calix cath care complete, patentcey maintained. No complaints of pain n/v.

## 2024-12-10 NOTE — DISCHARGE SUMMARY
[facility]         HOSPITALIST  DISCHARGE SUMMARY    Patient Name: Inez Doyle  : 1950  MRN: [id number]    Date of Admission: 2024  Date of Discharge:  12/10/2024  Primary Care Physician: Christopher Hanson MD    Consults       Date and Time Order Name Status Description    11/15/2024  1:38 PM Inpatient Plastic Surgery Consult Completed     11/15/2024  7:49 AM Inpatient General Surgery Consult Completed     2024  3:14 PM Hospitalist (on-call MD unless specified)              Active and Resolved Hospital Problems:  Staph epi bacteremia secondary to severe SSTI cellulitis, likely from large abdominal and/or sacral wounds  Multiple skin and soft tissue wounds less notable for sacral, large lower abdominal pannus exposed wound  Paroxysmal A-fib (on coumadin)  Acute toxic and metabolic encephalopathy, improving waxing/waning  COPD (on room air baseline)  CHF, stable  Polypharmacy, likely contributing factor to intermittent confusion  HTN  DM2 (on metformin)  Morbid obesity BMI of 48.5  Nursing home resident (Soraya Troy)  Status post panniculectomy   Status post wound debridement     Hospital Course     Hospital Course:  74-year-old male history of atrial fibrillation on warfarin, CHF, COPD, hypertension, hyperlipidemia, obesity, chronic immobility presents from nursing facility with altered mental status and decreased oral intake found to have large abdominal wound General Surgery and plastic surgery evaluated, plastic surgery eventually took the patient to the operating room and did perform a panniculectomy with wound VAC placement.  The patient remained hospitalized for postoperative monitoring.  Due to morbidities and poor long-term prognosis,  palliative care was consulted and no CPR status updated.       Patient taken back to surgery for trunk debridement and wound vac placement on  by plastic surgeon.  She has had some hypokalemia but refused IV access and  intermittently refused oral potassium replacement. She is doing well postoperative setting and is likely stable for transfer to rehab at this time. Patient has had prolonged hospital course, as significant comorbidities impairing wound healing and which will likely lead to a poor eventual outcome.  She has a high risk of readmission, morbidity and/or mortality regardless of treatment.  Patient should follow-up with plastic surgery in 2 weeks.  Patient should follow-up with her PCP.  Patient is discharged back to her facility today in stable    Day of Discharge     Vital Signs:  Temp:  [97.3 °F (36.3 °C)-98.2 °F (36.8 °C)] 97.7 °F (36.5 °C)  Heart Rate:  [] 103  Resp:  [17-20] 20  BP: ()/(42-73) 112/73    Physical Exam:   GEN: No acute distress  HEENT: Moist mucous membranes  LUNGS: Equal chest rise bilaterally  CARDIAC: Regular rate and rhythm  NEURO: Moving all 4 extremities spontaneously    Discharge Details        Discharge Medications        New Medications        Instructions Start Date   warfarin 4 MG tablet  Commonly known as: COUMADIN   Indications: Atrial Fibrillation             Continue These Medications        Instructions Start Date   acetaminophen 325 MG tablet  Commonly known as: TYLENOL   650 mg, Every 4 Hours PRN      ferrous sulfate 325 (65 FE) MG tablet   325 mg, 2 Times Daily      furosemide 40 MG tablet  Commonly known as: LASIX   40 mg, Daily      gabapentin 800 MG tablet  Commonly known as: NEURONTIN   1 tablet, Every Night at Bedtime      ipratropium-albuterol 0.5-2.5 mg/3 ml nebulizer  Commonly known as: DUO-NEB   3 mL, Every 6 Hours      Liraglutide 18 MG/3ML solution pen-injector injection  Commonly known as: VICTOZA   0.6 mg, Daily      loperamide 2 MG capsule  Commonly known as: IMODIUM   2 mg, Oral, As Needed      LORazepam 0.5 MG tablet  Commonly known as: ATIVAN   0.5 mg, Every 12 Hours PRN      losartan 25 MG tablet  Commonly known as: COZAAR   12.5 mg, Daily       magnesium hydroxide 400 MG/5ML suspension  Commonly known as: MILK OF MAGNESIA   30 mL, Daily PRN      megestrol 40 MG/ML suspension  Commonly known as: MEGACE   400 mg, Daily      melatonin 3 MG tablet   3 mg, Daily      metFORMIN 500 MG tablet  Commonly known as: GLUCOPHAGE   500 mg, 2 Times Daily With Meals      metoclopramide 5 MG tablet  Commonly known as: REGLAN   5 mg, 4 Times Daily Before Meals & Nightly      miconazole 2 % powder  Commonly known as: MICOTIN   1 Application, 2 Times Daily      ondansetron ODT 4 MG disintegrating tablet  Commonly known as: ZOFRAN-ODT   4 mg, Every 4 Hours PRN      potassium chloride 10 MEQ CR tablet   10 mEq, Daily      saccharomyces boulardii 250 MG capsule  Commonly known as: FLORASTOR   250 mg, Oral, 2 Times Daily      Santyl 250 UNIT/GM ointment  Generic drug: collagenase   1 Application, Daily      sertraline 25 MG tablet  Commonly known as: ZOLOFT   25 mg, Daily      sodium hypochlorite 0.25 % solution topical solution  Commonly known as: HYSEPT   1 Application, Every 24 Hours Scheduled      traMADol 50 MG tablet  Commonly known as: ULTRAM   50 mg, Oral, Every 6 Hours PRN      vitamin B-12 1000 MCG tablet  Commonly known as: CYANOCOBALAMIN   1,000 mcg, Daily             Stop These Medications      levoFLOXacin 750 MG tablet  Commonly known as: LEVAQUIN              Allergies   Allergen Reactions    Codeine     Dye Fdc Red [Red Dye #40 (Allura Red)] Other (See Comments)          Penicillins     Sulfa Antibiotics     Iodinated Contrast Media Nausea Only       Discharge Disposition:  Skilled Nursing Facility (DC - External)    Diet:  Hospital:  Diet Order   Procedures    Diet: Regular/House; Texture: Mechanical Ground (NDD 2); Fluid Consistency: Thin (IDDSI 0)       Discharge Activity:       CODE STATUS:  Code Status and Medical Interventions: No CPR (Do Not Attempt to Resuscitate); Limited Support; No intubation (DNI)   Ordered at: 11/11/24 1549     Medical Intervention  Limits:    No intubation (DNI)     Code Status (Patient has no pulse and is not breathing):    No CPR (Do Not Attempt to Resuscitate)     Medical Interventions (Patient has pulse or is breathing):    Limited Support       No future appointments.    Additional Instructions for the Follow-ups that You Need to Schedule       Ambulatory Referral to Wound Clinic   As directed      Discharge Follow-up with PCP   As directed       Currently Documented PCP:    Christopher Hanson MD    PCP Phone Number:    [phone]     Follow Up Details: 3 to 5 days        Discharge Follow-up with PCP   As directed       Currently Documented PCP:    Christopher Hanson MD    PCP Phone Number:    [phone]     Follow Up Details: 3 to 7 days        Discharge Follow-up with Specified Provider: Dr. Shalonda Brown; 1 Week   As directed      To: Dr. Shalonda Brown   Follow Up: 1 Week        Discharge Follow-up with Specified Provider: plastic surgery; 2 Weeks   As directed      To: plastic surgery   Follow Up: 2 Weeks                Pertinent  and/or Most Recent Results     IMAGING:  CT Head Without Contrast    Result Date: 11/26/2024  CT HEAD WO CONTRAST Date of Exam: 11/26/2024 6:02 PM EST Indication: ongoing AMS. Comparison: 11/11/2024 Technique: Axial CT images were obtained of the head without contrast administration.  Reconstructed coronal and sagittal images were also obtained. Automated exposure control and iterative construction methods were used. Findings: No intracranial hemorrhage. No mass effect or midline shift. Mild white matter findings of chronic microvascular disease. Posterior fossa without acute abnormality. No abnormal extra-axial fluid collection. Mild cerebral atrophy. Globes intact and symmetric. No retro-orbital abnormality. The mastoid air cells are well-aerated. Negative for sinus fluid level. No calvarial fracture.     Impression: No acute intracranial abnormality. Electronically Signed: Chato Almonte MD  11/26/2024  8:06 PM EST  Workstation ID: [id number]    CT Abdomen Pelvis Without Contrast    Result Date: 11/13/2024  CT ABDOMEN PELVIS WO CONTRAST Date of Exam: 11/13/2024 8:28 PM EST Indication: lower abdominal wound. Comparison: 11/5/2024 Technique: Axial CT images were obtained of the abdomen and pelvis without the administration of contrast. Reconstructed coronal and sagittal images were also obtained. Automated exposure control and iterative construction methods were used. Findings: Motion degradation is present. There is a right lower lobe pulmonary nodule measuring about 6 mm in size. Lung bases are otherwise clear except for a left lower lobe calcified granuloma. Spinal stimulator is present. There is coronary artery disease and atherosclerotic disease. No aortic aneurysm. Gallbladder is surgically absent. There is no biliary obstruction. There is some atrophy of the pancreas. Spleen is borderline enlarged. The solid abdominal organs otherwise appear grossly normal. The kidneys are nonobstructed. Urinary bladder is decompressed around a Calix catheter. Solid pelvic organs are unremarkable. Large bowel is normal with no evidence of colitis. The appendix is normal. No small bowel obstruction is seen. Postoperative changes are noted involving the stomach. [place] ventral wall hernia containing large and small bowel loops is unchanged. This is nonincarcerating. There is no adenopathy or free fluid. There appears to be a soft tissue wound involving the right lateral aspect of the patient's panniculus. There is some associated air in the soft tissues. Correlate with physical exam. No associated fluid collection. There is some skin thickening that may reflect cellulitis. There is diffuse lumbar degenerative disease with old compression deformities from L3-L5.     1. There appears to be a soft tissue wound with skin breakdown and cellulitis involving the right lateral aspect of the patient's panniculus. No associated fluid  collection. 2. No other acute findings are identified in the abdomen and pelvis. The kidneys are nonobstructed, as is the bowel. 3. 6 mm right lower lobe pulmonary nodule. Indeterminate solid pulmonary nodule measuring 6 mm. In a patient of unknown risk level with a solid nodule of 6-8 mm, recommend CT at 6-12 months. In a low-risk patient, then consider CT at 18-24 months. In a high-risk patient, if the nodule is stable at 6-12 months, recommend CT at 18-24 months. Electronically Signed: Michael García MD  11/13/2024 9:33 PM EST  Workstation ID: [id number]    CT Head Without Contrast    Result Date: 11/11/2024  CT HEAD WO CONTRAST Date of Exam: 11/11/2024 2:41 PM EST Indication: AMS headache. Comparison: None available. Technique: Axial CT images were obtained of the head without contrast administration.  Reconstructed coronal and sagittal images were also obtained. Automated exposure control and iterative construction methods were used. Findings: There is no evidence of hemorrhage. There is no mass effect or midline shift. Diffuse brain atrophy. Periventricular hypodensities compatible with chronic vessel ischemia There is no extracerebral collection. Ventricles are normal in size and configuration for patient's stated age. Posterior fossa is within normal limits. Calvarium and skull base appear intact.  Visualized sinuses show no air fluid levels. Visualized orbits are unremarkable.     Impression: No acute intracranial abnormality Electronically Signed: Eliezer Stahl MD  11/11/2024 2:57 PM EST  Workstation ID: [id number]    XR Chest 1 View    Result Date: 11/11/2024  XR CHEST 1 VW Date of Exam: 11/11/2024 11:02 AM EST Indication: Weak/Dizzy/AMS triage protocol Comparison: Chest AP dated 10/21/2024 Findings: The lungs are clear bilaterally. Cardiac and mediastinal silhouettes appear normal. No effusion is seen. A spinal stimulator projects over the lower thoracic spine.     Impression: No acute cardiopulmonary  disease Electronically Signed: Alirio Mcfarlane MD  11/11/2024 11:34 AM EST  Workstation ID: [id number]      LAB RESULTS:      Lab 12/10/24  0305 12/09/24 0522 12/08/24  0333 12/07/24  0545 12/06/24  0452 12/05/24  0546 12/04/24  0348   WBC  --   --   --  7.50 9.43 7.82 7.49   HEMOGLOBIN  --   --   --  8.2* 8.2* 7.9* 8.1*   HEMATOCRIT  --   --   --  28.0* 27.8* 26.8* 27.6*   PLATELETS  --   --   --  147 165 162 159   NEUTROS ABS  --   --   --  5.30 6.85 5.50 5.27   IMMATURE GRANS (ABS)  --   --   --  0.07* 0.08* 0.07* 0.09*   LYMPHS ABS  --   --   --  1.36 1.55 1.42 1.34   MONOS ABS  --   --   --  0.55 0.73 0.67 0.64   EOS ABS  --   --   --  0.18 0.17 0.12 0.11   MCV  --   --   --  80.7 80.1 79.8 80.2   PROTIME 27.1* 27.9* 28.4* 27.0* 27.6* 31.9* 32.8*         Lab 12/10/24  0305 12/09/24 0522 12/08/24  1329 12/08/24 0333 12/07/24  0545 12/06/24 0452 12/05/24  0546 12/04/24  0348   SODIUM  --  136  --   --  137 136 137 137   POTASSIUM 3.2* 3.1* 2.8* 2.9* 2.8* 2.9* 2.9* 3.2*   CHLORIDE  --  102  --   --  102 101 103 104   CO2  --  25.0  --   --  24.4 25.3 24.0 24.5   ANION GAP  --  9.0  --   --  10.6 9.7 10.0 8.5   BUN  --  12  --   --  12 12 13 12   CREATININE  --  0.56*  --   --  0.49* 0.65 0.56* 0.63   EGFR  --  95.9  --   --  99.0 92.5 95.9 93.2   GLUCOSE  --  95  --   --  99 107* 101* 93   CALCIUM  --  8.2*  --   --  8.0* 7.9* 7.7* 7.7*   MAGNESIUM 1.7  --   --   --  1.7  --   --   --          Lab 12/07/24  0545 12/06/24  0452 12/05/24  0546 12/04/24  0348   TOTAL PROTEIN 4.5* 4.7* 4.3* 4.4*   ALBUMIN 1.7* 1.8* 1.6* 1.7*   GLOBULIN 2.8 2.9 2.7 2.7   ALT (SGPT) 7 8 7 6   AST (SGOT) 9 9 9 11   BILIRUBIN 0.4 0.4 0.3 0.3   ALK PHOS 106 121* 119* 132*         Lab 12/10/24  0305 12/09/24  0522 12/08/24  0333 12/07/24  0545 12/06/24  0452   PROTIME 27.1* 27.9* 28.4* 27.0* 27.6*   INR 2.47* 2.55* 2.61* 2.46* 2.52*                 Brief Urine Lab Results  (Last result in the past 365 days)        Color   Clarity   Blood    Leuk Est   Nitrite   Protein   CREAT   Urine HCG        11/27/24 1832 Dark Yellow   Turbid   Large (3+)   Moderate (2+)   Negative   >=300 mg/dL (3+)                 Microbiology Results (last 10 days)       ** No results found for the last 240 hours. **              Results for orders placed during the hospital encounter of 06/14/23    Adult Transthoracic Echo Complete W/ Cont if Necessary Per Protocol    Interpretation Summary  Technically limited study.  Normal left ventricular systolic function.  No significant valve abnormalities noted.      Time spent on Discharge including face to face service: Greater than 30 minutes      Electronically signed by Ramon Lei MD, 12/10/24, 1:25 PM EST.

## 2024-12-10 NOTE — PLAN OF CARE
Goal Outcome Evaluation:            Pt discharging to [facility] for d/c.    Deann Tong RN

## 2024-12-10 NOTE — PROGRESS NOTES
Pharmacy to Dose: Warfarin  Consulting provider: Bakari   Indication for warfarin: Afib  Goal INR range: 2 - 3  Home warfarin dose:  Restarting warfarin therapy. Patient previously on warfarin  in May, 2024. Recently on apixaban, per note apixaban makes patient feel funny, and would like to restart warfarin  Previous warfarin dose: 4 mg, then 5mg, then 5mg, rotating. Previous admission in April, 2024: average dose of 5.3 mg    Date INR Warfarin Dose Given   11/23 1.14 5 mg   11/24 1.32 5 mg   11/25 1.52 7.5 mg   11/26 1.94 5 mg (per MD)   11/27 2.30 5 mg   11/28 2.65 5 mg   11/29 3.27 Held by MD   11/30 3.35 Held by MD   12/1 3.44 Held by MD   12/2 3.10 Held by MD   12/3 2.80 5 mg   12/4 3.15 HOLD   12/5 3.04 HOLD   12/6 2.52 4 mg   12/7 2.46 4 mg per MD   12/8 2.61 3 mg   12/9 2.55 Pt Refused   12/10 2.47 4 mg          Any Vitamin K given?: No  Drug interactions Reviewed: Yes.  Is patient on bridging therapy with another anticoagulant?: No    Plan:   -  INR remains therapeutic at 2.47 today.  Patient refused all oral medications yesterday.   -  Will order Warfarin 4mg x 1 today.   -  Daily INR's ordered    Thank you for this consult. Pharmacy will continue to monitor.   Brent Acevedo

## 2024-12-10 NOTE — SIGNIFICANT NOTE
Wound Eval / Progress Noted     Tammy     Patient Name: Inez Doyle  : 1950  MRN: [id number]  Today's Date: 2024                 Admit Date: 2024    Visit Dx:    ICD-10-CM ICD-9-CM   1. Chronic wound infection of abdomen, sequela  S31.109S 906.0    L08.9    2. Hypokalemia  E87.6 276.8   3. Anorexia  R63.0 783.0   4. Oropharyngeal dysphagia  R13.12 787.22   5. Difficulty walking  R26.2 719.7   6. Chronic wound infection of abdomen, initial encounter  S31.109A 958.3    L08.9          AMS (altered mental status)    Severe protein-calorie malnutrition    Chronic wound infection of abdomen        Past Medical History:   Diagnosis Date    A-fib     Anemia     Anxiety     Asthma     Candidiasis of skin and nail     CHF (congestive heart failure)     COPD (chronic obstructive pulmonary disease)     Depression     Diabetes mellitus     GERD (gastroesophageal reflux disease)     Hyperlipidemia     Hypertension     Hypokalemia     Hypomagnesemia     Intervertebral disc disease     Obesity     Osteoarthritis     Panniculitis     Sleep apnea     Vitamin D deficiency         Past Surgical History:   Procedure Laterality Date    BACK SURGERY      STOMACH SURGERY      TRUNK DEBRIDEMENT N/A 2024    Procedure: TRUNK DEBRIDEMENT;  Surgeon: Shalonda Brown MD;  Location: [facility];  Service: Plastics;  Laterality: N/A;    WOUND DEBRIDEMENT Bilateral 2024    Procedure: TRUNK DEBRIDEMENTabdominal wall debridement with  closure and wound vac placement;  Surgeon: Shalonda Brown MD;  Location: [facility];  Service: Plastics;  Laterality: Bilateral;         Physical Assessment:     24 1935   Wound 24 1800 anterior abdomen   Placement Date/Time: 24   Orientation: anterior  Location: abdomen  Primary Wound Type: Incision   Wound Image     Dressing Appearance dry;intact   Closure None   Base moist;red;yellow;slough;subcutaneous   Red (%), Wound Tissue Color 50    Yellow (%), Wound Tissue Color 50   Periwound dry;maroon/purple;redness   Periwound Temperature warm   Periwound Skin Turgor firm   Edges open   Wound Length (cm) 3.4 cm   Wound Width (cm) 23 cm   Wound Depth (cm) 4.5 cm   Wound Surface Area (cm^2) 78.2 cm^2   Wound Volume (cm^3) 351.9 cm^3   Tunneling [Depth (cm)/Location] Greater than 15cm / 9 o'clock   Drainage Characteristics/Odor serosanguineous   Drainage Amount small   Care, Wound cleansed with;irrigated with;sterile normal saline;negative pressure wound therapy   Dressing Care dressing removed;dressing applied;foam;transparent film   Periwound Care absorptive dressing applied   Wound 12/02/24 1601 Right lower abdomen surgical   Placement Date/Time: 12/02/24 1601   Side: Right  Orientation: lower  Location: abdomen  Primary Wound Type: Incision  Type: surgical   Wound Image     Dressing Appearance dry;intact   Closure Staples;Sutures  (retention sutures)   Base moist;yellow;slough;red   Red (%), Wound Tissue Color 50   Yellow (%), Wound Tissue Color 50   Periwound dry;ecchymotic   Periwound Temperature warm   Periwound Skin Turgor firm   Edges open;rolled/closed   Wound Length (cm) 1.4 cm   Wound Width (cm) 29 cm   Wound Depth (cm) 6.4 cm  (previous area of opening at most lateral aspect: 3.4cm)   Wound Surface Area (cm^2) 40.6 cm^2   Wound Volume (cm^3) 259.84 cm^3   Tunneling [Depth (cm)/Location] greater then 15 cm / 3 o'clock   Drainage Characteristics/Odor serosanguineous   Drainage Amount scant   Care, Wound cleansed with;irrigated with;sterile normal saline;negative pressure wound therapy   Dressing Care dressing removed;dressing applied;foam;non-adherent;petroleum-based;gauze;transparent film   Periwound Care absorptive dressing applied   NPWT (Negative Pressure Wound Therapy) 12/02/24 1614 abdomen   Placement Date/Time: 12/02/24 1614   Location: abdomen   Therapy Setting continuous therapy   Dressing foam, black;foam, white;transparent dressing    Contact Layer Vaseline-embedded gauze   Pressure Setting 125 mmHg   Sponges Inserted 6;other (see comments)  (3 black, 3 white, and 1 black for track pad)   Sponges Removed 5;other (see comments)  (3 black, 2 white, 1 track pad)   Finger sweep complete Yes     Wound Check / Follow-up:  Patient seen today for wound VAC dressing change. Patient is awake and alert; however, she is noted to be confused and unable to answer questions correctly. Wound VAC is functioning properly with no signs of leaks, or alarms.  Provided primary RN with an outline of wound care treatment while inpatient to send with patient when she returns to [facility].     Abdominal incision spans across a majority of lower abdominal tissue. Total length is measured at 52 cm.  Periwound tissue spanning over anterior aspect of abdomen is noted to be firm.  Firmness of abdominal tissue is not a new finding and already known to plastic surgeon.       Wound VAC is in place to left aspect of wound base which is open. Dressing removed with adhesive remover spray.  One white foam removed from tunnel, and three black foams (the third spanning across to right aspect wound base) removed from wound base.  Wound base presents with moist red tissue with visible granulation, tightly adhered yellow slough, and yellow adipose tissue.  Periwound tissue is dry with areas of redness and purple coloration. Area of purple coloration near center of open tissue is noted to have expanded since previous assessment. Yellow slough is now noted at edges of discoloration, with color changes within the tissue reflecting necrosis.   A small amount of serosanguineous drainage was noted, which was controlled by cleansing with normal saline and gauze.  A tunnel is located at 9 o'clock with greater than 15 cm of depth, which spans beneath incisional line which is closed with staples.  Cleansed with normal saline and gauze, blotted dry.  Skin-Prep was applied to  periwound tissue.  Periwound skin was prepped with transparent film. One piece of white foam was lightly tucked into tunnel at 9 o'clock.  Open wound base was then lightly filled with three pieces of black foam, with the last piece also being utilized to cover incision to right aspect of abdomen, merging the two dressings into one. A track pad was made on right aspect of wound base with an additional piece of black foam, for a total of four pieces. Dressing was connected to wound VAC suction at 125 mmHg. Seal obtained with no signs of lifting or leaking. Color change noted to black foam, demonstrating connection to white foam within tunnel. Recommending to continue wound VAC therapy with Monday, Wednesday, and Friday dressing changes.      Wound VAC is in place to right aspect of abdominal incision, with area of staple closure to medial aspect, and an area with retention suture closure to the lateral aspect. Dressing removed with adhesive remover spray.  Staple closure presents with intermittent areas of closed tissue, along with intermittent areas of yellow slough.  Tissue within area of retention sutures present with moist red tissue and yellow slough. Periwound tissue is dry with redness and ecchymosis. A tunnel is present to the lateral aspect of wound base with greater than 15 cm of depth noted at 3 o'clock, which runs beneath incision line. New area of depth and tunneling also noted within portion of moist red tissue between previous opening and staple line. This area has the greatest depth within the wound base with 6.4 cm measured. Cleansed with normal saline and gauze, blotted dry. Skin prep applied to periwound and periwound skin was draped with transparent film.  One piece of white foam was lightly tucked into tunnel noted to lateral aspect. An additional piece of white foam was lightly tucked into new area of depth noted. Nonadherent petroleum-based gauze was placed over area of staple closure. One piece  of black foam was applied on top of nonadherent petroleum-based gauze over staple closure, as well as over area of moist red tissue and yellow slough, which covered both pieces of white foam. This black foam spans the entirety of the abdominal incision and connects the left and right aspect wound bases. Foams were secured with transparent drape. A track pad was made with an additional piece of black foam, for a total of four pieces of black foam for the entire abdominal wound. Dressing was connected to wound VAC suction at 125 mmHg. Seal obtained with no signs of lifting or leaking. Color change noted to black foam, demonstrating connection to white foam within tunnel and white foam within new area of depth. Recommending to continue wound VAC therapy with Monday, Wednesday, and Friday dressing changes.        Impression: Abdominal incision post debridement, Wound VAC to open wound base to left aspect of abdomen and right aspect of abdomen with staple and retention suture closure.     Short term goals:  Regain skin integrity, skin protection, negative pressure wound therapy, every three day dressing changes.    Candi Mccauley RN    12/9/2024    23:58 EST

## 2024-12-10 NOTE — PLAN OF CARE
Goal Outcome Evaluation:              Outcome Evaluation: Alert to self, patient was noncompliant with medications today, refused all medications today.  khan cath in place, no other changes this shift

## (undated) DEVICE — DRSNG PAD ABD 8X10IN STRL

## (undated) DEVICE — PENCL SMOKE/EVAC MEGADYNE TELESCP 10FT

## (undated) DEVICE — VAGINAL PREP TRAY: Brand: MEDLINE INDUSTRIES, INC.

## (undated) DEVICE — GLOVE,SURG,SENSICARE SLT,LF,PF,5.5: Brand: MEDLINE

## (undated) DEVICE — 3M™ V.A.C. VERAFLO CLEANSE™ DRESSING, ULTVCL05MD, MEDIUM, CAVILON WARNING LABEL, 5/CS: Brand: 3M™ V.A.C. VERAFLO CLEANSE™

## (undated) DEVICE — VASHE WOUND SOLUTION IS A SKIN, WOUND, BURN CLEANSING SOLUTION THAT IS FOR USE FOR DEBRIDEMENT, IRRIGATION, PHYSICAL DISRUPTION OF BIOFILM AND REMOVAL OF MICROORGANISMS.  THIS IS THE 250 ML (8.5 FL OZ)) SIZE WITH AN INSTILLATION CAP.: Brand: VASHE WOUND SOLUTION 250 ML (8.5 FL OZ) INSTILLATION CAP CONTAINER

## (undated) DEVICE — GAUZE,SPONGE,4"X4",16PLY,STRL,LF,10/TRAY: Brand: MEDLINE

## (undated) DEVICE — KT CANSTR VAC WND W/ISOLYSER SENSATRAC 500CC 5CS

## (undated) DEVICE — INTENDED FOR TISSUE SEPARATION, AND OTHER PROCEDURES THAT REQUIRE A SHARP SURGICAL BLADE TO PUNCTURE OR CUT.: Brand: BARD-PARKER ® CARBON RIB-BACK BLADES

## (undated) DEVICE — PROXIMATE RH ROTATING HEAD SKIN STAPLERS (35 WIDE) CONTAINS 35 STAINLESS STEEL STAPLES: Brand: PROXIMATE

## (undated) DEVICE — HYPODERMIC SAFETY NEEDLE: Brand: MONOJECT

## (undated) DEVICE — DRSNG WND VAC GRANUFOAM SENSATRAC MD 5PK

## (undated) DEVICE — VAC VERALINK CASSETTE: Brand: V.A.C. VERAT.R.A.C. DUO™

## (undated) DEVICE — SUT ETHLN 2/0 FSLX 30IN 1674H

## (undated) DEVICE — STERILE POLYISOPRENE POWDER-FREE SURGICAL GLOVES WITH EMOLLIENT COATING: Brand: PROTEXIS

## (undated) DEVICE — BIOPATCH™ ANTIMICROBIAL DRESSING WITH CHLORHEXIDINE GLUCONATE IS A HYDROPHILLIC POLYURETHANE ABSORPTIVE FOAM WITH CHLORHEXIDINE GLUCONATE (CHG) WHICH INHIBITS BACTERIAL GROWTH UNDER THE DRESSING. THE DRESSING IS INTENDED TO BE USED TO ABSORB EXUDATE, COVER A WOUND CAUSED BY VASCULAR AND NONVASCULAR PERCUTANEOUS MEDICAL DEVICES DURING SURGERY, AS WELL AS REDUCE LOCAL INFECTION AND COLONIZATION OF MICROORGANISMS.: Brand: BIOPATCH

## (undated) DEVICE — SUT ETHLN 2 TP1 60IN 825G

## (undated) DEVICE — GLV SURG SENSICARE PI PF LF 7 GRN STRL

## (undated) DEVICE — BLAKE SILICONE DRAIN, 15 FR ROUND, HUBLESS WITH 3/16" TROCAR: Brand: BLAKE

## (undated) DEVICE — MARKER,SKIN,WI/RULER AND LABELS: Brand: MEDLINE

## (undated) DEVICE — BNDG ELAS CO-FLEX SLF ADHR 6IN 5YD LF STRL

## (undated) DEVICE — DRESSING,GAUZE,XEROFORM,CURAD,1"X8",ST: Brand: CURAD

## (undated) DEVICE — BNDG ELAS ECON W/CLIP 4IN 5YD LF STRL

## (undated) DEVICE — STPLR SKIN SUBCUTICULAR INSORB 2030

## (undated) DEVICE — JACKSON-PRATT 100CC BULB RESERVOIR: Brand: CARDINAL HEALTH

## (undated) DEVICE — SLV SCD KN/LEN ADJ EXPRSS BLENDED MD 1P/U

## (undated) DEVICE — GLOVE,SURG,SENSICARE SLT,LF,PF,6.5: Brand: MEDLINE

## (undated) DEVICE — PLASTIC MAJOR-LF: Brand: MEDLINE INDUSTRIES, INC.